# Patient Record
Sex: MALE | Race: WHITE | Employment: OTHER | URBAN - METROPOLITAN AREA
[De-identification: names, ages, dates, MRNs, and addresses within clinical notes are randomized per-mention and may not be internally consistent; named-entity substitution may affect disease eponyms.]

---

## 2017-01-25 ENCOUNTER — TELEPHONE (OUTPATIENT)
Dept: CARDIOLOGY CLINIC | Age: 67
End: 2017-01-25

## 2017-02-01 ENCOUNTER — TELEPHONE (OUTPATIENT)
Dept: CARDIOLOGY CLINIC | Age: 67
End: 2017-02-01

## 2017-02-01 ENCOUNTER — HOSPITAL ENCOUNTER (OUTPATIENT)
Dept: SURGERY | Age: 67
Discharge: HOME | End: 2017-02-01
Attending: OPHTHALMOLOGY | Admitting: OPHTHALMOLOGY

## 2017-02-01 VITALS
HEART RATE: 64 BPM | HEIGHT: 71 IN | BODY MASS INDEX: 28 KG/M2 | WEIGHT: 200 LBS | RESPIRATION RATE: 16 BRPM | OXYGEN SATURATION: 92 % | TEMPERATURE: 97.2 F | SYSTOLIC BLOOD PRESSURE: 102 MMHG | DIASTOLIC BLOOD PRESSURE: 64 MMHG

## 2017-02-01 RX ORDER — SODIUM CHLORIDE, SODIUM LACTATE, POTASSIUM CHLORIDE, CALCIUM CHLORIDE 600; 310; 30; 20 MG/100ML; MG/100ML; MG/100ML; MG/100ML
INJECTION, SOLUTION INTRAVENOUS CONTINUOUS
Status: DISCONTINUED | OUTPATIENT
Start: 2017-02-01 | End: 2017-02-02 | Stop reason: HOSPADM

## 2017-02-01 RX ORDER — CLOPIDOGREL BISULFATE 75 MG/1
75 TABLET ORAL DAILY
Qty: 90 TABLET | Refills: 0 | Status: SHIPPED | OUTPATIENT
Start: 2017-02-01 | End: 2017-04-06 | Stop reason: SDUPTHER

## 2017-02-01 RX ORDER — LISINOPRIL 10 MG/1
10 TABLET ORAL DAILY
Qty: 90 TABLET | Refills: 0 | Status: SHIPPED | OUTPATIENT
Start: 2017-02-01 | End: 2017-04-06 | Stop reason: SDUPTHER

## 2017-02-01 RX ADMIN — SODIUM CHLORIDE, SODIUM LACTATE, POTASSIUM CHLORIDE, CALCIUM CHLORIDE: 600; 310; 30; 20 INJECTION, SOLUTION INTRAVENOUS at 08:38

## 2017-02-01 ASSESSMENT — PAIN SCALES - GENERAL: PAINLEVEL_OUTOF10: 0

## 2017-02-01 ASSESSMENT — PAIN - FUNCTIONAL ASSESSMENT: PAIN_FUNCTIONAL_ASSESSMENT: 0-10

## 2017-02-10 RX ORDER — FUROSEMIDE 20 MG/1
20 TABLET ORAL DAILY
Qty: 90 TABLET | Refills: 0 | Status: SHIPPED | OUTPATIENT
Start: 2017-02-10 | End: 2017-05-16 | Stop reason: SDUPTHER

## 2017-02-22 ENCOUNTER — HOSPITAL ENCOUNTER (OUTPATIENT)
Dept: SURGERY | Age: 67
Discharge: HOME | End: 2017-02-22
Attending: OPHTHALMOLOGY | Admitting: OPHTHALMOLOGY

## 2017-02-22 RX ORDER — LABETALOL HYDROCHLORIDE 5 MG/ML
5 INJECTION, SOLUTION INTRAVENOUS EVERY 10 MIN PRN
Status: DISCONTINUED | OUTPATIENT
Start: 2017-02-22 | End: 2017-02-23 | Stop reason: HOSPADM

## 2017-02-22 RX ORDER — SODIUM CHLORIDE, SODIUM LACTATE, POTASSIUM CHLORIDE, CALCIUM CHLORIDE 600; 310; 30; 20 MG/100ML; MG/100ML; MG/100ML; MG/100ML
INJECTION, SOLUTION INTRAVENOUS CONTINUOUS
Status: DISCONTINUED | OUTPATIENT
Start: 2017-02-22 | End: 2017-02-23 | Stop reason: HOSPADM

## 2017-02-22 RX ORDER — MORPHINE SULFATE 10 MG/ML
2 INJECTION, SOLUTION INTRAMUSCULAR; INTRAVENOUS EVERY 5 MIN PRN
Status: DISCONTINUED | OUTPATIENT
Start: 2017-02-22 | End: 2017-02-23 | Stop reason: HOSPADM

## 2017-02-22 RX ORDER — SODIUM CHLORIDE 0.9 % (FLUSH) 0.9 %
10 SYRINGE (ML) INJECTION EVERY 12 HOURS SCHEDULED
Status: DISCONTINUED | OUTPATIENT
Start: 2017-02-22 | End: 2017-02-23 | Stop reason: HOSPADM

## 2017-02-22 RX ORDER — OXYCODONE HYDROCHLORIDE AND ACETAMINOPHEN 5; 325 MG/1; MG/1
2 TABLET ORAL PRN
Status: ACTIVE | OUTPATIENT
Start: 2017-02-22 | End: 2017-02-22

## 2017-02-22 RX ORDER — MEPERIDINE HYDROCHLORIDE 50 MG/ML
12.5 INJECTION INTRAMUSCULAR; INTRAVENOUS; SUBCUTANEOUS EVERY 5 MIN PRN
Status: DISCONTINUED | OUTPATIENT
Start: 2017-02-22 | End: 2017-02-23 | Stop reason: HOSPADM

## 2017-02-22 RX ORDER — LIDOCAINE HYDROCHLORIDE 10 MG/ML
1 INJECTION, SOLUTION EPIDURAL; INFILTRATION; INTRACAUDAL; PERINEURAL
Status: ACTIVE | OUTPATIENT
Start: 2017-02-22 | End: 2017-02-22

## 2017-02-22 RX ORDER — HYDRALAZINE HYDROCHLORIDE 20 MG/ML
5 INJECTION INTRAMUSCULAR; INTRAVENOUS EVERY 10 MIN PRN
Status: DISCONTINUED | OUTPATIENT
Start: 2017-02-22 | End: 2017-02-23 | Stop reason: HOSPADM

## 2017-02-22 RX ORDER — SODIUM CHLORIDE 0.9 % (FLUSH) 0.9 %
10 SYRINGE (ML) INJECTION PRN
Status: DISCONTINUED | OUTPATIENT
Start: 2017-02-22 | End: 2017-02-23 | Stop reason: HOSPADM

## 2017-02-22 RX ORDER — PROMETHAZINE HYDROCHLORIDE 25 MG/ML
6.25 INJECTION, SOLUTION INTRAMUSCULAR; INTRAVENOUS
Status: ACTIVE | OUTPATIENT
Start: 2017-02-22 | End: 2017-02-22

## 2017-02-22 RX ORDER — OXYCODONE HYDROCHLORIDE AND ACETAMINOPHEN 5; 325 MG/1; MG/1
1 TABLET ORAL PRN
Status: ACTIVE | OUTPATIENT
Start: 2017-02-22 | End: 2017-02-22

## 2017-02-22 RX ORDER — MORPHINE SULFATE 2 MG/ML
1 INJECTION, SOLUTION INTRAMUSCULAR; INTRAVENOUS EVERY 5 MIN PRN
Status: DISCONTINUED | OUTPATIENT
Start: 2017-02-22 | End: 2017-02-23 | Stop reason: HOSPADM

## 2017-02-22 RX ORDER — ONDANSETRON 2 MG/ML
4 INJECTION INTRAMUSCULAR; INTRAVENOUS
Status: ACTIVE | OUTPATIENT
Start: 2017-02-22 | End: 2017-02-22

## 2017-02-22 RX ORDER — DIPHENHYDRAMINE HYDROCHLORIDE 50 MG/ML
12.5 INJECTION INTRAMUSCULAR; INTRAVENOUS
Status: ACTIVE | OUTPATIENT
Start: 2017-02-22 | End: 2017-02-22

## 2017-03-01 ENCOUNTER — HOSPITAL ENCOUNTER (OUTPATIENT)
Dept: SURGERY | Age: 67
Discharge: HOME | End: 2017-03-01
Attending: OPHTHALMOLOGY | Admitting: OPHTHALMOLOGY

## 2017-03-01 VITALS
HEIGHT: 71 IN | RESPIRATION RATE: 16 BRPM | DIASTOLIC BLOOD PRESSURE: 63 MMHG | WEIGHT: 200 LBS | SYSTOLIC BLOOD PRESSURE: 97 MMHG | BODY MASS INDEX: 28 KG/M2 | TEMPERATURE: 97.2 F | OXYGEN SATURATION: 96 % | HEART RATE: 64 BPM

## 2017-03-01 RX ORDER — SODIUM CHLORIDE, SODIUM LACTATE, POTASSIUM CHLORIDE, CALCIUM CHLORIDE 600; 310; 30; 20 MG/100ML; MG/100ML; MG/100ML; MG/100ML
INJECTION, SOLUTION INTRAVENOUS CONTINUOUS
Status: DISCONTINUED | OUTPATIENT
Start: 2017-03-01 | End: 2017-03-02 | Stop reason: HOSPADM

## 2017-03-01 RX ORDER — LIDOCAINE HYDROCHLORIDE 10 MG/ML
1 INJECTION, SOLUTION EPIDURAL; INFILTRATION; INTRACAUDAL; PERINEURAL
Status: ACTIVE | OUTPATIENT
Start: 2017-03-01 | End: 2017-03-01

## 2017-03-01 RX ORDER — SODIUM CHLORIDE 0.9 % (FLUSH) 0.9 %
10 SYRINGE (ML) INJECTION PRN
Status: DISCONTINUED | OUTPATIENT
Start: 2017-03-01 | End: 2017-03-02 | Stop reason: HOSPADM

## 2017-03-01 RX ORDER — SODIUM CHLORIDE 0.9 % (FLUSH) 0.9 %
10 SYRINGE (ML) INJECTION EVERY 12 HOURS SCHEDULED
Status: DISCONTINUED | OUTPATIENT
Start: 2017-03-01 | End: 2017-03-02 | Stop reason: HOSPADM

## 2017-03-01 RX ADMIN — SODIUM CHLORIDE, SODIUM LACTATE, POTASSIUM CHLORIDE, CALCIUM CHLORIDE: 600; 310; 30; 20 INJECTION, SOLUTION INTRAVENOUS at 06:18

## 2017-03-01 ASSESSMENT — PAIN SCALES - GENERAL: PAINLEVEL_OUTOF10: 0

## 2017-03-01 ASSESSMENT — PAIN - FUNCTIONAL ASSESSMENT: PAIN_FUNCTIONAL_ASSESSMENT: 0-10

## 2017-03-09 ENCOUNTER — OFFICE VISIT (OUTPATIENT)
Dept: CARDIOLOGY CLINIC | Age: 67
End: 2017-03-09

## 2017-03-09 VITALS
HEART RATE: 88 BPM | HEIGHT: 70 IN | WEIGHT: 194 LBS | SYSTOLIC BLOOD PRESSURE: 96 MMHG | DIASTOLIC BLOOD PRESSURE: 58 MMHG | BODY MASS INDEX: 27.77 KG/M2 | OXYGEN SATURATION: 95 %

## 2017-03-09 DIAGNOSIS — Z95.810 AICD (AUTOMATIC CARDIOVERTER/DEFIBRILLATOR) PRESENT: ICD-10-CM

## 2017-03-09 DIAGNOSIS — I25.10 CORONARY ARTERY DISEASE INVOLVING NATIVE CORONARY ARTERY OF NATIVE HEART WITHOUT ANGINA PECTORIS: ICD-10-CM

## 2017-03-09 DIAGNOSIS — R07.2 PRECORDIAL PAIN: Primary | ICD-10-CM

## 2017-03-09 PROCEDURE — 99214 OFFICE O/P EST MOD 30 MIN: CPT | Performed by: INTERNAL MEDICINE

## 2017-03-09 RX ORDER — OMEPRAZOLE 20 MG/1
20 TABLET, DELAYED RELEASE ORAL DAILY
Status: ON HOLD | COMMUNITY
End: 2018-12-27

## 2017-03-14 ENCOUNTER — HOSPITAL ENCOUNTER (OUTPATIENT)
Dept: ENDOSCOPY | Age: 67
Discharge: HOME | End: 2017-03-14
Attending: INTERNAL MEDICINE | Admitting: INTERNAL MEDICINE

## 2017-03-14 VITALS
SYSTOLIC BLOOD PRESSURE: 112 MMHG | HEART RATE: 77 BPM | WEIGHT: 194 LBS | OXYGEN SATURATION: 96 % | TEMPERATURE: 97.9 F | DIASTOLIC BLOOD PRESSURE: 75 MMHG | HEIGHT: 71 IN | BODY MASS INDEX: 27.16 KG/M2 | RESPIRATION RATE: 20 BRPM

## 2017-04-07 RX ORDER — LISINOPRIL 10 MG/1
10 TABLET ORAL DAILY
Qty: 90 TABLET | Refills: 3 | Status: SHIPPED | OUTPATIENT
Start: 2017-04-07 | End: 2018-05-01 | Stop reason: SDUPTHER

## 2017-04-07 RX ORDER — CLOPIDOGREL BISULFATE 75 MG/1
75 TABLET ORAL DAILY
Qty: 90 TABLET | Refills: 3 | Status: SHIPPED | OUTPATIENT
Start: 2017-04-07 | End: 2018-05-01 | Stop reason: SDUPTHER

## 2017-05-10 RX ORDER — CARVEDILOL 3.12 MG/1
3.12 TABLET ORAL 2 TIMES DAILY WITH MEALS
Qty: 180 TABLET | Refills: 3 | Status: SHIPPED | OUTPATIENT
Start: 2017-05-10 | End: 2018-06-05 | Stop reason: SDUPTHER

## 2017-05-16 RX ORDER — FUROSEMIDE 20 MG/1
20 TABLET ORAL DAILY
Qty: 90 TABLET | Refills: 2 | Status: SHIPPED | OUTPATIENT
Start: 2017-05-16 | End: 2017-10-27 | Stop reason: SDUPTHER

## 2017-06-06 ENCOUNTER — TELEPHONE (OUTPATIENT)
Dept: CARDIOLOGY CLINIC | Age: 67
End: 2017-06-06

## 2017-06-08 ENCOUNTER — OFFICE VISIT (OUTPATIENT)
Dept: ORTHOPEDIC SURGERY | Age: 67
End: 2017-06-08

## 2017-06-08 VITALS
HEIGHT: 70 IN | BODY MASS INDEX: 27.77 KG/M2 | WEIGHT: 194 LBS | SYSTOLIC BLOOD PRESSURE: 107 MMHG | DIASTOLIC BLOOD PRESSURE: 78 MMHG | HEART RATE: 67 BPM

## 2017-06-08 DIAGNOSIS — M17.10 ARTHRITIS OF KNEE: Primary | ICD-10-CM

## 2017-06-08 PROCEDURE — 99213 OFFICE O/P EST LOW 20 MIN: CPT | Performed by: ORTHOPAEDIC SURGERY

## 2017-06-08 PROCEDURE — 73562 X-RAY EXAM OF KNEE 3: CPT | Performed by: ORTHOPAEDIC SURGERY

## 2017-06-08 RX ORDER — MELOXICAM 15 MG/1
15 TABLET ORAL DAILY
Qty: 30 TABLET | Refills: 0 | Status: SHIPPED | OUTPATIENT
Start: 2017-06-08 | End: 2017-09-21 | Stop reason: ALTCHOICE

## 2017-08-01 ENCOUNTER — NURSE ONLY (OUTPATIENT)
Dept: CARDIOLOGY CLINIC | Age: 67
End: 2017-08-01

## 2017-08-01 DIAGNOSIS — Z95.810 AICD (AUTOMATIC CARDIOVERTER/DEFIBRILLATOR) PRESENT: Primary | ICD-10-CM

## 2017-08-01 DIAGNOSIS — I42.9 CARDIOMYOPATHY, UNSPECIFIED TYPE (HCC): ICD-10-CM

## 2017-08-01 PROCEDURE — 93295 DEV INTERROG REMOTE 1/2/MLT: CPT | Performed by: INTERNAL MEDICINE

## 2017-08-01 PROCEDURE — 93296 REM INTERROG EVL PM/IDS: CPT | Performed by: INTERNAL MEDICINE

## 2017-08-25 ENCOUNTER — HOSPITAL ENCOUNTER (OUTPATIENT)
Dept: OTHER | Age: 67
Discharge: HOME | End: 2017-08-25
Attending: INTERNAL MEDICINE | Admitting: INTERNAL MEDICINE

## 2017-08-25 DIAGNOSIS — E78.5 HYPERLIPIDEMIA, UNSPECIFIED HYPERLIPIDEMIA TYPE: Primary | ICD-10-CM

## 2017-08-25 LAB
CHOLESTEROL, FASTING: 171 MG/DL (ref 0–199)
HDLC SERPL-MCNC: 41 MG/DL (ref 40–60)
LDL CHOLESTEROL CALCULATED: 106 MG/DL
TRIGLYCERIDE, FASTING: 121 MG/DL (ref 0–150)
VLDLC SERPL CALC-MCNC: 24 MG/DL

## 2017-08-27 LAB
ALBUMIN SERPL-MCNC: 5 G/DL (ref 3.4–5)
ALP BLD-CCNC: 93 U/L (ref 40–129)
ALT SERPL-CCNC: 11 U/L (ref 10–40)
AST SERPL-CCNC: 16 U/L (ref 15–37)
BILIRUB SERPL-MCNC: 0.3 MG/DL (ref 0–1)
BILIRUBIN DIRECT: <0.2 MG/DL (ref 0–0.3)
BILIRUBIN, INDIRECT: NORMAL MG/DL (ref 0–1)
TOTAL PROTEIN: 7.7 G/DL (ref 6.4–8.2)

## 2017-08-28 ENCOUNTER — TELEPHONE (OUTPATIENT)
Dept: CARDIOLOGY CLINIC | Age: 67
End: 2017-08-28

## 2017-09-21 ENCOUNTER — OFFICE VISIT (OUTPATIENT)
Dept: CARDIOLOGY CLINIC | Age: 67
End: 2017-09-21

## 2017-09-21 VITALS
BODY MASS INDEX: 27.77 KG/M2 | WEIGHT: 194 LBS | DIASTOLIC BLOOD PRESSURE: 60 MMHG | HEART RATE: 80 BPM | SYSTOLIC BLOOD PRESSURE: 114 MMHG | HEIGHT: 70 IN | OXYGEN SATURATION: 96 %

## 2017-09-21 DIAGNOSIS — Z95.810 AICD (AUTOMATIC CARDIOVERTER/DEFIBRILLATOR) PRESENT: ICD-10-CM

## 2017-09-21 DIAGNOSIS — E78.2 MIXED HYPERLIPIDEMIA: ICD-10-CM

## 2017-09-21 DIAGNOSIS — I50.22 CHRONIC SYSTOLIC CHF (CONGESTIVE HEART FAILURE), NYHA CLASS 3 (HCC): ICD-10-CM

## 2017-09-21 DIAGNOSIS — I25.10 CORONARY ARTERY DISEASE INVOLVING NATIVE CORONARY ARTERY OF NATIVE HEART WITHOUT ANGINA PECTORIS: Primary | ICD-10-CM

## 2017-09-21 PROCEDURE — 99214 OFFICE O/P EST MOD 30 MIN: CPT | Performed by: INTERNAL MEDICINE

## 2017-09-21 RX ORDER — ATORVASTATIN CALCIUM 80 MG/1
80 TABLET, FILM COATED ORAL DAILY
Qty: 90 TABLET | Refills: 3 | Status: SHIPPED | OUTPATIENT
Start: 2017-09-21 | End: 2018-07-23 | Stop reason: ALTCHOICE

## 2017-10-25 NOTE — TELEPHONE ENCOUNTER
Pt states he needs a refill on his gemfibrozil (LOPID) 600 MG tablet sent to [facility] mail order pharmacy. He also states that he only has 1 more refill on his Furosemide 20 mg and would like a new Rx sent for that as well if possible.  Last ov 9/21/17

## 2017-10-26 RX ORDER — GEMFIBROZIL 600 MG/1
600 TABLET, FILM COATED ORAL
Qty: 180 TABLET | Refills: 3 | Status: SHIPPED | OUTPATIENT
Start: 2017-10-26 | End: 2017-10-27 | Stop reason: SDUPTHER

## 2017-10-27 RX ORDER — GEMFIBROZIL 600 MG/1
600 TABLET, FILM COATED ORAL
Qty: 180 TABLET | Refills: 3 | Status: SHIPPED | OUTPATIENT
Start: 2017-10-27 | End: 2018-11-26 | Stop reason: SDUPTHER

## 2017-10-27 RX ORDER — FUROSEMIDE 20 MG/1
20 TABLET ORAL DAILY
Qty: 90 TABLET | Refills: 2 | Status: SHIPPED | OUTPATIENT
Start: 2017-10-27 | End: 2018-05-01 | Stop reason: SDUPTHER

## 2017-10-31 ENCOUNTER — NURSE ONLY (OUTPATIENT)
Dept: CARDIOLOGY CLINIC | Age: 67
End: 2017-10-31

## 2017-10-31 DIAGNOSIS — Z95.810 AICD (AUTOMATIC CARDIOVERTER/DEFIBRILLATOR) PRESENT: Primary | ICD-10-CM

## 2017-10-31 DIAGNOSIS — I42.9 CARDIOMYOPATHY, UNSPECIFIED TYPE (HCC): ICD-10-CM

## 2017-10-31 PROCEDURE — 93295 DEV INTERROG REMOTE 1/2/MLT: CPT | Performed by: INTERNAL MEDICINE

## 2017-10-31 PROCEDURE — 93296 REM INTERROG EVL PM/IDS: CPT | Performed by: INTERNAL MEDICINE

## 2017-10-31 NOTE — LETTER
[facility] [phone]  [address] [place] [address]  Martine Cooks [address] [facility] [phone]    Pacemaker/Defibrillator Clinic          11/07/17        Matteo Yap  [address]  [place] [address]        Dear Matteo Yap    This letter is to inform you that we received the transmission from your monitor at home that checks your pacemaker and/or defibrillator, or implanted heart monitor. Everything is within normal limits. The next date your monitor will automatically transmit will be 1/30/18. Please do not send additional routine transmissions unless specifically requested. Your device and monitor are wireless and most transmit cellularly, but please periodically check your monitor is still plugged in to the electrical outlet. If you still use the telephone land line to send please ensure the connection to the phone antwan is secure. This will help to ensure successful automatic transmissions in the future. Also, the monitor needs to be close to you while sleeping at night. Please be aware that the remote device transmission sites are periodically monitored only during regular business hours during which simultaneous in-office device clinics are being run. If your transmission requires attention, we will contact you as soon as possible. Thank you.             Larissa [address]

## 2018-01-30 ENCOUNTER — NURSE ONLY (OUTPATIENT)
Dept: CARDIOLOGY CLINIC | Age: 68
End: 2018-01-30

## 2018-01-30 DIAGNOSIS — Z95.810 AICD (AUTOMATIC CARDIOVERTER/DEFIBRILLATOR) PRESENT: Primary | ICD-10-CM

## 2018-01-30 DIAGNOSIS — I42.9 CARDIOMYOPATHY, UNSPECIFIED TYPE (HCC): ICD-10-CM

## 2018-01-30 PROCEDURE — 93296 REM INTERROG EVL PM/IDS: CPT | Performed by: INTERNAL MEDICINE

## 2018-01-30 PROCEDURE — 93295 DEV INTERROG REMOTE 1/2/MLT: CPT | Performed by: INTERNAL MEDICINE

## 2018-05-01 ENCOUNTER — NURSE ONLY (OUTPATIENT)
Dept: CARDIOLOGY CLINIC | Age: 68
End: 2018-05-01

## 2018-05-01 DIAGNOSIS — Z95.810 AICD (AUTOMATIC CARDIOVERTER/DEFIBRILLATOR) PRESENT: Primary | ICD-10-CM

## 2018-05-01 DIAGNOSIS — I42.9 CARDIOMYOPATHY, UNSPECIFIED TYPE (HCC): ICD-10-CM

## 2018-05-01 PROCEDURE — 93295 DEV INTERROG REMOTE 1/2/MLT: CPT | Performed by: INTERNAL MEDICINE

## 2018-05-01 PROCEDURE — 93296 REM INTERROG EVL PM/IDS: CPT | Performed by: INTERNAL MEDICINE

## 2018-05-01 RX ORDER — CLOPIDOGREL BISULFATE 75 MG/1
75 TABLET ORAL DAILY
Qty: 90 TABLET | Refills: 5 | Status: SHIPPED | OUTPATIENT
Start: 2018-05-01 | End: 2019-03-15 | Stop reason: SDUPTHER

## 2018-05-01 RX ORDER — FUROSEMIDE 20 MG/1
20 TABLET ORAL DAILY
Qty: 90 TABLET | Refills: 5 | Status: SHIPPED | OUTPATIENT
Start: 2018-05-01 | End: 2019-03-15 | Stop reason: SDUPTHER

## 2018-05-01 RX ORDER — LISINOPRIL 10 MG/1
10 TABLET ORAL DAILY
Qty: 90 TABLET | Refills: 5 | Status: ON HOLD | OUTPATIENT
Start: 2018-05-01 | End: 2018-12-31

## 2018-05-29 ENCOUNTER — HOSPITAL ENCOUNTER (OUTPATIENT)
Dept: OTHER | Age: 68
Discharge: HOME | End: 2018-05-29
Attending: INTERNAL MEDICINE | Admitting: INTERNAL MEDICINE

## 2018-05-29 LAB
CHOLESTEROL, FASTING: 180 MG/DL (ref 0–199)
HDLC SERPL-MCNC: 47 MG/DL (ref 40–60)
LDL CHOLESTEROL CALCULATED: 106 MG/DL
TRIGLYCERIDE, FASTING: 133 MG/DL (ref 0–150)
VLDLC SERPL CALC-MCNC: 27 MG/DL

## 2018-05-30 DIAGNOSIS — Z79.899 MEDICATION MANAGEMENT: Primary | ICD-10-CM

## 2018-05-30 RX ORDER — ROSUVASTATIN CALCIUM 40 MG/1
40 TABLET, COATED ORAL EVERY EVENING
Qty: 30 TABLET | Refills: 5 | Status: SHIPPED | OUTPATIENT
Start: 2018-05-30 | End: 2018-07-23 | Stop reason: SDUPTHER

## 2018-06-05 RX ORDER — CARVEDILOL 3.12 MG/1
3.12 TABLET ORAL 2 TIMES DAILY WITH MEALS
Qty: 180 TABLET | Refills: 3 | Status: SHIPPED | OUTPATIENT
Start: 2018-06-05 | End: 2019-03-15 | Stop reason: SDUPTHER

## 2018-06-26 ENCOUNTER — PROCEDURE VISIT (OUTPATIENT)
Dept: CARDIOLOGY CLINIC | Age: 68
End: 2018-06-26

## 2018-06-26 DIAGNOSIS — Z95.810 AICD (AUTOMATIC CARDIOVERTER/DEFIBRILLATOR) PRESENT: ICD-10-CM

## 2018-07-23 ENCOUNTER — TELEPHONE (OUTPATIENT)
Dept: CARDIOLOGY CLINIC | Age: 68
End: 2018-07-23

## 2018-07-23 ENCOUNTER — OFFICE VISIT (OUTPATIENT)
Dept: CARDIOLOGY CLINIC | Age: 68
End: 2018-07-23

## 2018-07-23 VITALS
HEART RATE: 78 BPM | WEIGHT: 194 LBS | BODY MASS INDEX: 27.77 KG/M2 | OXYGEN SATURATION: 97 % | HEIGHT: 70 IN | SYSTOLIC BLOOD PRESSURE: 116 MMHG | DIASTOLIC BLOOD PRESSURE: 66 MMHG

## 2018-07-23 DIAGNOSIS — E78.2 MIXED HYPERLIPIDEMIA: ICD-10-CM

## 2018-07-23 DIAGNOSIS — Z95.810 AICD (AUTOMATIC CARDIOVERTER/DEFIBRILLATOR) PRESENT: ICD-10-CM

## 2018-07-23 DIAGNOSIS — I25.10 CORONARY ARTERY DISEASE INVOLVING NATIVE CORONARY ARTERY OF NATIVE HEART WITHOUT ANGINA PECTORIS: Primary | ICD-10-CM

## 2018-07-23 DIAGNOSIS — I50.22 CHRONIC SYSTOLIC CHF (CONGESTIVE HEART FAILURE), NYHA CLASS 3 (HCC): ICD-10-CM

## 2018-07-23 PROCEDURE — 4040F PNEUMOC VAC/ADMIN/RCVD: CPT | Performed by: INTERNAL MEDICINE

## 2018-07-23 PROCEDURE — G8427 DOCREV CUR MEDS BY ELIG CLIN: HCPCS | Performed by: INTERNAL MEDICINE

## 2018-07-23 PROCEDURE — 1101F PT FALLS ASSESS-DOCD LE1/YR: CPT | Performed by: INTERNAL MEDICINE

## 2018-07-23 PROCEDURE — 3017F COLORECTAL CA SCREEN DOC REV: CPT | Performed by: INTERNAL MEDICINE

## 2018-07-23 PROCEDURE — G8598 ASA/ANTIPLAT THER USED: HCPCS | Performed by: INTERNAL MEDICINE

## 2018-07-23 PROCEDURE — 1123F ACP DISCUSS/DSCN MKR DOCD: CPT | Performed by: INTERNAL MEDICINE

## 2018-07-23 PROCEDURE — 4004F PT TOBACCO SCREEN RCVD TLK: CPT | Performed by: INTERNAL MEDICINE

## 2018-07-23 PROCEDURE — 99214 OFFICE O/P EST MOD 30 MIN: CPT | Performed by: INTERNAL MEDICINE

## 2018-07-23 PROCEDURE — G8419 CALC BMI OUT NRM PARAM NOF/U: HCPCS | Performed by: INTERNAL MEDICINE

## 2018-07-23 RX ORDER — ROSUVASTATIN CALCIUM 40 MG/1
40 TABLET, COATED ORAL EVERY EVENING
Qty: 90 TABLET | Refills: 3 | Status: SHIPPED | OUTPATIENT
Start: 2018-07-23 | End: 2019-03-15 | Stop reason: SDUPTHER

## 2018-07-23 NOTE — PROGRESS NOTES
Place 1 tablet under the tongue every 5 minutes as needed for Chest pain 8/29/16  Yes Mehrdad Arshad MD   aspirin 81 MG tablet Take 81 mg by mouth daily. Yes Historical Provider, MD        Allergies:  Patient has no known allergies. Review of Systems:   · Constitutional: there has been no unanticipated weight loss. There's been no change in energy level, sleep pattern, or activity level. · Eyes: No visual changes or diplopia. No scleral icterus. · ENT: No Headaches, hearing loss or vertigo. No mouth sores or sore throat. · Cardiovascular: Reviewed in HPI  · Respiratory: No cough or wheezing, no sputum production. No hematemesis. · Gastrointestinal: No abdominal pain, appetite loss, blood in stools. No change in bowel or bladder habits. · Genitourinary: No dysuria, trouble voiding, or hematuria. · Musculoskeletal:  No gait disturbance, weakness or joint complaints. · Integumentary: No rash or pruritis. · Neurological: No headache, diplopia, change in muscle strength, numbness or tingling. No change in gait, balance, coordination, mood, affect, memory, mentation, behavior. · Psychiatric: No anxiety, no depression. · Endocrine: No malaise, fatigue or temperature intolerance. No excessive thirst, fluid intake, or urination. No tremor. · Hematologic/Lymphatic: No abnormal bruising or bleeding, blood clots or swollen lymph nodes. · Allergic/Immunologic: No nasal congestion or hives.     Physical Examination:    Vitals:    07/23/18 1451   BP: 116/66   Pulse: 78   SpO2: 97%        Constitutional and General Appearance: NAD   Respiratory:  · Normal excursion and expansion without use of accessory muscles  · Resp Auscultation: soft clear bs  Cardiovascular:  · The apical impulses not displaced  · Heart tones are crisp and normal  · Cervical veins are not engorged  · The carotid upstroke is normal in amplitude and contour without delay or bruit  · Distant soft S1S2, No S3, No Murmur  · Peripheral anterior and apical  akinesis. (no change from stress test from 11/13 and 2/15 studies). I have educated Mr. Blair Swenson that he may occasionally get SOB and/or CP related to his ischemic CM but to call if these increase in severity or frequency. Device check 9/21/17 showed 2 short brief episodes of VT. Most recent device check 5/1/18 Latitude remote transmission shows normal function. 1 NEW NSVT EVENT AND 1 NEW PAT EPISODE RECORDED IN THE LAST 3 MONTHS, both < 7 seconds      2. CAD:  Cardiac cath 6/2014 --> Stable known ischemic heart disease. He also has hx MI. There are no concerning symptoms for angina currently. Atypical chest pain may be due to severe cardiomyopathy. No need for cardiac testing at this time. 3. Hyperlipidemia:  Most recent lipids 5/29/18 see results I personally reviewed (see above). I switched him from high dose lipitor to crestor in late May due to YCC=497. Recheck labs now cmp, lipids      Plan:  1. He is doing well overall from a cardiac standpoint. No need for cardiac testing at this time. 2.  Meds reviewed and refilled as warranted  3. Follow up with me in 6 months   4. Smoking cessation is likely not going to happen. He is not very interested. Cost of prescription medications and patient compliance have been reviewed with patient. All questions answered. Thank you for allowing me to participate in the care of this individual.    Anthony Mcadams.  Aria Howard M.D., [facility]

## 2018-07-23 NOTE — COMMUNICATION BODY
day.  His wife is present at exam today. Past Medical History:   has a past medical history of AICD (automatic cardioverter/defibrillator) present; CAD (coronary artery disease); CHF (congestive heart failure) ([facility] [address] Chronic systolic CHF (congestive heart failure), NYHA class 3 ([facility] [address] Hyperlipidemia; and MI (myocardial infarction). Surgical History:   has a past surgical history that includes Coronary angioplasty with stent; Cardiac defibrillator placement; knee surgery; Cardiac defibrillator placement; Colonoscopy; Cataract removal with implant (Right, 02/01/2017); Cataract removal with implant (Left, 03/01/2017); and eye surgery. Social History:   reports that he has been smoking Cigarettes. He has a 54.00 pack-year smoking history. He has never used smokeless tobacco. He reports that he does not drink alcohol or use drugs. Family History:  family history includes Cancer in his father; Diabetes in his mother. Home Medications:  Prior to Admission medications    Medication Sig Start Date End Date Taking?  Authorizing Provider   rosuvastatin (CRESTOR) 40 MG tablet Take 1 tablet by mouth every evening 7/23/18  Yes Nohemi Salter MD   carvedilol (COREG) 3.125 MG tablet Take 1 tablet by mouth 2 times daily (with meals) 6/5/18  Yes Nohemi Salter MD   clopidogrel (PLAVIX) 75 MG tablet Take 1 tablet by mouth daily 5/1/18  Yes Nohemi Salter MD   furosemide (LASIX) 20 MG tablet Take 1 tablet by mouth daily 5/1/18  Yes Nohemi Salter MD   lisinopril (PRINIVIL;ZESTRIL) 10 MG tablet Take 1 tablet by mouth daily 5/1/18  Yes Nohemi Salter MD   gemfibrozil (LOPID) 600 MG tablet Take 1 tablet by mouth 2 times daily (before meals) 10/27/17  Yes Nohemi Salter MD   omeprazole (PRILOSEC OTC) 20 MG tablet Take 20 mg by mouth daily   Yes Historical Provider, MD   tamsulosin (FLOMAX) 0.4 MG capsule Take 0.4 mg by mouth daily   Yes Historical Provider, MD   nitroGLYCERIN (NITROSTAT) 0.4 MG SL tablet Place 1 tablet under the tongue every 5 minutes as needed for Chest pain 8/29/16  Yes Iliana Hernandez MD   aspirin 81 MG tablet Take 81 mg by mouth daily. Yes Historical Provider, MD        Allergies:  Patient has no known allergies. Review of Systems:   · Constitutional: there has been no unanticipated weight loss. There's been no change in energy level, sleep pattern, or activity level. · Eyes: No visual changes or diplopia. No scleral icterus. · ENT: No Headaches, hearing loss or vertigo. No mouth sores or sore throat. · Cardiovascular: Reviewed in HPI  · Respiratory: No cough or wheezing, no sputum production. No hematemesis. · Gastrointestinal: No abdominal pain, appetite loss, blood in stools. No change in bowel or bladder habits. · Genitourinary: No dysuria, trouble voiding, or hematuria. · Musculoskeletal:  No gait disturbance, weakness or joint complaints. · Integumentary: No rash or pruritis. · Neurological: No headache, diplopia, change in muscle strength, numbness or tingling. No change in gait, balance, coordination, mood, affect, memory, mentation, behavior. · Psychiatric: No anxiety, no depression. · Endocrine: No malaise, fatigue or temperature intolerance. No excessive thirst, fluid intake, or urination. No tremor. · Hematologic/Lymphatic: No abnormal bruising or bleeding, blood clots or swollen lymph nodes. · Allergic/Immunologic: No nasal congestion or hives.     Physical Examination:    Vitals:    07/23/18 1451   BP: 116/66   Pulse: 78   SpO2: 97%        Constitutional and General Appearance: NAD   Respiratory:  · Normal excursion and expansion without use of accessory muscles  · Resp Auscultation: soft clear bs  Cardiovascular:  · The apical impulses not displaced  · Heart tones are crisp and normal  · Cervical veins are not engorged  · The carotid upstroke is normal in amplitude and contour without delay or bruit  · Distant soft S1S2, No S3, No Murmur  · Peripheral

## 2018-07-23 NOTE — TELEPHONE ENCOUNTER
Pt had ov with SMM today and was told to call Claude Ochoa to discuss getting a machine to do at home transmissions. Emeli Oliveros unavailable at time of call. Please call pt to discuss.

## 2018-07-23 NOTE — TELEPHONE ENCOUNTER
So I talked to the patient. He states that he no longer has a land line. He states they went with direct TV and [employer] for cell phones. What does he need to do to restart the transmissions?

## 2018-07-23 NOTE — TELEPHONE ENCOUNTER
I will order him a cellular transmitter. It will connect to the home monitor, then keep the monitor plugged into the electrical outlet and it will work wirelessly. He will not need to use any landline.

## 2018-07-23 NOTE — TELEPHONE ENCOUNTER
He has a machine at home, I ordered it for him 6/6/2017. He received the latitude remote transmitter and set it up and I told him it was working 1 year ago. It has been sending wireless transmissions since 8/2017. See notes from me and cardiology/paceart reports. His last transmission was 6/28/18. As of 7/17/18 it appears that the monitor came unplugged? ?? bc there has been no communication since then. He was sent a letter today stating to check his monitor bc he is due for remote transmission 7-31-18. If he has questions about the monitor please have him call [employer] at  [phone]. Please inform the patient.  Thank you

## 2018-07-24 NOTE — PROGRESS NOTES
See PACEART report under Cardiology tab. Latitude remote transmission shows normal function. 4 brief NSVT events recorded in the last 3 months-he is on Coreg 3.125 BID. new 6280 latitude monitor and cell adaptor ordered.     No charge

## 2018-08-03 ENCOUNTER — TELEPHONE (OUTPATIENT)
Dept: CARDIOLOGY CLINIC | Age: 68
End: 2018-08-03

## 2018-08-03 NOTE — TELEPHONE ENCOUNTER
Called pt. Gave him the number for [employer] to provide assistance for him with regard to his equipment and answer all his questions.

## 2018-09-25 ENCOUNTER — NURSE ONLY (OUTPATIENT)
Dept: CARDIOLOGY CLINIC | Age: 68
End: 2018-09-25
Payer: MEDICARE

## 2018-09-25 DIAGNOSIS — Z95.810 AICD (AUTOMATIC CARDIOVERTER/DEFIBRILLATOR) PRESENT: ICD-10-CM

## 2018-09-25 PROCEDURE — 93296 REM INTERROG EVL PM/IDS: CPT | Performed by: INTERNAL MEDICINE

## 2018-09-25 PROCEDURE — 93295 DEV INTERROG REMOTE 1/2/MLT: CPT | Performed by: INTERNAL MEDICINE

## 2018-09-25 NOTE — LETTER
[facility] [phone]  [facility] [address] [phone]    Pacemaker/Defibrillator Clinic          10/01/18        Nasrin Ambriz  [address]  [place] [address]        Dear Nasrin Ambriz    This letter is to inform you that we received the transmission from your monitor at home that checks your pacemaker and/or defibrillator, or implanted heart monitor. Your device shows normal function. The next date your monitor will automatically transmit will be 1-8-19. Please do not send additional routine transmissions unless specifically requested. Your device and monitor are wireless and most transmit cellularly, but please periodically check your monitor is still plugged in to the electrical outlet. If you still use the telephone land line to send please ensure the connection to the phone antwan is secure. This will help to ensure successful automatic transmissions in the future. Also, the monitor needs to be close to you while sleeping at night. Please be aware that the remote device transmission sites are periodically monitored only during regular business hours during which simultaneous in-office device clinics are being run. If your transmission requires attention, we will contact you as soon as possible. Thank you.             Larissa 81

## 2018-11-26 RX ORDER — GEMFIBROZIL 600 MG/1
600 TABLET, FILM COATED ORAL
Qty: 180 TABLET | Refills: 5 | Status: SHIPPED | OUTPATIENT
Start: 2018-11-26 | End: 2019-03-15 | Stop reason: SDUPTHER

## 2018-12-04 ENCOUNTER — HOSPITAL ENCOUNTER (OUTPATIENT)
Age: 68
Discharge: HOME | End: 2018-12-04
Payer: MEDICARE

## 2018-12-04 PROCEDURE — 84100 ASSAY OF PHOSPHORUS: CPT

## 2018-12-04 PROCEDURE — 80076 HEPATIC FUNCTION PANEL: CPT

## 2018-12-04 PROCEDURE — 80048 BASIC METABOLIC PNL TOTAL CA: CPT

## 2018-12-04 PROCEDURE — 85025 COMPLETE CBC W/AUTO DIFF WBC: CPT

## 2018-12-04 PROCEDURE — 36415 COLL VENOUS BLD VENIPUNCTURE: CPT

## 2018-12-05 ENCOUNTER — OFFICE VISIT (OUTPATIENT)
Dept: ORTHOPEDIC SURGERY | Age: 68
End: 2018-12-05
Payer: MEDICARE

## 2018-12-05 VITALS
SYSTOLIC BLOOD PRESSURE: 103 MMHG | WEIGHT: 194 LBS | DIASTOLIC BLOOD PRESSURE: 67 MMHG | BODY MASS INDEX: 27.77 KG/M2 | HEIGHT: 70 IN | HEART RATE: 92 BPM

## 2018-12-05 DIAGNOSIS — M25.561 RIGHT KNEE PAIN, UNSPECIFIED CHRONICITY: Primary | ICD-10-CM

## 2018-12-05 LAB
ALBUMIN SERPL-MCNC: 4.9 G/DL (ref 3.4–5)
ALP BLD-CCNC: 77 U/L (ref 40–129)
ALT SERPL-CCNC: 18 U/L (ref 10–40)
ANION GAP SERPL CALCULATED.3IONS-SCNC: 16 MMOL/L (ref 3–16)
AST SERPL-CCNC: 20 U/L (ref 15–37)
BASOPHILS ABSOLUTE: 0.1 K/UL (ref 0–0.2)
BASOPHILS RELATIVE PERCENT: 1 %
BILIRUB SERPL-MCNC: 0.4 MG/DL (ref 0–1)
BILIRUBIN DIRECT: <0.2 MG/DL (ref 0–0.3)
BILIRUBIN, INDIRECT: ABNORMAL MG/DL (ref 0–1)
BUN BLDV-MCNC: 24 MG/DL (ref 7–20)
CALCIUM SERPL-MCNC: 9.8 MG/DL (ref 8.3–10.6)
CHLORIDE BLD-SCNC: 101 MMOL/L (ref 99–110)
CO2: 23 MMOL/L (ref 21–32)
CREAT SERPL-MCNC: 1.3 MG/DL (ref 0.8–1.3)
EOSINOPHILS ABSOLUTE: 0.4 K/UL (ref 0–0.6)
EOSINOPHILS RELATIVE PERCENT: 4 %
GFR AFRICAN AMERICAN: >60
GFR NON-AFRICAN AMERICAN: 55
GLUCOSE BLD-MCNC: 103 MG/DL (ref 70–99)
HCT VFR BLD CALC: 37.4 % (ref 40.5–52.5)
HEMOGLOBIN: 12.5 G/DL (ref 13.5–17.5)
LYMPHOCYTES ABSOLUTE: 2.5 K/UL (ref 1–5.1)
LYMPHOCYTES RELATIVE PERCENT: 28 %
MCH RBC QN AUTO: 31.8 PG (ref 26–34)
MCHC RBC AUTO-ENTMCNC: 33.4 G/DL (ref 31–36)
MCV RBC AUTO: 95.4 FL (ref 80–100)
MONOCYTES ABSOLUTE: 1 K/UL (ref 0–1.3)
MONOCYTES RELATIVE PERCENT: 11 %
NEUTROPHILS ABSOLUTE: 5 K/UL (ref 1.7–7.7)
NEUTROPHILS RELATIVE PERCENT: 56 %
NUCLEATED RED BLOOD CELLS: 1 /100 WBC
PDW BLD-RTO: 13.9 % (ref 12.4–15.4)
PHOSPHORUS: 2.6 MG/DL (ref 2.5–4.9)
PLATELET # BLD: 234 K/UL (ref 135–450)
PLATELET SLIDE REVIEW: ADEQUATE
PMV BLD AUTO: 8.9 FL (ref 5–10.5)
POTASSIUM SERPL-SCNC: 4.6 MMOL/L (ref 3.5–5.1)
RBC # BLD: 3.93 M/UL (ref 4.2–5.9)
RBC # BLD: NORMAL 10*6/UL
SLIDE REVIEW: ABNORMAL
SODIUM BLD-SCNC: 140 MMOL/L (ref 136–145)
TOTAL PROTEIN: 6.3 G/DL (ref 6.4–8.2)
WBC # BLD: 9 K/UL (ref 4–11)

## 2018-12-05 PROCEDURE — 99213 OFFICE O/P EST LOW 20 MIN: CPT | Performed by: ORTHOPAEDIC SURGERY

## 2018-12-05 PROCEDURE — 1123F ACP DISCUSS/DSCN MKR DOCD: CPT | Performed by: ORTHOPAEDIC SURGERY

## 2018-12-05 PROCEDURE — 3017F COLORECTAL CA SCREEN DOC REV: CPT | Performed by: ORTHOPAEDIC SURGERY

## 2018-12-05 PROCEDURE — 20610 DRAIN/INJ JOINT/BURSA W/O US: CPT | Performed by: ORTHOPAEDIC SURGERY

## 2018-12-05 PROCEDURE — 1101F PT FALLS ASSESS-DOCD LE1/YR: CPT | Performed by: ORTHOPAEDIC SURGERY

## 2018-12-05 PROCEDURE — G8419 CALC BMI OUT NRM PARAM NOF/U: HCPCS | Performed by: ORTHOPAEDIC SURGERY

## 2018-12-05 PROCEDURE — 4004F PT TOBACCO SCREEN RCVD TLK: CPT | Performed by: ORTHOPAEDIC SURGERY

## 2018-12-05 PROCEDURE — 4040F PNEUMOC VAC/ADMIN/RCVD: CPT | Performed by: ORTHOPAEDIC SURGERY

## 2018-12-05 PROCEDURE — G8484 FLU IMMUNIZE NO ADMIN: HCPCS | Performed by: ORTHOPAEDIC SURGERY

## 2018-12-05 PROCEDURE — G8427 DOCREV CUR MEDS BY ELIG CLIN: HCPCS | Performed by: ORTHOPAEDIC SURGERY

## 2018-12-05 PROCEDURE — G8598 ASA/ANTIPLAT THER USED: HCPCS | Performed by: ORTHOPAEDIC SURGERY

## 2018-12-05 NOTE — PROGRESS NOTES
Subjective: Patient states that he is here for follow-up of his right knee osteoarthritis. He had previous meniscectomy years ago and then I did a arthroscopy meniscectomy chondroplasty 2005/2006. He states it helped him for years. States he's had recent increased pain on the medial aspect of his right knee. [employer] on it makes it worse getting off of it makes it better. I last saw him in June 2017. Evident of 10. Walking makes it worse heat makes it better  Objective: Physical exam shows he is alert and oriented ×3. He has a mild effusion in the right knee. Tenderness along the medial joint line of the right knee. Strength is 4+ over 5 the knee flexion extension anterior drawer and posterior drawer and Lachman's are all negative. Questionably positive bounce home and Sandra's. Full range of motion of the left hip knee and ankle with only mild tenderness over the left medial joint line. Crepitus with range of motion bilaterally. Ambulates with an antalgic gait  Imaging: 3 views of the right knee show medial compartment narrowing as well as spur formation of the lateral aspect of the patellofemoral joint  Assessment and plan: Patient has posttraumatic arthritis of the right knee. I discussed with him operative and nonoperative treatments. He does not want to consider a knee replacement right now. He's had some recent skin issues in the right leg so I don't think he would do well with a knee arthroscopy just yet. I injected his right knee with Marcaine and lidocaine and Kenalog 4/4/2 mL under sterile technique in the anterolateral portal for osteoarthritis. He tolerated this well. I'll see him back in 6 weeks. If it's still problematic than I would scope him.   Review of systems was performed based on the history intake sheet dated 12/5/18 and copy to the medial section of his chart

## 2018-12-18 ENCOUNTER — HOSPITAL ENCOUNTER (OUTPATIENT)
Age: 68
Discharge: HOME | End: 2018-12-18
Payer: MEDICARE

## 2018-12-18 LAB
HAV IGM SER IA-ACNC: NORMAL
HEPATITIS B CORE IGM ANTIBODY: NORMAL
HEPATITIS B SURFACE ANTIGEN INTERPRETATION: NORMAL
HEPATITIS C ANTIBODY INTERPRETATION: NORMAL

## 2018-12-18 PROCEDURE — 36415 COLL VENOUS BLD VENIPUNCTURE: CPT

## 2018-12-18 PROCEDURE — 80074 ACUTE HEPATITIS PANEL: CPT

## 2018-12-27 ENCOUNTER — APPOINTMENT (OUTPATIENT)
Dept: GENERAL RADIOLOGY | Age: 68
DRG: 315 | End: 2018-12-27
Payer: MEDICARE

## 2018-12-27 ENCOUNTER — HOSPITAL ENCOUNTER (INPATIENT)
Age: 68
LOS: 4 days | Discharge: HOME | DRG: 315 | End: 2018-12-31
Attending: EMERGENCY MEDICINE | Admitting: INTERNAL MEDICINE
Payer: MEDICARE

## 2018-12-27 DIAGNOSIS — I95.9 HYPOTENSION, UNSPECIFIED HYPOTENSION TYPE: ICD-10-CM

## 2018-12-27 DIAGNOSIS — R07.9 CHEST PAIN, UNSPECIFIED TYPE: Primary | ICD-10-CM

## 2018-12-27 LAB
A/G RATIO: 1.6 (ref 1.1–2.2)
ALBUMIN SERPL-MCNC: 4.7 G/DL (ref 3.4–5)
ALP BLD-CCNC: 82 U/L (ref 40–129)
ALT SERPL-CCNC: 13 U/L (ref 10–40)
ANION GAP SERPL CALCULATED.3IONS-SCNC: 12 MMOL/L (ref 3–16)
AST SERPL-CCNC: 16 U/L (ref 15–37)
BASOPHILS ABSOLUTE: 0.1 K/UL (ref 0–0.2)
BASOPHILS RELATIVE PERCENT: 0.9 %
BILIRUB SERPL-MCNC: <0.2 MG/DL (ref 0–1)
BUN BLDV-MCNC: 40 MG/DL (ref 7–20)
CALCIUM SERPL-MCNC: 10.1 MG/DL (ref 8.3–10.6)
CHLORIDE BLD-SCNC: 104 MMOL/L (ref 99–110)
CO2: 22 MMOL/L (ref 21–32)
CREAT SERPL-MCNC: 1.6 MG/DL (ref 0.8–1.3)
D DIMER: <200 NG/ML DDU (ref 0–229)
EKG ATRIAL RATE: 85 BPM
EKG DIAGNOSIS: NORMAL
EKG P AXIS: 74 DEGREES
EKG P-R INTERVAL: 182 MS
EKG Q-T INTERVAL: 332 MS
EKG QRS DURATION: 110 MS
EKG QTC CALCULATION (BAZETT): 395 MS
EKG R AXIS: 89 DEGREES
EKG T AXIS: 85 DEGREES
EKG VENTRICULAR RATE: 85 BPM
EOSINOPHILS ABSOLUTE: 0.2 K/UL (ref 0–0.6)
EOSINOPHILS RELATIVE PERCENT: 2.6 %
GFR AFRICAN AMERICAN: 52
GFR NON-AFRICAN AMERICAN: 43
GLOBULIN: 2.9 G/DL
GLUCOSE BLD-MCNC: 108 MG/DL (ref 70–99)
HCT VFR BLD CALC: 37.4 % (ref 40.5–52.5)
HEMOGLOBIN: 12.5 G/DL (ref 13.5–17.5)
LYMPHOCYTES ABSOLUTE: 1.2 K/UL (ref 1–5.1)
LYMPHOCYTES RELATIVE PERCENT: 16.2 %
MCH RBC QN AUTO: 32.2 PG (ref 26–34)
MCHC RBC AUTO-ENTMCNC: 33.3 G/DL (ref 31–36)
MCV RBC AUTO: 96.6 FL (ref 80–100)
MONOCYTES ABSOLUTE: 0.4 K/UL (ref 0–1.3)
MONOCYTES RELATIVE PERCENT: 5.4 %
NEUTROPHILS ABSOLUTE: 5.5 K/UL (ref 1.7–7.7)
NEUTROPHILS RELATIVE PERCENT: 74.9 %
PDW BLD-RTO: 13.4 % (ref 12.4–15.4)
PLATELET # BLD: 207 K/UL (ref 135–450)
PMV BLD AUTO: 7.8 FL (ref 5–10.5)
POTASSIUM SERPL-SCNC: 4.4 MMOL/L (ref 3.5–5.1)
RBC # BLD: 3.87 M/UL (ref 4.2–5.9)
SODIUM BLD-SCNC: 138 MMOL/L (ref 136–145)
TOTAL PROTEIN: 7.6 G/DL (ref 6.4–8.2)
TROPONIN: <0.01 NG/ML
WBC # BLD: 7.3 K/UL (ref 4–11)

## 2018-12-27 PROCEDURE — G0378 HOSPITAL OBSERVATION PER HR: HCPCS

## 2018-12-27 PROCEDURE — 99285 EMERGENCY DEPT VISIT HI MDM: CPT

## 2018-12-27 PROCEDURE — 2060000000 HC ICU INTERMEDIATE R&B

## 2018-12-27 PROCEDURE — 2580000003 HC RX 258: Performed by: INTERNAL MEDICINE

## 2018-12-27 PROCEDURE — 80053 COMPREHEN METABOLIC PANEL: CPT

## 2018-12-27 PROCEDURE — 85379 FIBRIN DEGRADATION QUANT: CPT

## 2018-12-27 PROCEDURE — 93010 ELECTROCARDIOGRAM REPORT: CPT | Performed by: INTERNAL MEDICINE

## 2018-12-27 PROCEDURE — 6370000000 HC RX 637 (ALT 250 FOR IP): Performed by: INTERNAL MEDICINE

## 2018-12-27 PROCEDURE — 96361 HYDRATE IV INFUSION ADD-ON: CPT

## 2018-12-27 PROCEDURE — 84484 ASSAY OF TROPONIN QUANT: CPT

## 2018-12-27 PROCEDURE — 2580000003 HC RX 258: Performed by: PHYSICIAN ASSISTANT

## 2018-12-27 PROCEDURE — 96360 HYDRATION IV INFUSION INIT: CPT

## 2018-12-27 PROCEDURE — 85025 COMPLETE CBC W/AUTO DIFF WBC: CPT

## 2018-12-27 PROCEDURE — 93005 ELECTROCARDIOGRAM TRACING: CPT | Performed by: EMERGENCY MEDICINE

## 2018-12-27 PROCEDURE — 36415 COLL VENOUS BLD VENIPUNCTURE: CPT

## 2018-12-27 PROCEDURE — 6360000002 HC RX W HCPCS: Performed by: INTERNAL MEDICINE

## 2018-12-27 PROCEDURE — 71046 X-RAY EXAM CHEST 2 VIEWS: CPT

## 2018-12-27 PROCEDURE — 2580000003 HC RX 258: Performed by: EMERGENCY MEDICINE

## 2018-12-27 PROCEDURE — 6370000000 HC RX 637 (ALT 250 FOR IP): Performed by: PHYSICIAN ASSISTANT

## 2018-12-27 RX ORDER — SODIUM CHLORIDE 9 MG/ML
INJECTION, SOLUTION INTRAVENOUS CONTINUOUS
Status: DISCONTINUED | OUTPATIENT
Start: 2018-12-27 | End: 2018-12-28

## 2018-12-27 RX ORDER — SODIUM CHLORIDE 0.9 % (FLUSH) 0.9 %
10 SYRINGE (ML) INJECTION PRN
Status: DISCONTINUED | OUTPATIENT
Start: 2018-12-27 | End: 2018-12-31 | Stop reason: HOSPADM

## 2018-12-27 RX ORDER — 0.9 % SODIUM CHLORIDE 0.9 %
1000 INTRAVENOUS SOLUTION INTRAVENOUS ONCE
Status: COMPLETED | OUTPATIENT
Start: 2018-12-27 | End: 2018-12-27

## 2018-12-27 RX ORDER — ASPIRIN 81 MG/1
81 TABLET, CHEWABLE ORAL DAILY
Status: DISCONTINUED | OUTPATIENT
Start: 2018-12-28 | End: 2018-12-31 | Stop reason: HOSPADM

## 2018-12-27 RX ORDER — NITROGLYCERIN 0.4 MG/1
0.4 TABLET SUBLINGUAL EVERY 5 MIN PRN
Status: DISCONTINUED | OUTPATIENT
Start: 2018-12-27 | End: 2018-12-31 | Stop reason: HOSPADM

## 2018-12-27 RX ORDER — CLOPIDOGREL BISULFATE 75 MG/1
75 TABLET ORAL DAILY
Status: DISCONTINUED | OUTPATIENT
Start: 2018-12-28 | End: 2018-12-31 | Stop reason: HOSPADM

## 2018-12-27 RX ORDER — TAMSULOSIN HYDROCHLORIDE 0.4 MG/1
0.4 CAPSULE ORAL DAILY
Status: DISCONTINUED | OUTPATIENT
Start: 2018-12-28 | End: 2018-12-31 | Stop reason: HOSPADM

## 2018-12-27 RX ORDER — ASPIRIN 81 MG/1
243 TABLET, CHEWABLE ORAL ONCE
Status: COMPLETED | OUTPATIENT
Start: 2018-12-27 | End: 2018-12-27

## 2018-12-27 RX ORDER — ROSUVASTATIN CALCIUM 10 MG/1
40 TABLET, COATED ORAL EVERY EVENING
Status: DISCONTINUED | OUTPATIENT
Start: 2018-12-27 | End: 2018-12-31 | Stop reason: HOSPADM

## 2018-12-27 RX ORDER — GEMFIBROZIL 600 MG/1
600 TABLET, FILM COATED ORAL
Status: DISCONTINUED | OUTPATIENT
Start: 2018-12-28 | End: 2018-12-31 | Stop reason: HOSPADM

## 2018-12-27 RX ORDER — PANTOPRAZOLE SODIUM 40 MG/1
40 TABLET, DELAYED RELEASE ORAL
Status: DISCONTINUED | OUTPATIENT
Start: 2018-12-28 | End: 2018-12-31 | Stop reason: HOSPADM

## 2018-12-27 RX ORDER — SODIUM CHLORIDE 0.9 % (FLUSH) 0.9 %
10 SYRINGE (ML) INJECTION EVERY 12 HOURS SCHEDULED
Status: DISCONTINUED | OUTPATIENT
Start: 2018-12-27 | End: 2018-12-31 | Stop reason: HOSPADM

## 2018-12-27 RX ORDER — NITROGLYCERIN 0.4 MG/1
0.4 TABLET SUBLINGUAL EVERY 5 MIN PRN
Status: DISCONTINUED | OUTPATIENT
Start: 2018-12-27 | End: 2018-12-27 | Stop reason: SDUPTHER

## 2018-12-27 RX ORDER — PANTOPRAZOLE SODIUM 20 MG/1
20 TABLET, DELAYED RELEASE ORAL DAILY
COMMUNITY
End: 2019-03-02

## 2018-12-27 RX ORDER — FOLIC ACID 1 MG/1
1 TABLET ORAL DAILY
Status: DISCONTINUED | OUTPATIENT
Start: 2018-12-28 | End: 2018-12-31 | Stop reason: HOSPADM

## 2018-12-27 RX ORDER — 0.9 % SODIUM CHLORIDE 0.9 %
500 INTRAVENOUS SOLUTION INTRAVENOUS ONCE
Status: COMPLETED | OUTPATIENT
Start: 2018-12-27 | End: 2018-12-27

## 2018-12-27 RX ORDER — ONDANSETRON 2 MG/ML
4 INJECTION INTRAMUSCULAR; INTRAVENOUS EVERY 6 HOURS PRN
Status: DISCONTINUED | OUTPATIENT
Start: 2018-12-27 | End: 2018-12-31 | Stop reason: HOSPADM

## 2018-12-27 RX ORDER — CARVEDILOL 3.12 MG/1
3.12 TABLET ORAL 2 TIMES DAILY WITH MEALS
Status: DISCONTINUED | OUTPATIENT
Start: 2018-12-28 | End: 2018-12-28

## 2018-12-27 RX ORDER — FOLIC ACID 1 MG/1
1 TABLET ORAL DAILY
COMMUNITY

## 2018-12-27 RX ADMIN — ENOXAPARIN SODIUM 40 MG: 100 INJECTION SUBCUTANEOUS at 21:21

## 2018-12-27 RX ADMIN — SODIUM CHLORIDE 1000 ML: 9 INJECTION, SOLUTION INTRAVENOUS at 18:26

## 2018-12-27 RX ADMIN — SODIUM CHLORIDE: 9 INJECTION, SOLUTION INTRAVENOUS at 21:22

## 2018-12-27 RX ADMIN — ASPIRIN 81 MG 243 MG: 81 TABLET ORAL at 15:44

## 2018-12-27 RX ADMIN — SODIUM CHLORIDE 1000 ML: 9 INJECTION, SOLUTION INTRAVENOUS at 16:40

## 2018-12-27 RX ADMIN — ROSUVASTATIN CALCIUM 40 MG: 10 TABLET, FILM COATED ORAL at 21:21

## 2018-12-27 RX ADMIN — SODIUM CHLORIDE 500 ML: 9 INJECTION, SOLUTION INTRAVENOUS at 16:00

## 2018-12-27 RX ADMIN — Medication 10 ML: at 21:22

## 2018-12-27 ASSESSMENT — PAIN DESCRIPTION - DESCRIPTORS: DESCRIPTORS: PRESSURE

## 2018-12-27 ASSESSMENT — PAIN DESCRIPTION - LOCATION: LOCATION: CHEST

## 2018-12-27 ASSESSMENT — PAIN SCALES - GENERAL: PAINLEVEL_OUTOF10: 7

## 2018-12-27 ASSESSMENT — HEART SCORE: ECG: 0

## 2018-12-27 ASSESSMENT — PAIN DESCRIPTION - PAIN TYPE: TYPE: ACUTE PAIN

## 2018-12-28 ENCOUNTER — APPOINTMENT (OUTPATIENT)
Dept: NUCLEAR MEDICINE | Age: 68
DRG: 315 | End: 2018-12-28
Payer: MEDICARE

## 2018-12-28 LAB
CHOLESTEROL, TOTAL: 126 MG/DL (ref 0–199)
HCT VFR BLD CALC: 30 % (ref 40.5–52.5)
HDLC SERPL-MCNC: 38 MG/DL (ref 40–60)
HEMOGLOBIN: 10.2 G/DL (ref 13.5–17.5)
LDL CHOLESTEROL CALCULATED: 54 MG/DL
LV EF: 33 %
LVEF MODALITY: NORMAL
MCH RBC QN AUTO: 32.9 PG (ref 26–34)
MCHC RBC AUTO-ENTMCNC: 34.2 G/DL (ref 31–36)
MCV RBC AUTO: 96.2 FL (ref 80–100)
PDW BLD-RTO: 13.6 % (ref 12.4–15.4)
PLATELET # BLD: 166 K/UL (ref 135–450)
PMV BLD AUTO: 8.1 FL (ref 5–10.5)
RBC # BLD: 3.11 M/UL (ref 4.2–5.9)
TRIGL SERPL-MCNC: 170 MG/DL (ref 0–150)
VLDLC SERPL CALC-MCNC: 34 MG/DL
WBC # BLD: 5.5 K/UL (ref 4–11)

## 2018-12-28 PROCEDURE — A9502 TC99M TETROFOSMIN: HCPCS | Performed by: INTERNAL MEDICINE

## 2018-12-28 PROCEDURE — 99218 PR INITIAL OBSERVATION CARE/DAY 30 MINUTES: CPT | Performed by: INTERNAL MEDICINE

## 2018-12-28 PROCEDURE — 6370000000 HC RX 637 (ALT 250 FOR IP): Performed by: INTERNAL MEDICINE

## 2018-12-28 PROCEDURE — 2580000003 HC RX 258: Performed by: INTERNAL MEDICINE

## 2018-12-28 PROCEDURE — G0378 HOSPITAL OBSERVATION PER HR: HCPCS

## 2018-12-28 PROCEDURE — 3430000000 HC RX DIAGNOSTIC RADIOPHARMACEUTICAL: Performed by: INTERNAL MEDICINE

## 2018-12-28 PROCEDURE — 85027 COMPLETE CBC AUTOMATED: CPT

## 2018-12-28 PROCEDURE — 36415 COLL VENOUS BLD VENIPUNCTURE: CPT

## 2018-12-28 PROCEDURE — 93308 TTE F-UP OR LMTD: CPT

## 2018-12-28 PROCEDURE — 6360000002 HC RX W HCPCS: Performed by: INTERNAL MEDICINE

## 2018-12-28 PROCEDURE — 99223 1ST HOSP IP/OBS HIGH 75: CPT | Performed by: INTERNAL MEDICINE

## 2018-12-28 PROCEDURE — 99220 PR INITIAL OBSERVATION CARE/DAY 70 MINUTES: CPT | Performed by: INTERNAL MEDICINE

## 2018-12-28 PROCEDURE — 2060000000 HC ICU INTERMEDIATE R&B

## 2018-12-28 PROCEDURE — 93017 CV STRESS TEST TRACING ONLY: CPT

## 2018-12-28 PROCEDURE — 78452 HT MUSCLE IMAGE SPECT MULT: CPT

## 2018-12-28 PROCEDURE — 80061 LIPID PANEL: CPT

## 2018-12-28 RX ADMIN — ASPIRIN 81 MG 81 MG: 81 TABLET ORAL at 08:34

## 2018-12-28 RX ADMIN — Medication 10 ML: at 20:41

## 2018-12-28 RX ADMIN — CLOPIDOGREL BISULFATE 75 MG: 75 TABLET ORAL at 08:34

## 2018-12-28 RX ADMIN — Medication 2.5 MG: at 14:18

## 2018-12-28 RX ADMIN — TETROFOSMIN 10.5 MILLICURIE: 0.23 INJECTION, POWDER, LYOPHILIZED, FOR SOLUTION INTRAVENOUS at 10:50

## 2018-12-28 RX ADMIN — REGADENOSON 0.4 MG: 0.08 INJECTION, SOLUTION INTRAVENOUS at 11:48

## 2018-12-28 RX ADMIN — TAMSULOSIN HYDROCHLORIDE 0.4 MG: 0.4 CAPSULE ORAL at 08:34

## 2018-12-28 RX ADMIN — TETROFOSMIN 31.1 MILLICURIE: 0.23 INJECTION, POWDER, LYOPHILIZED, FOR SOLUTION INTRAVENOUS at 11:48

## 2018-12-28 RX ADMIN — GEMFIBROZIL 600 MG: 600 TABLET ORAL at 16:40

## 2018-12-28 RX ADMIN — ROSUVASTATIN CALCIUM 40 MG: 10 TABLET, FILM COATED ORAL at 20:40

## 2018-12-28 RX ADMIN — SODIUM CHLORIDE: 9 INJECTION, SOLUTION INTRAVENOUS at 07:43

## 2018-12-28 RX ADMIN — FOLIC ACID 1 MG: 1 TABLET ORAL at 08:34

## 2018-12-28 ASSESSMENT — PAIN SCALES - GENERAL: PAINLEVEL_OUTOF10: 0

## 2018-12-29 PROCEDURE — 6370000000 HC RX 637 (ALT 250 FOR IP): Performed by: INTERNAL MEDICINE

## 2018-12-29 PROCEDURE — G0008 ADMIN INFLUENZA VIRUS VAC: HCPCS | Performed by: INTERNAL MEDICINE

## 2018-12-29 PROCEDURE — 2580000003 HC RX 258: Performed by: INTERNAL MEDICINE

## 2018-12-29 PROCEDURE — 90686 IIV4 VACC NO PRSV 0.5 ML IM: CPT | Performed by: INTERNAL MEDICINE

## 2018-12-29 PROCEDURE — 2060000000 HC ICU INTERMEDIATE R&B

## 2018-12-29 PROCEDURE — 99233 SBSQ HOSP IP/OBS HIGH 50: CPT | Performed by: INTERNAL MEDICINE

## 2018-12-29 PROCEDURE — 6360000002 HC RX W HCPCS: Performed by: INTERNAL MEDICINE

## 2018-12-29 PROCEDURE — G0378 HOSPITAL OBSERVATION PER HR: HCPCS

## 2018-12-29 PROCEDURE — 99224 PR SBSQ OBSERVATION CARE/DAY 15 MINUTES: CPT | Performed by: INTERNAL MEDICINE

## 2018-12-29 RX ORDER — SODIUM CHLORIDE 9 MG/ML
INJECTION, SOLUTION INTRAVENOUS CONTINUOUS
Status: ACTIVE | OUTPATIENT
Start: 2018-12-29 | End: 2018-12-29

## 2018-12-29 RX ORDER — ACETAMINOPHEN 325 MG/1
650 TABLET ORAL EVERY 4 HOURS PRN
Status: DISCONTINUED | OUTPATIENT
Start: 2018-12-29 | End: 2018-12-31 | Stop reason: HOSPADM

## 2018-12-29 RX ADMIN — ASPIRIN 81 MG 81 MG: 81 TABLET ORAL at 09:19

## 2018-12-29 RX ADMIN — GEMFIBROZIL 600 MG: 600 TABLET ORAL at 09:19

## 2018-12-29 RX ADMIN — SODIUM CHLORIDE: 9 INJECTION, SOLUTION INTRAVENOUS at 09:29

## 2018-12-29 RX ADMIN — Medication 10 ML: at 09:19

## 2018-12-29 RX ADMIN — FOLIC ACID 1 MG: 1 TABLET ORAL at 09:19

## 2018-12-29 RX ADMIN — INFLUENZA A VIRUS A/MICHIGAN/45/2015 X-275 (H1N1) ANTIGEN (FORMALDEHYDE INACTIVATED), INFLUENZA A VIRUS A/SINGAPORE/INFIMH-16-0019/2016 IVR-186 (H3N2) ANTIGEN (FORMALDEHYDE INACTIVATED), INFLUENZA B VIRUS B/PHUKET/3073/2013 ANTIGEN (FORMALDEHYDE INACTIVATED), AND INFLUENZA B VIRUS B/MARYLAND/15/2016 BX-69A ANTIGEN (FORMALDEHYDE INACTIVATED) 0.5 ML: 15; 15; 15; 15 INJECTION, SUSPENSION INTRAMUSCULAR at 09:19

## 2018-12-29 RX ADMIN — ENOXAPARIN SODIUM 40 MG: 100 INJECTION SUBCUTANEOUS at 09:19

## 2018-12-29 RX ADMIN — PANTOPRAZOLE SODIUM 40 MG: 40 TABLET, DELAYED RELEASE ORAL at 05:33

## 2018-12-29 RX ADMIN — GEMFIBROZIL 600 MG: 600 TABLET ORAL at 17:23

## 2018-12-29 RX ADMIN — ROSUVASTATIN CALCIUM 40 MG: 10 TABLET, FILM COATED ORAL at 20:38

## 2018-12-29 RX ADMIN — ACETAMINOPHEN 650 MG: 325 TABLET ORAL at 14:21

## 2018-12-29 RX ADMIN — TAMSULOSIN HYDROCHLORIDE 0.4 MG: 0.4 CAPSULE ORAL at 09:19

## 2018-12-29 RX ADMIN — CLOPIDOGREL BISULFATE 75 MG: 75 TABLET ORAL at 09:19

## 2018-12-29 RX ADMIN — Medication 10 ML: at 20:38

## 2018-12-29 ASSESSMENT — PAIN DESCRIPTION - LOCATION: LOCATION: HEAD

## 2018-12-29 ASSESSMENT — PAIN DESCRIPTION - PAIN TYPE: TYPE: ACUTE PAIN

## 2018-12-29 ASSESSMENT — PAIN - FUNCTIONAL ASSESSMENT: PAIN_FUNCTIONAL_ASSESSMENT: 0-10

## 2018-12-29 ASSESSMENT — PAIN SCALES - GENERAL
PAINLEVEL_OUTOF10: 5
PAINLEVEL_OUTOF10: 0

## 2018-12-29 ASSESSMENT — PAIN DESCRIPTION - DESCRIPTORS: DESCRIPTORS: HEADACHE

## 2018-12-30 LAB
ANION GAP SERPL CALCULATED.3IONS-SCNC: 12 MMOL/L (ref 3–16)
BUN BLDV-MCNC: 21 MG/DL (ref 7–20)
CALCIUM SERPL-MCNC: 9.8 MG/DL (ref 8.3–10.6)
CHLORIDE BLD-SCNC: 102 MMOL/L (ref 99–110)
CO2: 20 MMOL/L (ref 21–32)
CREAT SERPL-MCNC: 1.1 MG/DL (ref 0.8–1.3)
GFR AFRICAN AMERICAN: >60
GFR NON-AFRICAN AMERICAN: >60
GLUCOSE BLD-MCNC: 104 MG/DL (ref 70–99)
POTASSIUM SERPL-SCNC: 4.3 MMOL/L (ref 3.5–5.1)
SODIUM BLD-SCNC: 134 MMOL/L (ref 136–145)

## 2018-12-30 PROCEDURE — 1200000000 HC SEMI PRIVATE

## 2018-12-30 PROCEDURE — 80048 BASIC METABOLIC PNL TOTAL CA: CPT

## 2018-12-30 PROCEDURE — 6370000000 HC RX 637 (ALT 250 FOR IP): Performed by: INTERNAL MEDICINE

## 2018-12-30 PROCEDURE — 6360000002 HC RX W HCPCS: Performed by: INTERNAL MEDICINE

## 2018-12-30 PROCEDURE — 36415 COLL VENOUS BLD VENIPUNCTURE: CPT

## 2018-12-30 PROCEDURE — 2580000003 HC RX 258: Performed by: INTERNAL MEDICINE

## 2018-12-30 PROCEDURE — 99233 SBSQ HOSP IP/OBS HIGH 50: CPT | Performed by: INTERNAL MEDICINE

## 2018-12-30 RX ORDER — CARVEDILOL 3.12 MG/1
3.12 TABLET ORAL 2 TIMES DAILY WITH MEALS
Status: DISCONTINUED | OUTPATIENT
Start: 2018-12-30 | End: 2018-12-30

## 2018-12-30 RX ORDER — CARVEDILOL 3.12 MG/1
3.12 TABLET ORAL 2 TIMES DAILY
Status: DISCONTINUED | OUTPATIENT
Start: 2018-12-30 | End: 2018-12-31 | Stop reason: HOSPADM

## 2018-12-30 RX ADMIN — Medication 10 ML: at 21:59

## 2018-12-30 RX ADMIN — GEMFIBROZIL 600 MG: 600 TABLET ORAL at 05:19

## 2018-12-30 RX ADMIN — GEMFIBROZIL 600 MG: 600 TABLET ORAL at 15:04

## 2018-12-30 RX ADMIN — ASPIRIN 81 MG 81 MG: 81 TABLET ORAL at 08:56

## 2018-12-30 RX ADMIN — ACETAMINOPHEN 650 MG: 325 TABLET ORAL at 23:13

## 2018-12-30 RX ADMIN — CLOPIDOGREL BISULFATE 75 MG: 75 TABLET ORAL at 08:56

## 2018-12-30 RX ADMIN — CARVEDILOL 3.12 MG: 3.12 TABLET, FILM COATED ORAL at 21:59

## 2018-12-30 RX ADMIN — FOLIC ACID 1 MG: 1 TABLET ORAL at 08:56

## 2018-12-30 RX ADMIN — TAMSULOSIN HYDROCHLORIDE 0.4 MG: 0.4 CAPSULE ORAL at 08:56

## 2018-12-30 RX ADMIN — ENOXAPARIN SODIUM 40 MG: 100 INJECTION SUBCUTANEOUS at 08:56

## 2018-12-30 RX ADMIN — CARVEDILOL 3.12 MG: 3.12 TABLET, FILM COATED ORAL at 13:42

## 2018-12-30 RX ADMIN — PANTOPRAZOLE SODIUM 40 MG: 40 TABLET, DELAYED RELEASE ORAL at 05:19

## 2018-12-30 RX ADMIN — Medication 10 ML: at 08:56

## 2018-12-30 RX ADMIN — ROSUVASTATIN CALCIUM 40 MG: 10 TABLET, FILM COATED ORAL at 21:59

## 2018-12-30 ASSESSMENT — PAIN DESCRIPTION - LOCATION: LOCATION: HEAD

## 2018-12-30 ASSESSMENT — PAIN DESCRIPTION - PAIN TYPE: TYPE: ACUTE PAIN

## 2018-12-30 ASSESSMENT — PAIN DESCRIPTION - ONSET: ONSET: GRADUAL

## 2018-12-30 ASSESSMENT — PAIN DESCRIPTION - FREQUENCY: FREQUENCY: INTERMITTENT

## 2018-12-30 ASSESSMENT — PAIN DESCRIPTION - DESCRIPTORS: DESCRIPTORS: HEADACHE

## 2018-12-30 ASSESSMENT — PAIN SCALES - GENERAL
PAINLEVEL_OUTOF10: 2
PAINLEVEL_OUTOF10: 3

## 2018-12-30 ASSESSMENT — PAIN DESCRIPTION - PROGRESSION: CLINICAL_PROGRESSION: GRADUALLY WORSENING

## 2018-12-31 VITALS
RESPIRATION RATE: 18 BRPM | HEART RATE: 72 BPM | BODY MASS INDEX: 26.94 KG/M2 | OXYGEN SATURATION: 96 % | HEIGHT: 71 IN | TEMPERATURE: 98.1 F | WEIGHT: 192.4 LBS | SYSTOLIC BLOOD PRESSURE: 116 MMHG | DIASTOLIC BLOOD PRESSURE: 82 MMHG

## 2018-12-31 PROCEDURE — 99233 SBSQ HOSP IP/OBS HIGH 50: CPT | Performed by: INTERNAL MEDICINE

## 2018-12-31 PROCEDURE — 6360000002 HC RX W HCPCS: Performed by: INTERNAL MEDICINE

## 2018-12-31 PROCEDURE — 2580000003 HC RX 258: Performed by: INTERNAL MEDICINE

## 2018-12-31 PROCEDURE — 6370000000 HC RX 637 (ALT 250 FOR IP): Performed by: INTERNAL MEDICINE

## 2018-12-31 RX ORDER — LISINOPRIL 10 MG/1
5 TABLET ORAL DAILY
Qty: 90 TABLET | Refills: 5
Start: 2018-12-31 | End: 2019-01-02 | Stop reason: DRUGHIGH

## 2018-12-31 RX ORDER — LISINOPRIL 5 MG/1
5 TABLET ORAL DAILY
Status: DISCONTINUED | OUTPATIENT
Start: 2018-12-31 | End: 2018-12-31 | Stop reason: HOSPADM

## 2018-12-31 RX ADMIN — Medication 10 ML: at 08:46

## 2018-12-31 RX ADMIN — PANTOPRAZOLE SODIUM 40 MG: 40 TABLET, DELAYED RELEASE ORAL at 07:10

## 2018-12-31 RX ADMIN — LISINOPRIL 5 MG: 5 TABLET ORAL at 12:03

## 2018-12-31 RX ADMIN — ENOXAPARIN SODIUM 40 MG: 100 INJECTION SUBCUTANEOUS at 08:46

## 2018-12-31 RX ADMIN — ASPIRIN 81 MG 81 MG: 81 TABLET ORAL at 08:46

## 2018-12-31 RX ADMIN — TAMSULOSIN HYDROCHLORIDE 0.4 MG: 0.4 CAPSULE ORAL at 08:46

## 2018-12-31 RX ADMIN — CARVEDILOL 3.12 MG: 3.12 TABLET, FILM COATED ORAL at 08:46

## 2018-12-31 RX ADMIN — FOLIC ACID 1 MG: 1 TABLET ORAL at 08:46

## 2018-12-31 RX ADMIN — GEMFIBROZIL 600 MG: 600 TABLET ORAL at 07:10

## 2018-12-31 RX ADMIN — CLOPIDOGREL BISULFATE 75 MG: 75 TABLET ORAL at 08:46

## 2019-01-02 RX ORDER — LISINOPRIL 5 MG/1
5 TABLET ORAL DAILY
COMMUNITY
End: 2019-01-02 | Stop reason: SDUPTHER

## 2019-01-02 RX ORDER — LISINOPRIL 5 MG/1
5 TABLET ORAL DAILY
Qty: 90 TABLET | Refills: 5 | Status: SHIPPED | OUTPATIENT
Start: 2019-01-02 | End: 2020-02-07 | Stop reason: SINTOL

## 2019-01-03 ENCOUNTER — HOSPITAL ENCOUNTER (OUTPATIENT)
Age: 69
Discharge: HOME | End: 2019-01-03
Payer: MEDICARE

## 2019-01-03 LAB
ALBUMIN SERPL-MCNC: 4.5 G/DL (ref 3.4–5)
ALP BLD-CCNC: 83 U/L (ref 40–129)
ALT SERPL-CCNC: 21 U/L (ref 10–40)
AST SERPL-CCNC: 22 U/L (ref 15–37)
BILIRUB SERPL-MCNC: 0.3 MG/DL (ref 0–1)
BILIRUBIN DIRECT: <0.2 MG/DL (ref 0–0.3)
BILIRUBIN, INDIRECT: NORMAL MG/DL (ref 0–1)
HCT VFR BLD CALC: 36 % (ref 40.5–52.5)
HEMOGLOBIN: 12 G/DL (ref 13.5–17.5)
MCH RBC QN AUTO: 32.3 PG (ref 26–34)
MCHC RBC AUTO-ENTMCNC: 33.3 G/DL (ref 31–36)
MCV RBC AUTO: 97.2 FL (ref 80–100)
PDW BLD-RTO: 13.8 % (ref 12.4–15.4)
PLATELET # BLD: 210 K/UL (ref 135–450)
PMV BLD AUTO: 7.1 FL (ref 5–10.5)
RBC # BLD: 3.7 M/UL (ref 4.2–5.9)
TOTAL PROTEIN: 7.6 G/DL (ref 6.4–8.2)
WBC # BLD: 8.7 K/UL (ref 4–11)

## 2019-01-03 PROCEDURE — 85027 COMPLETE CBC AUTOMATED: CPT

## 2019-01-03 PROCEDURE — 80076 HEPATIC FUNCTION PANEL: CPT

## 2019-01-03 PROCEDURE — 36415 COLL VENOUS BLD VENIPUNCTURE: CPT

## 2019-01-08 ENCOUNTER — NURSE ONLY (OUTPATIENT)
Dept: CARDIOLOGY CLINIC | Age: 69
End: 2019-01-08
Payer: MEDICARE

## 2019-01-08 DIAGNOSIS — I25.5 ISCHEMIC CARDIOMYOPATHY: ICD-10-CM

## 2019-01-08 DIAGNOSIS — Z95.810 AICD (AUTOMATIC CARDIOVERTER/DEFIBRILLATOR) PRESENT: Primary | ICD-10-CM

## 2019-01-08 PROCEDURE — 93295 DEV INTERROG REMOTE 1/2/MLT: CPT | Performed by: INTERNAL MEDICINE

## 2019-01-08 PROCEDURE — 93296 REM INTERROG EVL PM/IDS: CPT | Performed by: INTERNAL MEDICINE

## 2019-01-09 ENCOUNTER — HOSPITAL ENCOUNTER (OUTPATIENT)
Dept: ULTRASOUND IMAGING | Age: 69
Discharge: HOME | End: 2019-01-09
Payer: MEDICARE

## 2019-01-09 DIAGNOSIS — Z13.6 SCREENING FOR AAA (ABDOMINAL AORTIC ANEURYSM): ICD-10-CM

## 2019-01-09 PROCEDURE — 76706 US ABDL AORTA SCREEN AAA: CPT

## 2019-01-17 ENCOUNTER — TELEPHONE (OUTPATIENT)
Dept: CARDIOLOGY CLINIC | Age: 69
End: 2019-01-17

## 2019-02-12 ENCOUNTER — HOSPITAL ENCOUNTER (OUTPATIENT)
Age: 69
Discharge: HOME | End: 2019-02-12
Payer: MEDICARE

## 2019-02-12 LAB
ALBUMIN SERPL-MCNC: 4.5 G/DL (ref 3.4–5)
ALP BLD-CCNC: 110 U/L (ref 40–129)
ALT SERPL-CCNC: 8 U/L (ref 10–40)
AST SERPL-CCNC: 15 U/L (ref 15–37)
BILIRUB SERPL-MCNC: 0.3 MG/DL (ref 0–1)
BILIRUBIN DIRECT: <0.2 MG/DL (ref 0–0.3)
BILIRUBIN, INDIRECT: ABNORMAL MG/DL (ref 0–1)
HCT VFR BLD CALC: 34.8 % (ref 40.5–52.5)
HEMOGLOBIN: 12 G/DL (ref 13.5–17.5)
MCH RBC QN AUTO: 33.5 PG (ref 26–34)
MCHC RBC AUTO-ENTMCNC: 34.3 G/DL (ref 31–36)
MCV RBC AUTO: 97.6 FL (ref 80–100)
PDW BLD-RTO: 14.9 % (ref 12.4–15.4)
PLATELET # BLD: 276 K/UL (ref 135–450)
PMV BLD AUTO: 8.2 FL (ref 5–10.5)
RBC # BLD: 3.57 M/UL (ref 4.2–5.9)
TOTAL PROTEIN: 7.9 G/DL (ref 6.4–8.2)
WBC # BLD: 9.7 K/UL (ref 4–11)

## 2019-02-12 PROCEDURE — 85027 COMPLETE CBC AUTOMATED: CPT

## 2019-02-12 PROCEDURE — 36415 COLL VENOUS BLD VENIPUNCTURE: CPT

## 2019-02-12 PROCEDURE — 80076 HEPATIC FUNCTION PANEL: CPT

## 2019-03-02 ENCOUNTER — APPOINTMENT (OUTPATIENT)
Dept: GENERAL RADIOLOGY | Age: 69
DRG: 189 | End: 2019-03-02
Payer: MEDICARE

## 2019-03-02 ENCOUNTER — HOSPITAL ENCOUNTER (INPATIENT)
Age: 69
LOS: 2 days | Discharge: HOME | DRG: 189 | End: 2019-03-05
Attending: EMERGENCY MEDICINE | Admitting: INTERNAL MEDICINE
Payer: MEDICARE

## 2019-03-02 DIAGNOSIS — D64.9 ANEMIA, UNSPECIFIED TYPE: ICD-10-CM

## 2019-03-02 DIAGNOSIS — N17.9 ACUTE KIDNEY INJURY (HCC): ICD-10-CM

## 2019-03-02 DIAGNOSIS — J44.9 CHRONIC OBSTRUCTIVE PULMONARY DISEASE, UNSPECIFIED COPD TYPE (HCC): ICD-10-CM

## 2019-03-02 DIAGNOSIS — R06.02 SHORTNESS OF BREATH: ICD-10-CM

## 2019-03-02 DIAGNOSIS — Z72.0 TOBACCO ABUSE: ICD-10-CM

## 2019-03-02 DIAGNOSIS — I95.9 HYPOTENSION, UNSPECIFIED HYPOTENSION TYPE: ICD-10-CM

## 2019-03-02 DIAGNOSIS — J44.1 COPD EXACERBATION (HCC): Primary | ICD-10-CM

## 2019-03-02 LAB
A/G RATIO: 1.2 (ref 1.1–2.2)
ALBUMIN SERPL-MCNC: 4.2 G/DL (ref 3.4–5)
ALP BLD-CCNC: 88 U/L (ref 40–129)
ALT SERPL-CCNC: 7 U/L (ref 10–40)
ANION GAP SERPL CALCULATED.3IONS-SCNC: 12 MMOL/L (ref 3–16)
AST SERPL-CCNC: 13 U/L (ref 15–37)
BASE EXCESS ARTERIAL: -3.6 MMOL/L (ref -3–3)
BASOPHILS ABSOLUTE: 0.1 K/UL (ref 0–0.2)
BASOPHILS RELATIVE PERCENT: 1 %
BILIRUB SERPL-MCNC: 0.5 MG/DL (ref 0–1)
BUN BLDV-MCNC: 30 MG/DL (ref 7–20)
CALCIUM SERPL-MCNC: 9.9 MG/DL (ref 8.3–10.6)
CARBOXYHEMOGLOBIN ARTERIAL: 0.8 % (ref 0–1.5)
CHLORIDE BLD-SCNC: 100 MMOL/L (ref 99–110)
CO2: 20 MMOL/L (ref 21–32)
CREAT SERPL-MCNC: 2 MG/DL (ref 0.8–1.3)
EOSINOPHILS ABSOLUTE: 0.4 K/UL (ref 0–0.6)
EOSINOPHILS RELATIVE PERCENT: 4.1 %
ETHANOL: NORMAL MG/DL (ref 0–0.08)
GFR AFRICAN AMERICAN: 40
GFR NON-AFRICAN AMERICAN: 33
GLOBULIN: 3.5 G/DL
GLUCOSE BLD-MCNC: 107 MG/DL (ref 70–99)
HCO3 ARTERIAL: 19.6 MMOL/L (ref 21–29)
HCT VFR BLD CALC: 30.3 % (ref 40.5–52.5)
HEMOGLOBIN, ART, EXTENDED: 10.1 G/DL (ref 13.5–17.5)
HEMOGLOBIN: 10.3 G/DL (ref 13.5–17.5)
LACTIC ACID: 1.7 MMOL/L (ref 0.4–2)
LYMPHOCYTES ABSOLUTE: 1.3 K/UL (ref 1–5.1)
LYMPHOCYTES RELATIVE PERCENT: 13.3 %
MCH RBC QN AUTO: 33.1 PG (ref 26–34)
MCHC RBC AUTO-ENTMCNC: 33.9 G/DL (ref 31–36)
MCV RBC AUTO: 97.7 FL (ref 80–100)
METHEMOGLOBIN ARTERIAL: 0.1 %
MONOCYTES ABSOLUTE: 1.4 K/UL (ref 0–1.3)
MONOCYTES RELATIVE PERCENT: 14.6 %
NEUTROPHILS ABSOLUTE: 6.4 K/UL (ref 1.7–7.7)
NEUTROPHILS RELATIVE PERCENT: 67 %
O2 CONTENT ARTERIAL: 13 ML/DL
O2 SAT, ARTERIAL: 93.8 %
O2 THERAPY: ABNORMAL
PCO2 ARTERIAL: 29 MMHG (ref 35–45)
PDW BLD-RTO: 15.5 % (ref 12.4–15.4)
PH ARTERIAL: 7.45 (ref 7.35–7.45)
PLATELET # BLD: 305 K/UL (ref 135–450)
PMV BLD AUTO: 7.5 FL (ref 5–10.5)
PO2 ARTERIAL: 64.6 MMHG (ref 75–108)
POTASSIUM SERPL-SCNC: 4.4 MMOL/L (ref 3.5–5.1)
PRO-BNP: 746 PG/ML (ref 0–124)
RAPID INFLUENZA  B AGN: NEGATIVE
RAPID INFLUENZA A AGN: NEGATIVE
RBC # BLD: 3.1 M/UL (ref 4.2–5.9)
SODIUM BLD-SCNC: 132 MMOL/L (ref 136–145)
TCO2 ARTERIAL: 20.5 MMOL/L
TOTAL PROTEIN: 7.7 G/DL (ref 6.4–8.2)
TROPONIN: <0.01 NG/ML
WBC # BLD: 9.6 K/UL (ref 4–11)

## 2019-03-02 PROCEDURE — 80053 COMPREHEN METABOLIC PANEL: CPT

## 2019-03-02 PROCEDURE — 96374 THER/PROPH/DIAG INJ IV PUSH: CPT

## 2019-03-02 PROCEDURE — 83605 ASSAY OF LACTIC ACID: CPT

## 2019-03-02 PROCEDURE — 6370000000 HC RX 637 (ALT 250 FOR IP): Performed by: PHYSICIAN ASSISTANT

## 2019-03-02 PROCEDURE — G0480 DRUG TEST DEF 1-7 CLASSES: HCPCS

## 2019-03-02 PROCEDURE — 2580000003 HC RX 258: Performed by: PHYSICIAN ASSISTANT

## 2019-03-02 PROCEDURE — 82803 BLOOD GASES ANY COMBINATION: CPT

## 2019-03-02 PROCEDURE — 71046 X-RAY EXAM CHEST 2 VIEWS: CPT

## 2019-03-02 PROCEDURE — 83880 ASSAY OF NATRIURETIC PEPTIDE: CPT

## 2019-03-02 PROCEDURE — 85025 COMPLETE CBC W/AUTO DIFF WBC: CPT

## 2019-03-02 PROCEDURE — 99285 EMERGENCY DEPT VISIT HI MDM: CPT

## 2019-03-02 PROCEDURE — 87040 BLOOD CULTURE FOR BACTERIA: CPT

## 2019-03-02 PROCEDURE — 93005 ELECTROCARDIOGRAM TRACING: CPT | Performed by: EMERGENCY MEDICINE

## 2019-03-02 PROCEDURE — 84484 ASSAY OF TROPONIN QUANT: CPT

## 2019-03-02 PROCEDURE — 96361 HYDRATE IV INFUSION ADD-ON: CPT

## 2019-03-02 PROCEDURE — 6360000002 HC RX W HCPCS: Performed by: PHYSICIAN ASSISTANT

## 2019-03-02 PROCEDURE — 87804 INFLUENZA ASSAY W/OPTIC: CPT

## 2019-03-02 RX ORDER — METHYLPREDNISOLONE SODIUM SUCCINATE 125 MG/2ML
125 INJECTION, POWDER, LYOPHILIZED, FOR SOLUTION INTRAMUSCULAR; INTRAVENOUS ONCE
Status: COMPLETED | OUTPATIENT
Start: 2019-03-02 | End: 2019-03-02

## 2019-03-02 RX ORDER — 0.9 % SODIUM CHLORIDE 0.9 %
2000 INTRAVENOUS SOLUTION INTRAVENOUS ONCE
Status: COMPLETED | OUTPATIENT
Start: 2019-03-02 | End: 2019-03-03

## 2019-03-02 RX ORDER — IPRATROPIUM BROMIDE AND ALBUTEROL SULFATE 2.5; .5 MG/3ML; MG/3ML
1 SOLUTION RESPIRATORY (INHALATION) ONCE
Status: COMPLETED | OUTPATIENT
Start: 2019-03-02 | End: 2019-03-02

## 2019-03-02 RX ADMIN — IPRATROPIUM BROMIDE AND ALBUTEROL SULFATE 1 AMPULE: .5; 3 SOLUTION RESPIRATORY (INHALATION) at 23:05

## 2019-03-02 RX ADMIN — METHYLPREDNISOLONE SODIUM SUCCINATE 125 MG: 125 INJECTION, POWDER, FOR SOLUTION INTRAMUSCULAR; INTRAVENOUS at 23:07

## 2019-03-02 RX ADMIN — SODIUM CHLORIDE 2000 ML: 9 INJECTION, SOLUTION INTRAVENOUS at 23:07

## 2019-03-03 PROBLEM — R06.02 SOB (SHORTNESS OF BREATH): Status: ACTIVE | Noted: 2019-03-03

## 2019-03-03 LAB
ANION GAP SERPL CALCULATED.3IONS-SCNC: 13 MMOL/L (ref 3–16)
BUN BLDV-MCNC: 30 MG/DL (ref 7–20)
CALCIUM SERPL-MCNC: 9.7 MG/DL (ref 8.3–10.6)
CHLORIDE BLD-SCNC: 102 MMOL/L (ref 99–110)
CO2: 20 MMOL/L (ref 21–32)
CREAT SERPL-MCNC: 1.6 MG/DL (ref 0.8–1.3)
EKG ATRIAL RATE: 101 BPM
EKG DIAGNOSIS: NORMAL
EKG P AXIS: 79 DEGREES
EKG P-R INTERVAL: 176 MS
EKG Q-T INTERVAL: 350 MS
EKG QRS DURATION: 108 MS
EKG QTC CALCULATION (BAZETT): 453 MS
EKG R AXIS: 97 DEGREES
EKG T AXIS: 81 DEGREES
EKG VENTRICULAR RATE: 101 BPM
GFR AFRICAN AMERICAN: 52
GFR NON-AFRICAN AMERICAN: 43
GLUCOSE BLD-MCNC: 196 MG/DL (ref 70–99)
MAGNESIUM: 2.1 MG/DL (ref 1.8–2.4)
POTASSIUM SERPL-SCNC: 4.6 MMOL/L (ref 3.5–5.1)
REASON FOR REJECTION: NORMAL
REJECTED TEST: NORMAL
S PYO AG THROAT QL: NEGATIVE
SODIUM BLD-SCNC: 135 MMOL/L (ref 136–145)

## 2019-03-03 PROCEDURE — 83735 ASSAY OF MAGNESIUM: CPT

## 2019-03-03 PROCEDURE — 87205 SMEAR GRAM STAIN: CPT

## 2019-03-03 PROCEDURE — 6370000000 HC RX 637 (ALT 250 FOR IP): Performed by: INTERNAL MEDICINE

## 2019-03-03 PROCEDURE — 94640 AIRWAY INHALATION TREATMENT: CPT

## 2019-03-03 PROCEDURE — 96372 THER/PROPH/DIAG INJ SC/IM: CPT

## 2019-03-03 PROCEDURE — 2060000000 HC ICU INTERMEDIATE R&B

## 2019-03-03 PROCEDURE — 93010 ELECTROCARDIOGRAM REPORT: CPT | Performed by: INTERNAL MEDICINE

## 2019-03-03 PROCEDURE — 36415 COLL VENOUS BLD VENIPUNCTURE: CPT

## 2019-03-03 PROCEDURE — 87880 STREP A ASSAY W/OPTIC: CPT

## 2019-03-03 PROCEDURE — 87081 CULTURE SCREEN ONLY: CPT

## 2019-03-03 PROCEDURE — 6360000002 HC RX W HCPCS: Performed by: INTERNAL MEDICINE

## 2019-03-03 PROCEDURE — 99222 1ST HOSP IP/OBS MODERATE 55: CPT | Performed by: INTERNAL MEDICINE

## 2019-03-03 PROCEDURE — 96361 HYDRATE IV INFUSION ADD-ON: CPT

## 2019-03-03 PROCEDURE — 2580000003 HC RX 258: Performed by: INTERNAL MEDICINE

## 2019-03-03 PROCEDURE — 94750 HC PULMONARY COMPLIANCE STUDY: CPT

## 2019-03-03 PROCEDURE — 80048 BASIC METABOLIC PNL TOTAL CA: CPT

## 2019-03-03 PROCEDURE — 94761 N-INVAS EAR/PLS OXIMETRY MLT: CPT

## 2019-03-03 RX ORDER — TAMSULOSIN HYDROCHLORIDE 0.4 MG/1
0.4 CAPSULE ORAL DAILY
Status: DISCONTINUED | OUTPATIENT
Start: 2019-03-03 | End: 2019-03-05 | Stop reason: HOSPADM

## 2019-03-03 RX ORDER — CLOPIDOGREL BISULFATE 75 MG/1
75 TABLET ORAL DAILY
Status: DISCONTINUED | OUTPATIENT
Start: 2019-03-03 | End: 2019-03-05 | Stop reason: HOSPADM

## 2019-03-03 RX ORDER — NITROGLYCERIN 0.4 MG/1
0.4 TABLET SUBLINGUAL EVERY 5 MIN PRN
Status: DISCONTINUED | OUTPATIENT
Start: 2019-03-03 | End: 2019-03-05 | Stop reason: HOSPADM

## 2019-03-03 RX ORDER — IPRATROPIUM BROMIDE AND ALBUTEROL SULFATE 2.5; .5 MG/3ML; MG/3ML
1 SOLUTION RESPIRATORY (INHALATION) EVERY 4 HOURS PRN
Status: DISCONTINUED | OUTPATIENT
Start: 2019-03-03 | End: 2019-03-05 | Stop reason: HOSPADM

## 2019-03-03 RX ORDER — CARVEDILOL 3.12 MG/1
3.12 TABLET ORAL 2 TIMES DAILY WITH MEALS
Status: DISCONTINUED | OUTPATIENT
Start: 2019-03-03 | End: 2019-03-05 | Stop reason: HOSPADM

## 2019-03-03 RX ORDER — SODIUM CHLORIDE 0.9 % (FLUSH) 0.9 %
10 SYRINGE (ML) INJECTION EVERY 12 HOURS SCHEDULED
Status: DISCONTINUED | OUTPATIENT
Start: 2019-03-03 | End: 2019-03-05 | Stop reason: HOSPADM

## 2019-03-03 RX ORDER — SODIUM CHLORIDE 0.9 % (FLUSH) 0.9 %
10 SYRINGE (ML) INJECTION PRN
Status: DISCONTINUED | OUTPATIENT
Start: 2019-03-03 | End: 2019-03-05 | Stop reason: HOSPADM

## 2019-03-03 RX ORDER — ONDANSETRON 2 MG/ML
4 INJECTION INTRAMUSCULAR; INTRAVENOUS EVERY 6 HOURS PRN
Status: DISCONTINUED | OUTPATIENT
Start: 2019-03-03 | End: 2019-03-05 | Stop reason: HOSPADM

## 2019-03-03 RX ORDER — ASPIRIN 81 MG/1
TABLET, CHEWABLE ORAL
Status: DISPENSED
Start: 2019-03-03 | End: 2019-03-03

## 2019-03-03 RX ORDER — AZITHROMYCIN 250 MG/1
500 TABLET, FILM COATED ORAL DAILY
Status: COMPLETED | OUTPATIENT
Start: 2019-03-03 | End: 2019-03-03

## 2019-03-03 RX ORDER — AZITHROMYCIN 250 MG/1
250 TABLET, FILM COATED ORAL DAILY
Status: DISCONTINUED | OUTPATIENT
Start: 2019-03-04 | End: 2019-03-05 | Stop reason: HOSPADM

## 2019-03-03 RX ORDER — ASPIRIN 81 MG/1
81 TABLET, CHEWABLE ORAL DAILY
Status: DISCONTINUED | OUTPATIENT
Start: 2019-03-03 | End: 2019-03-03 | Stop reason: SDUPTHER

## 2019-03-03 RX ORDER — ASPIRIN 81 MG/1
81 TABLET, CHEWABLE ORAL DAILY
Status: DISCONTINUED | OUTPATIENT
Start: 2019-03-03 | End: 2019-03-05 | Stop reason: HOSPADM

## 2019-03-03 RX ORDER — IPRATROPIUM BROMIDE AND ALBUTEROL SULFATE 2.5; .5 MG/3ML; MG/3ML
1 SOLUTION RESPIRATORY (INHALATION) 3 TIMES DAILY
Status: DISCONTINUED | OUTPATIENT
Start: 2019-03-03 | End: 2019-03-05 | Stop reason: HOSPADM

## 2019-03-03 RX ORDER — ROSUVASTATIN CALCIUM 10 MG/1
40 TABLET, COATED ORAL EVERY EVENING
Status: DISCONTINUED | OUTPATIENT
Start: 2019-03-03 | End: 2019-03-05 | Stop reason: HOSPADM

## 2019-03-03 RX ADMIN — CARVEDILOL 3.12 MG: 3.12 TABLET, FILM COATED ORAL at 17:21

## 2019-03-03 RX ADMIN — TAMSULOSIN HYDROCHLORIDE 0.4 MG: 0.4 CAPSULE ORAL at 09:44

## 2019-03-03 RX ADMIN — ROSUVASTATIN CALCIUM 40 MG: 10 TABLET, FILM COATED ORAL at 17:21

## 2019-03-03 RX ADMIN — IPRATROPIUM BROMIDE AND ALBUTEROL SULFATE 1 AMPULE: .5; 3 SOLUTION RESPIRATORY (INHALATION) at 16:15

## 2019-03-03 RX ADMIN — CLOPIDOGREL BISULFATE 75 MG: 75 TABLET ORAL at 09:44

## 2019-03-03 RX ADMIN — ENOXAPARIN SODIUM 40 MG: 40 INJECTION SUBCUTANEOUS at 09:44

## 2019-03-03 RX ADMIN — IPRATROPIUM BROMIDE AND ALBUTEROL SULFATE 1 AMPULE: .5; 3 SOLUTION RESPIRATORY (INHALATION) at 13:18

## 2019-03-03 RX ADMIN — ASPIRIN 81 MG 81 MG: 81 TABLET ORAL at 09:52

## 2019-03-03 RX ADMIN — IPRATROPIUM BROMIDE AND ALBUTEROL SULFATE 1 AMPULE: .5; 3 SOLUTION RESPIRATORY (INHALATION) at 19:58

## 2019-03-03 RX ADMIN — AZITHROMYCIN 500 MG: 250 TABLET, FILM COATED ORAL at 09:44

## 2019-03-03 RX ADMIN — CARVEDILOL 3.12 MG: 3.12 TABLET, FILM COATED ORAL at 09:44

## 2019-03-03 RX ADMIN — Medication 10 ML: at 21:12

## 2019-03-03 RX ADMIN — Medication 10 ML: at 09:43

## 2019-03-03 ASSESSMENT — PAIN SCALES - GENERAL: PAINLEVEL_OUTOF10: 0

## 2019-03-04 PROCEDURE — 6360000002 HC RX W HCPCS: Performed by: INTERNAL MEDICINE

## 2019-03-04 PROCEDURE — 6370000000 HC RX 637 (ALT 250 FOR IP): Performed by: INTERNAL MEDICINE

## 2019-03-04 PROCEDURE — 96372 THER/PROPH/DIAG INJ SC/IM: CPT

## 2019-03-04 PROCEDURE — 2060000000 HC ICU INTERMEDIATE R&B

## 2019-03-04 PROCEDURE — 99232 SBSQ HOSP IP/OBS MODERATE 35: CPT | Performed by: INTERNAL MEDICINE

## 2019-03-04 PROCEDURE — 2580000003 HC RX 258: Performed by: INTERNAL MEDICINE

## 2019-03-04 PROCEDURE — 94640 AIRWAY INHALATION TREATMENT: CPT

## 2019-03-04 PROCEDURE — 94761 N-INVAS EAR/PLS OXIMETRY MLT: CPT

## 2019-03-04 RX ORDER — PREDNISONE 20 MG/1
40 TABLET ORAL DAILY
Status: DISCONTINUED | OUTPATIENT
Start: 2019-03-04 | End: 2019-03-05 | Stop reason: HOSPADM

## 2019-03-04 RX ADMIN — IPRATROPIUM BROMIDE AND ALBUTEROL SULFATE 1 AMPULE: .5; 3 SOLUTION RESPIRATORY (INHALATION) at 19:38

## 2019-03-04 RX ADMIN — AZITHROMYCIN 250 MG: 250 TABLET, FILM COATED ORAL at 08:29

## 2019-03-04 RX ADMIN — IPRATROPIUM BROMIDE AND ALBUTEROL SULFATE 1 AMPULE: .5; 3 SOLUTION RESPIRATORY (INHALATION) at 07:44

## 2019-03-04 RX ADMIN — Medication 10 ML: at 08:30

## 2019-03-04 RX ADMIN — ENOXAPARIN SODIUM 40 MG: 40 INJECTION SUBCUTANEOUS at 08:27

## 2019-03-04 RX ADMIN — ASPIRIN 81 MG 81 MG: 81 TABLET ORAL at 08:29

## 2019-03-04 RX ADMIN — CARVEDILOL 3.12 MG: 3.12 TABLET, FILM COATED ORAL at 16:45

## 2019-03-04 RX ADMIN — ROSUVASTATIN CALCIUM 40 MG: 10 TABLET, FILM COATED ORAL at 18:56

## 2019-03-04 RX ADMIN — CLOPIDOGREL BISULFATE 75 MG: 75 TABLET ORAL at 08:29

## 2019-03-04 RX ADMIN — PREDNISONE 40 MG: 20 TABLET ORAL at 12:04

## 2019-03-04 RX ADMIN — Medication 10 ML: at 21:55

## 2019-03-04 RX ADMIN — TAMSULOSIN HYDROCHLORIDE 0.4 MG: 0.4 CAPSULE ORAL at 08:29

## 2019-03-04 RX ADMIN — CARVEDILOL 3.12 MG: 3.12 TABLET, FILM COATED ORAL at 08:28

## 2019-03-04 RX ADMIN — IPRATROPIUM BROMIDE AND ALBUTEROL SULFATE 1 AMPULE: .5; 3 SOLUTION RESPIRATORY (INHALATION) at 13:13

## 2019-03-04 ASSESSMENT — PAIN SCALES - GENERAL
PAINLEVEL_OUTOF10: 0

## 2019-03-05 VITALS
OXYGEN SATURATION: 94 % | HEIGHT: 71 IN | HEART RATE: 92 BPM | TEMPERATURE: 97.4 F | DIASTOLIC BLOOD PRESSURE: 64 MMHG | BODY MASS INDEX: 26.14 KG/M2 | WEIGHT: 186.7 LBS | SYSTOLIC BLOOD PRESSURE: 110 MMHG | RESPIRATION RATE: 16 BRPM

## 2019-03-05 LAB
ANION GAP SERPL CALCULATED.3IONS-SCNC: 12 MMOL/L (ref 3–16)
BUN BLDV-MCNC: 25 MG/DL (ref 7–20)
CALCIUM SERPL-MCNC: 9.4 MG/DL (ref 8.3–10.6)
CHLORIDE BLD-SCNC: 101 MMOL/L (ref 99–110)
CO2: 21 MMOL/L (ref 21–32)
CREAT SERPL-MCNC: 1.2 MG/DL (ref 0.8–1.3)
GFR AFRICAN AMERICAN: >60
GFR NON-AFRICAN AMERICAN: 60
GLUCOSE BLD-MCNC: 179 MG/DL (ref 70–99)
POTASSIUM SERPL-SCNC: 4.5 MMOL/L (ref 3.5–5.1)
S PYO THROAT QL CULT: NORMAL
SODIUM BLD-SCNC: 134 MMOL/L (ref 136–145)

## 2019-03-05 PROCEDURE — 6360000002 HC RX W HCPCS: Performed by: INTERNAL MEDICINE

## 2019-03-05 PROCEDURE — 94640 AIRWAY INHALATION TREATMENT: CPT

## 2019-03-05 PROCEDURE — 6370000000 HC RX 637 (ALT 250 FOR IP): Performed by: INTERNAL MEDICINE

## 2019-03-05 PROCEDURE — 96372 THER/PROPH/DIAG INJ SC/IM: CPT

## 2019-03-05 PROCEDURE — 2580000003 HC RX 258: Performed by: INTERNAL MEDICINE

## 2019-03-05 PROCEDURE — 99238 HOSP IP/OBS DSCHRG MGMT 30/<: CPT | Performed by: INTERNAL MEDICINE

## 2019-03-05 PROCEDURE — 36415 COLL VENOUS BLD VENIPUNCTURE: CPT

## 2019-03-05 PROCEDURE — 80048 BASIC METABOLIC PNL TOTAL CA: CPT

## 2019-03-05 RX ORDER — AZITHROMYCIN 250 MG/1
250 TABLET, FILM COATED ORAL DAILY
Qty: 2 TABLET | Refills: 0 | Status: SHIPPED | OUTPATIENT
Start: 2019-03-06 | End: 2019-03-08

## 2019-03-05 RX ORDER — PREDNISONE 20 MG/1
TABLET ORAL
Qty: 9 TABLET | Refills: 0 | Status: SHIPPED | OUTPATIENT
Start: 2019-03-05 | End: 2019-04-25 | Stop reason: ALTCHOICE

## 2019-03-05 RX ADMIN — TAMSULOSIN HYDROCHLORIDE 0.4 MG: 0.4 CAPSULE ORAL at 08:24

## 2019-03-05 RX ADMIN — CLOPIDOGREL BISULFATE 75 MG: 75 TABLET ORAL at 08:24

## 2019-03-05 RX ADMIN — Medication 10 ML: at 08:24

## 2019-03-05 RX ADMIN — AZITHROMYCIN 250 MG: 250 TABLET, FILM COATED ORAL at 08:23

## 2019-03-05 RX ADMIN — IPRATROPIUM BROMIDE AND ALBUTEROL SULFATE 1 AMPULE: .5; 3 SOLUTION RESPIRATORY (INHALATION) at 06:43

## 2019-03-05 RX ADMIN — CARVEDILOL 3.12 MG: 3.12 TABLET, FILM COATED ORAL at 08:23

## 2019-03-05 RX ADMIN — ASPIRIN 81 MG 81 MG: 81 TABLET ORAL at 08:23

## 2019-03-05 RX ADMIN — PREDNISONE 40 MG: 20 TABLET ORAL at 08:24

## 2019-03-05 RX ADMIN — ENOXAPARIN SODIUM 40 MG: 40 INJECTION SUBCUTANEOUS at 08:24

## 2019-03-07 LAB — BLOOD CULTURE, ROUTINE: NORMAL

## 2019-03-15 RX ORDER — ROSUVASTATIN CALCIUM 40 MG/1
40 TABLET, COATED ORAL EVERY EVENING
Qty: 90 TABLET | Refills: 3 | Status: SHIPPED | OUTPATIENT
Start: 2019-03-15 | End: 2020-05-12 | Stop reason: SDUPTHER

## 2019-03-15 RX ORDER — FUROSEMIDE 20 MG/1
20 TABLET ORAL DAILY
Qty: 90 TABLET | Refills: 3 | Status: SHIPPED | OUTPATIENT
Start: 2019-03-15 | End: 2019-04-25 | Stop reason: DRUGHIGH

## 2019-03-15 RX ORDER — CARVEDILOL 3.12 MG/1
3.12 TABLET ORAL 2 TIMES DAILY WITH MEALS
Qty: 180 TABLET | Refills: 3 | Status: SHIPPED | OUTPATIENT
Start: 2019-03-15 | End: 2020-06-22 | Stop reason: SDUPTHER

## 2019-03-15 RX ORDER — GEMFIBROZIL 600 MG/1
600 TABLET, FILM COATED ORAL
Qty: 180 TABLET | Refills: 3 | Status: SHIPPED | OUTPATIENT
Start: 2019-03-15 | End: 2020-06-22 | Stop reason: SDUPTHER

## 2019-03-15 RX ORDER — CLOPIDOGREL BISULFATE 75 MG/1
75 TABLET ORAL DAILY
Qty: 90 TABLET | Refills: 3 | Status: SHIPPED | OUTPATIENT
Start: 2019-03-15 | End: 2020-05-12 | Stop reason: SDUPTHER

## 2019-03-19 ENCOUNTER — TELEPHONE (OUTPATIENT)
Dept: FAMILY MEDICINE CLINIC | Age: 69
End: 2019-03-19

## 2019-04-23 ENCOUNTER — NURSE ONLY (OUTPATIENT)
Dept: CARDIOLOGY CLINIC | Age: 69
End: 2019-04-23

## 2019-04-23 DIAGNOSIS — Z95.810 AICD (AUTOMATIC CARDIOVERTER/DEFIBRILLATOR) PRESENT: ICD-10-CM

## 2019-04-23 NOTE — PROGRESS NOTES
[address]   Cardiac Consultation    Referring Provider:  Carter Spain     Chief Complaint   Patient presents with    Dizziness      Subjective: Mr Dairl Hashimoto is being seen today for cardiology follow up of CAD, HTN, HLD, severe ischemic cardiomyopathy; c/o dizziness upon standing at times    Past Medical History:    Austin Jacinto is a 79 yo male who has PMH of chronic severe ischemic cardiomyopathy and CAD s/p RCA stents prior.  Former patient of Dr. Irene Barcenas. He has a Guidant ICD for h/o VT. Cardiac cath 6/2014 --> stable known ischemic heart disease. He also has hx MI, hyperlipidemia, and chronic systolic CHF. Most recent ECHO July 2016 showed EF is severely decreased EF=15-20%; akinetic septum/apex; LV dilated (no change from 2014 study). I switched him from high dose lipitor to crestor in late May 2018 due to DXV=931. Due to unspecified CP he underwent most recent Lexiscan myoview 12/28/18 no evidence of stress induced ischemia. Moderate-large sized anterior, anteroseptal, and entire apex fixed defects c/w infarction in the territory of the proximal to distal LAD and/or LCx; apical akinesis;  LVEF 33% (no change from 7/16, 11/15, or 11/13 studies). Most recent ECHO 12/28/18 EF 20-25%. [place], septum, and anteroseptal segment appear akinetic. Mild to moderate MR and TR; trace anterior pericardial effusion; Pacemaker / ICD lead is visualized in the right-sided chambers. Previous ECHO done 2014 - EF 25%  Device check 4/23/19 Latitude Remote transmission of pacemaker and/or ICD shows normal cardiac device function. 4 episodes of PAT x 6-7 sec, no NSVT. Most recent EKG 3/2/19  Sinus tachycardia Rightward axis     History of Present Illness: Today he reports he has been feeling fairly good but reports feeling dizzy headed when he gets up to walk. He waits few minutes and dizziness resolves. He reports intermittent, rare brief sharp CP but he does not need to take SL nitro.  He continues to smoke Monday/Wednesday/Friday. 3/5/19  Yes Junius Holstein, MD   lisinopril (PRINIVIL;ZESTRIL) 5 MG tablet Take 1 tablet by mouth daily 1/2/19  Yes Manuela Kingsley MD   folic acid (FOLVITE) 1 MG tablet Take 1 mg by mouth daily   Yes Historical Provider, MD   tamsulosin (FLOMAX) 0.4 MG capsule Take 0.4 mg by mouth daily   Yes Historical Provider, MD   nitroGLYCERIN (NITROSTAT) 0.4 MG SL tablet Place 1 tablet under the tongue every 5 minutes as needed for Chest pain 8/29/16  Yes Manuela Kingsley MD   aspirin 81 MG tablet Take 81 mg by mouth daily. Yes Historical Provider, MD        Allergies:  Patient has no known allergies. Review of Systems:   · Constitutional: there has been no unanticipated weight loss. There's been no change in energy level, sleep pattern, or activity level. · Eyes: No visual changes or diplopia. No scleral icterus. · ENT: No Headaches, hearing loss or vertigo. No mouth sores or sore throat. · Cardiovascular: Reviewed in HPI  · Respiratory: No cough or wheezing, no sputum production. No hematemesis. · Gastrointestinal: No abdominal pain, appetite loss, blood in stools. No change in bowel or bladder habits. · Genitourinary: No dysuria, trouble voiding, or hematuria. · Musculoskeletal:  No gait disturbance, weakness or joint complaints. · Integumentary: No rash or pruritis. · Neurological: No headache, diplopia, change in muscle strength, numbness or tingling. No change in gait, balance, coordination, mood, affect, memory, mentation, behavior. · Psychiatric: No anxiety, no depression. · Endocrine: No malaise, fatigue or temperature intolerance. No excessive thirst, fluid intake, or urination. No tremor. · Hematologic/Lymphatic: No abnormal bruising or bleeding, blood clots or swollen lymph nodes. · Allergic/Immunologic: No nasal congestion or hives.     Physical Examination:    Vitals:    04/25/19 1351   BP: (!) 80/56   Pulse: 98   SpO2: 96%        Constitutional and General Appearance: NAD   Respiratory:  · Normal excursion and expansion without use of accessory muscles  · Resp Auscultation: soft BLL crackles otherwise soft clear bs  Cardiovascular:  · The apical impulses not displaced  · Heart tones are crisp and normal  · Cervical veins are not engorged  · The carotid upstroke is normal in amplitude and contour without delay or bruit  · Distant soft S1S2, No S3, No Murmur  · Peripheral pulses are symmetrical and full  · There is no clubbing, cyanosis of the extremities. · No edema  · Femoral Arteries: 2+ and equal  · Pedal Pulses: 2+ and equal   Abdomen:  · No masses or tenderness  · Liver/Spleen: No Abnormalities Noted  Neurological/Psychiatric:  · Alert and oriented in all spheres  · Moves all extremities well  · Exhibits normal gait balance and coordination  · No abnormalities of mood, affect, memory, mentation, or behavior are noted  ·   Lab Results   Component Value Date    CHOL 126 12/28/2018    CHOL 132 07/06/2016    CHOL 160 03/25/2016     Lab Results   Component Value Date    TRIG 170 (H) 12/28/2018    TRIG 196 (H) 07/06/2016    TRIG 272 (H) 03/25/2016     Lab Results   Component Value Date    HDL 38 (L) 12/28/2018    HDL 47 05/29/2018    HDL 41 08/25/2017     Lab Results   Component Value Date    LDLCALC 54 12/28/2018    LDLCALC 106 (H) 05/29/2018    LDLCALC 106 (H) 08/25/2017     Lab Results   Component Value Date    LABVLDL 34 12/28/2018    LABVLDL 27 05/29/2018    LABVLDL 24 08/25/2017       Assessment:   1. Ischemic cardiomyopathy:  Most recent ECHO 12/28/18 EF 20-25%. [place], septum, and anteroseptal segment appear akinetic. Mild to moderate MR and TR; trace anterior pericardial effusion; Pacemaker / ICD lead is visualized in the right-sided chambers. Previous ECHO done 2014 - EF 25%  Device check 4/23/19 Latitude Remote transmission of pacemaker and/or ICD shows normal cardiac device function. 4 episodes of PAT x 6-7 sec, no NSVT.  Clinically compensated NYHA Class II and will continue current CHF medical regimen. Given low BP I cannot add aldactone. 2. Hyperlipidemia:  Most recent egamzo55/28/18 see results I personally reviewed (see above). I switched him from high dose lipitor to crestor in late May 2018 and he is doing better. 3. Orthostatic hypotension: Reports dizziness with standing. Today in office orthostatic readings 4/25/19 supine 102/64 Hr 95 sitting 80/58 P 94 Standing 80/56 HR 98. Will have him use lasix PRN to see if helps. Plan:  1. He is doing well overall from a cardiac standpoint. No need for cardiac testing at this time. 2.  Meds reviewed and refilled as warranted  3. Follow up with me in 6 months   4. Stop daily use of Lasix and use only as needed for swelling or weight gain, this may improve dizziness. If you have more than 2-3 lbs of weight gain overnight or swelling in feet/legs then take   5. Advised to watch low salt diet limit fluid to 64 oz per day  6. Smoking cessation is likely not going to happen. He is not very interested. Cost of prescription medications and patient compliance have been reviewed with patient. All questions answered. Thank you for allowing me to participate in the care of this individual.    This note was scribed in the presence of Edi Meza MD by [place]onsec[place] RN    I, Dr. Axel Rodriguez, personally performed the services described in this documentation, as scribed by the above signed scribe in my presence. It is both accurate and complete to my knowledge. I agree with the details independently gathered by the clinical support staff, while the remaining scribed note accurately describes my personal service to the patient. Carol Hicks.  Radha Orellana M.D., [facility]

## 2019-04-25 ENCOUNTER — OFFICE VISIT (OUTPATIENT)
Dept: CARDIOLOGY CLINIC | Age: 69
End: 2019-04-25
Payer: MEDICARE

## 2019-04-25 VITALS
DIASTOLIC BLOOD PRESSURE: 56 MMHG | WEIGHT: 190 LBS | OXYGEN SATURATION: 96 % | HEIGHT: 70 IN | SYSTOLIC BLOOD PRESSURE: 80 MMHG | BODY MASS INDEX: 27.2 KG/M2 | HEART RATE: 98 BPM

## 2019-04-25 DIAGNOSIS — I25.10 CORONARY ARTERY DISEASE INVOLVING NATIVE CORONARY ARTERY OF NATIVE HEART WITHOUT ANGINA PECTORIS: Primary | ICD-10-CM

## 2019-04-25 DIAGNOSIS — E78.2 MIXED HYPERLIPIDEMIA: ICD-10-CM

## 2019-04-25 DIAGNOSIS — Z95.810 AICD (AUTOMATIC CARDIOVERTER/DEFIBRILLATOR) PRESENT: ICD-10-CM

## 2019-04-25 DIAGNOSIS — I25.5 ISCHEMIC CARDIOMYOPATHY: ICD-10-CM

## 2019-04-25 PROCEDURE — G8427 DOCREV CUR MEDS BY ELIG CLIN: HCPCS | Performed by: INTERNAL MEDICINE

## 2019-04-25 PROCEDURE — 99214 OFFICE O/P EST MOD 30 MIN: CPT | Performed by: INTERNAL MEDICINE

## 2019-04-25 PROCEDURE — 4004F PT TOBACCO SCREEN RCVD TLK: CPT | Performed by: INTERNAL MEDICINE

## 2019-04-25 PROCEDURE — 3017F COLORECTAL CA SCREEN DOC REV: CPT | Performed by: INTERNAL MEDICINE

## 2019-04-25 PROCEDURE — 1123F ACP DISCUSS/DSCN MKR DOCD: CPT | Performed by: INTERNAL MEDICINE

## 2019-04-25 PROCEDURE — G8598 ASA/ANTIPLAT THER USED: HCPCS | Performed by: INTERNAL MEDICINE

## 2019-04-25 PROCEDURE — G8419 CALC BMI OUT NRM PARAM NOF/U: HCPCS | Performed by: INTERNAL MEDICINE

## 2019-04-25 PROCEDURE — 4040F PNEUMOC VAC/ADMIN/RCVD: CPT | Performed by: INTERNAL MEDICINE

## 2019-04-25 RX ORDER — FINASTERIDE 5 MG/1
5 TABLET, FILM COATED ORAL DAILY
COMMUNITY

## 2019-04-25 RX ORDER — FUROSEMIDE 20 MG/1
20 TABLET ORAL DAILY PRN
Qty: 90 TABLET | Refills: 3 | Status: SHIPPED
Start: 2019-04-25 | End: 2019-10-17 | Stop reason: ALTCHOICE

## 2019-04-25 NOTE — PATIENT INSTRUCTIONS
1/2ppd but down from [address] prior. Orthostatic readings 4/25/19 supine 102/64 Hr 95 sitting 80/58 P 94 Standing 80/56 HR 98    Past Medical History:   has a past medical history of AICD (automatic cardioverter/defibrillator) present, CAD (coronary artery disease), CHF (congestive heart failure) ([facility] Chronic systolic CHF (congestive heart failure), NYHA class 3 ([facility] Hyperlipidemia, and MI (myocardial infarction) ([facility] Surgical History:   has a past surgical history that includes Coronary angioplasty with stent; Cardiac defibrillator placement; knee surgery; Cardiac defibrillator placement; Colonoscopy; Cataract removal with implant (Right, 02/01/2017); Cataract removal with implant (Left, 03/01/2017); and eye surgery. Social History:   reports that he has been smoking cigarettes. He has a 54.00 pack-year smoking history. He has never used smokeless tobacco. He reports that he does not drink alcohol or use drugs. Family History:  family history includes Cancer in his father; Diabetes in his mother. Home Medications:  Prior to Admission medications    Medication Sig Start Date End Date Taking? Authorizing Provider   finasteride (PROSCAR) 5 MG tablet Take 5 mg by mouth daily   Yes Historical Provider, MD   carvedilol (COREG) 3.125 MG tablet Take 1 tablet by mouth 2 times daily (with meals) 3/15/19  Yes Fifi Mckeon MD   clopidogrel (PLAVIX) 75 MG tablet Take 1 tablet by mouth daily 3/15/19  Yes Fifi Mckeon MD   furosemide (LASIX) 20 MG tablet Take 1 tablet by mouth daily 3/15/19  Yes Fifi Mckeon MD   gemfibrozil (LOPID) 600 MG tablet Take 1 tablet by mouth 2 times daily (before meals) 3/15/19  Yes Fifi Mckeon MD   rosuvastatin (CRESTOR) 40 MG tablet Take 1 tablet by mouth every evening 3/15/19  Yes Fifi Mckeon MD   methotrexate (RHEUMATREX) 2.5 MG chemo tablet Take 1 tablet by mouth once a week RX directions are 4 tablets weekly.   Patient takes one tablet Monday/Wednesday/Friday. 3/5/19  Yes Clay Barrios MD   lisinopril (PRINIVIL;ZESTRIL) 5 MG tablet Take 1 tablet by mouth daily 1/2/19  Yes Yamini Martinez MD   folic acid (FOLVITE) 1 MG tablet Take 1 mg by mouth daily   Yes Historical Provider, MD   tamsulosin (FLOMAX) 0.4 MG capsule Take 0.4 mg by mouth daily   Yes Historical Provider, MD   nitroGLYCERIN (NITROSTAT) 0.4 MG SL tablet Place 1 tablet under the tongue every 5 minutes as needed for Chest pain 8/29/16  Yes Yamini Martinez MD   aspirin 81 MG tablet Take 81 mg by mouth daily. Yes Historical Provider, MD        Allergies:  Patient has no known allergies. Review of Systems:   · Constitutional: there has been no unanticipated weight loss. There's been no change in energy level, sleep pattern, or activity level. · Eyes: No visual changes or diplopia. No scleral icterus. · ENT: No Headaches, hearing loss or vertigo. No mouth sores or sore throat. · Cardiovascular: Reviewed in HPI  · Respiratory: No cough or wheezing, no sputum production. No hematemesis. · Gastrointestinal: No abdominal pain, appetite loss, blood in stools. No change in bowel or bladder habits. · Genitourinary: No dysuria, trouble voiding, or hematuria. · Musculoskeletal:  No gait disturbance, weakness or joint complaints. · Integumentary: No rash or pruritis. · Neurological: No headache, diplopia, change in muscle strength, numbness or tingling. No change in gait, balance, coordination, mood, affect, memory, mentation, behavior. · Psychiatric: No anxiety, no depression. · Endocrine: No malaise, fatigue or temperature intolerance. No excessive thirst, fluid intake, or urination. No tremor. · Hematologic/Lymphatic: No abnormal bruising or bleeding, blood clots or swollen lymph nodes. · Allergic/Immunologic: No nasal congestion or hives.     Physical Examination:    Vitals:    04/25/19 1351   BP: (!) 80/56   Pulse: 98   SpO2: 96%        Constitutional and General Appearance: NAD   Respiratory:  · Normal excursion and expansion without use of accessory muscles  · Resp Auscultation: soft BLL crackles otherwise soft clear bs  Cardiovascular:  · The apical impulses not displaced  · Heart tones are crisp and normal  · Cervical veins are not engorged  · The carotid upstroke is normal in amplitude and contour without delay or bruit  · Distant soft S1S2, No S3, No Murmur  · Peripheral pulses are symmetrical and full  · There is no clubbing, cyanosis of the extremities. · No edema  · Femoral Arteries: 2+ and equal  · Pedal Pulses: 2+ and equal   Abdomen:  · No masses or tenderness  · Liver/Spleen: No Abnormalities Noted  Neurological/Psychiatric:  · Alert and oriented in all spheres  · Moves all extremities well  · Exhibits normal gait balance and coordination  · No abnormalities of mood, affect, memory, mentation, or behavior are noted  ·   Lab Results   Component Value Date    CHOL 126 12/28/2018    CHOL 132 07/06/2016    CHOL 160 03/25/2016     Lab Results   Component Value Date    TRIG 170 (H) 12/28/2018    TRIG 196 (H) 07/06/2016    TRIG 272 (H) 03/25/2016     Lab Results   Component Value Date    HDL 38 (L) 12/28/2018    HDL 47 05/29/2018    HDL 41 08/25/2017     Lab Results   Component Value Date    LDLCALC 54 12/28/2018    LDLCALC 106 (H) 05/29/2018    LDLCALC 106 (H) 08/25/2017     Lab Results   Component Value Date    LABVLDL 34 12/28/2018    LABVLDL 27 05/29/2018    LABVLDL 24 08/25/2017       Assessment:   1. Ischemic cardiomyopathy:  Most recent ECHO 12/28/18 EF 20-25%. [place], septum, and anteroseptal segment appear akinetic. Mild to moderate MR and TR; trace anterior pericardial effusion; Pacemaker / ICD lead is visualized in the right-sided chambers. Previous ECHO done 2014 - EF 25%  Device check 4/23/19 [facility] Remote transmission of pacemaker and/or ICD shows normal cardiac device function. 4 episodes of PAT x 6-7 sec, no NSVT.  Clinically compensated NYHA Class II and will continue current CHF medical regimen. Given low BP I cannot add aldactone. 2. Hyperlipidemia:  Most recent tpolrm29/28/18 see results I personally reviewed (see above). I switched him from high dose lipitor to crestor in late May 2018 and he is doing better. 3. Orthostatic hypotension: Reports dizziness with standing. Today in office orthostatic readings 4/25/19 supine 102/64 Hr 95 sitting 80/58 P 94 Standing 80/56 HR 98. Will have him use lasix PRN to see if helps. Plan:  1. He is doing well overall from a cardiac standpoint. No need for cardiac testing at this time. 2.  Meds reviewed and refilled as warranted  3. Follow up with me in 6 months   4. Stop daily use of Lasix and use only as needed for swelling or weight gain, this may improve dizziness. If you have more than 2-3 lbs of weight gain overnight or swelling in feet/legs then take   5. Advised to watch low salt diet limit fluid to 64 oz per day  6. Smoking cessation is likely not going to happen. He is not very interested. Cost of prescription medications and patient compliance have been reviewed with patient. All questions answered. Thank you for allowing me to participate in the care of this individual.    This note was scribed in the presence of Tammy Mercer MD by Una Magaña RN    I, Dr. Radha Cordero, personally performed the services described in this documentation, as scribed by the above signed scribe in my presence. It is both accurate and complete to my knowledge. I agree with the details independently gathered by the clinical support staff, while the remaining scribed note accurately describes my personal service to the patient. Bartolome Jules.  Pipo Walton M.D., [address] [facility]

## 2019-05-07 ENCOUNTER — NURSE ONLY (OUTPATIENT)
Dept: CARDIOLOGY CLINIC | Age: 69
End: 2019-05-07
Payer: MEDICARE

## 2019-05-07 DIAGNOSIS — I25.5 ISCHEMIC CARDIOMYOPATHY: ICD-10-CM

## 2019-05-07 DIAGNOSIS — Z95.810 AICD (AUTOMATIC CARDIOVERTER/DEFIBRILLATOR) PRESENT: ICD-10-CM

## 2019-05-07 PROCEDURE — 93295 DEV INTERROG REMOTE 1/2/MLT: CPT | Performed by: INTERNAL MEDICINE

## 2019-05-07 PROCEDURE — 93296 REM INTERROG EVL PM/IDS: CPT | Performed by: INTERNAL MEDICINE

## 2019-05-08 NOTE — PROGRESS NOTES
[facility] Remote transmission of pacemaker and/or ICD shows normal cardiac device function. 3 new episodes recorded as NSVT-EGM's show brief PAT. He takes ASA, Plavix and Coreg. Follow up in 3 months via [facility].

## 2019-05-10 ENCOUNTER — TELEPHONE (OUTPATIENT)
Dept: FAMILY MEDICINE CLINIC | Age: 69
End: 2019-05-10

## 2019-05-10 ENCOUNTER — OFFICE VISIT (OUTPATIENT)
Dept: FAMILY MEDICINE CLINIC | Age: 69
End: 2019-05-10
Payer: MEDICARE

## 2019-05-10 VITALS
DIASTOLIC BLOOD PRESSURE: 78 MMHG | BODY MASS INDEX: 29.1 KG/M2 | HEIGHT: 68 IN | SYSTOLIC BLOOD PRESSURE: 110 MMHG | WEIGHT: 192 LBS | HEART RATE: 63 BPM

## 2019-05-10 DIAGNOSIS — I25.10 CORONARY ARTERY DISEASE INVOLVING NATIVE CORONARY ARTERY OF NATIVE HEART WITHOUT ANGINA PECTORIS: ICD-10-CM

## 2019-05-10 DIAGNOSIS — R73.9 HYPERGLYCEMIA: ICD-10-CM

## 2019-05-10 DIAGNOSIS — I50.22 CHRONIC SYSTOLIC CONGESTIVE HEART FAILURE (HCC): Primary | ICD-10-CM

## 2019-05-10 DIAGNOSIS — Z87.891 PERSONAL HISTORY OF TOBACCO USE: ICD-10-CM

## 2019-05-10 DIAGNOSIS — J41.0 SIMPLE CHRONIC BRONCHITIS (HCC): ICD-10-CM

## 2019-05-10 DIAGNOSIS — E78.2 MIXED HYPERLIPIDEMIA: ICD-10-CM

## 2019-05-10 LAB — HBA1C MFR BLD: 6.1 %

## 2019-05-10 PROCEDURE — 83036 HEMOGLOBIN GLYCOSYLATED A1C: CPT | Performed by: INTERNAL MEDICINE

## 2019-05-10 PROCEDURE — 3023F SPIROM DOC REV: CPT | Performed by: INTERNAL MEDICINE

## 2019-05-10 PROCEDURE — 99214 OFFICE O/P EST MOD 30 MIN: CPT | Performed by: INTERNAL MEDICINE

## 2019-05-10 PROCEDURE — G8419 CALC BMI OUT NRM PARAM NOF/U: HCPCS | Performed by: INTERNAL MEDICINE

## 2019-05-10 PROCEDURE — 3017F COLORECTAL CA SCREEN DOC REV: CPT | Performed by: INTERNAL MEDICINE

## 2019-05-10 PROCEDURE — G8926 SPIRO NO PERF OR DOC: HCPCS | Performed by: INTERNAL MEDICINE

## 2019-05-10 PROCEDURE — 4040F PNEUMOC VAC/ADMIN/RCVD: CPT | Performed by: INTERNAL MEDICINE

## 2019-05-10 PROCEDURE — G8598 ASA/ANTIPLAT THER USED: HCPCS | Performed by: INTERNAL MEDICINE

## 2019-05-10 PROCEDURE — G8427 DOCREV CUR MEDS BY ELIG CLIN: HCPCS | Performed by: INTERNAL MEDICINE

## 2019-05-10 PROCEDURE — 4004F PT TOBACCO SCREEN RCVD TLK: CPT | Performed by: INTERNAL MEDICINE

## 2019-05-10 PROCEDURE — 1123F ACP DISCUSS/DSCN MKR DOCD: CPT | Performed by: INTERNAL MEDICINE

## 2019-05-10 RX ORDER — IPRATROPIUM BROMIDE AND ALBUTEROL SULFATE 2.5; .5 MG/3ML; MG/3ML
1 SOLUTION RESPIRATORY (INHALATION) EVERY 6 HOURS PRN
Qty: 120 VIAL | Refills: 5 | Status: SHIPPED | OUTPATIENT
Start: 2019-05-10

## 2019-05-10 ASSESSMENT — PATIENT HEALTH QUESTIONNAIRE - PHQ9
SUM OF ALL RESPONSES TO PHQ QUESTIONS 1-9: 0
1. LITTLE INTEREST OR PLEASURE IN DOING THINGS: 0
SUM OF ALL RESPONSES TO PHQ QUESTIONS 1-9: 0
SUM OF ALL RESPONSES TO PHQ9 QUESTIONS 1 & 2: 0
2. FEELING DOWN, DEPRESSED OR HOPELESS: 0

## 2019-05-10 NOTE — PROGRESS NOTES
5/10/2019    Zabrina Ibanez (:  1950) is a 78 y.o. male, here for evaluation of the following medical concerns:  Chief Complaint   Patient presents with   Pearline Meckel Doctor     was a former pt of yours was seeing     Nicotine Dependence     Pended CT Lung Screening     Other     per pt had a Colonoscopy done about 5 years ago. I called for report      Seeing Dr. Breonna Rudolph and Dr. eNllie Prescott. HPI  New patient visit and follow up of   Diagnosis Orders   1. Personal history of tobacco use  CT Lung Screening (Annual)   2. Hyperglycemia  POCT glycosylated hemoglobin (Hb A1C)   3. Chronic systolic congestive heart failure ([address]     4. Coronary artery disease involving native coronary artery of native heart without angina pectoris     5. Mixed hyperlipidemia     6. Simple chronic bronchitis (HCC)       Stable per patient. Discussed CT lung screen. Smoking and discussed cessation, behavioral changes, dependence, medication uses and side effects. Patient's medications, allergies, past medical, surgical, social and family histories were reviewed and updated as appropriate. Review of Systems   Constitutional: Negative for fatigue. Respiratory: Negative for cough and shortness of breath. Cardiovascular: Negative for chest pain and leg swelling. Neurological: Negative for dizziness and headaches. Prior to Visit Medications    Medication Sig Taking?  Authorizing Provider   finasteride (PROSCAR) 5 MG tablet Take 5 mg by mouth daily Indications: Rx by Dr. Giuliana Prescott  Yes Oksana Pierson MD   carvedilol (COREG) 3.125 MG tablet Take 1 tablet by mouth 2 times daily (with meals)  Patient taking differently: Take 3.125 mg by mouth 2 times daily (with meals) Indications: Prescribed by Dr. Breonna Rudolph  Yes Salvatore Cheadle, MD   clopidogrel (PLAVIX) 75 MG tablet Take 1 tablet by mouth daily  Patient taking differently: Take 75 mg by mouth daily Indications: Gets from Dr Brennan Richmond Date    CARDIAC DEFIBRILLATOR PLACEMENT      CARDIAC DEFIBRILLATOR PLACEMENT      CATARACT REMOVAL WITH IMPLANT Right 02/01/2017    CATARACT REMOVAL WITH IMPLANT Left 03/01/2017    COLONOSCOPY      CORONARY ANGIOPLASTY WITH STENT PLACEMENT      EYE SURGERY      KNEE SURGERY         Social History     Socioeconomic History    Marital status:      Spouse name: Not on file    Number of children: Not on file    Years of education: Not on file    Highest education level: Not on file   Occupational History    Not on file   Social Needs    Financial resource strain: Not on file    Food insecurity:     Worry: Not on file     Inability: Not on file    Transportation needs:     Medical: Not on file     Non-medical: Not on file   Tobacco Use    Smoking status: Current Every Day Smoker     Packs/day: 1.00     Years: 54.00     Pack years: 54.00     Types: Cigarettes    Smokeless tobacco: Never Used   Substance and Sexual Activity    Alcohol use: No    Drug use: No    Sexual activity: Yes     Partners: Female   Lifestyle    Physical activity:     Days per week: Not on file     Minutes per session: Not on file    Stress: Not on file   Relationships    Social connections:     Talks on phone: Not on file     Gets together: Not on file     Attends Jainism service: Not on file     Active member of club or organization: Not on file     Attends meetings of clubs or organizations: Not on file     Relationship status: Not on file    Intimate partner violence:     Fear of current or ex partner: Not on file     Emotionally abused: Not on file     Physically abused: Not on file     Forced sexual activity: Not on file   Other Topics Concern    Not on file   Social History Narrative    Not on file        Family History   Problem Relation Age of Onset    Diabetes Mother     Cancer Father        Vitals:    05/10/19 0958   BP: 110/78   Pulse: 63   Weight: 192 lb (87.1 kg)  Comment: without shoes   Height: 5' 7.72\" (1.72 m)  Comment: without shoes   PF: 97 L/min     Estimated body mass index is 29.44 kg/m² as calculated from the following:    Height as of this encounter: 5' 7.72\" (1.72 m). Weight as of this encounter: 192 lb (87.1 kg). Physical Exam   Constitutional: He is oriented to person, place, and time. He appears well-developed and well-nourished. HENT:   Mouth/Throat: Oropharynx is clear and moist.   Eyes: Conjunctivae are normal. No scleral icterus. Neck: No JVD present. Carotid bruit is not present. No thyromegaly present. Cardiovascular: Normal rate, regular rhythm, normal heart sounds and intact distal pulses. No murmur heard. Pulmonary/Chest: Effort normal and breath sounds normal. He has no wheezes. He has no rales. Musculoskeletal: He exhibits no edema or tenderness. Neurological: He is alert and oriented to person, place, and time. Skin: No rash noted. Vitals reviewed. ASSESSMENT/PLAN:  Dakotah Rose was seen today for established new doctor, nicotine dependence and other. Diagnoses and all orders for this visit:    Chronic systolic congestive heart failure ([address]    Personal history of tobacco use  -     CT Lung Screening (Annual); Future    Hyperglycemia  -     POCT glycosylated hemoglobin (Hb A1C)    Coronary artery disease involving native coronary artery of native heart without angina pectoris    Mixed hyperlipidemia    Simple chronic bronchitis (HCC)    Other orders  -     ipratropium-albuterol (DUONEB) 0.5-2.5 (3) MG/3ML SOLN nebulizer solution; Inhale 3 mLs into the lungs every 6 hours as needed for Shortness of Breath  -     nicotine (NICOTROL) 10 MG inhaler; Inhale 1 puff into the lungs as needed for Smoking cessation    Smoking and discussed cessation, behavioral changes, dependence, medication uses and side effects. No follow-ups on file. An  electronic signature was used to authenticate this note.     --Rizwana Beatty MD on 5/10/2019 at 10:25 AM

## 2019-05-17 ENCOUNTER — TELEPHONE (OUTPATIENT)
Dept: FAMILY MEDICINE CLINIC | Age: 69
End: 2019-05-17

## 2019-05-19 ASSESSMENT — ENCOUNTER SYMPTOMS
COUGH: 0
SHORTNESS OF BREATH: 0

## 2019-05-20 NOTE — TELEPHONE ENCOUNTER
I submitted PA for Ipratropium-Albuterol 0.5-2.5 (3)MG/3ML solution. Villalobos: [place]. Message came back that this is Available without authorization. Please advise patient. Thank you.

## 2019-06-28 ENCOUNTER — HOSPITAL ENCOUNTER (OUTPATIENT)
Age: 69
Discharge: HOME | End: 2019-06-28
Payer: MEDICARE

## 2019-06-28 LAB
ALBUMIN SERPL-MCNC: 4.6 G/DL (ref 3.4–5)
ALP BLD-CCNC: 111 U/L (ref 40–129)
ALT SERPL-CCNC: 11 U/L (ref 10–40)
AST SERPL-CCNC: 18 U/L (ref 15–37)
BILIRUB SERPL-MCNC: 0.3 MG/DL (ref 0–1)
BILIRUBIN DIRECT: <0.2 MG/DL (ref 0–0.3)
BILIRUBIN, INDIRECT: NORMAL MG/DL (ref 0–1)
HCT VFR BLD CALC: 38.3 % (ref 40.5–52.5)
HEMOGLOBIN: 12.9 G/DL (ref 13.5–17.5)
MCH RBC QN AUTO: 33.6 PG (ref 26–34)
MCHC RBC AUTO-ENTMCNC: 33.8 G/DL (ref 31–36)
MCV RBC AUTO: 99.5 FL (ref 80–100)
PDW BLD-RTO: 15.3 % (ref 12.4–15.4)
PLATELET # BLD: 241 K/UL (ref 135–450)
PMV BLD AUTO: 8.3 FL (ref 5–10.5)
RBC # BLD: 3.84 M/UL (ref 4.2–5.9)
TOTAL PROTEIN: 7.6 G/DL (ref 6.4–8.2)
WBC # BLD: 8.8 K/UL (ref 4–11)

## 2019-06-28 PROCEDURE — 85027 COMPLETE CBC AUTOMATED: CPT

## 2019-06-28 PROCEDURE — 36415 COLL VENOUS BLD VENIPUNCTURE: CPT

## 2019-06-28 PROCEDURE — 80076 HEPATIC FUNCTION PANEL: CPT

## 2019-07-17 ENCOUNTER — OFFICE VISIT (OUTPATIENT)
Dept: ORTHOPEDIC SURGERY | Age: 69
End: 2019-07-17
Payer: MEDICARE

## 2019-07-17 VITALS
WEIGHT: 192.02 LBS | DIASTOLIC BLOOD PRESSURE: 62 MMHG | SYSTOLIC BLOOD PRESSURE: 99 MMHG | HEART RATE: 71 BPM | BODY MASS INDEX: 29.1 KG/M2 | HEIGHT: 68 IN

## 2019-07-17 DIAGNOSIS — M25.561 RIGHT KNEE PAIN, UNSPECIFIED CHRONICITY: Primary | ICD-10-CM

## 2019-07-17 PROCEDURE — 4004F PT TOBACCO SCREEN RCVD TLK: CPT | Performed by: ORTHOPAEDIC SURGERY

## 2019-07-17 PROCEDURE — 1123F ACP DISCUSS/DSCN MKR DOCD: CPT | Performed by: ORTHOPAEDIC SURGERY

## 2019-07-17 PROCEDURE — 3017F COLORECTAL CA SCREEN DOC REV: CPT | Performed by: ORTHOPAEDIC SURGERY

## 2019-07-17 PROCEDURE — G8419 CALC BMI OUT NRM PARAM NOF/U: HCPCS | Performed by: ORTHOPAEDIC SURGERY

## 2019-07-17 PROCEDURE — 4040F PNEUMOC VAC/ADMIN/RCVD: CPT | Performed by: ORTHOPAEDIC SURGERY

## 2019-07-17 PROCEDURE — G8427 DOCREV CUR MEDS BY ELIG CLIN: HCPCS | Performed by: ORTHOPAEDIC SURGERY

## 2019-07-17 PROCEDURE — 20610 DRAIN/INJ JOINT/BURSA W/O US: CPT | Performed by: ORTHOPAEDIC SURGERY

## 2019-07-17 PROCEDURE — G8598 ASA/ANTIPLAT THER USED: HCPCS | Performed by: ORTHOPAEDIC SURGERY

## 2019-07-17 PROCEDURE — 99212 OFFICE O/P EST SF 10 MIN: CPT | Performed by: ORTHOPAEDIC SURGERY

## 2019-08-06 ENCOUNTER — NURSE ONLY (OUTPATIENT)
Dept: CARDIOLOGY CLINIC | Age: 69
End: 2019-08-06
Payer: MEDICARE

## 2019-08-06 DIAGNOSIS — Z95.810 AICD (AUTOMATIC CARDIOVERTER/DEFIBRILLATOR) PRESENT: ICD-10-CM

## 2019-08-06 DIAGNOSIS — I25.5 ISCHEMIC CARDIOMYOPATHY: ICD-10-CM

## 2019-08-06 PROCEDURE — 93296 REM INTERROG EVL PM/IDS: CPT | Performed by: INTERNAL MEDICINE

## 2019-08-06 PROCEDURE — 93295 DEV INTERROG REMOTE 1/2/MLT: CPT | Performed by: INTERNAL MEDICINE

## 2019-09-18 ENCOUNTER — OFFICE VISIT (OUTPATIENT)
Dept: FAMILY MEDICINE CLINIC | Age: 69
End: 2019-09-18
Payer: MEDICARE

## 2019-09-18 VITALS
DIASTOLIC BLOOD PRESSURE: 62 MMHG | WEIGHT: 194.6 LBS | HEIGHT: 68 IN | BODY MASS INDEX: 29.49 KG/M2 | SYSTOLIC BLOOD PRESSURE: 102 MMHG | RESPIRATION RATE: 18 BRPM | HEART RATE: 81 BPM | TEMPERATURE: 98.5 F | OXYGEN SATURATION: 99 %

## 2019-09-18 DIAGNOSIS — J40 BRONCHITIS: Primary | ICD-10-CM

## 2019-09-18 DIAGNOSIS — J44.9 SUSPECTED CHRONIC OBSTRUCTIVE PULMONARY DISEASE BASED ON INITIAL EVALUATION (HCC): ICD-10-CM

## 2019-09-18 PROCEDURE — 99213 OFFICE O/P EST LOW 20 MIN: CPT | Performed by: PHYSICIAN ASSISTANT

## 2019-09-18 PROCEDURE — G8419 CALC BMI OUT NRM PARAM NOF/U: HCPCS | Performed by: PHYSICIAN ASSISTANT

## 2019-09-18 PROCEDURE — 4040F PNEUMOC VAC/ADMIN/RCVD: CPT | Performed by: PHYSICIAN ASSISTANT

## 2019-09-18 PROCEDURE — 96372 THER/PROPH/DIAG INJ SC/IM: CPT | Performed by: PHYSICIAN ASSISTANT

## 2019-09-18 PROCEDURE — 3017F COLORECTAL CA SCREEN DOC REV: CPT | Performed by: PHYSICIAN ASSISTANT

## 2019-09-18 PROCEDURE — 3023F SPIROM DOC REV: CPT | Performed by: PHYSICIAN ASSISTANT

## 2019-09-18 PROCEDURE — G8427 DOCREV CUR MEDS BY ELIG CLIN: HCPCS | Performed by: PHYSICIAN ASSISTANT

## 2019-09-18 PROCEDURE — 4004F PT TOBACCO SCREEN RCVD TLK: CPT | Performed by: PHYSICIAN ASSISTANT

## 2019-09-18 PROCEDURE — G8926 SPIRO NO PERF OR DOC: HCPCS | Performed by: PHYSICIAN ASSISTANT

## 2019-09-18 PROCEDURE — G8598 ASA/ANTIPLAT THER USED: HCPCS | Performed by: PHYSICIAN ASSISTANT

## 2019-09-18 PROCEDURE — 1123F ACP DISCUSS/DSCN MKR DOCD: CPT | Performed by: PHYSICIAN ASSISTANT

## 2019-09-18 RX ORDER — AZITHROMYCIN 250 MG/1
TABLET, FILM COATED ORAL
Qty: 1 PACKET | Refills: 0 | Status: SHIPPED | OUTPATIENT
Start: 2019-09-18 | End: 2019-09-28

## 2019-09-18 RX ORDER — METHYLPREDNISOLONE ACETATE 40 MG/ML
40 INJECTION, SUSPENSION INTRA-ARTICULAR; INTRALESIONAL; INTRAMUSCULAR; SOFT TISSUE ONCE
Status: COMPLETED | OUTPATIENT
Start: 2019-09-18 | End: 2019-09-18

## 2019-09-18 RX ORDER — BENZONATATE 100 MG/1
100 CAPSULE ORAL 2 TIMES DAILY PRN
Qty: 20 CAPSULE | Refills: 0 | Status: SHIPPED | OUTPATIENT
Start: 2019-09-18 | End: 2019-09-25

## 2019-09-18 RX ADMIN — METHYLPREDNISOLONE ACETATE 40 MG: 40 INJECTION, SUSPENSION INTRA-ARTICULAR; INTRALESIONAL; INTRAMUSCULAR; SOFT TISSUE at 14:15

## 2019-09-18 NOTE — PROGRESS NOTES
Subjective:       History was provided by the patient. Main Marques is a 78 y.o. male who presents for evaluation of symptoms of a URI. Symptoms include chills, headache, nasal blockage, post nasal drip, productive cough, sinus and nasal congestion, sore throat and wheezing. Onset of symptoms was 10 days ago, gradually worsening since that time. Associated symptoms include nasal congestion and no  fever. He is not drinking much. Evaluation to date: none. Treatment to date: none  Patient's medications, allergies, past medical, surgical, social and family histories were reviewed and updated as appropriate.     Review of Systems  Eyes: positive for none  Ears, nose, mouth, throat, and face: positive for nasal congestion and sore throat  Respiratory: positive for cough and wheezing  Cardiovascular: negative  Gastrointestinal: negative  Musculoskeletal:negative      Objective:      /62   Pulse 81   Temp 98.5 °F (36.9 °C) (Oral)   Resp 18   Ht 5' 7.7\" (1.72 m)   Wt 194 lb 9.6 oz (88.3 kg)   SpO2 99%   BMI 29.85 kg/m²     General Appearance:    Alert, cooperative, no distress, appears stated age   Head:    Normocephalic, without obvious abnormality, atraumatic   Eyes:    PERRL, conjunctiva/corneas clear, EOM's intact, fundi     benign, both eyes        Ears:    Normal TM's and external ear canals, both ears   Nose:   Nares normal, septum midline, mucosa normal, no drainage    or sinus tenderness   Throat:   Lips, mucosa, and tongue normal; teeth and gums normal; erythematous posterior oropharynx, no exudate   Neck:   Supple, symmetrical, trachea midline, no adenopathy;        thyroid:  No enlargement/tenderness/nodules; no carotid    bruit or JVD   Back:     Symmetric, no curvature, ROM normal, no CVA tenderness   Lungs:    Course rales bilaterally, no crackles appreciated; respirations unlabored   Chest wall:    No tenderness or deformity   Heart:    Regular rate and rhythm, S1 and S2 normal, no murmur, rub

## 2019-09-25 ENCOUNTER — HOSPITAL ENCOUNTER (OUTPATIENT)
Dept: CT IMAGING | Age: 69
Discharge: HOME | End: 2019-09-25
Payer: MEDICARE

## 2019-09-25 DIAGNOSIS — Z87.891 PERSONAL HISTORY OF TOBACCO USE: ICD-10-CM

## 2019-09-25 PROCEDURE — G0297 LDCT FOR LUNG CA SCREEN: HCPCS

## 2019-09-27 ENCOUNTER — TELEPHONE (OUTPATIENT)
Dept: FAMILY MEDICINE CLINIC | Age: 69
End: 2019-09-27

## 2019-10-17 ENCOUNTER — OFFICE VISIT (OUTPATIENT)
Dept: CARDIOLOGY CLINIC | Age: 69
End: 2019-10-17
Payer: MEDICARE

## 2019-10-17 VITALS
HEART RATE: 88 BPM | BODY MASS INDEX: 30.03 KG/M2 | WEIGHT: 198.12 LBS | SYSTOLIC BLOOD PRESSURE: 90 MMHG | OXYGEN SATURATION: 97 % | DIASTOLIC BLOOD PRESSURE: 60 MMHG | HEIGHT: 68 IN

## 2019-10-17 DIAGNOSIS — E78.2 MIXED HYPERLIPIDEMIA: ICD-10-CM

## 2019-10-17 DIAGNOSIS — I25.5 ISCHEMIC CARDIOMYOPATHY: ICD-10-CM

## 2019-10-17 DIAGNOSIS — I25.10 CORONARY ARTERY DISEASE INVOLVING NATIVE CORONARY ARTERY OF NATIVE HEART WITHOUT ANGINA PECTORIS: Primary | ICD-10-CM

## 2019-10-17 PROCEDURE — 99214 OFFICE O/P EST MOD 30 MIN: CPT | Performed by: INTERNAL MEDICINE

## 2019-10-17 PROCEDURE — 4040F PNEUMOC VAC/ADMIN/RCVD: CPT | Performed by: INTERNAL MEDICINE

## 2019-10-17 PROCEDURE — 4004F PT TOBACCO SCREEN RCVD TLK: CPT | Performed by: INTERNAL MEDICINE

## 2019-10-17 PROCEDURE — 3017F COLORECTAL CA SCREEN DOC REV: CPT | Performed by: INTERNAL MEDICINE

## 2019-10-17 PROCEDURE — G8484 FLU IMMUNIZE NO ADMIN: HCPCS | Performed by: INTERNAL MEDICINE

## 2019-10-17 PROCEDURE — 1123F ACP DISCUSS/DSCN MKR DOCD: CPT | Performed by: INTERNAL MEDICINE

## 2019-10-17 PROCEDURE — G8427 DOCREV CUR MEDS BY ELIG CLIN: HCPCS | Performed by: INTERNAL MEDICINE

## 2019-10-17 PROCEDURE — G8417 CALC BMI ABV UP PARAM F/U: HCPCS | Performed by: INTERNAL MEDICINE

## 2019-10-17 PROCEDURE — G8598 ASA/ANTIPLAT THER USED: HCPCS | Performed by: INTERNAL MEDICINE

## 2019-10-17 RX ORDER — NITROGLYCERIN 0.4 MG/1
0.4 TABLET SUBLINGUAL EVERY 5 MIN PRN
Qty: 25 TABLET | Refills: 1 | Status: SHIPPED | OUTPATIENT
Start: 2019-10-17 | End: 2021-02-25 | Stop reason: SDUPTHER

## 2019-10-29 ENCOUNTER — OFFICE VISIT (OUTPATIENT)
Dept: FAMILY MEDICINE CLINIC | Age: 69
End: 2019-10-29
Payer: MEDICARE

## 2019-10-29 VITALS
SYSTOLIC BLOOD PRESSURE: 110 MMHG | HEART RATE: 84 BPM | WEIGHT: 196 LBS | DIASTOLIC BLOOD PRESSURE: 74 MMHG | TEMPERATURE: 97.8 F | OXYGEN SATURATION: 96 % | BODY MASS INDEX: 30.07 KG/M2

## 2019-10-29 DIAGNOSIS — I25.10 CORONARY ARTERY DISEASE INVOLVING NATIVE CORONARY ARTERY OF NATIVE HEART WITHOUT ANGINA PECTORIS: ICD-10-CM

## 2019-10-29 DIAGNOSIS — J41.0 SIMPLE CHRONIC BRONCHITIS (HCC): ICD-10-CM

## 2019-10-29 DIAGNOSIS — E78.2 MIXED HYPERLIPIDEMIA: ICD-10-CM

## 2019-10-29 DIAGNOSIS — E78.2 MIXED HYPERLIPIDEMIA: Primary | ICD-10-CM

## 2019-10-29 DIAGNOSIS — Z23 NEED FOR INFLUENZA VACCINATION: ICD-10-CM

## 2019-10-29 DIAGNOSIS — R73.01 IFG (IMPAIRED FASTING GLUCOSE): ICD-10-CM

## 2019-10-29 DIAGNOSIS — M1A.0710 CHRONIC IDIOPATHIC GOUT INVOLVING TOE OF RIGHT FOOT WITHOUT TOPHUS: ICD-10-CM

## 2019-10-29 DIAGNOSIS — I50.22 CHRONIC SYSTOLIC CONGESTIVE HEART FAILURE (HCC): ICD-10-CM

## 2019-10-29 DIAGNOSIS — I25.5 ISCHEMIC CARDIOMYOPATHY: ICD-10-CM

## 2019-10-29 LAB
A/G RATIO: 2.1 (ref 1.1–2.2)
ALBUMIN SERPL-MCNC: 4.9 G/DL (ref 3.4–5)
ALP BLD-CCNC: 106 U/L (ref 40–129)
ALT SERPL-CCNC: 12 U/L (ref 10–40)
ANION GAP SERPL CALCULATED.3IONS-SCNC: 16 MMOL/L (ref 3–16)
AST SERPL-CCNC: 18 U/L (ref 15–37)
BASOPHILS ABSOLUTE: 0.1 K/UL (ref 0–0.2)
BASOPHILS RELATIVE PERCENT: 0.7 %
BILIRUB SERPL-MCNC: 0.4 MG/DL (ref 0–1)
BUN BLDV-MCNC: 22 MG/DL (ref 7–20)
CALCIUM SERPL-MCNC: 9.9 MG/DL (ref 8.3–10.6)
CHLORIDE BLD-SCNC: 102 MMOL/L (ref 99–110)
CHOLESTEROL, TOTAL: 147 MG/DL (ref 0–199)
CO2: 21 MMOL/L (ref 21–32)
CREAT SERPL-MCNC: 1.1 MG/DL (ref 0.8–1.3)
EOSINOPHILS ABSOLUTE: 0.1 K/UL (ref 0–0.6)
EOSINOPHILS RELATIVE PERCENT: 1.8 %
GFR AFRICAN AMERICAN: >60
GFR NON-AFRICAN AMERICAN: >60
GLOBULIN: 2.3 G/DL
GLUCOSE BLD-MCNC: 76 MG/DL (ref 70–99)
HCT VFR BLD CALC: 37.5 % (ref 40.5–52.5)
HDLC SERPL-MCNC: 52 MG/DL (ref 40–60)
HEMOGLOBIN: 12.6 G/DL (ref 13.5–17.5)
LDL CHOLESTEROL CALCULATED: 72 MG/DL
LYMPHOCYTES ABSOLUTE: 1.2 K/UL (ref 1–5.1)
LYMPHOCYTES RELATIVE PERCENT: 15.2 %
MCH RBC QN AUTO: 33.3 PG (ref 26–34)
MCHC RBC AUTO-ENTMCNC: 33.6 G/DL (ref 31–36)
MCV RBC AUTO: 99.1 FL (ref 80–100)
MONOCYTES ABSOLUTE: 0.4 K/UL (ref 0–1.3)
MONOCYTES RELATIVE PERCENT: 5.3 %
NEUTROPHILS ABSOLUTE: 6.4 K/UL (ref 1.7–7.7)
NEUTROPHILS RELATIVE PERCENT: 77 %
PDW BLD-RTO: 15.3 % (ref 12.4–15.4)
PLATELET # BLD: 249 K/UL (ref 135–450)
PMV BLD AUTO: 8.3 FL (ref 5–10.5)
POTASSIUM SERPL-SCNC: 4.7 MMOL/L (ref 3.5–5.1)
RBC # BLD: 3.79 M/UL (ref 4.2–5.9)
SODIUM BLD-SCNC: 139 MMOL/L (ref 136–145)
TOTAL PROTEIN: 7.2 G/DL (ref 6.4–8.2)
TRIGL SERPL-MCNC: 117 MG/DL (ref 0–150)
URIC ACID, SERUM: 6 MG/DL (ref 3.5–7.2)
VLDLC SERPL CALC-MCNC: 23 MG/DL
WBC # BLD: 8.2 K/UL (ref 4–11)

## 2019-10-29 PROCEDURE — G8482 FLU IMMUNIZE ORDER/ADMIN: HCPCS | Performed by: INTERNAL MEDICINE

## 2019-10-29 PROCEDURE — 99214 OFFICE O/P EST MOD 30 MIN: CPT | Performed by: INTERNAL MEDICINE

## 2019-10-29 PROCEDURE — G8417 CALC BMI ABV UP PARAM F/U: HCPCS | Performed by: INTERNAL MEDICINE

## 2019-10-29 PROCEDURE — G8427 DOCREV CUR MEDS BY ELIG CLIN: HCPCS | Performed by: INTERNAL MEDICINE

## 2019-10-29 PROCEDURE — G8598 ASA/ANTIPLAT THER USED: HCPCS | Performed by: INTERNAL MEDICINE

## 2019-10-29 PROCEDURE — 90653 IIV ADJUVANT VACCINE IM: CPT | Performed by: INTERNAL MEDICINE

## 2019-10-29 PROCEDURE — G0008 ADMIN INFLUENZA VIRUS VAC: HCPCS | Performed by: INTERNAL MEDICINE

## 2019-10-29 PROCEDURE — 3023F SPIROM DOC REV: CPT | Performed by: INTERNAL MEDICINE

## 2019-10-29 PROCEDURE — 4040F PNEUMOC VAC/ADMIN/RCVD: CPT | Performed by: INTERNAL MEDICINE

## 2019-10-29 PROCEDURE — 3017F COLORECTAL CA SCREEN DOC REV: CPT | Performed by: INTERNAL MEDICINE

## 2019-10-29 PROCEDURE — G8926 SPIRO NO PERF OR DOC: HCPCS | Performed by: INTERNAL MEDICINE

## 2019-10-29 PROCEDURE — 1123F ACP DISCUSS/DSCN MKR DOCD: CPT | Performed by: INTERNAL MEDICINE

## 2019-10-29 PROCEDURE — 4004F PT TOBACCO SCREEN RCVD TLK: CPT | Performed by: INTERNAL MEDICINE

## 2019-10-29 RX ORDER — PREDNISONE 20 MG/1
20 TABLET ORAL DAILY
Qty: 5 TABLET | Refills: 0 | Status: SHIPPED | OUTPATIENT
Start: 2019-10-29 | End: 2019-11-03

## 2019-10-29 RX ORDER — OMEPRAZOLE 20 MG/1
20 CAPSULE, DELAYED RELEASE ORAL
Qty: 90 CAPSULE | Refills: 1 | Status: SHIPPED | OUTPATIENT
Start: 2019-10-29 | End: 2020-04-29 | Stop reason: SDUPTHER

## 2019-10-29 ASSESSMENT — ENCOUNTER SYMPTOMS
SHORTNESS OF BREATH: 0
COUGH: 0

## 2019-10-30 LAB
ESTIMATED AVERAGE GLUCOSE: 139.9 MG/DL
HBA1C MFR BLD: 6.5 %

## 2019-10-31 ENCOUNTER — TELEPHONE (OUTPATIENT)
Dept: ADMINISTRATIVE | Age: 69
End: 2019-10-31

## 2019-10-31 RX ORDER — INDOMETHACIN 25 MG/1
25 CAPSULE ORAL DAILY
Qty: 5 CAPSULE | Refills: 0 | Status: SHIPPED | OUTPATIENT
Start: 2019-10-31 | End: 2020-01-21 | Stop reason: ALTCHOICE

## 2019-11-04 ENCOUNTER — TELEPHONE (OUTPATIENT)
Dept: FAMILY MEDICINE CLINIC | Age: 69
End: 2019-11-04

## 2019-11-05 ENCOUNTER — NURSE ONLY (OUTPATIENT)
Dept: CARDIOLOGY CLINIC | Age: 69
End: 2019-11-05
Payer: MEDICARE

## 2019-11-05 DIAGNOSIS — Z95.810 AICD (AUTOMATIC CARDIOVERTER/DEFIBRILLATOR) PRESENT: ICD-10-CM

## 2019-11-05 DIAGNOSIS — I25.5 ISCHEMIC CARDIOMYOPATHY: ICD-10-CM

## 2019-11-05 PROCEDURE — 93296 REM INTERROG EVL PM/IDS: CPT | Performed by: INTERNAL MEDICINE

## 2019-11-05 PROCEDURE — 93295 DEV INTERROG REMOTE 1/2/MLT: CPT | Performed by: INTERNAL MEDICINE

## 2019-11-08 ENCOUNTER — OFFICE VISIT (OUTPATIENT)
Dept: FAMILY MEDICINE CLINIC | Age: 69
End: 2019-11-08
Payer: MEDICARE

## 2019-11-08 ENCOUNTER — HOSPITAL ENCOUNTER (OUTPATIENT)
Dept: GENERAL RADIOLOGY | Age: 69
Discharge: HOME | End: 2019-11-08
Payer: MEDICARE

## 2019-11-08 ENCOUNTER — HOSPITAL ENCOUNTER (OUTPATIENT)
Age: 69
Discharge: HOME | End: 2019-11-08
Payer: MEDICARE

## 2019-11-08 ENCOUNTER — TELEPHONE (OUTPATIENT)
Dept: FAMILY MEDICINE CLINIC | Age: 69
End: 2019-11-08

## 2019-11-08 VITALS
HEART RATE: 67 BPM | BODY MASS INDEX: 26.88 KG/M2 | RESPIRATION RATE: 20 BRPM | TEMPERATURE: 98 F | HEIGHT: 71 IN | SYSTOLIC BLOOD PRESSURE: 102 MMHG | WEIGHT: 192 LBS | OXYGEN SATURATION: 99 % | DIASTOLIC BLOOD PRESSURE: 70 MMHG

## 2019-11-08 DIAGNOSIS — M89.8X7 PAIN IN METATARSUS OF RIGHT FOOT: ICD-10-CM

## 2019-11-08 DIAGNOSIS — R73.03 PRE-DIABETES: Primary | ICD-10-CM

## 2019-11-08 DIAGNOSIS — M89.8X7 PAIN IN METATARSUS OF RIGHT FOOT: Primary | ICD-10-CM

## 2019-11-08 LAB
CREATININE URINE POCT: 100
MICROALBUMIN/CREAT 24H UR: 80 MG/G{CREAT}
MICROALBUMIN/CREAT UR-RTO: 30

## 2019-11-08 PROCEDURE — 1123F ACP DISCUSS/DSCN MKR DOCD: CPT | Performed by: PHYSICIAN ASSISTANT

## 2019-11-08 PROCEDURE — 4040F PNEUMOC VAC/ADMIN/RCVD: CPT | Performed by: PHYSICIAN ASSISTANT

## 2019-11-08 PROCEDURE — G8427 DOCREV CUR MEDS BY ELIG CLIN: HCPCS | Performed by: PHYSICIAN ASSISTANT

## 2019-11-08 PROCEDURE — G8598 ASA/ANTIPLAT THER USED: HCPCS | Performed by: PHYSICIAN ASSISTANT

## 2019-11-08 PROCEDURE — 3017F COLORECTAL CA SCREEN DOC REV: CPT | Performed by: PHYSICIAN ASSISTANT

## 2019-11-08 PROCEDURE — G8482 FLU IMMUNIZE ORDER/ADMIN: HCPCS | Performed by: PHYSICIAN ASSISTANT

## 2019-11-08 PROCEDURE — G8417 CALC BMI ABV UP PARAM F/U: HCPCS | Performed by: PHYSICIAN ASSISTANT

## 2019-11-08 PROCEDURE — 4004F PT TOBACCO SCREEN RCVD TLK: CPT | Performed by: PHYSICIAN ASSISTANT

## 2019-11-08 PROCEDURE — 82044 UR ALBUMIN SEMIQUANTITATIVE: CPT | Performed by: PHYSICIAN ASSISTANT

## 2019-11-08 PROCEDURE — 73630 X-RAY EXAM OF FOOT: CPT

## 2019-11-08 PROCEDURE — 99213 OFFICE O/P EST LOW 20 MIN: CPT | Performed by: PHYSICIAN ASSISTANT

## 2019-11-08 RX ORDER — TRAMADOL HYDROCHLORIDE 50 MG/1
50 TABLET ORAL EVERY 6 HOURS PRN
Qty: 12 TABLET | Refills: 0 | Status: SHIPPED | OUTPATIENT
Start: 2019-11-08 | End: 2019-11-11

## 2019-11-08 ASSESSMENT — ENCOUNTER SYMPTOMS
CONSTIPATION: 0
NAUSEA: 0
ABDOMINAL PAIN: 0
VOMITING: 0
SORE THROAT: 0
SHORTNESS OF BREATH: 0
COUGH: 0
RHINORRHEA: 0
DIARRHEA: 0

## 2019-11-13 ENCOUNTER — OFFICE VISIT (OUTPATIENT)
Dept: ORTHOPEDIC SURGERY | Age: 69
End: 2019-11-13
Payer: MEDICARE

## 2019-11-13 VITALS
HEIGHT: 71 IN | SYSTOLIC BLOOD PRESSURE: 129 MMHG | WEIGHT: 192.02 LBS | DIASTOLIC BLOOD PRESSURE: 75 MMHG | HEART RATE: 62 BPM | BODY MASS INDEX: 26.88 KG/M2

## 2019-11-13 DIAGNOSIS — M20.11 HALLUX VALGUS, RIGHT: ICD-10-CM

## 2019-11-13 DIAGNOSIS — M77.51 BURSITIS OF RIGHT FOOT: ICD-10-CM

## 2019-11-13 DIAGNOSIS — M79.671 FOOT PAIN, RIGHT: Primary | ICD-10-CM

## 2019-11-13 PROCEDURE — 99203 OFFICE O/P NEW LOW 30 MIN: CPT | Performed by: PODIATRIST

## 2019-11-13 PROCEDURE — 1123F ACP DISCUSS/DSCN MKR DOCD: CPT | Performed by: PODIATRIST

## 2019-11-13 PROCEDURE — G8598 ASA/ANTIPLAT THER USED: HCPCS | Performed by: PODIATRIST

## 2019-11-13 PROCEDURE — 4040F PNEUMOC VAC/ADMIN/RCVD: CPT | Performed by: PODIATRIST

## 2019-11-13 PROCEDURE — G8427 DOCREV CUR MEDS BY ELIG CLIN: HCPCS | Performed by: PODIATRIST

## 2019-11-13 PROCEDURE — G8482 FLU IMMUNIZE ORDER/ADMIN: HCPCS | Performed by: PODIATRIST

## 2019-11-13 PROCEDURE — G8417 CALC BMI ABV UP PARAM F/U: HCPCS | Performed by: PODIATRIST

## 2019-11-13 PROCEDURE — 20600 DRAIN/INJ JOINT/BURSA W/O US: CPT | Performed by: PODIATRIST

## 2019-11-13 PROCEDURE — 4004F PT TOBACCO SCREEN RCVD TLK: CPT | Performed by: PODIATRIST

## 2019-11-13 PROCEDURE — 3017F COLORECTAL CA SCREEN DOC REV: CPT | Performed by: PODIATRIST

## 2019-11-13 RX ORDER — BETAMETHASONE SODIUM PHOSPHATE AND BETAMETHASONE ACETATE 3; 3 MG/ML; MG/ML
0.5 INJECTION, SUSPENSION INTRA-ARTICULAR; INTRALESIONAL; INTRAMUSCULAR; SOFT TISSUE ONCE
Status: COMPLETED | OUTPATIENT
Start: 2019-11-13 | End: 2019-11-13

## 2019-11-13 RX ORDER — BUPIVACAINE HYDROCHLORIDE 5 MG/ML
1 INJECTION, SOLUTION PERINEURAL ONCE
Status: COMPLETED | OUTPATIENT
Start: 2019-11-13 | End: 2019-11-13

## 2019-11-13 RX ADMIN — BUPIVACAINE HYDROCHLORIDE 5 MG: 5 INJECTION, SOLUTION PERINEURAL at 12:25

## 2019-11-13 RX ADMIN — BETAMETHASONE SODIUM PHOSPHATE AND BETAMETHASONE ACETATE 0.48 MG: 3; 3 INJECTION, SUSPENSION INTRA-ARTICULAR; INTRALESIONAL; INTRAMUSCULAR; SOFT TISSUE at 12:24

## 2019-12-31 ENCOUNTER — OFFICE VISIT (OUTPATIENT)
Dept: FAMILY MEDICINE CLINIC | Age: 69
End: 2019-12-31
Payer: MEDICARE

## 2019-12-31 VITALS
OXYGEN SATURATION: 96 % | HEART RATE: 80 BPM | SYSTOLIC BLOOD PRESSURE: 126 MMHG | DIASTOLIC BLOOD PRESSURE: 66 MMHG | WEIGHT: 197 LBS | BODY MASS INDEX: 27.86 KG/M2 | TEMPERATURE: 98.1 F

## 2019-12-31 DIAGNOSIS — R30.0 BURNING WITH URINATION: Primary | ICD-10-CM

## 2019-12-31 DIAGNOSIS — R10.9 ABDOMINAL CRAMPING: ICD-10-CM

## 2019-12-31 DIAGNOSIS — M54.50 BILATERAL LOW BACK PAIN WITHOUT SCIATICA, UNSPECIFIED CHRONICITY: ICD-10-CM

## 2019-12-31 PROBLEM — I47.29 PAROXYSMAL VENTRICULAR TACHYCARDIA: Status: ACTIVE | Noted: 2019-12-31

## 2019-12-31 PROBLEM — I42.9 SECONDARY CARDIOMYOPATHY (HCC): Status: ACTIVE | Noted: 2019-12-31

## 2019-12-31 PROBLEM — I47.20 PAROXYSMAL VENTRICULAR TACHYCARDIA: Status: ACTIVE | Noted: 2019-12-31

## 2019-12-31 LAB
BILIRUBIN, POC: NORMAL
BLOOD URINE, POC: NORMAL
CLARITY, POC: CLEAR
COLOR, POC: YELLOW
GLUCOSE URINE, POC: NORMAL
KETONES, POC: NORMAL
LEUKOCYTE EST, POC: NORMAL
NITRITE, POC: NORMAL
PH, POC: 5.5
PROTEIN, POC: NORMAL
SPECIFIC GRAVITY, POC: 1.02
UROBILINOGEN, POC: 0.2

## 2019-12-31 PROCEDURE — G8598 ASA/ANTIPLAT THER USED: HCPCS | Performed by: INTERNAL MEDICINE

## 2019-12-31 PROCEDURE — G8427 DOCREV CUR MEDS BY ELIG CLIN: HCPCS | Performed by: INTERNAL MEDICINE

## 2019-12-31 PROCEDURE — 3017F COLORECTAL CA SCREEN DOC REV: CPT | Performed by: INTERNAL MEDICINE

## 2019-12-31 PROCEDURE — G8417 CALC BMI ABV UP PARAM F/U: HCPCS | Performed by: INTERNAL MEDICINE

## 2019-12-31 PROCEDURE — 81002 URINALYSIS NONAUTO W/O SCOPE: CPT | Performed by: INTERNAL MEDICINE

## 2019-12-31 PROCEDURE — 4040F PNEUMOC VAC/ADMIN/RCVD: CPT | Performed by: INTERNAL MEDICINE

## 2019-12-31 PROCEDURE — 4004F PT TOBACCO SCREEN RCVD TLK: CPT | Performed by: INTERNAL MEDICINE

## 2019-12-31 PROCEDURE — G8482 FLU IMMUNIZE ORDER/ADMIN: HCPCS | Performed by: INTERNAL MEDICINE

## 2019-12-31 PROCEDURE — 1123F ACP DISCUSS/DSCN MKR DOCD: CPT | Performed by: INTERNAL MEDICINE

## 2019-12-31 PROCEDURE — 99213 OFFICE O/P EST LOW 20 MIN: CPT | Performed by: INTERNAL MEDICINE

## 2019-12-31 RX ORDER — CIPROFLOXACIN 500 MG/1
500 TABLET, FILM COATED ORAL 2 TIMES DAILY
Qty: 14 TABLET | Refills: 0 | Status: SHIPPED | OUTPATIENT
Start: 2019-12-31 | End: 2020-01-07

## 2020-01-02 LAB — URINE CULTURE, ROUTINE: NORMAL

## 2020-01-13 ENCOUNTER — TELEPHONE (OUTPATIENT)
Dept: FAMILY MEDICINE CLINIC | Age: 70
End: 2020-01-13

## 2020-01-13 ENCOUNTER — OFFICE VISIT (OUTPATIENT)
Dept: FAMILY MEDICINE CLINIC | Age: 70
End: 2020-01-13
Payer: MEDICARE

## 2020-01-13 VITALS
HEART RATE: 83 BPM | BODY MASS INDEX: 27.55 KG/M2 | OXYGEN SATURATION: 96 % | DIASTOLIC BLOOD PRESSURE: 78 MMHG | HEIGHT: 71 IN | TEMPERATURE: 98.7 F | WEIGHT: 196.8 LBS | RESPIRATION RATE: 16 BRPM | SYSTOLIC BLOOD PRESSURE: 120 MMHG

## 2020-01-13 PROCEDURE — G8427 DOCREV CUR MEDS BY ELIG CLIN: HCPCS | Performed by: NURSE PRACTITIONER

## 2020-01-13 PROCEDURE — G8417 CALC BMI ABV UP PARAM F/U: HCPCS | Performed by: NURSE PRACTITIONER

## 2020-01-13 PROCEDURE — 4004F PT TOBACCO SCREEN RCVD TLK: CPT | Performed by: NURSE PRACTITIONER

## 2020-01-13 PROCEDURE — 4040F PNEUMOC VAC/ADMIN/RCVD: CPT | Performed by: NURSE PRACTITIONER

## 2020-01-13 PROCEDURE — G8482 FLU IMMUNIZE ORDER/ADMIN: HCPCS | Performed by: NURSE PRACTITIONER

## 2020-01-13 PROCEDURE — 3017F COLORECTAL CA SCREEN DOC REV: CPT | Performed by: NURSE PRACTITIONER

## 2020-01-13 PROCEDURE — 99213 OFFICE O/P EST LOW 20 MIN: CPT | Performed by: NURSE PRACTITIONER

## 2020-01-13 PROCEDURE — 1123F ACP DISCUSS/DSCN MKR DOCD: CPT | Performed by: NURSE PRACTITIONER

## 2020-01-13 RX ORDER — METHYLPREDNISOLONE 4 MG/1
TABLET ORAL
Qty: 1 KIT | Refills: 0 | Status: SHIPPED | OUTPATIENT
Start: 2020-01-13 | End: 2020-01-19

## 2020-01-13 RX ORDER — AZITHROMYCIN 250 MG/1
TABLET, FILM COATED ORAL
Qty: 6 TABLET | Refills: 0 | Status: SHIPPED | OUTPATIENT
Start: 2020-01-13 | End: 2020-01-21 | Stop reason: ALTCHOICE

## 2020-01-13 ASSESSMENT — ENCOUNTER SYMPTOMS
COUGH: 1
WHEEZING: 1
SORE THROAT: 1
DIARRHEA: 0

## 2020-01-13 NOTE — TELEPHONE ENCOUNTER
Pt. Wife calling to report that Joe Gomez is wheezing, has a cough and generally does not feel well. Is asking to be worked into your schedule today.

## 2020-01-15 ENCOUNTER — OFFICE VISIT (OUTPATIENT)
Dept: ORTHOPEDIC SURGERY | Age: 70
End: 2020-01-15
Payer: MEDICARE

## 2020-01-15 VITALS — BODY MASS INDEX: 27.56 KG/M2 | WEIGHT: 196.87 LBS | HEIGHT: 71 IN

## 2020-01-15 PROCEDURE — 1123F ACP DISCUSS/DSCN MKR DOCD: CPT | Performed by: PODIATRIST

## 2020-01-15 PROCEDURE — 4004F PT TOBACCO SCREEN RCVD TLK: CPT | Performed by: PODIATRIST

## 2020-01-15 PROCEDURE — G8417 CALC BMI ABV UP PARAM F/U: HCPCS | Performed by: PODIATRIST

## 2020-01-15 PROCEDURE — G8482 FLU IMMUNIZE ORDER/ADMIN: HCPCS | Performed by: PODIATRIST

## 2020-01-15 PROCEDURE — 4040F PNEUMOC VAC/ADMIN/RCVD: CPT | Performed by: PODIATRIST

## 2020-01-15 PROCEDURE — G8427 DOCREV CUR MEDS BY ELIG CLIN: HCPCS | Performed by: PODIATRIST

## 2020-01-15 PROCEDURE — 99213 OFFICE O/P EST LOW 20 MIN: CPT | Performed by: PODIATRIST

## 2020-01-15 PROCEDURE — 3017F COLORECTAL CA SCREEN DOC REV: CPT | Performed by: PODIATRIST

## 2020-01-15 NOTE — TELEPHONE ENCOUNTER
Pharmacy ran out of medrol dose pack a couple of days ago. Have been dispensing the 21 pills with an instruction sheet. Pharmacy said Pt said did not get instruction sheet. Has been taking 2 tabs a day. Please advise how you want him to take the remainder.

## 2020-01-18 ENCOUNTER — TELEPHONE (OUTPATIENT)
Dept: FAMILY MEDICINE CLINIC | Age: 70
End: 2020-01-18

## 2020-01-21 ENCOUNTER — HOSPITAL ENCOUNTER (OUTPATIENT)
Dept: GENERAL RADIOLOGY | Age: 70
Discharge: HOME | End: 2020-01-21
Payer: MEDICARE

## 2020-01-21 ENCOUNTER — OFFICE VISIT (OUTPATIENT)
Dept: FAMILY MEDICINE CLINIC | Age: 70
End: 2020-01-21
Payer: MEDICARE

## 2020-01-21 ENCOUNTER — HOSPITAL ENCOUNTER (OUTPATIENT)
Age: 70
Discharge: HOME | End: 2020-01-21
Payer: MEDICARE

## 2020-01-21 VITALS — HEART RATE: 81 BPM | OXYGEN SATURATION: 95 % | TEMPERATURE: 98.6 F | BODY MASS INDEX: 26.58 KG/M2 | WEIGHT: 188 LBS

## 2020-01-21 LAB
A/G RATIO: 1.5 (ref 1.1–2.2)
ALBUMIN SERPL-MCNC: 4.8 G/DL (ref 3.4–5)
ALP BLD-CCNC: 104 U/L (ref 40–129)
ALT SERPL-CCNC: 10 U/L (ref 10–40)
ANION GAP SERPL CALCULATED.3IONS-SCNC: 15 MMOL/L (ref 3–16)
AST SERPL-CCNC: 16 U/L (ref 15–37)
BASOPHILS ABSOLUTE: 0.1 K/UL (ref 0–0.2)
BASOPHILS RELATIVE PERCENT: 0.8 %
BILIRUB SERPL-MCNC: 0.4 MG/DL (ref 0–1)
BUN BLDV-MCNC: 37 MG/DL (ref 7–20)
CALCIUM SERPL-MCNC: 9.9 MG/DL (ref 8.3–10.6)
CHLORIDE BLD-SCNC: 103 MMOL/L (ref 99–110)
CO2: 20 MMOL/L (ref 21–32)
CREAT SERPL-MCNC: 2.2 MG/DL (ref 0.8–1.3)
EOSINOPHILS ABSOLUTE: 0.3 K/UL (ref 0–0.6)
EOSINOPHILS RELATIVE PERCENT: 3.3 %
GFR AFRICAN AMERICAN: 36
GFR NON-AFRICAN AMERICAN: 30
GLOBULIN: 3.1 G/DL
GLUCOSE BLD-MCNC: 112 MG/DL (ref 70–99)
HCT VFR BLD CALC: 40.3 % (ref 40.5–52.5)
HEMOGLOBIN: 13.6 G/DL (ref 13.5–17.5)
LYMPHOCYTES ABSOLUTE: 1.9 K/UL (ref 1–5.1)
LYMPHOCYTES RELATIVE PERCENT: 22.3 %
MCH RBC QN AUTO: 33.1 PG (ref 26–34)
MCHC RBC AUTO-ENTMCNC: 33.8 G/DL (ref 31–36)
MCV RBC AUTO: 98 FL (ref 80–100)
MONOCYTES ABSOLUTE: 0.7 K/UL (ref 0–1.3)
MONOCYTES RELATIVE PERCENT: 7.5 %
NEUTROPHILS ABSOLUTE: 5.7 K/UL (ref 1.7–7.7)
NEUTROPHILS RELATIVE PERCENT: 66.1 %
PDW BLD-RTO: 14 % (ref 12.4–15.4)
PLATELET # BLD: 330 K/UL (ref 135–450)
PMV BLD AUTO: 8.5 FL (ref 5–10.5)
POTASSIUM SERPL-SCNC: 4.9 MMOL/L (ref 3.5–5.1)
RBC # BLD: 4.12 M/UL (ref 4.2–5.9)
SODIUM BLD-SCNC: 138 MMOL/L (ref 136–145)
TOTAL PROTEIN: 7.9 G/DL (ref 6.4–8.2)
TSH REFLEX: 2.93 UIU/ML (ref 0.27–4.2)
WBC # BLD: 8.7 K/UL (ref 4–11)

## 2020-01-21 PROCEDURE — G8417 CALC BMI ABV UP PARAM F/U: HCPCS | Performed by: INTERNAL MEDICINE

## 2020-01-21 PROCEDURE — 3017F COLORECTAL CA SCREEN DOC REV: CPT | Performed by: INTERNAL MEDICINE

## 2020-01-21 PROCEDURE — 1123F ACP DISCUSS/DSCN MKR DOCD: CPT | Performed by: INTERNAL MEDICINE

## 2020-01-21 PROCEDURE — 99214 OFFICE O/P EST MOD 30 MIN: CPT | Performed by: INTERNAL MEDICINE

## 2020-01-21 PROCEDURE — 4040F PNEUMOC VAC/ADMIN/RCVD: CPT | Performed by: INTERNAL MEDICINE

## 2020-01-21 PROCEDURE — 4004F PT TOBACCO SCREEN RCVD TLK: CPT | Performed by: INTERNAL MEDICINE

## 2020-01-21 PROCEDURE — G8482 FLU IMMUNIZE ORDER/ADMIN: HCPCS | Performed by: INTERNAL MEDICINE

## 2020-01-21 PROCEDURE — G8427 DOCREV CUR MEDS BY ELIG CLIN: HCPCS | Performed by: INTERNAL MEDICINE

## 2020-01-21 PROCEDURE — 71046 X-RAY EXAM CHEST 2 VIEWS: CPT

## 2020-01-21 ASSESSMENT — PATIENT HEALTH QUESTIONNAIRE - PHQ9
SUM OF ALL RESPONSES TO PHQ QUESTIONS 1-9: 2
1. LITTLE INTEREST OR PLEASURE IN DOING THINGS: 1
2. FEELING DOWN, DEPRESSED OR HOPELESS: 1
SUM OF ALL RESPONSES TO PHQ9 QUESTIONS 1 & 2: 2
SUM OF ALL RESPONSES TO PHQ QUESTIONS 1-9: 2

## 2020-01-21 NOTE — PROGRESS NOTES
(with meals)  Patient taking differently: Take 3.125 mg by mouth 2 times daily (with meals) Indications: Prescribed by Dr. Mike Virk MD   clopidogrel (PLAVIX) 75 MG tablet Take 1 tablet by mouth daily  Patient taking differently: Take 75 mg by mouth daily Indications: Gets from Dr Mike Virk MD   gemfibrozil (LOPID) 600 MG tablet Take 1 tablet by mouth 2 times daily (before meals)  Patient taking differently: Take 600 mg by mouth 2 times daily (before meals) Indications: prescribed by Dr. Mike Virk MD   rosuvastatin (CRESTOR) 40 MG tablet Take 1 tablet by mouth every evening  Patient taking differently: Take 40 mg by mouth every evening Indications: Prescribed by Dr. Mike Virk MD   methotrexate (RHEUMATREX) 2.5 MG chemo tablet Take 1 tablet by mouth once a week RX directions are 4 tablets weekly. Patient takes one tablet Monday/Wednesday/Friday. Patient taking differently: Take 2.5 mg by mouth once a week Indications: prescribed  by Kimberlyn Martinez RX directions are 4 tablets weekly. Patient takes one tablet Monday/Wednesday/Friday. Larry Tobar MD   lisinopril (PRINIVIL;ZESTRIL) 5 MG tablet Take 1 tablet by mouth daily  Patient taking differently: Take 5 mg by mouth daily Indications: Prescribed by Dr. Mike Virk MD   folic acid (FOLVITE) 1 MG tablet Take 1 mg by mouth daily  Historical Provider, MD   tamsulosin (FLOMAX) 0.4 MG capsule Take 0.4 mg by mouth daily  Clint Jordan MD   aspirin 81 MG tablet Take 81 mg by mouth daily.   Historical Provider, MD        Social History     Tobacco Use    Smoking status: Current Every Day Smoker     Packs/day: 1.00     Years: 54.00     Pack years: 54.00     Types: Cigarettes     Start date: 1/1/1962    Smokeless tobacco: Never Used   Substance Use Topics    Alcohol use: No        Vitals:    01/21/20 1634   Pulse: 81   Temp: 98.6 °F (37 °C)   TempSrc: Oral   SpO2:

## 2020-01-29 ASSESSMENT — ENCOUNTER SYMPTOMS
SHORTNESS OF BREATH: 0
COUGH: 1

## 2020-01-29 NOTE — TELEPHONE ENCOUNTER
Pt spouse is calling back regarding a medication the pt is suppose to be stopping but they could not understand the message or make out the rx name please call pt spouse at phone number [phone]

## 2020-02-04 ENCOUNTER — HOSPITAL ENCOUNTER (OUTPATIENT)
Age: 70
Discharge: HOME | End: 2020-02-04
Payer: MEDICARE

## 2020-02-04 ENCOUNTER — NURSE ONLY (OUTPATIENT)
Dept: CARDIOLOGY CLINIC | Age: 70
End: 2020-02-04
Payer: MEDICARE

## 2020-02-04 ENCOUNTER — HOSPITAL ENCOUNTER (OUTPATIENT)
Dept: ULTRASOUND IMAGING | Age: 70
Discharge: HOME | End: 2020-02-04
Payer: MEDICARE

## 2020-02-04 PROCEDURE — 93295 DEV INTERROG REMOTE 1/2/MLT: CPT | Performed by: INTERNAL MEDICINE

## 2020-02-04 PROCEDURE — 93296 REM INTERROG EVL PM/IDS: CPT | Performed by: INTERNAL MEDICINE

## 2020-02-04 PROCEDURE — 76770 US EXAM ABDO BACK WALL COMP: CPT

## 2020-02-06 ENCOUNTER — HOSPITAL ENCOUNTER (OUTPATIENT)
Age: 70
Discharge: HOME | End: 2020-02-06
Payer: MEDICARE

## 2020-02-06 LAB
24HR URINE VOLUME (ML): 1400 ML
ALBUMIN SERPL-MCNC: 4.6 G/DL (ref 3.4–5)
ANION GAP SERPL CALCULATED.3IONS-SCNC: 15 MMOL/L (ref 3–16)
BACTERIA: ABNORMAL /HPF
BUN BLDV-MCNC: 17 MG/DL (ref 7–20)
CALCIUM SERPL-MCNC: 9.7 MG/DL (ref 8.3–10.6)
CHLORIDE BLD-SCNC: 104 MMOL/L (ref 99–110)
CO2: 21 MMOL/L (ref 21–32)
CREAT SERPL-MCNC: 1.3 MG/DL (ref 0.8–1.3)
CREATININE 24 HOUR URINE: 2.1 G/24HR (ref 0.6–2.5)
CREATININE URINE: 36.7 MG/DL (ref 39–259)
EPITHELIAL CELLS, UA: ABNORMAL /HPF
GFR AFRICAN AMERICAN: >60
GFR NON-AFRICAN AMERICAN: 54
GLUCOSE BLD-MCNC: 113 MG/DL (ref 70–99)
OSMOLALITY URINE: 218 MOSM/KG (ref 390–1070)
PHOSPHORUS: 2.8 MG/DL (ref 2.5–4.9)
POTASSIUM SERPL-SCNC: 4.7 MMOL/L (ref 3.5–5.1)
PROTEIN PROTEIN: 12 MG/DL
PROTEIN/CREAT RATIO: 0.3 MG/DL
RBC UA: ABNORMAL /HPF (ref 0–2)
SODIUM 24 HOUR URINE: 211 MMOL/24 HR (ref 40–220)
SODIUM BLD-SCNC: 140 MMOL/L (ref 136–145)
URINE TYPE: ABNORMAL
WBC UA: ABNORMAL /HPF (ref 0–5)

## 2020-02-06 PROCEDURE — 84156 ASSAY OF PROTEIN URINE: CPT

## 2020-02-06 PROCEDURE — 82570 ASSAY OF URINE CREATININE: CPT

## 2020-02-06 PROCEDURE — 83935 ASSAY OF URINE OSMOLALITY: CPT

## 2020-02-06 PROCEDURE — 83930 ASSAY OF BLOOD OSMOLALITY: CPT

## 2020-02-06 PROCEDURE — 80069 RENAL FUNCTION PANEL: CPT

## 2020-02-06 PROCEDURE — 81015 MICROSCOPIC EXAM OF URINE: CPT

## 2020-02-06 PROCEDURE — 36415 COLL VENOUS BLD VENIPUNCTURE: CPT

## 2020-02-06 PROCEDURE — 84300 ASSAY OF URINE SODIUM: CPT

## 2020-02-07 ENCOUNTER — OFFICE VISIT (OUTPATIENT)
Dept: FAMILY MEDICINE CLINIC | Age: 70
End: 2020-02-07
Payer: MEDICARE

## 2020-02-07 VITALS
TEMPERATURE: 98.3 F | HEIGHT: 71 IN | SYSTOLIC BLOOD PRESSURE: 118 MMHG | OXYGEN SATURATION: 98 % | BODY MASS INDEX: 27.3 KG/M2 | DIASTOLIC BLOOD PRESSURE: 72 MMHG | WEIGHT: 195 LBS | HEART RATE: 75 BPM

## 2020-02-07 LAB — OSMOLALITY: 292 MOSM/KG (ref 280–301)

## 2020-02-07 PROCEDURE — G8417 CALC BMI ABV UP PARAM F/U: HCPCS | Performed by: INTERNAL MEDICINE

## 2020-02-07 PROCEDURE — G8482 FLU IMMUNIZE ORDER/ADMIN: HCPCS | Performed by: INTERNAL MEDICINE

## 2020-02-07 PROCEDURE — 4040F PNEUMOC VAC/ADMIN/RCVD: CPT | Performed by: INTERNAL MEDICINE

## 2020-02-07 PROCEDURE — G8427 DOCREV CUR MEDS BY ELIG CLIN: HCPCS | Performed by: INTERNAL MEDICINE

## 2020-02-07 PROCEDURE — 99214 OFFICE O/P EST MOD 30 MIN: CPT | Performed by: INTERNAL MEDICINE

## 2020-02-07 PROCEDURE — 3017F COLORECTAL CA SCREEN DOC REV: CPT | Performed by: INTERNAL MEDICINE

## 2020-02-07 PROCEDURE — 4004F PT TOBACCO SCREEN RCVD TLK: CPT | Performed by: INTERNAL MEDICINE

## 2020-02-07 PROCEDURE — 1123F ACP DISCUSS/DSCN MKR DOCD: CPT | Performed by: INTERNAL MEDICINE

## 2020-02-07 RX ORDER — AMOXICILLIN 500 MG/1
500 CAPSULE ORAL 3 TIMES DAILY
Qty: 30 CAPSULE | Refills: 0 | Status: SHIPPED | OUTPATIENT
Start: 2020-02-07 | End: 2020-02-17

## 2020-02-07 ASSESSMENT — PATIENT HEALTH QUESTIONNAIRE - PHQ9
1. LITTLE INTEREST OR PLEASURE IN DOING THINGS: 0
SUM OF ALL RESPONSES TO PHQ QUESTIONS 1-9: 0
2. FEELING DOWN, DEPRESSED OR HOPELESS: 0
SUM OF ALL RESPONSES TO PHQ QUESTIONS 1-9: 0
SUM OF ALL RESPONSES TO PHQ9 QUESTIONS 1 & 2: 0

## 2020-02-07 NOTE — PROGRESS NOTES
2020     Nellie Cloud (:  1950) is a 69 y.o. male, here for evaluation of the following medical concerns:  Chief Complaint   Patient presents with    Blood Pressure Check     low blood pressure was informed to stop bp meds    Otalgia     left ear pressure alot of wax build up     Discuss Labs     need to discuss recent lab results        HPI  Follow up hypotension associated with acute illness, poor oral intake and renal failure. Now eating and drinking well, blood pressure gradually increased. Has been off ACE and methotrexate. Repeat renal function test has improved close to baseline. New left ear pressure, some tenderness, no change in hearing. Patient's medications, allergies, past medical, surgical, social and family histories were reviewed and updated as appropriate. Review of Systems   Constitutional: Negative for fatigue. Respiratory: Negative for cough and shortness of breath. Cardiovascular: Negative for chest pain and leg swelling. Neurological: Negative for dizziness and headaches. Prior to Visit Medications    Medication Sig Taking? Authorizing Provider   finasteride (PROSCAR) 5 MG tablet Take 5 mg by mouth daily Indications: Rx by Dr. Valerie Mckeon MD   folic acid (FOLVITE) 1 MG tablet Take 1 mg by mouth daily Yes Historical Provider, MD   omeprazole (PRILOSEC) 20 MG delayed release capsule Take 1 capsule by mouth every morning (before breakfast)  Ruperto Her MD   nitroGLYCERIN (NITROSTAT) 0.4 MG SL tablet Place 1 tablet under the tongue every 5 minutes as needed for Chest pain  Lv Goyal MD   nicotine (NICOTROL) 10 MG/ML SOLN nasal spray 1-2 sprays nasal every 1 hours as needed. Minimum daily dose 8 sprays per day. Maximum 48 sprays per day.   Ruperto Her MD   ipratropium-albuterol (DUONEB) 0.5-2.5 (3) MG/3ML SOLN nebulizer solution Inhale 3 mLs into the lungs every 6 hours as needed for Shortness of Breath  Ruperto Her MD nicotine (NICOTROL) 10 MG inhaler Inhale 1 puff into the lungs as needed for Smoking cessation  Jelena Virk MD   carvedilol (COREG) 3.125 MG tablet Take 1 tablet by mouth 2 times daily (with meals)  Patient not taking: Reported on 2/7/2020  Yesenia Isaac MD   clopidogrel (PLAVIX) 75 MG tablet Take 1 tablet by mouth daily  Patient not taking: Reported on 2/7/2020  Yesenia Isaac MD   gemfibrozil (LOPID) 600 MG tablet Take 1 tablet by mouth 2 times daily (before meals)  Patient taking differently: Take 600 mg by mouth 2 times daily (before meals) Indications: prescribed by Dr. David Pineda MD   rosuvastatin (CRESTOR) 40 MG tablet Take 1 tablet by mouth every evening  Patient taking differently: Take 40 mg by mouth every evening Indications: Prescribed by Dr. David Pineda MD   methotrexate (RHEUMATREX) 2.5 MG chemo tablet Take 1 tablet by mouth once a week RX directions are 4 tablets weekly. Patient takes one tablet Monday/Wednesday/Friday. Patient not taking: Reported on 2/7/2020  Raffi Hunt MD   tamsulosin [facility]) 0.4 MG capsule Take 0.4 mg by mouth daily  Mary Mitchell MD   aspirin 81 MG tablet Take 81 mg by mouth daily. Historical Provider, MD        Social History     Tobacco Use    Smoking status: Current Every Day Smoker     Packs/day: 1.00     Years: 54.00     Pack years: 54.00     Types: Cigarettes     Start date: 1/1/1962    Smokeless tobacco: Never Used   Substance Use Topics    Alcohol use: No        Vitals:    02/07/20 1319   BP: 118/72   Site: Right Upper Arm   Position: Sitting   Cuff Size: Small Adult   Pulse: 75   Temp: 98.3 °F (36.8 °C)   TempSrc: Oral   SpO2: 98%   Weight: 195 lb (88.5 kg)   Height: 5' 10.5\" (1.791 m)     Estimated body mass index is 27.58 kg/m² as calculated from the following:    Height as of this encounter: 5' 10.5\" (1.791 m). Weight as of this encounter: 195 lb (88.5 kg).     Physical Exam  Vitals signs

## 2020-02-10 ENCOUNTER — TELEPHONE (OUTPATIENT)
Dept: FAMILY MEDICINE CLINIC | Age: 70
End: 2020-02-10

## 2020-02-10 ASSESSMENT — ENCOUNTER SYMPTOMS
COUGH: 0
SHORTNESS OF BREATH: 0

## 2020-02-10 NOTE — TELEPHONE ENCOUNTER
Pt is still having problems with his ear, would like to speak to [facility] regarding wax buildup.

## 2020-02-11 ENCOUNTER — TELEPHONE (OUTPATIENT)
Dept: CARDIOLOGY CLINIC | Age: 70
End: 2020-02-11

## 2020-02-11 NOTE — TELEPHONE ENCOUNTER
Pt would like a return call from [facility]'s nurse , pt has some questions about his Lisinopril, pt had some kidney issues, and his pcp took him off his Lisinopril and pt wants to know if this is going to be ok.   Please call

## 2020-02-11 NOTE — TELEPHONE ENCOUNTER
I did not see any wax there. I recommend trial of flonase 2 sprays each nostril twice daily for one week. Schedule OV in 2 to 3 days if not feeling better.

## 2020-02-11 NOTE — TELEPHONE ENCOUNTER
I called Mr. Medina De Santiago. Stay off lisinopril and get BMP drawn next week. If renal fcn still improved and stable I will restart lisinopril at 2.5mg daily. Await results until next week.

## 2020-02-11 NOTE — TELEPHONE ENCOUNTER
Patient made aware and states it maybe another day or so before he can pick any flonase up from the store

## 2020-02-17 ENCOUNTER — HOSPITAL ENCOUNTER (OUTPATIENT)
Age: 70
Discharge: HOME | End: 2020-02-17
Payer: MEDICARE

## 2020-02-17 LAB
ANION GAP SERPL CALCULATED.3IONS-SCNC: 17 MMOL/L (ref 3–16)
BUN BLDV-MCNC: 21 MG/DL (ref 7–20)
CALCIUM SERPL-MCNC: 10 MG/DL (ref 8.3–10.6)
CHLORIDE BLD-SCNC: 100 MMOL/L (ref 99–110)
CO2: 20 MMOL/L (ref 21–32)
CREAT SERPL-MCNC: 1.3 MG/DL (ref 0.8–1.3)
GFR AFRICAN AMERICAN: >60
GFR NON-AFRICAN AMERICAN: 54
GLUCOSE BLD-MCNC: 109 MG/DL (ref 70–99)
POTASSIUM SERPL-SCNC: 4.8 MMOL/L (ref 3.5–5.1)
SODIUM BLD-SCNC: 137 MMOL/L (ref 136–145)

## 2020-02-17 PROCEDURE — 36415 COLL VENOUS BLD VENIPUNCTURE: CPT

## 2020-02-17 PROCEDURE — 80048 BASIC METABOLIC PNL TOTAL CA: CPT

## 2020-02-20 ENCOUNTER — OFFICE VISIT (OUTPATIENT)
Dept: FAMILY MEDICINE CLINIC | Age: 70
End: 2020-02-20
Payer: MEDICARE

## 2020-02-20 ENCOUNTER — OFFICE VISIT (OUTPATIENT)
Dept: ENT CLINIC | Age: 70
End: 2020-02-20
Payer: MEDICARE

## 2020-02-20 VITALS
DIASTOLIC BLOOD PRESSURE: 76 MMHG | HEART RATE: 73 BPM | BODY MASS INDEX: 27.77 KG/M2 | OXYGEN SATURATION: 99 % | HEIGHT: 71 IN | TEMPERATURE: 97.6 F | RESPIRATION RATE: 20 BRPM | WEIGHT: 198.4 LBS | SYSTOLIC BLOOD PRESSURE: 121 MMHG

## 2020-02-20 VITALS
WEIGHT: 198.41 LBS | BODY MASS INDEX: 27.78 KG/M2 | HEART RATE: 65 BPM | DIASTOLIC BLOOD PRESSURE: 67 MMHG | TEMPERATURE: 97.1 F | HEIGHT: 71 IN | SYSTOLIC BLOOD PRESSURE: 108 MMHG

## 2020-02-20 PROBLEM — M26.609 TMJ (TEMPOROMANDIBULAR JOINT SYNDROME): Status: ACTIVE | Noted: 2020-02-20

## 2020-02-20 LAB — HBA1C MFR BLD: 6.2 %

## 2020-02-20 PROCEDURE — G8427 DOCREV CUR MEDS BY ELIG CLIN: HCPCS | Performed by: OTOLARYNGOLOGY

## 2020-02-20 PROCEDURE — 4004F PT TOBACCO SCREEN RCVD TLK: CPT | Performed by: OTOLARYNGOLOGY

## 2020-02-20 PROCEDURE — G8482 FLU IMMUNIZE ORDER/ADMIN: HCPCS | Performed by: OTOLARYNGOLOGY

## 2020-02-20 PROCEDURE — G8482 FLU IMMUNIZE ORDER/ADMIN: HCPCS | Performed by: PHYSICIAN ASSISTANT

## 2020-02-20 PROCEDURE — 1123F ACP DISCUSS/DSCN MKR DOCD: CPT | Performed by: PHYSICIAN ASSISTANT

## 2020-02-20 PROCEDURE — G8427 DOCREV CUR MEDS BY ELIG CLIN: HCPCS | Performed by: PHYSICIAN ASSISTANT

## 2020-02-20 PROCEDURE — 4040F PNEUMOC VAC/ADMIN/RCVD: CPT | Performed by: OTOLARYNGOLOGY

## 2020-02-20 PROCEDURE — 3017F COLORECTAL CA SCREEN DOC REV: CPT | Performed by: OTOLARYNGOLOGY

## 2020-02-20 PROCEDURE — G8417 CALC BMI ABV UP PARAM F/U: HCPCS | Performed by: PHYSICIAN ASSISTANT

## 2020-02-20 PROCEDURE — 99203 OFFICE O/P NEW LOW 30 MIN: CPT | Performed by: OTOLARYNGOLOGY

## 2020-02-20 PROCEDURE — 4040F PNEUMOC VAC/ADMIN/RCVD: CPT | Performed by: PHYSICIAN ASSISTANT

## 2020-02-20 PROCEDURE — G8417 CALC BMI ABV UP PARAM F/U: HCPCS | Performed by: OTOLARYNGOLOGY

## 2020-02-20 PROCEDURE — 4004F PT TOBACCO SCREEN RCVD TLK: CPT | Performed by: PHYSICIAN ASSISTANT

## 2020-02-20 PROCEDURE — 83036 HEMOGLOBIN GLYCOSYLATED A1C: CPT | Performed by: PHYSICIAN ASSISTANT

## 2020-02-20 PROCEDURE — 99213 OFFICE O/P EST LOW 20 MIN: CPT | Performed by: PHYSICIAN ASSISTANT

## 2020-02-20 PROCEDURE — 1123F ACP DISCUSS/DSCN MKR DOCD: CPT | Performed by: OTOLARYNGOLOGY

## 2020-02-20 PROCEDURE — 3017F COLORECTAL CA SCREEN DOC REV: CPT | Performed by: PHYSICIAN ASSISTANT

## 2020-02-20 ASSESSMENT — ENCOUNTER SYMPTOMS
DIARRHEA: 0
CONSTIPATION: 0
SHORTNESS OF BREATH: 0
VOMITING: 0
ABDOMINAL PAIN: 0
NAUSEA: 0
SORE THROAT: 0
COUGH: 0
RHINORRHEA: 0

## 2020-02-20 NOTE — PROGRESS NOTES
Review of Systems     REVIEW OF SYSTEMS  The following systems were reviewed and revealed the following in addition to any already discussed in the HPI:    CONSTITUTIONAL: No weight loss, no fever, no night sweats, no chills  EYES: no vision changes, no blurry vision  EARS: no changes in hearing, +otalgia  NOSE: no epistaxis, no rhinorrhea  RESPIRATORY: No difficulty breathing, no shortness of breath  CV: no chest pain, no peripheral vascular disease  HEME: No coagulation disorder, no bleeding disorder  NEURO: No TIA or stroke-like symptoms  SKIN: No new rashes in the head and neck, no recent skin cancers  MOUTH: No new ulcers, no recent teeth infections  GASTROINTESTINAL: No diarrhea, stomach pain  PSYCH: No anxiety, no depression    PhysicalExam     Vitals:    02/20/20 1116   BP: 108/67   Site: Left Upper Arm   Position: Sitting   Cuff Size: Medium Adult   Pulse: 65   Temp: 97.1 °F (36.2 °C)   TempSrc: Oral   Weight: 198 lb 6.6 oz (90 kg)   Height: 5' 10.51\" (1.791 m)       PHYSICAL EXAM  /67 (Site: Left Upper Arm, Position: Sitting, Cuff Size: Medium Adult)   Pulse 65   Temp 97.1 °F (36.2 °C) (Oral)   Ht 5' 10.51\" (1.791 m)   Wt 198 lb 6.6 oz (90 kg)   BMI 28.06 kg/m²     GENERAL: No Acute Distress, Alert and Oriented, no Hoarseness  EYES: EOMI, Anti-icteric  NOSE: On anterior rhinoscopy there is no epistaxis, nasal mucosa within normal limits, no purulent drainage  EARS: Normal external appearance; on portable otomicroscopy:  -Right ear: External auditory canal without stenosis, tympanic membrane clear, no middle ear effusions or retractions  -Left ear: External auditory canal without stenosis, tympanic membrane clear, no middle ear effusions or retractions  -Marked tenderness to palpation of the left temporomandibular joint - especially intra-canal with jaw opening (normal on right side)  FACE: 1/6 House-Brackmann Scale, symmetric, sensation equal bilaterally  ORAL CAVITY: No masses or lesions review of images  Review / order clinical lab tests  Review / order radiology tests  Decision to obtain old records  Review and summation of old records as accessed through [facility] (a summary of my findings in these old records: none)     Portions of this note were dictated using Dragon.  There may be linguistic errors secondary to the use of this program.

## 2020-02-20 NOTE — PATIENT INSTRUCTIONS
Jaw joint tecommendations as follows:  -Ibuprofen 600mg q8h x 7 days (may exacerbate LPR)  -Soft non chewing diet x 7 days  -Warm compresses to the left jaw joint x 7 days  -Sleep without dentures in place

## 2020-02-20 NOTE — PROGRESS NOTES
2020  Jorge Banks (: 1950)  69 y.o. HPI    Presents left ear pain. Has been ongoing for the last 2 weeks. Patient reports completed abx and steroid nasal spray recently without improvement in symptoms. Feels ear is \"full\" and painful and soreness radiates down into the side of the face and into the mouth. Has tried claritin, flonase, and tylenol. Denies history of bruxism or clenching. Review of Systems   Constitutional: Negative for activity change, chills and fever. HENT: Positive for dental problem and ear pain. Negative for congestion, rhinorrhea and sore throat. +jaw pain   Eyes: Negative for visual disturbance. Respiratory: Negative for cough and shortness of breath. Cardiovascular: Negative for chest pain and palpitations. Gastrointestinal: Negative for abdominal pain, constipation, diarrhea, nausea and vomiting. Genitourinary: Negative for difficulty urinating and dysuria. Musculoskeletal: Negative for arthralgias and myalgias. Skin: Negative for rash. Neurological: Negative for dizziness, weakness and numbness. Psychiatric/Behavioral: Negative for sleep disturbance. Allergies, past medical history, family history, and social history reviewed and unchanged from previous encounter.      Current Outpatient Medications   Medication Sig Dispense Refill    omeprazole (PRILOSEC) 20 MG delayed release capsule Take 1 capsule by mouth every morning (before breakfast) 90 capsule 1    nitroGLYCERIN (NITROSTAT) 0.4 MG SL tablet Place 1 tablet under the tongue every 5 minutes as needed for Chest pain 25 tablet 1    ipratropium-albuterol (DUONEB) 0.5-2.5 (3) MG/3ML SOLN nebulizer solution Inhale 3 mLs into the lungs every 6 hours as needed for Shortness of Breath 120 vial 5    finasteride (PROSCAR) 5 MG tablet Take 5 mg by mouth daily Indications: Rx by Dr. Mariusz Peterson gemfibrozil (LOPID) 600 MG tablet Take 1 tablet by mouth 2 times daily (before meals)

## 2020-02-24 ENCOUNTER — TELEPHONE (OUTPATIENT)
Dept: ORTHOPEDIC SURGERY | Age: 70
End: 2020-02-24

## 2020-02-24 ENCOUNTER — TELEPHONE (OUTPATIENT)
Dept: ENT CLINIC | Age: 70
End: 2020-02-24

## 2020-02-24 RX ORDER — METAXALONE 800 MG/1
800 TABLET ORAL 3 TIMES DAILY
Qty: 30 TABLET | Refills: 0 | Status: SHIPPED | OUTPATIENT
Start: 2020-02-24 | End: 2020-03-02

## 2020-02-24 NOTE — TELEPHONE ENCOUNTER
ENT Note  Patient with left suspected TMJ arthralgia-is using ibuprofen, warm compresses, soft diet-pain without improvement, continues to have left otalgia with radiation to the jaw and trapezius. In addition to ibuprofen 600 mg every 6 hours, we will start him on metaxalone 800 mg 3 times daily for 10-day worse-he currently has been prescribed baclofen and tizanidine, however he is not using them.

## 2020-02-24 NOTE — TELEPHONE ENCOUNTER
Pt called in saying he is still in pain, the instructions he was given do not seem to be helping. Pt is wanting to know if he should be seen in our clinic again before his hearing test that is scheduled for 03/02/2020, or if there is anything else that he can try for the pain, or if there is anything else Dr. Kerrie Johnson would like him to do.

## 2020-03-02 ENCOUNTER — OFFICE VISIT (OUTPATIENT)
Dept: ENT CLINIC | Age: 70
End: 2020-03-02
Payer: MEDICARE

## 2020-03-02 ENCOUNTER — PROCEDURE VISIT (OUTPATIENT)
Dept: AUDIOLOGY | Age: 70
End: 2020-03-02
Payer: MEDICARE

## 2020-03-02 VITALS
BODY MASS INDEX: 27.97 KG/M2 | HEART RATE: 74 BPM | SYSTOLIC BLOOD PRESSURE: 99 MMHG | WEIGHT: 199.8 LBS | DIASTOLIC BLOOD PRESSURE: 60 MMHG | TEMPERATURE: 97.7 F | HEIGHT: 71 IN

## 2020-03-02 PROBLEM — H90.3 SENSORINEURAL HEARING LOSS, BILATERAL: Status: ACTIVE | Noted: 2020-03-02

## 2020-03-02 PROCEDURE — G8427 DOCREV CUR MEDS BY ELIG CLIN: HCPCS | Performed by: OTOLARYNGOLOGY

## 2020-03-02 PROCEDURE — 4040F PNEUMOC VAC/ADMIN/RCVD: CPT | Performed by: OTOLARYNGOLOGY

## 2020-03-02 PROCEDURE — 92557 COMPREHENSIVE HEARING TEST: CPT | Performed by: AUDIOLOGIST

## 2020-03-02 PROCEDURE — 1123F ACP DISCUSS/DSCN MKR DOCD: CPT | Performed by: OTOLARYNGOLOGY

## 2020-03-02 PROCEDURE — G8417 CALC BMI ABV UP PARAM F/U: HCPCS | Performed by: OTOLARYNGOLOGY

## 2020-03-02 PROCEDURE — 4004F PT TOBACCO SCREEN RCVD TLK: CPT | Performed by: OTOLARYNGOLOGY

## 2020-03-02 PROCEDURE — 92567 TYMPANOMETRY: CPT | Performed by: AUDIOLOGIST

## 2020-03-02 PROCEDURE — G8482 FLU IMMUNIZE ORDER/ADMIN: HCPCS | Performed by: OTOLARYNGOLOGY

## 2020-03-02 PROCEDURE — 3017F COLORECTAL CA SCREEN DOC REV: CPT | Performed by: OTOLARYNGOLOGY

## 2020-03-02 PROCEDURE — 99213 OFFICE O/P EST LOW 20 MIN: CPT | Performed by: OTOLARYNGOLOGY
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
## 2020-03-02 NOTE — PATIENT INSTRUCTIONS
part of your treatment and safety. Be sure to make and go to all appointments, and call your doctor if you are having problems. It's also a good idea to know your test results and keep a list of the medicines you take. How can you care for yourself at home? · Limit or cut out alcohol, caffeine, and sodium. They can make your symptoms worse. · Do not smoke or use other tobacco products. Nicotine reduces blood flow to the ear and makes tinnitus worse. If you need help quitting, talk to your doctor about stop-smoking programs and medicines. These can increase your chances of quitting for good. · Talk to your doctor about whether to stop taking aspirin and similar products such as ibuprofen or naproxen. · Get exercise often. It can help improve blood flow to the ear. Ways to manage/cope with tinnitus  Some tinnitus may last a long time. To manage your tinnitus, try to:  · Avoid noises that you think caused your tinnitus. If you can't avoid loud noises, wear earplugs or earmuffs. · Ignore the sound by paying attention to other things. Keeping your brain busy with other tasks or background noise can help your brain not focus on the tinnitus. · Try to not give the tinnitus an emotional reaction. Do your best to ignore the sound and not let it bother you. Relax using biofeedback, meditation, or yoga. Feeling stressed and being tired can make tinnitus worse. · Play music or white noise to help you sleep. Background noise may cover up the noise that you hear in your ears. You can buy a tabletop machine or a device that sits under your pillow to play soothing sounds, like ocean waves. · Smart phones have free apps, such as Whist, Relax Melodies, ReSound Relief, and White Noise Lite. These apps have different types of sounds/noise, some of which you can blend together to find sounds that are most soothing to you.   · Hearing aid technology, especially when there is some hearing loss, may help reduce tinnitus instead of talking or listening. These devices are also called TDD. When messages are typed on the keyboard, they are sent over the phone line to a receiving TTY. The message is shown on a monitor. · Use pagers, fax machines, text, and email if it is hard for you to communicate by telephone. · Try to learn a listening technique called speech-reading. It is not lip-reading. You pay attention to people's gestures, expressions, posture, and tone of voice. These clues can help you understand what a person is saying. Face the person you are talking to, and have him or her face you. Make sure the lighting is good. You need to see the other person's face clearly. · Think about counseling if you need help to adjust to your hearing loss. When should you call for help? Watch closely for changes in your health, and be sure to contact your doctor if:    · You think your hearing is getting worse. · You have new symptoms, such as dizziness or nausea.

## 2020-03-03 ENCOUNTER — TELEPHONE (OUTPATIENT)
Dept: ENT CLINIC | Age: 70
End: 2020-03-03

## 2020-03-03 LAB
ANION GAP SERPL CALCULATED.3IONS-SCNC: 14 MMOL/L (ref 3–16)
BUN BLDV-MCNC: 21 MG/DL (ref 7–20)
CALCIUM SERPL-MCNC: 9.7 MG/DL (ref 8.3–10.6)
CHLORIDE BLD-SCNC: 103 MMOL/L (ref 99–110)
CO2: 22 MMOL/L (ref 21–32)
CREAT SERPL-MCNC: 1.3 MG/DL (ref 0.8–1.3)
GFR AFRICAN AMERICAN: >60
GFR NON-AFRICAN AMERICAN: 54
GLUCOSE BLD-MCNC: 112 MG/DL (ref 70–99)
POTASSIUM SERPL-SCNC: 4.5 MMOL/L (ref 3.5–5.1)
SODIUM BLD-SCNC: 139 MMOL/L (ref 136–145)

## 2020-03-03 NOTE — TELEPHONE ENCOUNTER
Keke Arechiga from [facility] called and asked if Dr Ruben Dallas can put an order for a CT scan without contrast due to the radiologist's recommendation.

## 2020-03-04 NOTE — TELEPHONE ENCOUNTER
Spoke with Madeleine at [address] [place] [address] to let her know the order was placed for the pt. Madeleine said she would let Greer know I called and I asked her to give her the message.

## 2020-03-10 ENCOUNTER — HOSPITAL ENCOUNTER (OUTPATIENT)
Dept: CT IMAGING | Age: 70
Discharge: HOME | End: 2020-03-10
Payer: MEDICARE

## 2020-03-10 PROCEDURE — 70480 CT ORBIT/EAR/FOSSA W/O DYE: CPT

## 2020-04-29 RX ORDER — OMEPRAZOLE 20 MG/1
20 CAPSULE, DELAYED RELEASE ORAL
Qty: 90 CAPSULE | Refills: 1 | Status: SHIPPED | OUTPATIENT
Start: 2020-04-29 | End: 2020-10-22 | Stop reason: SDUPTHER

## 2020-05-07 ENCOUNTER — NURSE ONLY (OUTPATIENT)
Dept: CARDIOLOGY CLINIC | Age: 70
End: 2020-05-07
Payer: MEDICARE

## 2020-05-07 PROCEDURE — 93295 DEV INTERROG REMOTE 1/2/MLT: CPT | Performed by: INTERNAL MEDICINE

## 2020-05-07 PROCEDURE — 93296 REM INTERROG EVL PM/IDS: CPT | Performed by: INTERNAL MEDICINE

## 2020-05-07 NOTE — PROGRESS NOTES
[facility] Remote transmission of pacemaker and/or ICD shows normal cardiac device function. BILLY @ 3.5 yrs. AP/ 0%. Events recorded in nsvt/vt zone. Egms show nsvt, longest 16 sec.  (coreg). Follow up in 3 months via [facility].

## 2020-05-12 RX ORDER — CLOPIDOGREL BISULFATE 75 MG/1
75 TABLET ORAL DAILY
Qty: 90 TABLET | Refills: 0 | Status: SHIPPED | OUTPATIENT
Start: 2020-05-12 | End: 2020-06-22 | Stop reason: SDUPTHER

## 2020-05-12 RX ORDER — ROSUVASTATIN CALCIUM 40 MG/1
40 TABLET, COATED ORAL EVERY EVENING
Qty: 90 TABLET | Refills: 0 | Status: SHIPPED | OUTPATIENT
Start: 2020-05-12 | End: 2020-06-22 | Stop reason: SDUPTHER

## 2020-05-18 ENCOUNTER — TELEPHONE (OUTPATIENT)
Dept: CARDIOLOGY CLINIC | Age: 70
End: 2020-05-18

## 2020-05-18 RX ORDER — LISINOPRIL 5 MG/1
5 TABLET ORAL DAILY
Qty: 90 TABLET | Refills: 1 | Status: SHIPPED | OUTPATIENT
Start: 2020-05-18 | End: 2020-06-22 | Stop reason: SDUPTHER

## 2020-05-18 RX ORDER — LISINOPRIL 5 MG/1
5 TABLET ORAL DAILY
Qty: 90 TABLET | Status: CANCELLED | OUTPATIENT
Start: 2020-05-18

## 2020-05-18 NOTE — TELEPHONE ENCOUNTER
DR. Melissa Linder - Pt had BMP in march. He is asking if you want another BMP, or did you not know he already had one. He said having blood draws is expensive so he wanted to make sure you knew [facility] had one in March.

## 2020-05-18 NOTE — TELEPHONE ENCOUNTER
Pt calling for refill on Lisinopril 5 mg 1 qd. Per tele note on 2.11.20. Pt was told to cut back to 2.5 mg if blood work came back ok, regarding kidneys. Pt continued to take 5 mg daily (he never started taking 2.5 mg)  Per pt he is not having anymore kidney issues. Do you want him to stay on 5 mg daily? If, so medication needs needs to be sent to [facility] mail order 90 day supply.

## 2020-06-11 ENCOUNTER — TELEPHONE (OUTPATIENT)
Dept: FAMILY MEDICINE CLINIC | Age: 70
End: 2020-06-11

## 2020-06-22 RX ORDER — CLOPIDOGREL BISULFATE 75 MG/1
75 TABLET ORAL DAILY
Qty: 90 TABLET | Refills: 3 | Status: ON HOLD | OUTPATIENT
Start: 2020-06-22 | End: 2020-06-29 | Stop reason: SDUPTHER

## 2020-06-22 RX ORDER — CARVEDILOL 3.12 MG/1
3.12 TABLET ORAL 2 TIMES DAILY WITH MEALS
Qty: 180 TABLET | Refills: 3 | Status: ON HOLD | OUTPATIENT
Start: 2020-06-22 | End: 2020-06-29 | Stop reason: SDUPTHER

## 2020-06-22 RX ORDER — ROSUVASTATIN CALCIUM 40 MG/1
40 TABLET, COATED ORAL EVERY EVENING
Qty: 90 TABLET | Refills: 3 | Status: ON HOLD | OUTPATIENT
Start: 2020-06-22 | End: 2020-06-29 | Stop reason: SDUPTHER

## 2020-06-22 RX ORDER — LISINOPRIL 5 MG/1
5 TABLET ORAL DAILY
Qty: 90 TABLET | Refills: 3 | Status: ON HOLD | OUTPATIENT
Start: 2020-06-22 | End: 2020-06-29 | Stop reason: SDUPTHER

## 2020-06-22 RX ORDER — GEMFIBROZIL 600 MG/1
600 TABLET, FILM COATED ORAL
Qty: 180 TABLET | Refills: 3 | Status: ON HOLD | OUTPATIENT
Start: 2020-06-22 | End: 2020-06-29 | Stop reason: SDUPTHER

## 2020-06-22 NOTE — TELEPHONE ENCOUNTER
Pt needs new rx's for:  Clopidogrel 75mgs, Gemfibrozil 600mgs, Lisinopril 5mgs,  and Carvedilol 3.125mgs. Pt's pharmacy - [facility]. Pt would like refills please.

## 2020-06-28 ENCOUNTER — HOSPITAL ENCOUNTER (INPATIENT)
Age: 70
LOS: 1 days | Discharge: HOME | DRG: 309 | End: 2020-06-29
Attending: EMERGENCY MEDICINE | Admitting: INTERNAL MEDICINE
Payer: MEDICARE

## 2020-06-28 PROBLEM — Z45.02 DEFIBRILLATOR DISCHARGE: Status: ACTIVE | Noted: 2020-06-28

## 2020-06-28 LAB
A/G RATIO: 1.7 (ref 1.1–2.2)
ALBUMIN SERPL-MCNC: 4.7 G/DL (ref 3.4–5)
ALP BLD-CCNC: 110 U/L (ref 40–129)
ALT SERPL-CCNC: 14 U/L (ref 10–40)
ANION GAP SERPL CALCULATED.3IONS-SCNC: 11 MMOL/L (ref 3–16)
AST SERPL-CCNC: 20 U/L (ref 15–37)
BASOPHILS ABSOLUTE: 0.1 K/UL (ref 0–0.2)
BASOPHILS RELATIVE PERCENT: 0.7 %
BILIRUB SERPL-MCNC: 0.5 MG/DL (ref 0–1)
BUN BLDV-MCNC: 22 MG/DL (ref 7–20)
CALCIUM SERPL-MCNC: 9.7 MG/DL (ref 8.3–10.6)
CHLORIDE BLD-SCNC: 99 MMOL/L (ref 99–110)
CO2: 23 MMOL/L (ref 21–32)
CREAT SERPL-MCNC: 1.1 MG/DL (ref 0.8–1.3)
EKG ATRIAL RATE: 66 BPM
EKG DIAGNOSIS: NORMAL
EKG P AXIS: 14 DEGREES
EKG P-R INTERVAL: 192 MS
EKG Q-T INTERVAL: 390 MS
EKG QRS DURATION: 136 MS
EKG QTC CALCULATION (BAZETT): 408 MS
EKG R AXIS: -44 DEGREES
EKG T AXIS: 54 DEGREES
EKG VENTRICULAR RATE: 66 BPM
EOSINOPHILS ABSOLUTE: 0.2 K/UL (ref 0–0.6)
EOSINOPHILS RELATIVE PERCENT: 2.8 %
GFR AFRICAN AMERICAN: >60
GFR NON-AFRICAN AMERICAN: >60
GLOBULIN: 2.7 G/DL
GLUCOSE BLD-MCNC: 107 MG/DL (ref 70–99)
HCT VFR BLD CALC: 39.4 % (ref 40.5–52.5)
HEMOGLOBIN: 13.4 G/DL (ref 13.5–17.5)
LYMPHOCYTES ABSOLUTE: 1.3 K/UL (ref 1–5.1)
LYMPHOCYTES RELATIVE PERCENT: 15.5 %
MAGNESIUM: 2 MG/DL (ref 1.8–2.4)
MCH RBC QN AUTO: 33.8 PG (ref 26–34)
MCHC RBC AUTO-ENTMCNC: 34.1 G/DL (ref 31–36)
MCV RBC AUTO: 99.1 FL (ref 80–100)
MONOCYTES ABSOLUTE: 0.6 K/UL (ref 0–1.3)
MONOCYTES RELATIVE PERCENT: 7.7 %
NEUTROPHILS ABSOLUTE: 6.1 K/UL (ref 1.7–7.7)
NEUTROPHILS RELATIVE PERCENT: 73.3 %
PDW BLD-RTO: 14.6 % (ref 12.4–15.4)
PLATELET # BLD: 211 K/UL (ref 135–450)
PMV BLD AUTO: 7.8 FL (ref 5–10.5)
POTASSIUM SERPL-SCNC: 4.1 MMOL/L (ref 3.5–5.1)
RBC # BLD: 3.97 M/UL (ref 4.2–5.9)
SODIUM BLD-SCNC: 133 MMOL/L (ref 136–145)
TOTAL PROTEIN: 7.4 G/DL (ref 6.4–8.2)
TROPONIN: <0.01 NG/ML
WBC # BLD: 8.3 K/UL (ref 4–11)

## 2020-06-28 PROCEDURE — 2580000003 HC RX 258: Performed by: INTERNAL MEDICINE

## 2020-06-28 PROCEDURE — 85025 COMPLETE CBC W/AUTO DIFF WBC: CPT

## 2020-06-28 PROCEDURE — 93010 ELECTROCARDIOGRAM REPORT: CPT | Performed by: INTERNAL MEDICINE

## 2020-06-28 PROCEDURE — 83735 ASSAY OF MAGNESIUM: CPT

## 2020-06-28 PROCEDURE — 93005 ELECTROCARDIOGRAM TRACING: CPT | Performed by: EMERGENCY MEDICINE

## 2020-06-28 PROCEDURE — 99285 EMERGENCY DEPT VISIT HI MDM: CPT

## 2020-06-28 PROCEDURE — 84484 ASSAY OF TROPONIN QUANT: CPT

## 2020-06-28 PROCEDURE — 1200000000 HC SEMI PRIVATE

## 2020-06-28 PROCEDURE — 6370000000 HC RX 637 (ALT 250 FOR IP): Performed by: INTERNAL MEDICINE

## 2020-06-28 PROCEDURE — 80053 COMPREHEN METABOLIC PANEL: CPT

## 2020-06-28 PROCEDURE — 36415 COLL VENOUS BLD VENIPUNCTURE: CPT

## 2020-06-28 RX ORDER — ASPIRIN 81 MG/1
81 TABLET ORAL DAILY
Status: DISCONTINUED | OUTPATIENT
Start: 2020-06-28 | End: 2020-06-29 | Stop reason: HOSPADM

## 2020-06-28 RX ORDER — SODIUM CHLORIDE 0.9 % (FLUSH) 0.9 %
10 SYRINGE (ML) INJECTION EVERY 12 HOURS SCHEDULED
Status: DISCONTINUED | OUTPATIENT
Start: 2020-06-28 | End: 2020-06-29 | Stop reason: HOSPADM

## 2020-06-28 RX ORDER — PROMETHAZINE HYDROCHLORIDE 25 MG/1
12.5 TABLET ORAL EVERY 6 HOURS PRN
Status: DISCONTINUED | OUTPATIENT
Start: 2020-06-28 | End: 2020-06-29 | Stop reason: HOSPADM

## 2020-06-28 RX ORDER — CLOPIDOGREL BISULFATE 75 MG/1
75 TABLET ORAL DAILY
Status: DISCONTINUED | OUTPATIENT
Start: 2020-06-28 | End: 2020-06-29 | Stop reason: HOSPADM

## 2020-06-28 RX ORDER — NITROGLYCERIN 0.4 MG/1
0.4 TABLET SUBLINGUAL EVERY 5 MIN PRN
Status: DISCONTINUED | OUTPATIENT
Start: 2020-06-28 | End: 2020-06-29 | Stop reason: HOSPADM

## 2020-06-28 RX ORDER — SODIUM CHLORIDE 0.9 % (FLUSH) 0.9 %
10 SYRINGE (ML) INJECTION PRN
Status: DISCONTINUED | OUTPATIENT
Start: 2020-06-28 | End: 2020-06-29 | Stop reason: HOSPADM

## 2020-06-28 RX ORDER — ROSUVASTATIN CALCIUM 10 MG/1
40 TABLET, COATED ORAL EVERY EVENING
Status: DISCONTINUED | OUTPATIENT
Start: 2020-06-28 | End: 2020-06-29 | Stop reason: HOSPADM

## 2020-06-28 RX ORDER — ONDANSETRON 2 MG/ML
4 INJECTION INTRAMUSCULAR; INTRAVENOUS EVERY 6 HOURS PRN
Status: DISCONTINUED | OUTPATIENT
Start: 2020-06-28 | End: 2020-06-29 | Stop reason: HOSPADM

## 2020-06-28 RX ORDER — PANTOPRAZOLE SODIUM 40 MG/1
40 TABLET, DELAYED RELEASE ORAL
Status: DISCONTINUED | OUTPATIENT
Start: 2020-06-29 | End: 2020-06-29 | Stop reason: HOSPADM

## 2020-06-28 RX ORDER — LISINOPRIL 5 MG/1
5 TABLET ORAL DAILY
Status: DISCONTINUED | OUTPATIENT
Start: 2020-06-28 | End: 2020-06-29 | Stop reason: HOSPADM

## 2020-06-28 RX ORDER — CARVEDILOL 3.12 MG/1
3.12 TABLET ORAL 2 TIMES DAILY WITH MEALS
Status: DISCONTINUED | OUTPATIENT
Start: 2020-06-28 | End: 2020-06-29 | Stop reason: HOSPADM

## 2020-06-28 RX ORDER — ASPIRIN 81 MG/1
81 TABLET, CHEWABLE ORAL DAILY
Status: DISCONTINUED | OUTPATIENT
Start: 2020-06-29 | End: 2020-06-28 | Stop reason: SDUPTHER

## 2020-06-28 RX ORDER — TAMSULOSIN HYDROCHLORIDE 0.4 MG/1
0.4 CAPSULE ORAL DAILY
Status: DISCONTINUED | OUTPATIENT
Start: 2020-06-28 | End: 2020-06-29 | Stop reason: HOSPADM

## 2020-06-28 RX ORDER — POLYETHYLENE GLYCOL 3350 17 G/17G
17 POWDER, FOR SOLUTION ORAL DAILY PRN
Status: DISCONTINUED | OUTPATIENT
Start: 2020-06-28 | End: 2020-06-29 | Stop reason: HOSPADM

## 2020-06-28 RX ORDER — FINASTERIDE 5 MG/1
5 TABLET, FILM COATED ORAL DAILY
Status: DISCONTINUED | OUTPATIENT
Start: 2020-06-28 | End: 2020-06-29 | Stop reason: HOSPADM

## 2020-06-28 RX ORDER — ACETAMINOPHEN 650 MG/1
650 SUPPOSITORY RECTAL EVERY 6 HOURS PRN
Status: DISCONTINUED | OUTPATIENT
Start: 2020-06-28 | End: 2020-06-29 | Stop reason: HOSPADM

## 2020-06-28 RX ORDER — IPRATROPIUM BROMIDE AND ALBUTEROL SULFATE 2.5; .5 MG/3ML; MG/3ML
1 SOLUTION RESPIRATORY (INHALATION) EVERY 6 HOURS PRN
Status: DISCONTINUED | OUTPATIENT
Start: 2020-06-28 | End: 2020-06-29 | Stop reason: HOSPADM

## 2020-06-28 RX ORDER — GEMFIBROZIL 600 MG/1
600 TABLET, FILM COATED ORAL
Status: DISCONTINUED | OUTPATIENT
Start: 2020-06-28 | End: 2020-06-29 | Stop reason: HOSPADM

## 2020-06-28 RX ORDER — FOLIC ACID 1 MG/1
1 TABLET ORAL DAILY
Status: DISCONTINUED | OUTPATIENT
Start: 2020-06-28 | End: 2020-06-29 | Stop reason: HOSPADM

## 2020-06-28 RX ORDER — ACETAMINOPHEN 325 MG/1
650 TABLET ORAL EVERY 6 HOURS PRN
Status: DISCONTINUED | OUTPATIENT
Start: 2020-06-28 | End: 2020-06-29 | Stop reason: HOSPADM

## 2020-06-28 RX ADMIN — CARVEDILOL 3.12 MG: 3.12 TABLET, FILM COATED ORAL at 17:41

## 2020-06-28 RX ADMIN — GEMFIBROZIL 600 MG: 600 TABLET ORAL at 17:41

## 2020-06-28 RX ADMIN — Medication 10 ML: at 22:11

## 2020-06-28 RX ADMIN — ROSUVASTATIN CALCIUM 40 MG: 10 TABLET, FILM COATED ORAL at 17:41

## 2020-06-28 ASSESSMENT — PAIN SCALES - GENERAL: PAINLEVEL_OUTOF10: 0

## 2020-06-28 NOTE — ED NOTES
Attempted to interrogate [employer] pacemaker with ipad. Unsuccessful attempt. [address] for a rep. [employer] will reach out to local representative who will call back.      Radha Sesay  06/28/20 0448

## 2020-06-28 NOTE — ED NOTES
1525 - PS to hospitalists  Re: aicd fire for vfib  1535 - Dr Gena Oneill called back to speak with ERIC Duenas  06/28/20 153

## 2020-06-28 NOTE — H&P
Responses    Vaping Use Never User    Start Date     Does device contain nicotine? Never    Quit Date     Vaping Type Unknown            Family History:       Reviewed in detail and negative for DM, CAD, Cancer, CVA. Positive as follows:        Problem Relation Age of Onset    Diabetes Mother     Cancer Father        REVIEW OF SYSTEMS:   Pertinent positives as noted in the HPI. All other systems reviewed and negative. PHYSICAL EXAM PERFORMED:    /66   Pulse 60   Temp 98 °F (36.7 °C) (Oral)   Resp 16   Ht 5' 10\" (1.778 m)   Wt 195 lb (88.5 kg)   SpO2 99%   BMI 27.98 kg/m²     General appearance:  No apparent distress, appears stated age and cooperative. HEENT:  Normal cephalic, atraumatic without obvious deformity. Pupils equal, round, and reactive to light. Extra ocular muscles intact. Conjunctivae/corneas clear. Neck: Supple, with full range of motion. No jugular venous distention. Trachea midline. Respiratory:  Normal respiratory effort. Clear to auscultation, bilaterally without Rales/Wheezes/Rhonchi. Cardiovascular:  Regular rate and rhythm with normal S1/S2 without murmurs, rubs or gallops. Has defibrillator  Abdomen: Soft, non-tender, non-distended with normal bowel sounds. Musculoskeletal:  No clubbing, cyanosis or edema bilaterally. Full range of motion without deformity. Skin: Skin color, texture, turgor normal.  No rashes or lesions. Neurologic:  Neurovascularly intact without any focal sensory/motor deficits.  Cranial nerves: II-XII intact, grossly non-focal.  Psychiatric:  Alert and oriented, thought content appropriate, normal insight  Capillary Refill: Brisk,< 3 seconds   Peripheral Pulses: +2 palpable, equal bilaterally       Labs:     Recent Labs     06/28/20  1347   WBC 8.3   HGB 13.4*   HCT 39.4*        Recent Labs     06/28/20  1347   *   K 4.1   CL 99   CO2 23   BUN 22*   CREATININE 1.1   CALCIUM 9.7     Recent Labs     06/28/20  1347   AST 20   ALT 14 BILITOT 0.5   ALKPHOS 110     No results for input(s): INR in the last 72 hours. Recent Labs     06/28/20  1347   TROPONINI <0.01       Urinalysis:      Lab Results   Component Value Date    NITRU Negative 12/18/2016    WBCUA 0-2 02/06/2020    BACTERIA Rare 02/06/2020    RBCUA 0-2 02/06/2020    BLOODU neg 12/31/2019    BLOODU Negative 12/18/2016    SPECGRAV 1.020 12/31/2019    SPECGRAV 1.020 12/18/2016    GLUCOSEU neg 12/31/2019    GLUCOSEU Negative 12/18/2016       Radiology:     CXR: I have reviewed the CXR with the following interpretation:   EKG:  I have reviewed the EKG with the following interpretation:   Normal sinus rhythm with 1st degree A-V blockLeft axis deviationNon-specific intra-ventricular conduction blockNonspecific ST abnormalityAbnormal ECGWhen compared with ECG of 02-MAR-2019 22:45,Vent. rate has decreased BY  35 BPMQRS duration has increasedConfirmed by USMAN Long MD orders to display       ASSESSMENT:    Active Hospital Problems    Diagnosis Date Noted    Defibrillator discharge [Z45.02] 06/28/2020         PLAN:  Firing of defibrillator/shock-most probably secondary to V. Fib-admit, telemetry, troponin, echocardiogram, continue home medications, cardiology was contacted from ER. Electrolytes within acceptable range potassium is 4.1 and magnesium is 2  Near syncope-secondary to above    Hyperlipidemia-statin    CAD-continue beta-blocker statin and aspirin    Chronic systolic CHF-ejection fraction is 25% according to echocardiogram in 2018-no evidence of fluid overload continue lisinopril he is off diuretics    BPH-continue home medication        DVT Prophylaxis: Lovenox  Diet: Cardiac  Code Status: Full    PT/OT Eval Status:     Dispo -admitted to [facility] 5 telemetry       Diamond Herman MD    Thank you Anthony Estrada MD for the opportunity to be involved in this patient's care. If you have any questions or concerns please feel free to contact me at [phone].

## 2020-06-28 NOTE — ED NOTES
Bed: 03  Expected date:   Expected time:   Means of arrival:   Comments:  mckenna Cobb RN  06/28/20 7620

## 2020-06-28 NOTE — ED PROVIDER NOTES
Emergency Department Provider Note     Location: [address] [place] [address]  ED  6/28/2020    I independently performed a history and physical on [employer]. All diagnostic, treatment, and disposition decisions were made by myself in conjunction with the mid-level provider. Briefly, this is a 69 y.o. male here after his defibrillator discharged in [facility] today. Patient denies any symptoms prior to being shocked. Patient reports that he currently feels fine just a little sore. Denies any dizziness, shortness of breath, or chest pain currently. ED Triage Vitals [06/28/20 1337]   BP Temp Temp Source Pulse Resp SpO2 Height Weight   130/80 98 °F (36.7 °C) Oral 70 20 98 % 5' 10\" (1.778 m) 195 lb (88.5 kg)        Patient resting comfortably in no acute distress. Heart is regular rate and rhythm. Lungs clear to auscultation bilaterally. Abdomen is soft, nondistended, and nontender. No peripheral edema noted. Patient seen and examined. Vital signs stable and within normal limits. Lab work-up largely unremarkable notable for negative troponin and electrolytes within normal limits. Device interrogated and shows that patient had an episode of ventricular fibrillation today that he was shocked for. Patient has had multiple episodes of nonsustained V. tach. Cardiology recommended admission. Patient is amenable with this plan of care. EKG  The Ekg interpreted by me in the absence of a cardiologist shows. Normal sinus rhythm, rate 66, normal intervals, no STEMI, previous EKG from March 2, 2019 showed sinus tachycardia. No results found.       Results for orders placed or performed during the hospital encounter of 06/28/20   CBC Auto Differential   Result Value Ref Range    WBC 8.3 4.0 - 11.0 K/uL    RBC 3.97 (L) 4.20 - 5.90 M/uL    Hemoglobin 13.4 (L) 13.5 - 17.5 g/dL    Hematocrit 39.4 (L) 40.5 - 52.5 %    MCV 99.1 80.0 - 100.0 fL    MCH 33.8 26.0 - 34.0 pg    MCHC 34.1 31.0 - 36.0 g/dL    RDW 14.6 12.4 - 15.4 %    Platelets 988 849 - 721 K/uL    MPV 7.8 5.0 - 10.5 fL    Neutrophils % 73.3 %    Lymphocytes % 15.5 %    Monocytes % 7.7 %    Eosinophils % 2.8 %    Basophils % 0.7 %    Neutrophils Absolute 6.1 1.7 - 7.7 K/uL    Lymphocytes Absolute 1.3 1.0 - 5.1 K/uL    Monocytes Absolute 0.6 0.0 - 1.3 K/uL    Eosinophils Absolute 0.2 0.0 - 0.6 K/uL    Basophils Absolute 0.1 0.0 - 0.2 K/uL   Comprehensive Metabolic Panel   Result Value Ref Range    Sodium 133 (L) 136 - 145 mmol/L    Potassium 4.1 3.5 - 5.1 mmol/L    Chloride 99 99 - 110 mmol/L    CO2 23 21 - 32 mmol/L    Anion Gap 11 3 - 16    Glucose 107 (H) 70 - 99 mg/dL    BUN 22 (H) 7 - 20 mg/dL    CREATININE 1.1 0.8 - 1.3 mg/dL    GFR Non-African American >60 >60    GFR African American >60 >60    Calcium 9.7 8.3 - 10.6 mg/dL    Total Protein 7.4 6.4 - 8.2 g/dL    Alb 4.7 3.4 - 5.0 g/dL    Albumin/Globulin Ratio 1.7 1.1 - 2.2    Total Bilirubin 0.5 0.0 - 1.0 mg/dL    Alkaline Phosphatase 110 40 - 129 U/L    ALT 14 10 - 40 U/L    AST 20 15 - 37 U/L    Globulin 2.7 g/dL   Troponin   Result Value Ref Range    Troponin <0.01 <0.01 ng/mL   Magnesium   Result Value Ref Range    Magnesium 2.00 1.80 - 2.40 mg/dL   EKG 12 Lead   Result Value Ref Range    Ventricular Rate 66 BPM    Atrial Rate 66 BPM    P-R Interval 192 ms    QRS Duration 136 ms    Q-T Interval 390 ms    QTc Calculation (Bazett) 408 ms    P Axis 14 degrees    R Axis -44 degrees    T Axis 54 degrees    Diagnosis       Normal sinus rhythm with 1st degree A-V blockLeft axis deviationNon-specific intra-ventricular conduction blockNonspecific ST abnormalityAbnormal ECGWhen compared with ECG of 02-MAR-2019 22:45,Vent. rate has decreased BY  35 BPMQRS duration has increasedConfirmed by Debra Haley MD, [address] [place] [address] ([id number]) on 6/28/2020 2:13:36 PM       For further details of [facility] emergency department encounter, please see Hang Seay's documentation.           This chart was generated in part by using Dragon Dictation system and may contain errors related to that system including errors in grammar, punctuation, and spelling, as well as words and phrases that may be inappropriate. If there are any questions or concerns please feel free to contact the dictating provider for clarification.            Joe Baptiste MD  06/28/20 1600

## 2020-06-28 NOTE — ED NOTES
Pt provided urinal per request. Pt able to stand without difficulty. This RN stayed in room, as stand by assist, should pt have gotten dizzy. Pt back to stretcher and now in position of comfort. Pt and spouse updated on plan of care. 3:53 PM   Dr Pelon Jasmine at bedside.       Curlene Claude, DORENE  06/28/20 2460

## 2020-06-28 NOTE — ED NOTES
Per , unable to interrogate pacemaker/defibrillator with ipad. After calling [employer], the rep states pt has \"some parts\" through [employer], but actual device is through [employer].       Curlene Claude, RN  06/28/20 9765

## 2020-06-28 NOTE — ED NOTES
Tech in room to interrogate pacemaker/defibrillator - [address] , [facility]  06/28/20 5015

## 2020-06-28 NOTE — ED NOTES
Pablo Huerta from [facility] called back and stated that the pacemaker is from [address] [place] [address].      Vijay Arguello  06/28/20 1264

## 2020-06-28 NOTE — PROGRESS NOTES
4 Eyes Skin Assessment     The patient is being assess for  Admission    I agree that 2 RN's have performed a thorough Head to Toe Skin Assessment on the patient. ALL assessment sites listed below have been assessed. Areas assessed by both nurses: Cata  and April  [x]   Head, Face, and Ears   [x]   Shoulders, Back, and Chest  [x]   Arms, Elbows, and Hands   [x]   Coccyx, Sacrum, and Ischum  [x]   Legs, Feet, and Heels        Does the Patient have Skin Breakdown?   No         Tommy Prevention initiated:  NA   Wound Care Orders initiated:  NA      [facility] nurse consulted for Pressure Injury (Stage 3,4, Unstageable, DTI, NWPT, and Complex wounds):  NA      Nurse 1 eSignature: Electronically signed by Jazmine Tucker RN on 6/28/20 at 6:28 PM EDT    **SHARE this note so that the co-signing nurse is able to place an eSignature**    Nurse 2 eSignature: Electronically signed by Tammy Li RN on 6/28/20 at 6:50 PM EDT

## 2020-06-28 NOTE — PROGRESS NOTES
Lat[facility] consult system check @ [facility] ER 6/28/20 for AICD discharge. Pt currently admitted. Hx nsvt (coreg). Last interrogation 5/7/20. arrhythmia report shows NSVT, longest 8 sec. Jun 28, 2020 11:29 VF ATPx1, 41J 00:00:48    See PACEART report under Cardiology tab.

## 2020-06-28 NOTE — PROGRESS NOTES
RESPIRATORY THERAPY ASSESSMENT FOR PRN PATIENTS    Name:  [address] [place] [address] Record Number:  [id number]  Age: 69 y.o. Gender: male  : 1950  Today's Date:  2020  Room:  [facility]0217-01  Patient Admission Diagnosis      Allergies  No Known Allergies    Vital Signs   /80   Pulse 69   Temp 98.3 °F (36.8 °C) (Oral)   Resp 20   Ht 5' 10\" (1.778 m)   Wt 196 lb 6.4 oz (89.1 kg)   SpO2 95%   BMI 28.18 kg/m²     Plan       Goals: Patient does not have any Respiratory Care goals at this time    Comments: Patient assessed and chart reviewed. Plan of Care: DuoNeb HHN Q6H PRN. Re-evaluate as needed.     Patient/caregiver was educated on the proper method of use of Respiratory Therapy device:  Yes      Level of patient/caregiver understanding able to:   [] Verbalize understanding   [] Demonstrate understanding       [] Teach back        [] Needs reinforcement       []  No available caregiver               []  Other:     Response to education:  Excellent     Electronically signed by Yonatan Lynn RCP, RRT, RRT-ACCS on 2020 at 4:50 PM

## 2020-06-28 NOTE — ED NOTES
Interrogated pacemaker using [employer] device. L[employer] [facility] and they stated they will fax report in 20 mins.      Michelle Lovell  06/28/20 0679
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
## 2020-06-28 NOTE — ED TRIAGE NOTES
Patient identified as a positive fall risk on the ED triage fall screening. Patient placed in fall precautions which includes:  yellow fall risk bracelet on wrist, yellow socks on feet, \"Be Safe\" sign placed on patient's door, and bed alarm placed under patient/alarm turned on. Patient instructed on importance of not getting out of bed or ambulating without assistance for safety.

## 2020-06-29 ENCOUNTER — NURSE ONLY (OUTPATIENT)
Dept: CARDIOLOGY CLINIC | Age: 70
End: 2020-06-29

## 2020-06-29 ENCOUNTER — APPOINTMENT (OUTPATIENT)
Dept: NUCLEAR MEDICINE | Age: 70
DRG: 309 | End: 2020-06-29
Payer: MEDICARE

## 2020-06-29 ENCOUNTER — TELEPHONE (OUTPATIENT)
Dept: CARDIOLOGY CLINIC | Age: 70
End: 2020-06-29

## 2020-06-29 VITALS
OXYGEN SATURATION: 99 % | HEART RATE: 66 BPM | RESPIRATION RATE: 18 BRPM | BODY MASS INDEX: 27.33 KG/M2 | DIASTOLIC BLOOD PRESSURE: 65 MMHG | TEMPERATURE: 97.5 F | HEIGHT: 70 IN | SYSTOLIC BLOOD PRESSURE: 99 MMHG | WEIGHT: 190.9 LBS

## 2020-06-29 LAB
ANION GAP SERPL CALCULATED.3IONS-SCNC: 11 MMOL/L (ref 3–16)
BUN BLDV-MCNC: 19 MG/DL (ref 7–20)
CALCIUM SERPL-MCNC: 9.5 MG/DL (ref 8.3–10.6)
CHLORIDE BLD-SCNC: 104 MMOL/L (ref 99–110)
CHOLESTEROL, TOTAL: 136 MG/DL (ref 0–199)
CO2: 21 MMOL/L (ref 21–32)
CREAT SERPL-MCNC: 1 MG/DL (ref 0.8–1.3)
GFR AFRICAN AMERICAN: >60
GFR NON-AFRICAN AMERICAN: >60
GLUCOSE BLD-MCNC: 155 MG/DL (ref 70–99)
HCT VFR BLD CALC: 38.6 % (ref 40.5–52.5)
HDLC SERPL-MCNC: 35 MG/DL (ref 40–60)
HEMOGLOBIN: 13.1 G/DL (ref 13.5–17.5)
LDL CHOLESTEROL CALCULATED: 71 MG/DL
LV EF: 18 %
LV EF: 24 %
LVEF MODALITY: NORMAL
LVEF MODALITY: NORMAL
MAGNESIUM: 2 MG/DL (ref 1.8–2.4)
MCH RBC QN AUTO: 33.2 PG (ref 26–34)
MCHC RBC AUTO-ENTMCNC: 34 G/DL (ref 31–36)
MCV RBC AUTO: 97.7 FL (ref 80–100)
PDW BLD-RTO: 14.4 % (ref 12.4–15.4)
PLATELET # BLD: 200 K/UL (ref 135–450)
PMV BLD AUTO: 8.4 FL (ref 5–10.5)
POTASSIUM REFLEX MAGNESIUM: 4.3 MMOL/L (ref 3.5–5.1)
RBC # BLD: 3.95 M/UL (ref 4.2–5.9)
SODIUM BLD-SCNC: 136 MMOL/L (ref 136–145)
TRIGL SERPL-MCNC: 151 MG/DL (ref 0–150)
VLDLC SERPL CALC-MCNC: 30 MG/DL
WBC # BLD: 6.5 K/UL (ref 4–11)

## 2020-06-29 PROCEDURE — 83735 ASSAY OF MAGNESIUM: CPT

## 2020-06-29 PROCEDURE — 99223 1ST HOSP IP/OBS HIGH 75: CPT | Performed by: INTERNAL MEDICINE

## 2020-06-29 PROCEDURE — 78452 HT MUSCLE IMAGE SPECT MULT: CPT

## 2020-06-29 PROCEDURE — 80048 BASIC METABOLIC PNL TOTAL CA: CPT

## 2020-06-29 PROCEDURE — 6360000002 HC RX W HCPCS: Performed by: INTERNAL MEDICINE

## 2020-06-29 PROCEDURE — 3430000000 HC RX DIAGNOSTIC RADIOPHARMACEUTICAL: Performed by: INTERNAL MEDICINE

## 2020-06-29 PROCEDURE — A9502 TC99M TETROFOSMIN: HCPCS | Performed by: INTERNAL MEDICINE

## 2020-06-29 PROCEDURE — 93017 CV STRESS TEST TRACING ONLY: CPT

## 2020-06-29 PROCEDURE — 6360000004 HC RX CONTRAST MEDICATION: Performed by: INTERNAL MEDICINE

## 2020-06-29 PROCEDURE — C8929 TTE W OR WO FOL WCON,DOPPLER: HCPCS

## 2020-06-29 PROCEDURE — 80061 LIPID PANEL: CPT

## 2020-06-29 PROCEDURE — 2580000003 HC RX 258: Performed by: INTERNAL MEDICINE

## 2020-06-29 PROCEDURE — 36415 COLL VENOUS BLD VENIPUNCTURE: CPT

## 2020-06-29 PROCEDURE — 85027 COMPLETE CBC AUTOMATED: CPT

## 2020-06-29 PROCEDURE — 6370000000 HC RX 637 (ALT 250 FOR IP): Performed by: INTERNAL MEDICINE

## 2020-06-29 PROCEDURE — 99221 1ST HOSP IP/OBS SF/LOW 40: CPT | Performed by: INTERNAL MEDICINE

## 2020-06-29 RX ORDER — LISINOPRIL 5 MG/1
5 TABLET ORAL DAILY
Qty: 90 TABLET | Refills: 3 | Status: SHIPPED | OUTPATIENT
Start: 2020-06-29 | End: 2021-05-20

## 2020-06-29 RX ORDER — GEMFIBROZIL 600 MG/1
600 TABLET, FILM COATED ORAL
Qty: 180 TABLET | Refills: 3 | Status: SHIPPED | OUTPATIENT
Start: 2020-06-29 | End: 2021-09-21 | Stop reason: SDUPTHER

## 2020-06-29 RX ORDER — AMINOPHYLLINE DIHYDRATE 25 MG/ML
75 INJECTION, SOLUTION INTRAVENOUS ONCE
Status: COMPLETED | OUTPATIENT
Start: 2020-06-29 | End: 2020-06-29

## 2020-06-29 RX ORDER — CARVEDILOL 3.12 MG/1
3.12 TABLET ORAL 2 TIMES DAILY WITH MEALS
Qty: 180 TABLET | Refills: 3 | Status: SHIPPED | OUTPATIENT
Start: 2020-06-29 | End: 2021-05-07

## 2020-06-29 RX ORDER — CLOPIDOGREL BISULFATE 75 MG/1
75 TABLET ORAL DAILY
Qty: 90 TABLET | Refills: 3 | Status: SHIPPED | OUTPATIENT
Start: 2020-06-29 | End: 2021-05-20

## 2020-06-29 RX ORDER — ROSUVASTATIN CALCIUM 40 MG/1
40 TABLET, COATED ORAL EVERY EVENING
Qty: 90 TABLET | Refills: 3 | Status: SHIPPED | OUTPATIENT
Start: 2020-06-29 | End: 2021-05-20

## 2020-06-29 RX ADMIN — CARVEDILOL 3.12 MG: 3.12 TABLET, FILM COATED ORAL at 08:34

## 2020-06-29 RX ADMIN — Medication 10 ML: at 08:36

## 2020-06-29 RX ADMIN — PERFLUTREN 1.3 ML: 6.52 INJECTION, SUSPENSION INTRAVENOUS at 13:17

## 2020-06-29 RX ADMIN — PANTOPRAZOLE SODIUM 40 MG: 40 TABLET, DELAYED RELEASE ORAL at 08:34

## 2020-06-29 RX ADMIN — AMINOPHYLLINE DIHYDRATE 75 MG: 25 INJECTION, SOLUTION INTRAVENOUS at 14:13

## 2020-06-29 RX ADMIN — ASPIRIN 81 MG: 81 TABLET ORAL at 08:34

## 2020-06-29 RX ADMIN — ENOXAPARIN SODIUM 40 MG: 40 INJECTION SUBCUTANEOUS at 08:35

## 2020-06-29 RX ADMIN — REGADENOSON 0.4 MG: 0.08 INJECTION, SOLUTION INTRAVENOUS at 14:21

## 2020-06-29 RX ADMIN — CARVEDILOL 3.12 MG: 3.12 TABLET, FILM COATED ORAL at 16:51

## 2020-06-29 RX ADMIN — ROSUVASTATIN CALCIUM 40 MG: 10 TABLET, FILM COATED ORAL at 16:51

## 2020-06-29 RX ADMIN — GEMFIBROZIL 600 MG: 600 TABLET ORAL at 08:36

## 2020-06-29 RX ADMIN — TAMSULOSIN HYDROCHLORIDE 0.4 MG: 0.4 CAPSULE ORAL at 08:34

## 2020-06-29 RX ADMIN — LISINOPRIL 5 MG: 5 TABLET ORAL at 08:34

## 2020-06-29 RX ADMIN — GEMFIBROZIL 600 MG: 600 TABLET ORAL at 16:52

## 2020-06-29 RX ADMIN — FINASTERIDE 5 MG: 5 TABLET, FILM COATED ORAL at 08:34

## 2020-06-29 RX ADMIN — CLOPIDOGREL BISULFATE 75 MG: 75 TABLET ORAL at 08:34

## 2020-06-29 RX ADMIN — TETROFOSMIN 11 MILLICURIE: 1.38 INJECTION, POWDER, LYOPHILIZED, FOR SOLUTION INTRAVENOUS at 11:50

## 2020-06-29 RX ADMIN — TETROFOSMIN 33 MILLICURIE: 1.38 INJECTION, POWDER, LYOPHILIZED, FOR SOLUTION INTRAVENOUS at 14:21

## 2020-06-29 RX ADMIN — FOLIC ACID 1 MG: 1 TABLET ORAL at 08:34

## 2020-06-29 ASSESSMENT — PAIN SCALES - GENERAL
PAINLEVEL_OUTOF10: 0
PAINLEVEL_OUTOF10: 0

## 2020-06-29 NOTE — CONSULTS
[address] [address]                                  CONSULTATION    PATIENT NAME: Natalia Tavarez                      :        1950  MED REC NO:   [id number]                          ROOM:       0217  ACCOUNT NO:   [de-identified]                           ADMIT DATE: 2020  PROVIDER:     Rhonda Méndez MD    CONSULT DATE:  2020    CARDIAC ELECTROPHYSIOLOGY CONSULTATION    REASON FOR CONSULTATION:  Ventricular fibrillation. HISTORY OF PRESENT ILLNESS:  The patient is a 76-year-old man with  history of chronic ischemic heart disease and orthostatic hypotension  who is admitted following ICD shock. He had undergone implantation of  dual-chamber ICD in  for ischemic cardiomyopathy. He had not  received high-energy therapy at any time throughout the duration of the  device. On 2020 at 11:29 a.m., he was seated in [facility] at rest  and received ICD shock without any premonitory symptoms. He reported  some confusion for the next several minutes prior to realizing what it  happened. Interrogation of the device demonstrates rapid ventricular  tachycardia which deteriorated into ventricular fibrillation. The  device appropriately recognized and treated the VF with a single-high  energy shock. This restored sinus rhythm. Review of this device  demonstrates a few episodes of nonsustained ventricular tachycardia and  brief PAT over the past several months. His device function has been  normal.  His device has 3 years of longevity remaining on this battery. The patient does not describe palpitations. He has not had much  difference in his level of exertional dyspnea. Interestingly, a 12-lead  ECG from 2020 demonstrates sinus rhythm with left bundle-branch  block. His most recent previous ECG from 2019 demonstrates a  normal QRS duration.   There were no acid-base electrolyte abnormalities  at the time of the ICD shock. PAST MEDICAL HISTORY:  1. Ischemic cardiomyopathy. 2.  Orthostatic hypotension. 3.  Compensated congestive heart failure. 4.  Status post myocardial infarction. 5.  Status post ICD implantation. MEDICATIONS:  At the time of admission include Coreg 3.125 mg p.o.  b.i.d., Plavix 75 mg p.o. daily, aspirin 81 mg p.o. daily, Proscar 5 mg  p.o. daily, folic acid 1 mg p.o. daily, gemfibrozil 600 mg p.o. b.i.d.,  lisinopril 5 mg p.o. daily, methotrexate 2.5 mg three times a week,  Prilosec 20 mg p.o. daily, Crestor 40 mg p.o. daily, Flomax 0.4 mg p.o.  daily. ALLERGIES:  None. SOCIAL HISTORY:  The patient is a current everyday cigarette smoker. He  does not consume alcohol at this time. FAMILY HISTORY:  Remarkable for diabetes in the mother. There is  history of cancer in the father. There is no known family history of  cardiac rhythm disturbance, syncope, or sudden cardiac death. REVIEW OF SYSTEMS:  Demonstrates no recent change in appetite or change  in weight. The patient has not had recent fever or chills. He does not  describe chest pain. There is no change in his baseline dyspnea on  exertion. He has not had near syncope or syncope. His functional  status is well-preserved. All other components of the 14-system review  are negative. PHYSICAL EXAMINATION:  VITAL SIGNS:  Blood pressure is 101/60, heart rate is 74 beats per  minute and regular. GENERAL APPEARANCE:  The patient is awake, alert, oriented, and in no  discomfort. HEENT:  Exam demonstrates normocephalic, atraumatic head. There is no  scleral icterus. Pupils are round and reactive. NECK:  Supple without thyromegaly. LUNGS:  Demonstrates a few basilar crackles. There is no consolidation. CARDIOVASCULAR:  Exam reveals a regular rhythm. The apical impulse is  difficult to appreciate. S1 and S2 are distant. There is no audible  murmur or gallop.   The jugular venous pressure

## 2020-06-29 NOTE — PROGRESS NOTES
Hospitalist Progress Note      PCP: Timi Baptiste MD    Date of Admission: 6/28/2020    Chief Complaint: Defibrillator Firing. Subjective: No new c/o. Medications:  Reviewed    Infusion Medications   Scheduled Medications    aspirin  81 mg Oral Daily    carvedilol  3.125 mg Oral BID WC    clopidogrel  75 mg Oral Daily    finasteride  5 mg Oral Daily    folic acid  1 mg Oral Daily    gemfibrozil  600 mg Oral BID AC    lisinopril  5 mg Oral Daily    pantoprazole  40 mg Oral QAM AC    rosuvastatin  40 mg Oral QPM    tamsulosin  0.4 mg Oral Daily    sodium chloride flush  10 mL Intravenous 2 times per day    enoxaparin  40 mg Subcutaneous Daily     PRN Meds: regadenoson, ipratropium-albuterol, sodium chloride flush, acetaminophen **OR** acetaminophen, polyethylene glycol, promethazine **OR** ondansetron, nitroGLYCERIN, perflutren lipid microspheres      Intake/Output Summary (Last 24 hours) at 6/29/2020 0911  Last data filed at 6/29/2020 0841  Gross per 24 hour   Intake 720 ml   Output 1200 ml   Net -480 ml       Physical Exam Performed:    /60   Pulse 74   Temp 97.9 °F (36.6 °C) (Oral)   Resp 16   Ht 5' 10\" (1.778 m)   Wt 190 lb 14.4 oz (86.6 kg)   SpO2 95%   BMI 27.39 kg/m²     General appearance: No apparent distress, appears stated age and cooperative. HEENT: Pupils equal, round, and reactive to light. Conjunctivae/corneas clear. Neck: Supple, with full range of motion. No jugular venous distention. Trachea midline. Respiratory:  Normal respiratory effort. Clear to auscultation, bilaterally without Rales/Wheezes/Rhonchi. Cardiovascular: Regular rate and rhythm with normal S1/S2 without murmurs, rubs or gallops. Abdomen: Soft, non-tender, non-distended with normal bowel sounds. Musculoskeletal: No clubbing, cyanosis or edema bilaterally. Full range of motion without deformity. Skin: Skin color, texture, turgor normal.  No rashes or lesions.   Neurologic: Neurovascularly intact without any focal sensory/motor deficits. Cranial nerves: II-XII intact, grossly non-focal.  Psychiatric: Alert and oriented, thought content appropriate, normal insight  Capillary Refill: Brisk,< 3 seconds   Peripheral Pulses: +2 palpable, equal bilaterally       Labs:   Recent Labs     06/28/20  1347 06/29/20  0816   WBC 8.3 6.5   HGB 13.4* 13.1*   HCT 39.4* 38.6*    200     Recent Labs     06/28/20  1347 06/29/20  0816   * 136   K 4.1 4.3   CL 99 104   CO2 23 21   BUN 22* 19   CREATININE 1.1 1.0   CALCIUM 9.7 9.5     Recent Labs     06/28/20  1347   AST 20   ALT 14   BILITOT 0.5   ALKPHOS 110     No results for input(s): INR in the last 72 hours. Recent Labs     06/28/20  1347 06/28/20  1703 06/28/20  1917   TROPONINI <0.01 <0.01 <0.01       Urinalysis:      Lab Results   Component Value Date    NITRU Negative 12/18/2016    WBCUA 0-2 02/06/2020    BACTERIA Rare 02/06/2020    RBCUA 0-2 02/06/2020    BLOODU neg 12/31/2019    BLOODU Negative 12/18/2016    SPECGRAV 1.020 12/31/2019    SPECGRAV 1.020 12/18/2016    GLUCOSEU neg 12/31/2019    GLUCOSEU Negative 12/18/2016       Consults:    IP CONSULT TO CARDIOLOGY  IP CONSULT TO HOSPITALIST  IP CONSULT TO CARDIOLOGY      Assessment/Plan:    Active Hospital Problems    Diagnosis    CAD (coronary artery disease) [I25.10]     Priority: High    Hyperlipidemia [E78.5]     Priority: Low    Defibrillator discharge [Z45.02]       Firing of defibrillator/shock-most probably secondary to V. Fib-admit, telemetry, troponin, echocardiogram, continue home medications, cardiology was contacted from ER.   Electrolytes within acceptable range potassium is 4.1 and magnesium is 2  Near syncope-secondary to above     Hyperlipidemia-statin     CAD-continue beta-blocker statin and aspirin     Chronic systolic CHF-ejection fraction is 25% according to echocardiogram in 2018-no evidence of fluid overload continue lisinopril he is off diuretics     BPH-continue home medication        DVT Prophylaxis: LMWH  Diet: DIET CARDIAC; No Caffeine  Diet NPO, After Midnight  Code Status: Full Code      PT/OT Eval Status: not yet ordered. Dispo - OK to discharge Monday 29 June per Cardiology recs.      Cortez Chong MD

## 2020-06-29 NOTE — PLAN OF CARE
Problem: Falls - Risk of:  Goal: Will remain free from falls  Description: Will remain free from falls  Outcome: Ongoing     Problem: Tissue Perfusion - Cardiopulmonary, Altered:  Goal: Absence of angina  Description: Absence of angina  Outcome: Ongoing
Problem: OXYGENATION/RESPIRATORY FUNCTION  Goal: Patient will maintain patent airway  Outcome: Ongoing  Note:   Patient's EF (Ejection Fraction) is less than 40%    Heart Failure Medications:  Diuretics[de-identified] None    (One of the following REQUIRED for EF <40%/SYSTOLIC FAILURE but MAY be used in EF% >40%/DIASTOLIC FAILURE)        ACE[de-identified] None        ARB[de-identified] None         ARNI[de-identified] None    (Beta Blockers)  NON- Evidenced Based Beta Blocker (for EF% >40%/DIASTOLIC FAILURE): None    Evidenced Based Beta Blocker::(REQUIRED for EF% <40%/SYSTOLIC FAILURE) Carvedilol- Coreg  . .................................................................................................................................................. Patient's weights and intake/output reviewed: Yes    Patient's Last Weight: 190 lbs obtained by standing scale. Difference of 6 lbs less than last documented weight. Intake/Output Summary (Last 24 hours) at 6/29/2020 1639  Last data filed at 6/29/2020 1605  Gross per 24 hour   Intake 960 ml   Output 1550 ml   Net -590 ml       Comorbidities Reviewed Yes    Patient has a past medical history of AICD (automatic cardioverter/defibrillator) present, Arthritis, CAD (coronary artery disease), CHF (congestive heart failure) ([address] Chronic systolic CHF (congestive heart failure), NYHA class 3 ([address] GERD (gastroesophageal reflux disease), Hearing loss, Hyperlipidemia, MI (myocardial infarction) ([address] and Rash. >>For CHF and Comorbidity documentation on Education Time and Topics, please see Education Tab    Progressive Mobility Assessment:  What is this patient's Current Level of Mobility?: Ambulatory-Up Ad [place]  How was this patient Mobilized today?: Up in Room                 With Whom? Self                 Level of Difficulty/Assistance: Independent     Pt sitting in bed at this time on room air. Pt denies shortness of breath. Pt without lower extremity edema.      Patient and/or Family's stated Goal of Care this
room air. Pt denies shortness of breath. Pt without lower extremity edema.      Patient and/or Family's stated Goal of Care this Admission: better understand heart failure and disease management and be more comfortable prior to discharge        :

## 2020-06-29 NOTE — CONSULTS
Travis Courtney MD   carvedilol (COREG) 3.125 MG tablet Take 1 tablet by mouth 2 times daily (with meals) 6/22/20  Yes Osei Reynaga MD   gemfibrozil (LOPID) 600 MG tablet Take 1 tablet by mouth 2 times daily (before meals) 6/22/20  Yes Osei Reynaga MD   lisinopril (PRINIVIL;ZESTRIL) 5 MG tablet Take 1 tablet by mouth daily 6/22/20  Yes Osei Reynaga MD   rosuvastatin (CRESTOR) 40 MG tablet Take 1 tablet by mouth every evening 6/22/20  Yes Osei Reynaga MD   omeprazole (PRILOSEC) 20 MG delayed release capsule Take 1 capsule by mouth every morning (before breakfast) 4/29/20  Yes WILBERTO Castillo   nitroGLYCERIN (NITROSTAT) 0.4 MG SL tablet Place 1 tablet under the tongue every 5 minutes as needed for Chest pain 10/17/19  Yes Osei Reynaga MD   ipratropium-albuterol (DUONEB) 0.5-2.5 (3) MG/3ML SOLN nebulizer solution Inhale 3 mLs into the lungs every 6 hours as needed for Shortness of Breath 5/10/19  Yes Christy Padron MD   finasteride (PROSCAR) 5 MG tablet Take 5 mg by mouth daily Indications: Rx by Dr. Jordan Arguello MD   methotrexate (RHEUMATREX) 2.5 MG chemo tablet Take 1 tablet by mouth once a week RX directions are 4 tablets weekly. Patient takes one tablet Monday/Wednesday/Friday. 3/5/19  Yes Krystyna Stiles MD   folic acid (FOLVITE) 1 MG tablet Take 1 mg by mouth daily   Yes Historical Provider, MD   tamsulosin (FLOMAX) 0.4 MG capsule Take 0.4 mg by mouth daily   Yes Jyotsna Hauser MD   aspirin 81 MG tablet Take 81 mg by mouth daily.    Yes Historical Provider, MD       In-patient schedule medications:   aspirin  81 mg Oral Daily    carvedilol  3.125 mg Oral BID WC    clopidogrel  75 mg Oral Daily    finasteride  5 mg Oral Daily    folic acid  1 mg Oral Daily    gemfibrozil  600 mg Oral BID AC    lisinopril  5 mg Oral Daily    pantoprazole  40 mg Oral QAM AC    rosuvastatin  40 mg Oral QPM    tamsulosin  0.4 mg Oral Daily    sodium chloride flush 10 mL Intravenous 2 times per day    enoxaparin  40 mg Subcutaneous Daily         Infusion Medications: Allergies:  Patient has no known allergies. Review of Systems:   All 14 point review of symptoms completed. Pertinent positives identified in the HPI, all other review of symptoms findings as below.      Review of Systems - History obtained from the patient  General ROS: negative for - chills, fever or night sweats  Psychological ROS: negative for - disorientation or hallucinations  Ophthalmic ROS: negative for - dry eyes, eye pain or loss of vision  ENT ROS: negative for - nasal discharge or sore throat  Allergy and Immunology ROS: negative for - hives or itchy/watery eyes  Hematological and Lymphatic ROS: negative for - jaundice or night sweats  Endocrine ROS: negative for - mood swings or temperature intolerance  Breast ROS: deferred  Respiratory ROS: negative for - hemoptysis or stridor  Gastrointestinal ROS: no abdominal pain, change in bowel habits, or black or bloody stools  Genito-Urinary ROS: no dysuria, trouble voiding, or hematuria  Musculoskeletal ROS: negative for - gait disturbance, joint pain or joint stiffness  Neurological ROS: negative for - seizures or speech problems  Dermatological ROS: negative for - rash or skin lesion changes      Physical Examination:    [unfilled]  BP 95/61   Pulse 71   Temp 97.7 °F (36.5 °C) (Oral)   Resp 16   Ht 5' 10\" (1.778 m)   Wt 190 lb 14.4 oz (86.6 kg)   SpO2 96%   BMI 27.39 kg/m²    Weight: 190 lb 14.4 oz (86.6 kg)     Wt Readings from Last 3 Encounters:   06/29/20 190 lb 14.4 oz (86.6 kg)   03/02/20 199 lb 12.8 oz (90.6 kg)   02/20/20 198 lb 6.6 oz (90 kg)       Intake/Output Summary (Last 24 hours) at 6/29/2020 0800  Last data filed at 6/29/2020 7365  Gross per 24 hour   Intake 480 ml   Output 850 ml   Net -370 ml       General Appearance:  Alert, cooperative, no distress, appears stated age   Head:  Normocephalic, without obvious abnormality, atraumatic   Eyes:  PERRL, conjunctiva/corneas clear       Nose: Nares normal, no drainage or sinus tenderness   Throat: Lips, mucosa, and tongue normal   Neck: Supple, symmetrical, trachea midline, no adenopathy, thyroid: not enlarged, symmetric, no tenderness/mass/nodules, no carotid bruit or JVD       Lungs:   Clear to auscultation bilaterally, respirations unlabored   Chest Wall:  No tenderness or deformity   Heart:  Regular rhythm and normal rate; S1, S2 are normal; no murmur noted; no rub or gallop   Abdomen:   Soft, non-tender, bowel sounds active all four quadrants,  no masses, no organomegaly           Extremities: Extremities normal, atraumatic, no cyanosis or edema   Pulses: 2+ and symmetric   Skin: Skin color, texture, turgor normal, no rashes or lesions   Pysch: Normal mood and affect   Neurologic: Normal gross motor and sensory exam.         Labs  Recent Labs     06/28/20  1347   WBC 8.3   HGB 13.4*   HCT 39.4*   MCV 99.1        Recent Labs     06/28/20  1347   CREATININE 1.1   BUN 22*   *   K 4.1   CL 99   CO2 23     No results for input(s): INR, PROTIME in the last 72 hours. Recent Labs     06/28/20  1347 06/28/20  1703 06/28/20  1917   TROPONINI <0.01 <0.01 <0.01     Invalid input(s): PRO-BNP  No results for input(s): CHOL, HDL in the last 72 hours. Invalid input(s): LDL, TG      Imaging:  I have reviewed the below testing personally and my interpretation is below. EKG:  CXR:      Assessment:  69 y.o. patient with:  Problem List Items Addressed This Visit     None      Visit Diagnoses     AICD discharge    -  Primary      Principal Problem:    Defibrillator discharge  Active Problems:    CAD (coronary artery disease)    Hyperlipidemia  Resolved Problems:    * No resolved hospital problems. *      Plan:  1. With the patient's underlying history of ischemic cardiomyopathy and reduced left ventricular function, he is at high risk for malignant arrhythmias.   2.  We will ask for our electrophysiology team to see the patient for further recommendations regarding his device discharge and recommendations. 3.  Maximize anti-ischemic and heart failure regimen while in-house. All questions and concerns were addressed to the patient/family. Alternatives to my treatment were discussed. The note was completed using EMR. Every effort was made to ensure accuracy; however, inadvertent computerized transcription errors may be present.     Shraddha Chavez MD, [facility], [address] [facility] office  [phone] [facility]  6/29/2020  8:00 AM

## 2020-06-29 NOTE — PROGRESS NOTES
Pt d/c'd home with wife in personal car. Removed perihperal IV and stopped bleeding. Catheter intact. Pt tolerated well. No redness noted at site. Notified [facility] and removed tele box. Reviewed d/c instructions, home meds, and  f/u information utilizing teach-back method. Meds at [address] [place] to patient to  and [address]. Patient verbalized understanding.

## 2020-06-30 ENCOUNTER — CARE COORDINATION (OUTPATIENT)
Dept: CASE MANAGEMENT | Age: 70
End: 2020-06-30

## 2020-06-30 PROCEDURE — 1111F DSCHRG MED/CURRENT MED MERGE: CPT | Performed by: INTERNAL MEDICINE

## 2020-06-30 NOTE — DISCHARGE SUMMARY
Hospital Medicine Discharge Summary    Patient ID: Love Panda      Patient's PCP: Ruddy Marsh MD    Admit Date: 6/28/2020     Discharge Date: 6/29/2020      Admitting Physician: Елена Billings MD     Discharge Physician: Rich Briones MD     Discharge Diagnoses: Active Hospital Problems    Diagnosis    CAD (coronary artery disease) [I25.10]     Priority: High    Hyperlipidemia [E78.5]     Priority: Low    Defibrillator discharge [Z45.02]       The patient was seen and examined on day of discharge and this discharge summary is in conjunction with any daily progress note from day of discharge. Hospital Course:        Firing of defibrillator/shock-most probably secondary to V. Fib-admit, telemetry, troponin, echocardiogram, continue home medications, cardiology was contacted from ER.  Electrolytes within acceptable range potassium is 4.1 and magnesium is 2    Near syncope - secondary to above     HTN/CAD - w/ known CAD but no evidence of active signs/sxs of ischemia/failure. Currently controlled on home meds w/ vitals reviewed and documented as above. HyperLipidemia - controlled on home Statin. Continue, w/ f/u and med adjustment w/ PCP     Chronic systolic CHF-ejection fraction is 25% according to echocardiogram in 2018-no evidence of fluid overload continue lisinopril he is off diuretics     BPH - w/out acute obstructive Uropathy, controlled on home medications as ordered - continued. Labs:  For convenience and continuity at follow-up the following most recent labs are provided:      CBC:    Lab Results   Component Value Date    WBC 6.5 06/29/2020    HGB 13.1 06/29/2020    HCT 38.6 06/29/2020     06/29/2020       Renal:    Lab Results   Component Value Date     06/29/2020    K 4.3 06/29/2020     06/29/2020    CO2 21 06/29/2020    BUN 19 06/29/2020    CREATININE 1.0 06/29/2020    CALCIUM 9.5 06/29/2020    PHOS 3.4 06/11/2020         Significant Diagnostic Studies    Radiology:   NM Cardiac Stress Test Nuclear Imaging   Final Result             Consults:     IP CONSULT TO CARDIOLOGY  IP CONSULT TO HOSPITALIST  IP CONSULT TO CARDIOLOGY    Disposition: Home    Condition at Discharge: Stable    Discharge Instructions/Follow-up:  w/ PCP 1-2 weeks and subspecialists as arranged. Code Status:  Full Code    Activity: activity as tolerated    Diet: regular diet      Discharge Medications:     Discharge Medication List as of 6/29/2020  5:08 PM           Details   carvedilol (COREG) 3.125 MG tablet Take 1 tablet by mouth 2 times daily (with meals), Disp-180 tablet, R-3Normal      clopidogrel (PLAVIX) 75 MG tablet Take 1 tablet by mouth daily, Disp-90 tablet, R-3Normal      gemfibrozil (LOPID) 600 MG tablet Take 1 tablet by mouth 2 times daily (before meals), Disp-180 tablet, R-3Normal      lisinopril (PRINIVIL;ZESTRIL) 5 MG tablet Take 1 tablet by mouth daily, Disp-90 tablet, R-3Normal      rosuvastatin (CRESTOR) 40 MG tablet Take 1 tablet by mouth every evening, Disp-90 tablet, R-3Normal              Details   omeprazole (PRILOSEC) 20 MG delayed release capsule Take 1 capsule by mouth every morning (before breakfast), Disp-90 capsule, R-1Normal      nitroGLYCERIN (NITROSTAT) 0.4 MG SL tablet Place 1 tablet under the tongue every 5 minutes as needed for Chest pain, Disp-25 tablet, R-1Normal      ipratropium-albuterol (DUONEB) 0.5-2.5 (3) MG/3ML SOLN nebulizer solution Inhale 3 mLs into the lungs every 6 hours as needed for Shortness of Breath, Disp-120 vial, R-5Normal      finasteride (PROSCAR) 5 MG tablet Take 5 mg by mouth daily Indications: Rx by Dr. Chelsea Leggett      methotrexate (RHEUMATREX) 2.5 MG chemo tablet Take 1 tablet by mouth once a week RX directions are 4 tablets weekly.   Patient takes one tablet Monday/Wednesday/Friday., Disp-1 tablet, U-3WM PRINT      folic acid (FOLVITE) 1 MG tablet Take 1 mg by mouth dailyHistorical Med      tamsulosin (FLOMAX) 0.4 MG capsule Take 0.4 mg by mouth dailyHistorical Med      aspirin 81 MG tablet Take 81 mg by mouth daily. Time Spent on discharge is more than 30 minutes in the examination, evaluation, counseling and review of medications and discharge plan. Signed:    Laverne Martínez MD   6/30/2020      Thank you Anthony Estrada MD for the opportunity to be involved in this patient's care. If you have any questions or concerns please feel free to contact me at [phone].

## 2020-07-08 ENCOUNTER — CARE COORDINATION (OUTPATIENT)
Dept: CASE MANAGEMENT | Age: 70
End: 2020-07-08

## 2020-07-08 ENCOUNTER — TELEPHONE (OUTPATIENT)
Dept: CARDIOLOGY CLINIC | Age: 70
End: 2020-07-08

## 2020-07-08 NOTE — CARE COORDINATION
[facility] 45 Transitions Follow Up Call    2020    Patient: Indio Sousa  Patient : 1950   MRN: [id number]  Reason for Admission: AICD discharge  Discharge Date: 20 RARS: Readmission Risk Score: 13         Spoke with: Indio Sousa (patient)    Has valid questions about his AICD, battery life and what if it happens again. Usually sees Dr Erica Georges at [place] but the below August appt at [facility] was soonest. Not sure if he is on wait list for cancellation or if it's an option. CTN will follow up with Erica Georges office to see. He prefers [place] office. Denies cp, palpitations, additional AICD discharge. Staying out of heat. Attending follow up appointments. Sees PCP in 2 days. Plans fasting lipid panel prior. Denies needs today. Outreach to Dr David Becker office to request sooner appointment. Spoke with Wilder Aguilar RN who reviewed cardiology notes from admission with CTN. Recommends sooner appt with Jagdish or LEONARD and she will send Epic message for someone to call Mr Jefry Bellamy to answer questions or move appointment up. Called Mr Jefry Bellamy back. Updated him to plan. Encouraged him to write down questions/concerns for provider when they call. He was thankful for this and said he felt better. Care Transitions Subsequent and Final Call    Schedule Follow Up Appointment with PCP:  Completed  Subsequent and Final Calls  Do you have any ongoing symptoms?:  No  Have your medications changed?:  No  Do you have any questions related to your medications?:  No  Do you currently have any active services?:  No  Do you have any needs or concerns that I can assist you with?:  No  Identified Barriers:  None  Care Transitions Interventions  No Identified Needs  Other Interventions:             Follow Up  Future Appointments   Date Time Provider Danni Dennis   7/10/2020 11:45 AM MD JAMSHID Chan   2020  7:45 AM CORNELL, Joshua Hassan REMOTE TRANSMISSION Perry Rubio [facility] 8/20/2020  1:45 PM Kriss Cody MD Izola Fuel [facility]   10/7/2020  3:00 PM SCHEDULE, [facility]   10/7/2020  3:00 PM Demi Zavala MD Izola Fuel [facility]       Luisa Bright, DORENE

## 2020-07-10 ENCOUNTER — OFFICE VISIT (OUTPATIENT)
Dept: FAMILY MEDICINE CLINIC | Age: 70
End: 2020-07-10
Payer: MEDICARE

## 2020-07-10 VITALS
TEMPERATURE: 97.7 F | OXYGEN SATURATION: 98 % | BODY MASS INDEX: 27.77 KG/M2 | HEIGHT: 70 IN | WEIGHT: 194 LBS | SYSTOLIC BLOOD PRESSURE: 106 MMHG | DIASTOLIC BLOOD PRESSURE: 68 MMHG | HEART RATE: 73 BPM

## 2020-07-10 LAB
CHOLESTEROL, TOTAL: 146 MG/DL (ref 0–199)
HDLC SERPL-MCNC: 43 MG/DL (ref 40–60)
LDL CHOLESTEROL CALCULATED: 84 MG/DL
TRIGL SERPL-MCNC: 96 MG/DL (ref 0–150)
VLDLC SERPL CALC-MCNC: 19 MG/DL

## 2020-07-10 PROCEDURE — 1111F DSCHRG MED/CURRENT MED MERGE: CPT | Performed by: INTERNAL MEDICINE

## 2020-07-10 PROCEDURE — 99495 TRANSJ CARE MGMT MOD F2F 14D: CPT | Performed by: INTERNAL MEDICINE

## 2020-07-14 ENCOUNTER — CARE COORDINATION (OUTPATIENT)
Dept: CASE MANAGEMENT | Age: 70
End: 2020-07-14

## 2020-07-14 NOTE — CARE COORDINATION
[facility]    2020    Patient: Amy Cordova  Patient : 1950   MRN: [id number]  Reason for Admission: AICD discharge  Discharge Date: 20 RARS: Readmission Risk Score: 15       Unable to reach patient by phone. Message left stating purpose of call with contact information requesting return call.       Follow Up  Future Appointments   Date Time Provider Danni Dennis   2020 11:15 AM SCHEDULE, [facility]   2020 11:15 AM MD Ingrid Goode [facility]   2020  7:45 AM SCHEDULE, Joana JERRY [facility]   2020  1:45 PM MD Ingrid Geiger [facility]   10/7/2020  3:00 PM SCHEDULE, [facility]   10/7/2020  3:00 PM MD Ingrid Goode [facility]       Krupa Munoz RN

## 2020-07-14 NOTE — PROGRESS NOTES
[facility]   Cardiac Follow Up      Primary Care:  Carlo Chong MD  Chief complaint: VF Follow up; no complaints      HPI:  Leeanne He is a 69 y.o. male who presents for evaluation of ventricular fibrillation. He was admitted 6/28/2020 after receiving ICD shock for VFib. This restored sinus rhythm. He had his dual-chamber pacemaker implanted in 4/2011 for ischemic cardiomyopathy by Dr. Sunshine Vergara. Interrogation of the device demonstrated a few episodes of NSVT and brief PAT over the past several months. Echo on 6/28/2020 showed an EF of 15-20% with severe global dysfunction with more prominent anterior hypokinesis. Stress test on 6/28/2020 showed an EF of 24%, anteroseptal/apical akinesis, inferior wall hypokinesis, fixed defect in anteroseptal.apical, inferior walls consistent with large scar, no ischemia. Today he states that he feels good. He states that he has been feeling fine since he has been out of the hospital.  He remains very active. He states that a couple of flights of stairs makes him short of breath. He can do one flight well. He tolerates doing daily activities. He is not limited. Patient denies chest pain, palpitations, dizziness or syncope. Device check today shows normal function.  <1%. Battery Life 3 years. 1 VF episode 6/28/2020 which was appropriately recognized by the device and terminated with high energy therapy. Past Medical History:   has a past medical history of AICD (automatic cardioverter/defibrillator) present, Arthritis, CAD (coronary artery disease), CHF (congestive heart failure) ([address] Chronic systolic CHF (congestive heart failure), NYHA class 3 ([address] GERD (gastroesophageal reflux disease), Hearing loss, Hyperlipidemia, MI (myocardial infarction) ([address] and Rash.     Surgical History:   has a past surgical history that includes Coronary angioplasty with stent; Cardiac defibrillator placement; knee surgery; Cardiac defibrillator placement; Colonoscopy; Cataract removal with implant (Right, 02/01/2017); Cataract removal with implant (Left, 03/01/2017); and eye surgery. Social History:   reports that he has been smoking cigarettes. He started smoking about 58 years ago. He has a 27.00 pack-year smoking history. He has never used smokeless tobacco. He reports that he does not drink alcohol or use drugs. Family History:  family history includes Cancer in his father; Diabetes in his mother. Home Medications:  Outpatient Encounter Medications as of 7/17/2020   Medication Sig Dispense Refill    carvedilol (COREG) 3.125 MG tablet Take 1 tablet by mouth 2 times daily (with meals) 180 tablet 3    clopidogrel (PLAVIX) 75 MG tablet Take 1 tablet by mouth daily 90 tablet 3    gemfibrozil (LOPID) 600 MG tablet Take 1 tablet by mouth 2 times daily (before meals) 180 tablet 3    lisinopril (PRINIVIL;ZESTRIL) 5 MG tablet Take 1 tablet by mouth daily 90 tablet 3    rosuvastatin (CRESTOR) 40 MG tablet Take 1 tablet by mouth every evening 90 tablet 3    omeprazole (PRILOSEC) 20 MG delayed release capsule Take 1 capsule by mouth every morning (before breakfast) 90 capsule 1    nitroGLYCERIN (NITROSTAT) 0.4 MG SL tablet Place 1 tablet under the tongue every 5 minutes as needed for Chest pain 25 tablet 1    ipratropium-albuterol (DUONEB) 0.5-2.5 (3) MG/3ML SOLN nebulizer solution Inhale 3 mLs into the lungs every 6 hours as needed for Shortness of Breath 120 vial 5    finasteride (PROSCAR) 5 MG tablet Take 5 mg by mouth daily Indications: Rx by Dr. Lorri Lopes methotrexate (RHEUMATREX) 2.5 MG chemo tablet Take 1 tablet by mouth once a week RX directions are 4 tablets weekly. Patient takes one tablet Monday/Wednesday/Friday. 1 tablet 0    folic acid (FOLVITE) 1 MG tablet Take 1 mg by mouth daily      tamsulosin (FLOMAX) 0.4 MG capsule Take 0.4 mg by mouth daily      aspirin 81 MG tablet Take 81 mg by mouth daily.        No

## 2020-07-17 ENCOUNTER — NURSE ONLY (OUTPATIENT)
Dept: CARDIOLOGY CLINIC | Age: 70
End: 2020-07-17
Payer: MEDICARE

## 2020-07-17 ENCOUNTER — OFFICE VISIT (OUTPATIENT)
Dept: CARDIOLOGY CLINIC | Age: 70
End: 2020-07-17
Payer: MEDICARE

## 2020-07-17 VITALS
HEART RATE: 73 BPM | HEIGHT: 70 IN | DIASTOLIC BLOOD PRESSURE: 60 MMHG | BODY MASS INDEX: 27.2 KG/M2 | OXYGEN SATURATION: 98 % | SYSTOLIC BLOOD PRESSURE: 90 MMHG | WEIGHT: 190 LBS

## 2020-07-17 PROCEDURE — 93283 PRGRMG EVAL IMPLANTABLE DFB: CPT | Performed by: INTERNAL MEDICINE

## 2020-07-17 PROCEDURE — G8427 DOCREV CUR MEDS BY ELIG CLIN: HCPCS | Performed by: INTERNAL MEDICINE

## 2020-07-17 PROCEDURE — 99214 OFFICE O/P EST MOD 30 MIN: CPT | Performed by: INTERNAL MEDICINE

## 2020-07-17 PROCEDURE — 93000 ELECTROCARDIOGRAM COMPLETE: CPT | Performed by: INTERNAL MEDICINE

## 2020-07-17 PROCEDURE — 4004F PT TOBACCO SCREEN RCVD TLK: CPT | Performed by: INTERNAL MEDICINE

## 2020-07-17 PROCEDURE — 1111F DSCHRG MED/CURRENT MED MERGE: CPT | Performed by: INTERNAL MEDICINE

## 2020-07-17 PROCEDURE — G8417 CALC BMI ABV UP PARAM F/U: HCPCS | Performed by: INTERNAL MEDICINE

## 2020-07-17 PROCEDURE — 4040F PNEUMOC VAC/ADMIN/RCVD: CPT | Performed by: INTERNAL MEDICINE

## 2020-07-17 PROCEDURE — 1123F ACP DISCUSS/DSCN MKR DOCD: CPT | Performed by: INTERNAL MEDICINE

## 2020-07-17 PROCEDURE — 3017F COLORECTAL CA SCREEN DOC REV: CPT | Performed by: INTERNAL MEDICINE

## 2020-07-20 NOTE — PROGRESS NOTES
Interrogation was preformed by company representative. See interrogation for further details. See P[employer]art report under the Cardiology tab and/or the Media tab for scanned in report.

## 2020-07-21 ENCOUNTER — CARE COORDINATION (OUTPATIENT)
Dept: CASE MANAGEMENT | Age: 70
End: 2020-07-21

## 2020-07-21 NOTE — CARE COORDINATION
[facility] 45 Transitions Follow Up Call    2020    Patient: Julianna Schrader  Patient : 1950   MRN: <[id number]>  Reason for Admission:   Discharge Date: 20 RARS: Readmission Risk Score: 13         Spoke with: [facility] Transitions Subsequent and Final Call    Subsequent and Final Calls  Do you have any ongoing symptoms?:  No  Have your medications changed?:  No  Do you have any questions related to your medications?:  No  Do you currently have any active services?:  No  Do you have any needs or concerns that I can assist you with?:  No  Care Transitions Interventions  Other Interventions:          States he's doing well. Denies any discharges from defibrillator, CP, palpitations, SOB, fever, cough or other symptoms. States he saw Dr Lito Bills yesterday and everything checked out. Reports taking medications as prescribed and states he stays on top of it. Denies questions or concerns at this time.    Follow Up  Future Appointments   Date Time Provider Danni Dennis   2020  1:45 PM Osei Reynaga MD Hermila Blood [facility]   10/21/2020  7:20 AM SCHEDULE, Karla Tomlinson REMOTE TRANSMISSION Hermila Blood [facility]       Wolfgang Her

## 2020-07-27 ENCOUNTER — TELEPHONE (OUTPATIENT)
Dept: CARDIOLOGY CLINIC | Age: 70
End: 2020-07-27

## 2020-07-27 ASSESSMENT — ENCOUNTER SYMPTOMS
COUGH: 0
SHORTNESS OF BREATH: 0

## 2020-07-27 NOTE — PROGRESS NOTES
Post-Discharge Transitional Care Management Services or Hospital Follow Up      Myron Older   YOB: 1950    Date of Office Visit:  7/10/2020  Date of Hospital Admission: 6/28/20  Date of Hospital Discharge: 6/29/20  Readmission Risk Score(high >=14%.  Medium >=10%):Readmission Risk Score: 15      Care management risk score Rising risk (score 2-5) and Complex Care (Scores >=6): 4     Non face to face  following discharge, date last encounter closed (first attempt may have been earlier):  6/30    Call initiated 2 business days of discharge: yes     Patient Active Problem List   Diagnosis    Hyperlipidemia    CAD (coronary artery disease)    Chronic systolic congestive heart failure ([address]    AICD (automatic cardioverter/defibrillator) present    Other forms of angina pectoris ([address]    Automatic implantable cardioverter-defibrillator in situ    Arthritis of knee    Hypotension    Ischemic cardiomyopathy    Pulmonary nodule    Shortness of breath    Anemia    Tracheobronchitis    Simple chronic bronchitis ([address]    Acute MI anterior wall subsequent episode care ([address]    Chronic low back pain    Chronic renal failure syndrome, stage 3 (moderate) ([address]    Mitral valve disorder    Disorder of bursae and tendons in shoulder region    Other pulmonary embolism and infarction    Pain in joint, multiple sites    Paroxysmal ventricular tachycardia (HCC)    Polymyalgia rheumatica ([address]    Primary pulmonary hypertension ([address]    Secondary cardiomyopathy ([address]    Tobacco use disorder    Asymmetrical hearing loss of left ear    TMJ (temporomandibular joint syndrome)    Sensorineural hearing loss, bilateral    Defibrillator discharge       No Known Allergies    Medications listed as ordered at the time of discharge from [address] Medication Instructions [place]UC[place]:    Printed on:07/27/20 7961   Medication Information                      aspirin 81 MG tablet  Take 81 mg by mouth daily. carvedilol (COREG) 3.125 MG tablet  Take 1 tablet by mouth 2 times daily (with meals)             clopidogrel (PLAVIX) 75 MG tablet  Take 1 tablet by mouth daily             finasteride (PROSCAR) 5 MG tablet  Take 5 mg by mouth daily Indications: Rx by Dr. Padmini Millan              folic acid (FOLVITE) 1 MG tablet  Take 1 mg by mouth daily             gemfibrozil (LOPID) 600 MG tablet  Take 1 tablet by mouth 2 times daily (before meals)             ipratropium-albuterol (DUONEB) 0.5-2.5 (3) MG/3ML SOLN nebulizer solution  Inhale 3 mLs into the lungs every 6 hours as needed for Shortness of Breath             lisinopril (PRINIVIL;ZESTRIL) 5 MG tablet  Take 1 tablet by mouth daily             methotrexate (RHEUMATREX) 2.5 MG chemo tablet  Take 1 tablet by mouth once a week RX directions are 4 tablets weekly. Patient takes one tablet Monday/Wednesday/Friday. nitroGLYCERIN (NITROSTAT) 0.4 MG SL tablet  Place 1 tablet under the tongue every 5 minutes as needed for Chest pain             omeprazole (PRILOSEC) 20 MG delayed release capsule  Take 1 capsule by mouth every morning (before breakfast)             rosuvastatin (CRESTOR) 40 MG tablet  Take 1 tablet by mouth every evening             tamsulosin (FLOMAX) 0.4 MG capsule  Take 0.4 mg by mouth daily                   Medications marked \"taking\" at this time  No outpatient medications have been marked as taking for the 7/10/20 encounter (Office Visit) with Mariia Goyal MD.        Medications patient taking as of now reconciled against medications ordered at time of hospital discharge: Yes    Chief Complaint   Patient presents with   [facility] ED Follow-up     [facility] 6/28/2020 was admitted        HPI    Inpatient course: Discharge summary reviewed- see chart. Interval history/Current status: no chest pain, shocks or dyspnea. Some fatigue and BP is running a bit low.   Patient's medications, allergies, past medical, surgical, social and family histories were reviewed and updated as appropriate. Review of Systems   Constitutional: Positive for fatigue. Respiratory: Negative for cough and shortness of breath. Cardiovascular: Negative for chest pain and leg swelling. Neurological: Negative for dizziness and headaches. Vitals:    07/10/20 1152   BP: 106/68   Site: Left Upper Arm   Position: Sitting   Cuff Size: Small Adult   Pulse: 73   Temp: 97.7 °F (36.5 °C)   TempSrc: Temporal   SpO2: 98%   Weight: 194 lb (88 kg)   Height: 5' 10\" (1.778 m)     Body mass index is 27.84 kg/m². Wt Readings from Last 3 Encounters:   07/17/20 190 lb (86.2 kg)   07/10/20 194 lb (88 kg)   06/29/20 190 lb 14.4 oz (86.6 kg)     BP Readings from Last 3 Encounters:   07/17/20 90/60   07/10/20 106/68   06/29/20 99/65       Physical Exam  Vitals signs reviewed. Constitutional:       Appearance: He is well-developed. Eyes:      General: No scleral icterus. Conjunctiva/sclera: Conjunctivae normal.   Neck:      Thyroid: No thyromegaly. Vascular: No carotid bruit or JVD. Cardiovascular:      Rate and Rhythm: Normal rate and regular rhythm. Heart sounds: Normal heart sounds. No murmur. Pulmonary:      Effort: Pulmonary effort is normal.      Breath sounds: Normal breath sounds. No wheezing or rales. Musculoskeletal:         General: No tenderness. Skin:     Findings: No rash. Neurological:      Mental Status: He is alert and oriented to person, place, and time. Assessment/Plan:  Meryl Perez was seen today for ed follow-up.     Diagnoses and all orders for this visit:    Paroxysmal ventricular tachycardia ([address]  -     WA DISCHARGE MEDS RECONCILED W/ CURRENT OUTPATIENT MED LIST    Mixed hyperlipidemia  -     Lipid Panel    Polymyalgia rheumatica (HCC)    Simple chronic bronchitis (HCC)    Chronic systolic congestive heart failure ([address]    Other forms of angina pectoris (HCC)        Stable, continue current, home weight and BP monitoring.  Follow up with cardiology    Medical Decision Making: moderate complexity

## 2020-07-29 ENCOUNTER — CARE COORDINATION (OUTPATIENT)
Dept: CASE MANAGEMENT | Age: 70
End: 2020-07-29

## 2020-07-29 NOTE — CARE COORDINATION
[facility] 45 Transitions Follow Up Call    2020    Patient: Leeanne He  Patient : 1950   MRN: [id number]  Reason for Admission: AICD discharge  Discharge Date: 20 RARS: Readmission Risk Score: 15         Spoke with: Leeanne He (patient)    Feels well. No further issues/concerns. CTN contact provided and encouraged to call prn. Reviewed upcoming appt with Renate Diop in August as below. He repeated info. He completed follow up visits as instructed and knows he can reach his physicians for any concerns as well. Denies needs today. Care transition outreach complete. Episode resolved at this time. Care Transitions Subsequent and Final Call    Schedule Follow Up Appointment with PCP:  Completed  Subsequent and Final Calls  Do you have any ongoing symptoms?:  No  Have your medications changed?:  No  Do you have any questions related to your medications?:  No  Do you currently have any active services?:  No  Do you have any needs or concerns that I can assist you with?:  No  Identified Barriers:  None  Care Transitions Interventions  No Identified Needs  Other Interventions:             Follow Up  Future Appointments   Date Time Provider Danni Dennis   2020  1:45 PM MD Jim Erazo   10/21/2020  7:20 AM SCHEDULE, Allyn Sanchez REMOTE TRANSMISSION Jim Blanca RN

## 2020-08-14 ENCOUNTER — TELEPHONE (OUTPATIENT)
Dept: FAMILY MEDICINE CLINIC | Age: 70
End: 2020-08-14

## 2020-08-14 ENCOUNTER — VIRTUAL VISIT (OUTPATIENT)
Dept: FAMILY MEDICINE CLINIC | Age: 70
End: 2020-08-14
Payer: MEDICARE

## 2020-08-14 PROCEDURE — 99212 OFFICE O/P EST SF 10 MIN: CPT | Performed by: NURSE PRACTITIONER

## 2020-08-14 RX ORDER — PREDNISONE 10 MG/1
TABLET ORAL
Qty: 32 TABLET | Refills: 0 | Status: SHIPPED | OUTPATIENT
Start: 2020-08-14 | End: 2020-08-28

## 2020-08-14 NOTE — PROGRESS NOTES
David Krishnan is a 69 y.o. male evaluated via telephone on 8/14/2020. Chief Complaint:Mau Marquez is a 69 y.o. male who is here for   Chief Complaint   Patient presents with    Foot Pain    Foot Swelling       HPI:   Patient presents via virtual visit for left foot pain. Patient was unable to access link for video so visit was completed via telephone. Describes left foot as being swollen on top of foot with no streaking. Denies injury to foot. Radiates to left ankle. Cousin has gout and told pt that is what his foot looks like. Foot Pain    The pain is present in the left foot. This is a new problem. Episode onset: 3 days ago. There has been no history of extremity trauma. The problem occurs constantly. The problem has been unchanged. The quality of the pain is described as burning and aching. The pain is at a severity of 7/10. The pain is moderate. Associated symptoms include joint swelling. Pertinent negatives include no fever, inability to bear weight (difficulty bearing weight), itching, joint locking, limited range of motion, numbness, stiffness or tingling. The symptoms are aggravated by activity, standing and contact. He has tried oral narcotics for the symptoms. The treatment provided no relief. Family history includes gout. Family history does not include rheumatoid arthritis. There is no history of diabetes, gout, osteoarthritis or rheumatoid arthritis. ROS:  Review of Systems   Constitutional: Negative for fever. Musculoskeletal: Negative for gout and stiffness. Skin: Negative for itching. Neurological: Negative for tingling and numbness.        Past medical history:  Past Medical History:   Diagnosis Date    AICD (automatic cardioverter/defibrillator) present 02/10/2015    pacemaker/defibrillator    Arthritis     CAD (coronary artery disease)     CH[facility]) [facility]     Chronic systolic CHF (congestive heart failure), NYHA class 3 ([facility])     GERD (gastroesophageal reflux disease)     Hearing loss     left    Hyperlipidemia     MI (myocardial infarction) ([address]     Rash        Surgical history:  Past Surgical History:   Procedure Laterality Date    CARDIAC DEFIBRILLATOR PLACEMENT      CARDIAC DEFIBRILLATOR PLACEMENT      CATARACT REMOVAL WITH IMPLANT Right 02/01/2017    CATARACT REMOVAL WITH IMPLANT Left 03/01/2017    COLONOSCOPY      CORONARY ANGIOPLASTY WITH STENT PLACEMENT      EYE SURGERY      KNEE SURGERY         Social history:  Social History     Socioeconomic History    Marital status:      Spouse name: None    Number of children: None    Years of education: None    Highest education level: None   Occupational History    None   Social Needs    Financial resource strain: None    Food insecurity     Worry: None     Inability: None    Transportation needs     Medical: None     Non-medical: None   Tobacco Use    Smoking status: Current Every Day Smoker     Packs/day: 0.50     Years: 54.00     Pack years: 27.00     Types: Cigarettes     Start date: 1/1/1962    Smokeless tobacco: Never Used   Substance and Sexual Activity    Alcohol use: No    Drug use: No    Sexual activity: Yes     Partners: Female   Lifestyle    Physical activity     Days per week: None     Minutes per session: None    Stress: None   Relationships    Social connections     Talks on phone: None     Gets together: None     Attends Evangelical service: None     Active member of club or organization: None     Attends meetings of clubs or organizations: None     Relationship status: None    Intimate partner violence     Fear of current or ex partner: None     Emotionally abused: None     Physically abused: None     Forced sexual activity: None   Other Topics Concern    None   Social History Narrative    None       Tobacco use:  Social History     Tobacco Use   Smoking Status Current Every Day Smoker    Packs/day: 0.50    Years: 54.00    Pack years: 27.00  Types: Cigarettes    Start date: 1/1/1962   Smokeless Tobacco Never Used       Medical, surgical, and social history reviewed. and allergies reviewed. No Known Allergies  Current Outpatient Medications   Medication Sig Dispense Refill    predniSONE (DELTASONE) 10 MG tablet Take 3 tablets by mouth daily for 7 days, THEN 2 tablets daily for 4 days, THEN 1 tablet daily for 3 days. 32 tablet 0    carvedilol (COREG) 3.125 MG tablet Take 1 tablet by mouth 2 times daily (with meals) 180 tablet 3    clopidogrel (PLAVIX) 75 MG tablet Take 1 tablet by mouth daily 90 tablet 3    gemfibrozil (LOPID) 600 MG tablet Take 1 tablet by mouth 2 times daily (before meals) 180 tablet 3    lisinopril (PRINIVIL;ZESTRIL) 5 MG tablet Take 1 tablet by mouth daily 90 tablet 3    rosuvastatin (CRESTOR) 40 MG tablet Take 1 tablet by mouth every evening 90 tablet 3    omeprazole (PRILOSEC) 20 MG delayed release capsule Take 1 capsule by mouth every morning (before breakfast) 90 capsule 1    nitroGLYCERIN (NITROSTAT) 0.4 MG SL tablet Place 1 tablet under the tongue every 5 minutes as needed for Chest pain 25 tablet 1    ipratropium-albuterol (DUONEB) 0.5-2.5 (3) MG/3ML SOLN nebulizer solution Inhale 3 mLs into the lungs every 6 hours as needed for Shortness of Breath 120 vial 5    finasteride (PROSCAR) 5 MG tablet Take 5 mg by mouth daily Indications: Rx by Dr. Jamie Johnson methotrexate (RHEUMATREX) 2.5 MG chemo tablet Take 1 tablet by mouth once a week RX directions are 4 tablets weekly. Patient takes one tablet Monday/Wednesday/Friday. 1 tablet 0    folic acid (FOLVITE) 1 MG tablet Take 1 mg by mouth daily      tamsulosin (FLOMAX) 0.4 MG capsule Take 0.4 mg by mouth daily      aspirin 81 MG tablet Take 81 mg by mouth daily. No current facility-administered medications for this visit. There were no vitals filed for this visit.    Wt Readings from Last 3 Encounters:   07/17/20 190 lb (86.2 kg)   07/10/20 194 lb (88 kg)   06/29/20 190 lb 14.4 oz (86.6 kg)     BP Readings from Last 3 Encounters:   07/17/20 90/60   07/10/20 106/68   06/29/20 99/65       Patient Active Problem List   Diagnosis    Hyperlipidemia    CAD (coronary artery disease)    Chronic systolic congestive heart failure (HCC)    AICD (automatic cardioverter/defibrillator) present    Other forms of angina pectoris (HCC)    Automatic implantable cardioverter-defibrillator in situ    Arthritis of knee    Hypotension    Ischemic cardiomyopathy    Pulmonary nodule    Shortness of breath    Anemia    Tracheobronchitis    Simple chronic bronchitis (HCC)    Acute MI anterior wall subsequent episode care ([address]    Chronic low back pain    [facility] failure syndrome, stage 3 (moderate) [facility]    Mitral valve disorder    Disorder of bursae and tendons in shoulder region    Other pulmonary embolism and infarction    Pain in joint, multiple sites    Paroxysmal ventricular tachycardia ([address]    Polymyalgia rheumatica ([address]    Primary pulmonary hypertension ([address]    Secondary cardiomyopathy ([address]    Tobacco use disorder    Asymmetrical hearing loss of left ear    TMJ (temporomandibular joint syndrome)    Sensorineural hearing loss, bilateral    Defibrillator discharge         Immunization History   Administered Date(s) Administered    Influenza Vaccine, unspecified formulation 11/22/2013, 02/12/2015, 10/05/2015, 12/29/2018    Influenza Virus Vaccine 11/22/2013, 02/12/2015, 10/05/2015    Influenza, High Dose (Fluzone 65 yrs and older) 12/06/2018    Influenza, Celestia [place]on, 6 mo and older, IM, PF (Flulaval, Fluarix) 12/29/2018    Influenza, Triv, inactivated, subunit, adjuvanted, IM (Fluad 65 yrs and older) 10/29/2019    Meningococcal B, OMV (Bexsero) 12/14/2010    Pneumococcal Conjugate 13-valent ([id number]) 06/21/2016, 12/06/2018    Pneumococcal Polysaccharide ([id number]) 11/22/2009, 11/22/2013, 06/02/2014, 12/19/2019    Tdap (Boostrix,

## 2020-08-14 NOTE — TELEPHONE ENCOUNTER
----- Message from Manuel Fine sent at 8/14/2020 11:23 AM EDT -----  Subject: Message to Provider    QUESTIONS  Information for Provider? pt requesting a call back from dr Deangelo Christianson nurse   regarding gout in his foot.   ---------------------------------------------------------------------------  --------------  CALL BACK INFO  What is the best way for the office to contact you? OK to leave message on   voicemail  Preferred Call Back Phone Number? [phone]  ---------------------------------------------------------------------------  --------------  SCRIPT ANSWERS  Relationship to Patient?  Self

## 2020-08-14 NOTE — TELEPHONE ENCOUNTER
I Scheduled pt with Benita at [facility] today so she can could order pt's labs and follow up with him tomorrow on the results.

## 2020-08-19 NOTE — PROGRESS NOTES
inferior walls; severe LV dysfunction, LVEF 24%, no ischemia. Most recent EKG 7/20 shows NSR; 1st degree AV block; LBBB. Device check today demonstrates normal function with 2 new NSVT episodes 7-8 secs. He saw EP Dr. Minnie Ferguson in June who recommended upgrade to BiV-ICD due to new LBBB but he wanted to think about it. Past Medical History:   has a past medical history of AICD (automatic cardioverter/defibrillator) present, Arthritis, CAD (coronary artery disease), CHF (congestive heart failure) ([address] Chronic systolic CHF (congestive heart failure), NYHA class 3 ([address] GERD (gastroesophageal reflux disease), Hearing loss, Hyperlipidemia, MI (myocardial infarction) ([address] and Rash. Surgical History:   has a past surgical history that includes Coronary angioplasty with stent; Cardiac defibrillator placement; knee surgery; Cardiac defibrillator placement; Colonoscopy; Cataract removal with implant (Right, 02/01/2017); Cataract removal with implant (Left, 03/01/2017); and eye surgery. Social History:   reports that he has been smoking cigarettes. He started smoking about 58 years ago. He has a 27.00 pack-year smoking history. He has never used smokeless tobacco. He reports that he does not drink alcohol or use drugs. Family History:  family history includes Cancer in his father; Diabetes in his mother. Home Medications:  Prior to Admission medications    Medication Sig Start Date End Date Taking? Authorizing Provider   predniSONE (DELTASONE) 10 MG tablet Take 3 tablets by mouth daily for 7 days, THEN 2 tablets daily for 4 days, THEN 1 tablet daily for 3 days.  8/14/20 8/28/20  SUSHMA Silverio   carvedilol (COREG) 3.125 MG tablet Take 1 tablet by mouth 2 times daily (with meals) 6/29/20   Shorty Aponte MD   clopidogrel (PLAVIX) 75 MG tablet Take 1 tablet by mouth daily 6/29/20   Shorty Aponte MD   gemfibrozil (LOPID) 600 MG tablet Take 1 tablet by mouth 2 times daily (before meals) 6/29/20 Jhon Diaz MD   lisinopril (PRINIVIL;ZESTRIL) 5 MG tablet Take 1 tablet by mouth daily 6/29/20   Jhon Diaz MD   rosuvastatin (CRESTOR) 40 MG tablet Take 1 tablet by mouth every evening 6/29/20   Jhon Diaz MD   omeprazole (PRILOSEC) 20 MG delayed release capsule Take 1 capsule by mouth every morning (before breakfast) 4/29/20   WILBERTO Granados   nitroGLYCERIN (NITROSTAT) 0.4 MG SL tablet Place 1 tablet under the tongue every 5 minutes as needed for Chest pain 10/17/19   Jhon Diaz MD   ipratropium-albuterol (DUONEB) 0.5-2.5 (3) MG/3ML SOLN nebulizer solution Inhale 3 mLs into the lungs every 6 hours as needed for Shortness of Breath 5/10/19   Alysia Bills MD   finasteride (PROSCAR) 5 MG tablet Take 5 mg by mouth daily Indications: Rx by Dr. Irving Avalos MD   methotrexate (RHEUMATREX) 2.5 MG chemo tablet Take 1 tablet by mouth once a week RX directions are 4 tablets weekly. Patient takes one tablet Monday/Wednesday/Friday. 3/5/19   Pam Funes MD   folic acid (FOLVITE) 1 MG tablet Take 1 mg by mouth daily    Historical Provider, MD   tamsulosin (FLOMAX) 0.4 MG capsule Take 0.4 mg by mouth daily    Kev Guerrero MD   aspirin 81 MG tablet Take 81 mg by mouth daily. Historical Provider, MD        Allergies:  Patient has no known allergies. Review of Systems:   · Constitutional: there has been no unanticipated weight loss. There's been no change in energy level, sleep pattern, or activity level. · Eyes: No visual changes or diplopia. No scleral icterus. · ENT: No Headaches, hearing loss or vertigo. No mouth sores or sore throat. · Cardiovascular: Reviewed in HPI  · Respiratory: No cough or wheezing, no sputum production. No hematemesis. · Gastrointestinal: No abdominal pain, appetite loss, blood in stools. No change in bowel or bladder habits. · Genitourinary: No dysuria, trouble voiding, or hematuria.   · Musculoskeletal:  No gait disturbance, weakness or joint complaints. · Integumentary: No rash or pruritis. · Neurological: No headache, diplopia, change in muscle strength, numbness or tingling. No change in gait, balance, coordination, mood, affect, memory, mentation, behavior. · Psychiatric: No anxiety, no depression. · Endocrine: No malaise, fatigue or temperature intolerance. No excessive thirst, fluid intake, or urination. No tremor. · Hematologic/Lymphatic: No abnormal bruising or bleeding, blood clots or swollen lymph nodes. · Allergic/Immunologic: No nasal congestion or hives. Physical Examination:    Vitals:    08/20/20 1420   BP: (!) 90/48   Pulse: 72   SpO2: 97%        Constitutional and General Appearance: NAD   Respiratory:  · Normal excursion and expansion without use of accessory muscles  · Resp Auscultation: soft crackles at bases otherwise clear   Cardiovascular:  · The apical impulses not displaced  · Heart tones are crisp and normal  · Cervical veins are not engorged  · The carotid upstroke is normal in amplitude and contour without delay or bruit  · Distant soft S1S2, No S3, Soft ELVIA  · Peripheral pulses are symmetrical and full  · There is no clubbing, cyanosis of the extremities.   · No edema  · Femoral Arteries: 2+ and equal  · Pedal Pulses: 2+ and equal   Abdomen:  · No masses or tenderness  · Liver/Spleen: No Abnormalities Noted  Neurological/Psychiatric:  · Alert and oriented in all spheres  · Moves all extremities well  · Exhibits normal gait balance and coordination  · No abnormalities of mood, affect, memory, mentation, or behavior are noted  ·   Lab Results   Component Value Date    CHOL 146 07/10/2020    CHOL 136 06/29/2020    CHOL 147 10/29/2019     Lab Results   Component Value Date    TRIG 96 07/10/2020    TRIG 151 (H) 06/29/2020    TRIG 117 10/29/2019     Lab Results   Component Value Date    HDL 43 07/10/2020    HDL 35 (L) 06/29/2020    HDL 52 10/29/2019     Lab Results   Component Value Date LDLCALC 84 07/10/2020    LDLCALC 71 06/29/2020    LDLCALC 72 10/29/2019     Lab Results   Component Value Date    LABVLDL 19 07/10/2020    LABVLDL 30 06/29/2020    LABVLDL 23 10/29/2019     LABS: 6/29/20 - Magnesium 2.00, , K 4.3, BUN/creat 19/1.0, H/H 13.1/3/.6  LABS: 1/21/20 TSH 2.93    Assessment:   1. Ischemic cardiomyopathy:  Note ECHO 6/29/20 showed EF=15-20%; moderate LV dilation; grade II DD with elevated filling pressure; mod MR/mild TR (previous ECHO done 2014 - EF 25% and 12/18-EF 20-25%). Most recent EKG 7/20 shows NSR; 1st degree AV block; LBBB. Device check today demonstrates normal function with 2 new NSVT episodes 7-8 secs. He saw EP Dr. Christi Bruner in June who recommended upgrade to BiV-ICD due to new LBBB but he wanted to think about it. He is pretty sure he wants device but is still thinking now. Will start low dose aldactone to see if he can tolerate. 2. Hyperlipidemia:  Most recent [id number]/28/18 see results I personally reviewed (see above). Well controlled and will continue current medical regimen. 3. CAD:   s/p RCA stents prior. Cardiac cath 6/2014 --> stable known ischemic heart disease. Most recent lexiscan stress test 6/28/20 demonstrated large scar anteroseptal/apical and inferior walls; severe LV dysfunction, LVEF 24%, no ischemia. There are no concerning symptoms for angina currently. 4. Orthostatic hypotension: Resolved    Plan:  1. Recommend starting Spironolactone (aldactone) 12.5 mg daily   2. Continue all other medications   3. Labs - BMP in 1 week after starting the spironolactone   4. Contact our office when you decide to proceed with the device upgrade with Dr. Christi Bruner  5. No cardiac testing warranted at this time  6. Follow up at [place] with Dr. Erica Georges in December 2020    Cost of prescription medications and patient compliance have been reviewed with patient. All questions answered.      Thank you for allowing me to participate in the care of this individual.    This note was scribed in the presence of Dr.Meyers JENNINGS by Ilana Limon, DORENE.     I, Dr. Jojo Rosales, personally performed the services described in this documentation, as scribed by the above signed scribe in my presence. It is both accurate and complete to my knowledge. I agree with the details independently gathered by the clinical support staff, while the remaining scribed note accurately describes my personal service to the patient. Pilo Arana.  Millie Jefferson M.D., [facility] [place]

## 2020-08-20 ENCOUNTER — NURSE ONLY (OUTPATIENT)
Dept: CARDIOLOGY CLINIC | Age: 70
End: 2020-08-20
Payer: MEDICARE

## 2020-08-20 ENCOUNTER — OFFICE VISIT (OUTPATIENT)
Dept: CARDIOLOGY CLINIC | Age: 70
End: 2020-08-20
Payer: MEDICARE

## 2020-08-20 VITALS
WEIGHT: 197 LBS | HEIGHT: 70 IN | DIASTOLIC BLOOD PRESSURE: 48 MMHG | SYSTOLIC BLOOD PRESSURE: 90 MMHG | OXYGEN SATURATION: 97 % | BODY MASS INDEX: 28.2 KG/M2 | HEART RATE: 72 BPM

## 2020-08-20 PROBLEM — R53.83 OTHER FATIGUE: Status: ACTIVE | Noted: 2020-08-20

## 2020-08-20 PROCEDURE — 1123F ACP DISCUSS/DSCN MKR DOCD: CPT | Performed by: INTERNAL MEDICINE

## 2020-08-20 PROCEDURE — 3017F COLORECTAL CA SCREEN DOC REV: CPT | Performed by: INTERNAL MEDICINE

## 2020-08-20 PROCEDURE — G8427 DOCREV CUR MEDS BY ELIG CLIN: HCPCS | Performed by: INTERNAL MEDICINE

## 2020-08-20 PROCEDURE — 4040F PNEUMOC VAC/ADMIN/RCVD: CPT | Performed by: INTERNAL MEDICINE

## 2020-08-20 PROCEDURE — G8417 CALC BMI ABV UP PARAM F/U: HCPCS | Performed by: INTERNAL MEDICINE

## 2020-08-20 PROCEDURE — 4004F PT TOBACCO SCREEN RCVD TLK: CPT | Performed by: INTERNAL MEDICINE

## 2020-08-20 PROCEDURE — 99214 OFFICE O/P EST MOD 30 MIN: CPT | Performed by: INTERNAL MEDICINE

## 2020-08-20 PROCEDURE — 93283 PRGRMG EVAL IMPLANTABLE DFB: CPT | Performed by: INTERNAL MEDICINE

## 2020-08-20 RX ORDER — SPIRONOLACTONE 25 MG/1
12.5 TABLET ORAL DAILY
Qty: 45 TABLET | Refills: 1 | Status: SHIPPED | OUTPATIENT
Start: 2020-08-20 | End: 2021-01-07

## 2020-08-20 NOTE — PROGRESS NOTES
Patient presents to the device clinic today for a programming evaluation for his defibrillator. Patient has a history of ICM and VT. Takes Coreg and Plavix. Last device interrogation was on 7/17. Since then, 2 NSVT events recorded. Event on 7/26 x 7 seconds @ 160 bpm. Event on 8/10 x 8 seconds @ 166 bpm.   AP 0%  <1%  All sensing and pacing parameters are within normal range. No changes need to be made at this time. Patient education was provided about device functionality, in home monitoring, and any other patient questions and/or concerns were addressed. Patient voices understanding. Please see interrogation for more detail. Patient will see Dr. Perla Johnson today in office. Patient will follow up in 3 months in office or remotely. See [facility] report under the Cardiology tab.

## 2020-08-20 NOTE — LETTER
[address]   Cardiac Consultation    Referring Provider:  Cynthia Shaver MD     Chief Complaint   Patient presents with    Follow-up    Coronary Artery Disease      Subjective: Mr Abeba Reyes is being seen today for cardiology follow up of CAD, HTN, HLD, severe ischemic CM; no complaints today    Past Medical History:    Heidi Jacinto is a 76yo male who has PMH of chronic severe ischemic CM, hx VT, s/p ICD and shock, CAD s/p RCA stents prior.  Former patient of Dr. Odette Dominguez. He has a Guidant ICD for h/o VT. Cardiac cath 6/2014 --> stable known ischemic heart disease. He also has hx MI, hyperlipidemia, and chronic systolic CHF. Most recent ECHO July 2016 showed EF is severely decreased EF=15-20%; akinetic septum/apex; LV dilated (no change from 2014 study). I switched him from high dose lipitor to crestor in late May 2018 due to NFH=938. Underwent Lexiscan myoview 12/28/18 no evidence of stress induced ischemia; moderate-large sized anterior, anteroseptal, and entire apex fixed defects c/w infarction; apical akinesis;  LVEF 33% (no change from 7/16, 11/15, or 11/13 studies). Note ECHO 6/29/20 showed EF=15-20%; moderate LV dilation; grade II DD with elevated filling pressure; mod MR/mild TR (previous ECHO done 2014 - EF 25% and 12/18-EF 20-25%). Today he reports feeling ok. He states he does feel weak and fatigued at times. He states he does get SOB with exertion. He denies chest pain. On 6/28/20 he presented to the ED with complaints his ICD shocked him. Upon interrogation  of the device demonstrates rapid VT which deteriorated into Vfib. The device appropriately recognized and treated the VF with a single-high energy shock. This restored sinus rhythm. Review of the device at that time demonstrated a few episodes of NSVT and brief PAT had happened over the past several months. Battery life 3 years.   Most recent lexiscan stress test 6/28/20 demonstrated large scar anteroseptal/apical and inferior walls; severe LV dysfunction, LVEF 24%, no ischemia. Most recent EKG 7/20 shows NSR; 1st degree AV block; LBBB. Device check today demonstrates normal function with 2 new NSVT episodes 7-8 secs. He saw EP Dr. Monae Foster in June who recommended upgrade to BiV-ICD due to new LBBB but he wanted to think about it. Past Medical History:   has a past medical history of AICD (automatic cardioverter/defibrillator) present, Arthritis, CAD (coronary artery disease), CHF (congestive heart failure) ([address] Chronic systolic CHF (congestive heart failure), NYHA class 3 ([address] GERD (gastroesophageal reflux disease), Hearing loss, Hyperlipidemia, MI (myocardial infarction) ([address] and Rash. Surgical History:   has a past surgical history that includes Coronary angioplasty with stent; Cardiac defibrillator placement; knee surgery; Cardiac defibrillator placement; Colonoscopy; Cataract removal with implant (Right, 02/01/2017); Cataract removal with implant (Left, 03/01/2017); and eye surgery. Social History:   reports that he has been smoking cigarettes. He started smoking about 58 years ago. He has a 27.00 pack-year smoking history. He has never used smokeless tobacco. He reports that he does not drink alcohol or use drugs. Family History:  family history includes Cancer in his father; Diabetes in his mother. Home Medications:  Prior to Admission medications    Medication Sig Start Date End Date Taking? Authorizing Provider   predniSONE (DELTASONE) 10 MG tablet Take 3 tablets by mouth daily for 7 days, THEN 2 tablets daily for 4 days, THEN 1 tablet daily for 3 days.  8/14/20 8/28/20  SUSHMA Harmon   carvedilol (COREG) 3.125 MG tablet Take 1 tablet by mouth 2 times daily (with meals) 6/29/20   Alfred Briceno MD   clopidogrel (PLAVIX) 75 MG tablet Take 1 tablet by mouth daily 6/29/20   Alfred Briceno MD   gemfibrozil (LOPID) 600 MG tablet Take 1 tablet by mouth 2 times daily (before meals) 6/29/20   Kelly Soto MD   lisinopril (PRINIVIL;ZESTRIL) 5 MG tablet Take 1 tablet by mouth daily 6/29/20   Kelly Soto MD   rosuvastatin (CRESTOR) 40 MG tablet Take 1 tablet by mouth every evening 6/29/20   Kelly Soto MD   omeprazole (PRILOSEC) 20 MG delayed release capsule Take 1 capsule by mouth every morning (before breakfast) 4/29/20   WILBERTO Weaver   nitroGLYCERIN (NITROSTAT) 0.4 MG SL tablet Place 1 tablet under the tongue every 5 minutes as needed for Chest pain 10/17/19   Kelly Soto MD   ipratropium-albuterol (DUONEB) 0.5-2.5 (3) MG/3ML SOLN nebulizer solution Inhale 3 mLs into the lungs every 6 hours as needed for Shortness of Breath 5/10/19   Chrissy Cooley MD   finasteride (PROSCAR) 5 MG tablet Take 5 mg by mouth daily Indications: Rx by Dr. Merrick Sharma MD   methotrexate (RHEUMATREX) 2.5 MG chemo tablet Take 1 tablet by mouth once a week RX directions are 4 tablets weekly. Patient takes one tablet Monday/Wednesday/Friday. 3/5/19   Philomena Sahu MD   folic acid (FOLVITE) 1 MG tablet Take 1 mg by mouth daily    Historical Provider, MD   tamsulosin (FLOMAX) 0.4 MG capsule Take 0.4 mg by mouth daily    Haylee Rodrigues MD   aspirin 81 MG tablet Take 81 mg by mouth daily. Historical Provider, MD        Allergies:  Patient has no known allergies. Review of Systems:   · Constitutional: there has been no unanticipated weight loss. There's been no change in energy level, sleep pattern, or activity level. · Eyes: No visual changes or diplopia. No scleral icterus. · ENT: No Headaches, hearing loss or vertigo. No mouth sores or sore throat. · Cardiovascular: Reviewed in HPI  · Respiratory: No cough or wheezing, no sputum production. No hematemesis. · Gastrointestinal: No abdominal pain, appetite loss, blood in stools. No change in bowel or bladder habits. · Genitourinary: No dysuria, trouble voiding, or hematuria. · Musculoskeletal:  No gait disturbance, weakness or joint complaints. · Integumentary: No rash or pruritis. · Neurological: No headache, diplopia, change in muscle strength, numbness or tingling. No change in gait, balance, coordination, mood, affect, memory, mentation, behavior. · Psychiatric: No anxiety, no depression. · Endocrine: No malaise, fatigue or temperature intolerance. No excessive thirst, fluid intake, or urination. No tremor. · Hematologic/Lymphatic: No abnormal bruising or bleeding, blood clots or swollen lymph nodes. · Allergic/Immunologic: No nasal congestion or hives. Physical Examination:    Vitals:    08/20/20 1420   BP: (!) 90/48   Pulse: 72   SpO2: 97%        Constitutional and General Appearance: NAD   Respiratory:  · Normal excursion and expansion without use of accessory muscles  · Resp Auscultation: soft crackles at bases otherwise clear   Cardiovascular:  · The apical impulses not displaced  · Heart tones are crisp and normal  · Cervical veins are not engorged  · The carotid upstroke is normal in amplitude and contour without delay or bruit  · Distant soft S1S2, No S3, Soft ELVIA  · Peripheral pulses are symmetrical and full  · There is no clubbing, cyanosis of the extremities.   · No edema  · Femoral Arteries: 2+ and equal  · Pedal Pulses: 2+ and equal   Abdomen:  · No masses or tenderness  · Liver/Spleen: No Abnormalities Noted  Neurological/Psychiatric:  · Alert and oriented in all spheres  · Moves all extremities well  · Exhibits normal gait balance and coordination  · No abnormalities of mood, affect, memory, mentation, or behavior are noted  ·   Lab Results   Component Value Date    CHOL 146 07/10/2020    CHOL 136 06/29/2020    CHOL 147 10/29/2019     Lab Results   Component Value Date    TRIG 96 07/10/2020    TRIG 151 (H) 06/29/2020    TRIG 117 10/29/2019     Lab Results   Component Value Date    HDL 43 07/10/2020    HDL 35 (L) 06/29/2020    HDL 52 10/29/2019 Lab Results   Component Value Date    LDLCALC 84 07/10/2020    LDLCALC 71 06/29/2020    LDLCALC 72 10/29/2019     Lab Results   Component Value Date    LABVLDL 19 07/10/2020    LABVLDL 30 06/29/2020    LABVLDL 23 10/29/2019     LABS: 6/29/20 - Magnesium 2.00, , K 4.3, BUN/creat 19/1.0, H/H 13.1/3/.6  LABS: 1/21/20 TSH 2.93    Assessment:   1. Ischemic cardiomyopathy:  Note ECHO 6/29/20 showed EF=15-20%; moderate LV dilation; grade II DD with elevated filling pressure; mod MR/mild TR (previous ECHO done 2014 - EF 25% and 12/18-EF 20-25%). Most recent EKG 7/20 shows NSR; 1st degree AV block; LBBB. Device check today demonstrates normal function with 2 new NSVT episodes 7-8 secs. He saw EP Dr. Rowe Patient in June who recommended upgrade to BiV-ICD due to new LBBB but he wanted to think about it. He is pretty sure he wants device but is still thinking now. Will start low dose aldactone to see if he can tolerate. 2. Hyperlipidemia:  Most recent [id number]/18 see results I personally reviewed (see above). Well controlled and will continue current medical regimen. 3. CAD:   s/p RCA stents prior. Cardiac cath 6/2014 --> stable known ischemic heart disease. Most recent lexiscan stress test 6/28/20 demonstrated large scar anteroseptal/apical and inferior walls; severe LV dysfunction, LVEF 24%, no ischemia. There are no concerning symptoms for angina currently. 4. Orthostatic hypotension: Resolved    Plan:  1. Recommend starting Spironolactone (aldactone) 12.5 mg daily   2. Continue all other medications   3. Labs - BMP in 1 week after starting the spironolactone   4. Contact our office when you decide to proceed with the device upgrade with Dr. Rowe Patient  5. No cardiac testing warranted at this time  6. Follow up at [facility] with Dr. Natasha Salamanca in December 2020    Cost of prescription medications and patient compliance have been reviewed with patient. All questions answered. Thank you for allowing me to participate in the care of this individual.    This note was scribed in the presence of Dr.Meyers JENNINGS by Christiane Davila RN.     I, Dr. Placido Burgess, personally performed the services described in this documentation, as scribed by the above signed scribe in my presence. It is both accurate and complete to my knowledge. I agree with the details independently gathered by the clinical support staff, while the remaining scribed note accurately describes my personal service to the patient. Dawn Kelly.  Denisse Frey M.D., [facility]

## 2020-09-16 ENCOUNTER — HOSPITAL ENCOUNTER (OUTPATIENT)
Age: 70
Discharge: HOME | End: 2020-09-16
Payer: MEDICARE

## 2020-09-16 LAB
ANION GAP SERPL CALCULATED.3IONS-SCNC: 13 MMOL/L (ref 3–16)
BUN BLDV-MCNC: 21 MG/DL (ref 7–20)
CALCIUM SERPL-MCNC: 9.8 MG/DL (ref 8.3–10.6)
CHLORIDE BLD-SCNC: 102 MMOL/L (ref 99–110)
CO2: 18 MMOL/L (ref 21–32)
CREAT SERPL-MCNC: 1.2 MG/DL (ref 0.8–1.3)
GFR AFRICAN AMERICAN: >60
GFR NON-AFRICAN AMERICAN: 60
GLUCOSE BLD-MCNC: 99 MG/DL (ref 70–99)
POTASSIUM SERPL-SCNC: 4.3 MMOL/L (ref 3.5–5.1)
SODIUM BLD-SCNC: 133 MMOL/L (ref 136–145)

## 2020-09-16 PROCEDURE — 80048 BASIC METABOLIC PNL TOTAL CA: CPT

## 2020-09-16 PROCEDURE — 36415 COLL VENOUS BLD VENIPUNCTURE: CPT

## 2020-10-02 ENCOUNTER — TELEPHONE (OUTPATIENT)
Dept: CARDIOLOGY CLINIC | Age: 70
End: 2020-10-02

## 2020-10-02 NOTE — TELEPHONE ENCOUNTER
Let her know that if ICD fires once and feeling OK afterwards then CPM and no need to be concerned. The device did its job. If ICD fires again or having CP/SOB symptoms concerning then come to ER to get checked out.

## 2020-10-02 NOTE — TELEPHONE ENCOUNTER
Pt's wife called and sts that the pt's ICD went off this AM. Wife sts that the pt feels fine and was walking around and even made coffee. I asked wife if pt could send in a manual transmission but pt wasn't sure how to do that. Pt wife is requesting a call back from [facility], Pt had no other symptoms. Please advise, thank you.  Call back # [phone]

## 2020-10-22 RX ORDER — OMEPRAZOLE 20 MG/1
20 CAPSULE, DELAYED RELEASE ORAL
Qty: 90 CAPSULE | Refills: 1 | Status: SHIPPED | OUTPATIENT
Start: 2020-10-22 | End: 2021-04-14 | Stop reason: SDUPTHER

## 2020-10-22 NOTE — TELEPHONE ENCOUNTER
Pt called to have his Omeprazole refilled to the LTAC, located within [facility] in [facility].

## 2020-10-26 ENCOUNTER — TELEPHONE (OUTPATIENT)
Dept: CARDIOLOGY CLINIC | Age: 70
End: 2020-10-26

## 2020-10-26 NOTE — TELEPHONE ENCOUNTER
Pt called, is ready to schedule a device upgrade with NICOLE. Told him we would send a message and someone will get back to him in about 1 week.

## 2020-10-28 NOTE — TELEPHONE ENCOUNTER
Last OV with NICOLE on 7/17/20  Device upgrade discussed at that time. Per SMM patient would like to move forward with device upgrade at this time. Does not have d/u OV scheduled with NICOLE at this time. NICOLE, how would you like to proceed?

## 2020-10-28 NOTE — TELEPHONE ENCOUNTER
If he wants to proceed with upgrade to CRT-D, he will need CHF class II-III symptoms and a left upper extremity venogram prior to scheduling.

## 2020-11-03 NOTE — TELEPHONE ENCOUNTER
FABIÁN, can you please diagnose patient with appropriate CHF class per [facility] request. Order for venogram has been placed per request of NICOLE.

## 2020-11-06 NOTE — TELEPHONE ENCOUNTER
Spoke with patient. He does not feel the need to have re-peat OV prior to scheduling procedure. He is ready to proceed. Please call patient to schedule upgrade to BiV ICD/ CRT-D with NICOLE with [employer]. Patient does not need COVID testing prior to procedure. Thank you.

## 2020-11-09 NOTE — TELEPHONE ENCOUNTER
Spoke with patient. He would like an afternoon procedure and after Thanksgiving. I told him I would call after I schedule a few others for the AM.    Patient would like info mailed to him once scheduled.

## 2020-11-19 ENCOUNTER — NURSE ONLY (OUTPATIENT)
Dept: CARDIOLOGY CLINIC | Age: 70
End: 2020-11-19
Payer: MEDICARE

## 2020-11-19 ENCOUNTER — TELEPHONE (OUTPATIENT)
Dept: CARDIOLOGY CLINIC | Age: 70
End: 2020-11-19

## 2020-11-19 PROCEDURE — 93295 DEV INTERROG REMOTE 1/2/MLT: CPT | Performed by: INTERNAL MEDICINE

## 2020-11-19 PROCEDURE — 93296 REM INTERROG EVL PM/IDS: CPT | Performed by: INTERNAL MEDICINE

## 2020-11-19 NOTE — LETTER
[address] [facility] [phone]  [address] [facility]  [address], [place] [phone]    Pacemaker/Defibrillator Clinic          11/19/20        Rem[place]  [address]  [place] [address]        Dear Remoov    This letter is to inform you that we received the transmission from your monitor at home that checks your implanted heart device. The next date your monitor will automatically transmit will be 2/22/2021. If your report needs attention we will notify you. Your device and monitor are wireless and most transmit cellularly, but please periodically check your monitor is still plugged in to the electrical outlet. If you still use the telephone land line to send please ensure the connection to the phone antwan is secure. This will help to ensure successful automatic transmissions in the future. Also, the monitor needs to be close to you while sleeping at night. Please be aware that the remote device transmission sites are periodically monitored only during regular business hours during which simultaneous in-office device clinics are being run. If your transmission requires attention, we will contact you as soon as possible. Thank you.             [address]

## 2020-11-19 NOTE — TELEPHONE ENCOUNTER
[facility] PLEASE REVIEW. PT IS HAVING INCREASED EPISODES OF NSVT, HIGH RATE VT IN THE VF ZONE AND received ATP/Shock for VT/VF events on 10/2. Since 8/20-Total Events: 2 VF, 47 NonSustV, 9 Other Untreated. LATITUDE Remote transmission of pacemaker and/or ICD shows normal cardiac device function. Last interrogation 8/20/20. Patient has a history of ICM and VT, he takes coreg. Per notes/jmb pt to have venogram and upgrade crt-d. Since last interrogation he has had 21 new [id number]-exyxcmcuoom and lasting up to 16 sec. *Oct 02, 2020 08:38, VF, A Rate: 74 bpm, V Rate: 209 bpm. Attempt 1, 41 J V Shock . ATP delivered prior to shock    *Oct 02, 2020 05:44, VF, A Rate: 88 bpm, V Rate: 188 bpm. VF ATP delivered, no shock attempted due to fail to reconfirm. Converted on own. Oct 02, 2020 05:41, VF, A Rate: 70 bpm, V Rate: 263 bpm. No shock attempted due to fail to reconfirm. Converted on own. Average V rates  w/ mean 79 bpm..

## 2020-11-19 NOTE — TELEPHONE ENCOUNTER
Patient needs stress test for ischemic disease. Unclear at this point why Dr. Jonathon Siemens is holding off on amiodarone. If he has another shock we will start him on amiodarone. Lets also ask about HF symptoms and chest pain. We will keep Dr. Jonathon Siemens in loop. Thanks.

## 2020-11-19 NOTE — PROGRESS NOTES
[facility] PLEASE REVIEW. PT IS HAVING INCREASED EPISODES OF NSVT, HIGH RATE VT IN THE VF ZONE AND received ATP/Shock for VT/VF events on 10/2. Since 8/20-Total Events: 2 VF, 47 NonSustV, 9 Other Untreated. LATITUDE Remote transmission of pacemaker and/or ICD shows normal cardiac device function. Last interrogation 8/20/20. Patient has a history of ICM and VT, he takes coreg. Per notes/jmb pt to have venogram and upgrade crt-d. Since last interrogation he has had 21 new txeunt-jiip-lerdreinthk and lasting up to 16 sec. *Oct 02, 2020 08:38, VF, A Rate: 74 bpm, V Rate: 209 bpm. Attempt 1, 41 J V Shock . ATP delivered prior to shock    *Oct 02, 2020 05:44, VF, A Rate: 88 bpm, V Rate: 188 bpm. VF ATP delivered, no shock attempted due to fail to reconfirm. Converted on own. Oct 02, 2020 05:41, VF, A Rate: 70 bpm, V Rate: 263 bpm. No shock attempted due to fail to reconfirm. Converted on own. Average V rates  w/ mean 79 bpm..    See PACEART report under Cardiology tab.

## 2020-11-20 NOTE — TELEPHONE ENCOUNTER
Patient states he was sleeping during these episodes. He reports he does recall waking up d/t SOB and then received a shock. Patient had stress test in June of 6/28/2020 that did not show any evidence of ischemia. He is to have a CRT-D upgrade this December 2020 (not scheduled yet, will send separate message to Komal). Patient has has known ongoing VT/VF episodes on recent monitor checks. Will forward to [facility] as FYI but I believe he is aware. Thank you!

## 2020-11-20 NOTE — TELEPHONE ENCOUNTER
NICOLE, please see Komal's message below regarding venogram.     I spoke with the patient today and informed him we are waiting to find out this piece of information prior to scheduling.

## 2020-11-30 ENCOUNTER — TELEPHONE (OUTPATIENT)
Dept: CASE MANAGEMENT | Age: 70
End: 2020-11-30

## 2020-11-30 NOTE — TELEPHONE ENCOUNTER
Patient due for annual CT Lung Screening. If you would like patient to have screening, please place order for CT Lung Screening ([facility] [address]). I will contact the patient to help schedule after order entered.     Thank you,  Pamela Fry RN  [facility] Lung Navigator  [phone]

## 2020-12-14 ENCOUNTER — HOSPITAL ENCOUNTER (OUTPATIENT)
Dept: CARDIAC CATH/INVASIVE PROCEDURES | Age: 70
Discharge: HOME | End: 2020-12-14
Attending: INTERNAL MEDICINE | Admitting: INTERNAL MEDICINE
Payer: MEDICARE

## 2020-12-14 ENCOUNTER — OFFICE VISIT (OUTPATIENT)
Dept: PRIMARY CARE CLINIC | Age: 70
End: 2020-12-14
Payer: MEDICARE

## 2020-12-14 VITALS — HEIGHT: 70 IN | BODY MASS INDEX: 27.92 KG/M2 | WEIGHT: 195 LBS

## 2020-12-14 LAB
ANION GAP SERPL CALCULATED.3IONS-SCNC: 9 MMOL/L (ref 3–16)
BUN BLDV-MCNC: 21 MG/DL (ref 7–20)
CALCIUM SERPL-MCNC: 9.5 MG/DL (ref 8.3–10.6)
CHLORIDE BLD-SCNC: 104 MMOL/L (ref 99–110)
CO2: 24 MMOL/L (ref 21–32)
CREAT SERPL-MCNC: 1 MG/DL (ref 0.8–1.3)
EKG ATRIAL RATE: 63 BPM
EKG DIAGNOSIS: NORMAL
EKG P AXIS: 72 DEGREES
EKG P-R INTERVAL: 202 MS
EKG Q-T INTERVAL: 398 MS
EKG QRS DURATION: 114 MS
EKG QTC CALCULATION (BAZETT): 407 MS
EKG R AXIS: 80 DEGREES
EKG T AXIS: 72 DEGREES
EKG VENTRICULAR RATE: 63 BPM
GFR AFRICAN AMERICAN: >60
GFR NON-AFRICAN AMERICAN: >60
GLUCOSE BLD-MCNC: 124 MG/DL (ref 70–99)
HCT VFR BLD CALC: 38 % (ref 40.5–52.5)
HEMOGLOBIN: 12.8 G/DL (ref 13.5–17.5)
INR BLD: 0.96 (ref 0.86–1.14)
MCH RBC QN AUTO: 33.3 PG (ref 26–34)
MCHC RBC AUTO-ENTMCNC: 33.7 G/DL (ref 31–36)
MCV RBC AUTO: 98.8 FL (ref 80–100)
PDW BLD-RTO: 14.3 % (ref 12.4–15.4)
PLATELET # BLD: 227 K/UL (ref 135–450)
PMV BLD AUTO: 8.2 FL (ref 5–10.5)
POTASSIUM REFLEX MAGNESIUM: 4 MMOL/L (ref 3.5–5.1)
PROTHROMBIN TIME: 11.1 SEC (ref 10–13.2)
RBC # BLD: 3.84 M/UL (ref 4.2–5.9)
SODIUM BLD-SCNC: 137 MMOL/L (ref 136–145)
WBC # BLD: 7.1 K/UL (ref 4–11)

## 2020-12-14 PROCEDURE — G8417 CALC BMI ABV UP PARAM F/U: HCPCS | Performed by: NURSE PRACTITIONER

## 2020-12-14 PROCEDURE — 85610 PROTHROMBIN TIME: CPT

## 2020-12-14 PROCEDURE — 80048 BASIC METABOLIC PNL TOTAL CA: CPT

## 2020-12-14 PROCEDURE — 85027 COMPLETE CBC AUTOMATED: CPT

## 2020-12-14 PROCEDURE — 36005 INJECTION EXT VENOGRAPHY: CPT

## 2020-12-14 PROCEDURE — G8428 CUR MEDS NOT DOCUMENT: HCPCS | Performed by: NURSE PRACTITIONER

## 2020-12-14 PROCEDURE — 93010 ELECTROCARDIOGRAM REPORT: CPT | Performed by: INTERNAL MEDICINE

## 2020-12-14 PROCEDURE — 75820 VEIN X-RAY ARM/LEG: CPT | Performed by: INTERNAL MEDICINE

## 2020-12-14 PROCEDURE — 93005 ELECTROCARDIOGRAM TRACING: CPT | Performed by: INTERNAL MEDICINE

## 2020-12-14 PROCEDURE — 99211 OFF/OP EST MAY X REQ PHY/QHP: CPT | Performed by: NURSE PRACTITIONER

## 2020-12-14 PROCEDURE — 75820 VEIN X-RAY ARM/LEG: CPT

## 2020-12-14 NOTE — PROCEDURES
After informed consent was obtained, the patient was brought to the cardiac electrophysiology laboratory in a fasting state on 12/14/2020. A 20-gauge IV was established in the left antecubital fossa. 15 cc of nonionic contrast was infused and cine angiography was performed. The left subclavian vein and left innominate vein are patent with no collaterals. There is slight stenosis in the mid left subclavian vein.   The patient tolerated the procedure well there are no apparent complications estimated blood loss less than 5 cc

## 2020-12-14 NOTE — PATIENT INSTRUCTIONS

## 2020-12-14 NOTE — H&P
Primary Care:  Murphy Serrano MD  Chief complaint: VF Follow up; no complaints        HPI:  Abraham Jeter is a 69 y.o. male who presents for evaluation of ventricular fibrillation. He was admitted 6/28/2020 after receiving ICD shock for VFib. This restored sinus rhythm. He had his dual-chamber pacemaker implanted in 4/2011 for ischemic cardiomyopathy by Dr. Mack Harada. Interrogation of the device demonstrated a few episodes of NSVT and brief PAT over the past several months. Echo on 6/28/2020 showed an EF of 15-20% with severe global dysfunction with more prominent anterior hypokinesis. Stress test on 6/28/2020 showed an EF of 24%, anteroseptal/apical akinesis, inferior wall hypokinesis, fixed defect in anteroseptal.apical, inferior walls consistent with large scar, no ischemia. Today he states that he feels good. He states that he has been feeling fine since he has been out of the hospital.  He remains very active. He states that a couple of flights of stairs makes him short of breath. He can do one flight well. He tolerates doing daily activities. He is not limited. Patient denies chest pain, palpitations, dizziness or syncope. Device check today shows normal function.  <1%. Battery Life 3 years. 1 VF episode 6/28/2020 which was appropriately recognized by the device and terminated with high energy therapy.     Past Medical History:   has a past medical history of AICD (automatic cardioverter/defibrillator) present, Arthritis, CAD (coronary artery disease), CHF (congestive heart failure) ([address] Chronic systolic CHF (congestive heart failure), NYHA class 3 ([address] GERD (gastroesophageal reflux disease), Hearing loss, Hyperlipidemia, MI (myocardial infarction) ([address] and Rash.     Surgical History:   has a past surgical history that includes Coronary angioplasty with stent; Cardiac defibrillator placement; knee surgery; Cardiac defibrillator placement; Colonoscopy;  Cataract removal with implant (Right, 02/01/2017); Cataract removal with implant (Left, 03/01/2017); and eye surgery.      Social History:   reports that he has been smoking cigarettes. He started smoking about 58 years ago. He has a 27.00 pack-year smoking history. He has never used smokeless tobacco. He reports that he does not drink alcohol or use drugs.      Family History:  family history includes Cancer in his father; Diabetes in his mother.      Home Medications:  Encounter Medications          Outpatient Encounter Medications as of 7/17/2020   Medication Sig Dispense Refill    carvedilol (COREG) 3.125 MG tablet Take 1 tablet by mouth 2 times daily (with meals) 180 tablet 3    clopidogrel (PLAVIX) 75 MG tablet Take 1 tablet by mouth daily 90 tablet 3    gemfibrozil (LOPID) 600 MG tablet Take 1 tablet by mouth 2 times daily (before meals) 180 tablet 3    lisinopril (PRINIVIL;ZESTRIL) 5 MG tablet Take 1 tablet by mouth daily 90 tablet 3    rosuvastatin (CRESTOR) 40 MG tablet Take 1 tablet by mouth every evening 90 tablet 3    omeprazole (PRILOSEC) 20 MG delayed release capsule Take 1 capsule by mouth every morning (before breakfast) 90 capsule 1    nitroGLYCERIN (NITROSTAT) 0.4 MG SL tablet Place 1 tablet under the tongue every 5 minutes as needed for Chest pain 25 tablet 1    ipratropium-albuterol (DUONEB) 0.5-2.5 (3) MG/3ML SOLN nebulizer solution Inhale 3 mLs into the lungs every 6 hours as needed for Shortness of Breath 120 vial 5    finasteride (PROSCAR) 5 MG tablet Take 5 mg by mouth daily Indications: Rx by Dr. Neva Jorgensen         methotrexate (RHEUMATREX) 2.5 MG chemo tablet Take 1 tablet by mouth once a week RX directions are 4 tablets weekly. Patient takes one tablet Monday/Wednesday/Friday.  1 tablet 0    folic acid (FOLVITE) 1 MG tablet Take 1 mg by mouth daily        tamsulosin (FLOMAX) 0.4 MG capsule Take 0.4 mg by mouth daily        aspirin 81 MG tablet Take 81 mg by mouth daily.          No facility-administered encounter medications on file as of 7/17/2020.             Allergies:  Patient has no known allergies.      Review of Systems   Constitutional: Negative. HENT: Negative. Eyes: Negative. Respiratory: Negative. Cardiovascular: Negative. Gastrointestinal: Negative. Genitourinary: Negative. Musculoskeletal: Negative. Skin: Negative. Neurological: Negative. Hematological: Negative. Psychiatric/Behavioral: Negative.     BP 90/60   Pulse 73   Ht 5' 10\" (1.778 m)   Wt 190 lb (86.2 kg)   SpO2 98%   BMI 27.26 kg/m²         Objective:  Physical Exam   Constitutional: He is oriented to person, place, and time. He appears well-developed and well-nourished. HENT:   Head: Normocephalic and atraumatic. Eyes: Pupils are equal, round, and reactive to light. Neck: Normal range of motion. Cardiovascular: Normal rate, regular rhythm and normal heart sounds. Pulmonary/Chest: Effort normal and breath sounds normal.   Abdominal: Soft. No tenderness. Musculoskeletal: Normal range of motion. He exhibits no edema. Neurological: He is alert and oriented to person, place, and time. Skin: Skin is warm and dry. Psychiatric: He has a normal mood and affect.      Assessment:  1. Ischemic CM  2. S/P VF episode  6/20/20  3. LBBB- CRT was discussed as QRS duration is > 130 msec      Plan:  1. Remote device check every 3months  2. Follow up in 6 months  3.  LUE venogram to evaluate left subclavian vein           Katherine Hwang M.D.

## 2020-12-14 NOTE — PROGRESS NOTES
[facility] received a viral test for COVID-19. They were educated on isolation and quarantine as appropriate. For any symptoms, they were directed to seek care from their PCP, given contact information to establish with a doctor, directed to an urgent care or the emergency room.

## 2020-12-15 LAB — SARS-COV-2, NAA: NOT DETECTED

## 2020-12-17 ENCOUNTER — APPOINTMENT (OUTPATIENT)
Dept: GENERAL RADIOLOGY | Age: 70
End: 2020-12-17
Attending: INTERNAL MEDICINE
Payer: MEDICARE

## 2020-12-17 ENCOUNTER — HOSPITAL ENCOUNTER (OUTPATIENT)
Dept: CARDIAC CATH/INVASIVE PROCEDURES | Age: 70
Discharge: HOME | End: 2020-12-17
Attending: INTERNAL MEDICINE | Admitting: INTERNAL MEDICINE
Payer: MEDICARE

## 2020-12-17 VITALS — BODY MASS INDEX: 27.63 KG/M2 | WEIGHT: 193 LBS | HEIGHT: 70 IN

## 2020-12-17 LAB
ANION GAP SERPL CALCULATED.3IONS-SCNC: 10 MMOL/L (ref 3–16)
BASOPHILS ABSOLUTE: 0.1 K/UL (ref 0–0.2)
BASOPHILS RELATIVE PERCENT: 1.1 %
BUN BLDV-MCNC: 23 MG/DL (ref 7–20)
CALCIUM SERPL-MCNC: 9.6 MG/DL (ref 8.3–10.6)
CHLORIDE BLD-SCNC: 105 MMOL/L (ref 99–110)
CHOLESTEROL, TOTAL: 142 MG/DL (ref 0–199)
CO2: 22 MMOL/L (ref 21–32)
CREAT SERPL-MCNC: 1.1 MG/DL (ref 0.8–1.3)
EKG ATRIAL RATE: 60 BPM
EKG ATRIAL RATE: 66 BPM
EKG DIAGNOSIS: NORMAL
EKG DIAGNOSIS: NORMAL
EKG P AXIS: 77 DEGREES
EKG P-R INTERVAL: 206 MS
EKG Q-T INTERVAL: 378 MS
EKG Q-T INTERVAL: 430 MS
EKG QRS DURATION: 114 MS
EKG QRS DURATION: 136 MS
EKG QTC CALCULATION (BAZETT): 396 MS
EKG QTC CALCULATION (BAZETT): 440 MS
EKG R AXIS: -71 DEGREES
EKG R AXIS: 62 DEGREES
EKG T AXIS: 63 DEGREES
EKG T AXIS: 83 DEGREES
EKG VENTRICULAR RATE: 63 BPM
EKG VENTRICULAR RATE: 66 BPM
EOSINOPHILS ABSOLUTE: 0.3 K/UL (ref 0–0.6)
EOSINOPHILS RELATIVE PERCENT: 3.8 %
GFR AFRICAN AMERICAN: >60
GFR NON-AFRICAN AMERICAN: >60
GLUCOSE BLD-MCNC: 128 MG/DL (ref 70–99)
HCT VFR BLD CALC: 37.5 % (ref 40.5–52.5)
HDLC SERPL-MCNC: 45 MG/DL (ref 40–60)
HEMOGLOBIN: 12.4 G/DL (ref 13.5–17.5)
INR BLD: 1 (ref 0.86–1.14)
LDL CHOLESTEROL CALCULATED: 71 MG/DL
LYMPHOCYTES ABSOLUTE: 1.2 K/UL (ref 1–5.1)
LYMPHOCYTES RELATIVE PERCENT: 15.5 %
MCH RBC QN AUTO: 32.8 PG (ref 26–34)
MCHC RBC AUTO-ENTMCNC: 33.2 G/DL (ref 31–36)
MCV RBC AUTO: 98.9 FL (ref 80–100)
MONOCYTES ABSOLUTE: 0.7 K/UL (ref 0–1.3)
MONOCYTES RELATIVE PERCENT: 9 %
NEUTROPHILS ABSOLUTE: 5.5 K/UL (ref 1.7–7.7)
NEUTROPHILS RELATIVE PERCENT: 70.6 %
PDW BLD-RTO: 14.5 % (ref 12.4–15.4)
PLATELET # BLD: 219 K/UL (ref 135–450)
PMV BLD AUTO: 8.3 FL (ref 5–10.5)
POTASSIUM SERPL-SCNC: 4.3 MMOL/L (ref 3.5–5.1)
PROTHROMBIN TIME: 11.6 SEC (ref 10–13.2)
RBC # BLD: 3.79 M/UL (ref 4.2–5.9)
SODIUM BLD-SCNC: 137 MMOL/L (ref 136–145)
TRIGL SERPL-MCNC: 132 MG/DL (ref 0–150)
VLDLC SERPL CALC-MCNC: 26 MG/DL
WBC # BLD: 7.8 K/UL (ref 4–11)

## 2020-12-17 PROCEDURE — 93010 ELECTROCARDIOGRAM REPORT: CPT | Performed by: INTERNAL MEDICINE

## 2020-12-17 PROCEDURE — 2709999900 HC NON-CHARGEABLE SUPPLY

## 2020-12-17 PROCEDURE — C1887 CATHETER, GUIDING: HCPCS

## 2020-12-17 PROCEDURE — C1730 CATH, EP, 19 OR FEW ELECT: HCPCS

## 2020-12-17 PROCEDURE — 33225 L VENTRIC PACING LEAD ADD-ON: CPT

## 2020-12-17 PROCEDURE — 2580000003 HC RX 258

## 2020-12-17 PROCEDURE — C1769 GUIDE WIRE: HCPCS

## 2020-12-17 PROCEDURE — 85610 PROTHROMBIN TIME: CPT

## 2020-12-17 PROCEDURE — 80048 BASIC METABOLIC PNL TOTAL CA: CPT

## 2020-12-17 PROCEDURE — C1882 AICD, OTHER THAN SING/DUAL: HCPCS

## 2020-12-17 PROCEDURE — 80061 LIPID PANEL: CPT

## 2020-12-17 PROCEDURE — 71045 X-RAY EXAM CHEST 1 VIEW: CPT

## 2020-12-17 PROCEDURE — 6360000002 HC RX W HCPCS

## 2020-12-17 PROCEDURE — 99152 MOD SED SAME PHYS/QHP 5/>YRS: CPT

## 2020-12-17 PROCEDURE — 2500000003 HC RX 250 WO HCPCS

## 2020-12-17 PROCEDURE — 33264 RMVL & RPLCMT DFB GEN MLT LD: CPT

## 2020-12-17 PROCEDURE — C1900 LEAD, CORONARY VENOUS: HCPCS

## 2020-12-17 PROCEDURE — 85025 COMPLETE CBC W/AUTO DIFF WBC: CPT

## 2020-12-17 PROCEDURE — 33264 RMVL & RPLCMT DFB GEN MLT LD: CPT | Performed by: INTERNAL MEDICINE

## 2020-12-17 PROCEDURE — 93005 ELECTROCARDIOGRAM TRACING: CPT | Performed by: INTERNAL MEDICINE

## 2020-12-17 PROCEDURE — 33225 L VENTRIC PACING LEAD ADD-ON: CPT | Performed by: INTERNAL MEDICINE

## 2020-12-17 PROCEDURE — 99153 MOD SED SAME PHYS/QHP EA: CPT

## 2020-12-17 PROCEDURE — 6360000004 HC RX CONTRAST MEDICATION

## 2020-12-17 RX ORDER — FENTANYL CITRATE 50 UG/ML
INJECTION, SOLUTION INTRAMUSCULAR; INTRAVENOUS
Status: COMPLETED | OUTPATIENT
Start: 2020-12-17 | End: 2020-12-17

## 2020-12-17 RX ORDER — 0.9 % SODIUM CHLORIDE 0.9 %
INTRAVENOUS SOLUTION INTRAVENOUS CONTINUOUS PRN
Status: COMPLETED | OUTPATIENT
Start: 2020-12-17 | End: 2020-12-17

## 2020-12-17 RX ORDER — MIDAZOLAM HYDROCHLORIDE 5 MG/ML
INJECTION INTRAMUSCULAR; INTRAVENOUS
Status: COMPLETED | OUTPATIENT
Start: 2020-12-17 | End: 2020-12-17

## 2020-12-17 RX ORDER — SODIUM CHLORIDE 9 MG/ML
1000 INJECTION, SOLUTION INTRAVENOUS CONTINUOUS
Status: DISCONTINUED | OUTPATIENT
Start: 2020-12-17 | End: 2020-12-17 | Stop reason: HOSPADM

## 2020-12-17 RX ADMIN — FENTANYL CITRATE 50 MCG: 50 INJECTION, SOLUTION INTRAMUSCULAR; INTRAVENOUS at 10:08

## 2020-12-17 RX ADMIN — MIDAZOLAM HYDROCHLORIDE 2 MG: 5 INJECTION INTRAMUSCULAR; INTRAVENOUS at 11:50

## 2020-12-17 RX ADMIN — FENTANYL CITRATE 50 MCG: 50 INJECTION, SOLUTION INTRAMUSCULAR; INTRAVENOUS at 11:01

## 2020-12-17 RX ADMIN — FENTANYL CITRATE 50 MCG: 50 INJECTION, SOLUTION INTRAMUSCULAR; INTRAVENOUS at 10:37

## 2020-12-17 RX ADMIN — MIDAZOLAM HYDROCHLORIDE 1 MG: 5 INJECTION INTRAMUSCULAR; INTRAVENOUS at 11:21

## 2020-12-17 RX ADMIN — FENTANYL CITRATE 50 MCG: 50 INJECTION, SOLUTION INTRAMUSCULAR; INTRAVENOUS at 11:21

## 2020-12-17 RX ADMIN — MIDAZOLAM HYDROCHLORIDE 2 MG: 5 INJECTION INTRAMUSCULAR; INTRAVENOUS at 11:41

## 2020-12-17 RX ADMIN — MIDAZOLAM HYDROCHLORIDE 2 MG: 5 INJECTION INTRAMUSCULAR; INTRAVENOUS at 12:11

## 2020-12-17 RX ADMIN — FENTANYL CITRATE 50 MCG: 50 INJECTION, SOLUTION INTRAMUSCULAR; INTRAVENOUS at 10:22

## 2020-12-17 RX ADMIN — MIDAZOLAM HYDROCHLORIDE 2 MG: 5 INJECTION INTRAMUSCULAR; INTRAVENOUS at 10:37

## 2020-12-17 RX ADMIN — MIDAZOLAM HYDROCHLORIDE 2 MG: 5 INJECTION INTRAMUSCULAR; INTRAVENOUS at 10:22

## 2020-12-17 RX ADMIN — Medication 500 ML: at 11:36

## 2020-12-17 RX ADMIN — MIDAZOLAM HYDROCHLORIDE 2 MG: 5 INJECTION INTRAMUSCULAR; INTRAVENOUS at 11:01

## 2020-12-17 RX ADMIN — FENTANYL CITRATE 25 MCG: 50 INJECTION, SOLUTION INTRAMUSCULAR; INTRAVENOUS at 11:41

## 2020-12-17 RX ADMIN — FENTANYL CITRATE 50 MCG: 50 INJECTION, SOLUTION INTRAMUSCULAR; INTRAVENOUS at 12:10

## 2020-12-17 RX ADMIN — MIDAZOLAM HYDROCHLORIDE 2 MG: 5 INJECTION INTRAMUSCULAR; INTRAVENOUS at 10:08

## 2020-12-17 NOTE — PROCEDURES
After informed consent was obtained the patient was brought to the cardiac electrophysiology laboratory in the fasting state on 12/17/2020. He was prepared for the procedure application of ECG electrodes and an automated blood pressure cuff. Pacing and defibrillation patches were applied to the chest and the anterior posterior orientation. The left upper chest was shaved and prepared with antibacterial soap and the patient was draped in sterile fashion. After adequate sedation was achieved, local anesthetic was infiltrated along the previous incision line. The previous incision was opened and sharp dissection. This incision was carried down to the level of the ICD capsule. The ICD capsule was incised and the pre-existing ICD and leads were dissected from surrounding connective tissue. Under ultrasound guidance, the left axillary vein was visualized and cannulated with a thin-walled 18-gauge needle through which a J-tip guidewire is passed the guidewire was observed to advance to the inferior vena cava. A 9 Albanian hemostatic introducer was advanced over the guidewire the guidewire and dilator were removed. The CS sheath was introduced over a steerable quadripolar electrode catheter. This assembly was advanced to the right atrium under fluoroscopic guidance. Temps were made to cannulate the coronary sinus with the electric catheter. The os could be engaged, but the catheter would not pass into the coronary sinus. The electric catheter was introduced into the middle cardiac vein the sheath was advanced over the electrode catheter the electrode catheter was removed and a blunt tipped angiographic catheter was introduced. Contrast injections confirmed the presence in the middle cardiac vein. The sheath and intragraft catheter were withdrawn slightly. Injection then demonstrated the middle cardiac vein as well as a left posterior lateral vein. The origin of the true coronary sinus was stenotic.   Sheath was then advanced over the angiographic catheter into the left posterior lateral branch. The angiographic catheter was then removed. The left ventricular lead was introduced over an angioplasty guidewire into the left posterior lateral branch. The antiplastic guidewire was advanced into the distal position in this branch the lead was advanced over the guidewire to the mid position in this branch. Sensing and pacing characteristics at that site were evaluated and found to be adequate. The coronary sinus guiding sheath was then withdrawn and peeled away. The lead was fixed to the underlying prepectoralis fascia using 2-0 silk suture. The pre-existing right atrial and right ventricular leads were analyzed and found to be functioning normally. The pocket was irrigated copiously with a solution of antibiotic. The leads were then connected to the new ICD. An ICD was placed and an antimicrobial patch returned to the subcutaneous pocket. The subcutaneous tissues were closed in interrupted fashion using 3-0 Vicryl suture. The skin was closed in subcuticular fashion using 4-0 Vicryl sutures. The patient tolerated procedure well there are no apparent complications. Estimated blood loss less than 30 cc.     Implanted hardware    Pre-existing right atrial lead [employer] model PY44 RS, serial number [id number]    Pre-existing right ventricular lead [employer] model 1633 serial number [id number]    New RV lead [address] 3084 serial #[id number]    Biventricular ICD [employer] model G138 serial #[id number]      Explanted device   [employer] model [id number] serial #[id number]

## 2020-12-17 NOTE — H&P
Signed             Primary Violeta Hackett MD  Chief complaint: VF Follow up; no complaints        HPI:  Mau Jacinto is a 69 y. o. male who presents for evaluation of ventricular fibrillation.  He was admitted 6/28/2020 after receiving ICD shock for VFib.  This restored sinus rhythm.  He had his dual-chamber pacemaker implanted in 4/2011 for ischemic cardiomyopathy by Dr. Jesi Pickard.  Interrogation of the device demonstrated a few episodes of NSVT and brief PAT over the past several months. Catalino Loosen on 6/28/2020 showed an EF of 15-20% with severe global dysfunction with more prominent anterior hypokinesis.  Stress test on 6/28/2020 showed an EF of 24%, anteroseptal/apical akinesis, inferior wall hypokinesis, fixed defect in anteroseptal.apical, inferior walls consistent with large scar, no ischemia.                 Today he states that he feels good. Jeremiah Kingsley states that he has been feeling fine since he has been out of the hospital. Jeremiah Kingsley remains very active. Jeremiah Kingsley states that a couple of flights of stairs makes him short of breath.  He can do one flight well. Jeremiah Kingsley tolerates doing daily activities. Jeremiah Kingsley is not limited. Lalita Layne denies chest pain, palpitations, dizziness or syncope. Device check today shows normal function.   <1%.  Battery Life 3 years.  1 VF episode 6/28/2020 which was appropriately recognized by the device and terminated with high energy therapy.     Past Medical History:   has a past medical history of AICD (automatic cardioverter/defibrillator) present, Arthritis, CAD (coronary artery disease), CHF (congestive heart failure) ([address] Chronic systolic CHF (congestive heart failure), NYHA class 3 ([address] GERD (gastroesophageal reflux disease), Hearing loss, Hyperlipidemia, MI (myocardial infarction) ([address] and Rash.     Surgical History:   has a past surgical history that includes Coronary angioplasty with stent; Cardiac defibrillator placement; knee surgery; Cardiac defibrillator placement; Colonoscopy;    No facility-administered encounter medications on file as of 7/17/2020.             Allergies:  Patient has no known allergies.      Review of Systems   Constitutional: Negative.    HENT: Negative.    Eyes: Negative.    Respiratory: Negative.    Cardiovascular: Negative. Gastrointestinal: Negative.    Genitourinary: Negative.    Musculoskeletal: Negative.    Skin: Negative.    Neurological: Negative.    Hematological: Negative.    Psychiatric/Behavioral: Negative.     BP 90/60   Pulse 73   Ht 5' 10\" (1.778 m)   Wt 190 lb (86.2 kg)   SpO2 98%   BMI 27.26 kg/m²         Objective:  Physical Exam   Constitutional: He is oriented to person, place, and time. He appears well-developed and well-nourished. HENT:   Head: Normocephalic and atraumatic. Eyes: Pupils are equal, round, and reactive to light. Neck: Normal range of motion. Cardiovascular: Normal rate, regular rhythm and normal heart sounds.    Pulmonary/Chest: Effort normal and breath sounds normal.   Abdominal: Soft. No tenderness. Musculoskeletal: Normal range of motion. He exhibits no edema. Neurological: He is alert and oriented to person, place, and time. Skin: Skin is warm and dry. Psychiatric: He has a normal mood and affect.      Assessment:  1.  Ischemic CM  2.  S/P VF episode  6/20/20  3.  LBBB- CRT was discussed as QRS duration is > 130 msec      Plan:  1.  Remote device check every 3months  2.  Follow up in 6 months  3. LUE venogram demonstrates patency of the left subclavian vein  4.  Plan upgrade to CRT-D           Jamie Campbell M.D.

## 2020-12-18 ENCOUNTER — NURSE ONLY (OUTPATIENT)
Dept: CARDIOLOGY CLINIC | Age: 70
End: 2020-12-18

## 2020-12-18 NOTE — LETTER
[address] [facility] [phone]  [facility]  [place] [facility] [facility] [phone]    Pacemaker/Defibrillator Clinic          12/18/20        Marciano Quinones  [address] [facility] [address]  [place] [address]        Dear Marciano Quinones    This letter is to inform you that we received the transmission from your monitor at home that checks your implanted heart device. The next date your monitor will automatically transmit will be 6/28/2021. If your report needs attention we will notify you. Your device and monitor are wireless and most transmit cellularly, but please periodically check your monitor is still plugged in to the electrical outlet. If you still use the telephone land line to send please ensure the connection to the phone antwan is secure. This will help to ensure successful automatic transmissions in the future. Also, the monitor needs to be close to you while sleeping at night. Please be aware that the remote device transmission sites are periodically monitored only during regular business hours during which simultaneous in-office device clinics are being run. If your transmission requires attention, we will contact you as soon as possible. Thank you.             [facility]

## 2020-12-18 NOTE — PROGRESS NOTES
Initial set up of latitude completed and transmission received. Follow up as scheduled.     Percent Paced  Atrial2 %  Right [id number] %  Left Ventricular (LVa)99 %  Left Ventricular (LVb)0 %

## 2020-12-23 ENCOUNTER — NURSE ONLY (OUTPATIENT)
Dept: CARDIOLOGY CLINIC | Age: 70
End: 2020-12-23
Payer: MEDICARE

## 2020-12-23 PROCEDURE — 93284 PRGRMG EVAL IMPLANTABLE DFB: CPT | Performed by: INTERNAL MEDICINE

## 2020-12-23 NOTE — PROGRESS NOTES
Patient presents to the device clinic today for a programming evaluation for his defibrillator. Patient has a history of ICM and VT. Takes Coreg and Plavix. Patient's device was upgraded from dual chamber to CRT on 12/17 by Dr. Sarah Beth Antonio. Since then, no arrhythmias recorded. AP <1%  5%  LV 98%  All sensing and pacing parameters are within normal range. LV pace vector was changed to [address], pulse width increased to 0.6ms. Sensed AV delay changed to 100 ms, paced AV delay was changed to 160 ms. Incision is clean and dry with all dressings removed and site left open to the air. Patient education was provided about site care, device functionality, in home monitoring, and any other patient questions and/or concerns were addressed. Aftercare and remote monitoring literature was provided. Patient voices understanding. Please see interrogation for more detail. Patient will follow up in 3 months in office or remotely. See Paceart report under the Cardiology tab.

## 2021-01-04 ENCOUNTER — TELEPHONE (OUTPATIENT)
Dept: FAMILY MEDICINE CLINIC | Age: 71
End: 2021-01-04

## 2021-01-04 DIAGNOSIS — Z72.0 TOBACCO ABUSE: Primary | ICD-10-CM

## 2021-01-04 NOTE — TELEPHONE ENCOUNTER
Pt. Has been contacted by [facility] that he is due for his annual CT Screening. Please let patient know when ordered has been placed. I cannot review the pharmacy that is pended.

## 2021-01-06 ENCOUNTER — TELEPHONE (OUTPATIENT)
Dept: CARDIOLOGY CLINIC | Age: 71
End: 2021-01-06

## 2021-01-06 ENCOUNTER — OFFICE VISIT (OUTPATIENT)
Dept: FAMILY MEDICINE CLINIC | Age: 71
End: 2021-01-06
Payer: MEDICARE

## 2021-01-06 VITALS
DIASTOLIC BLOOD PRESSURE: 64 MMHG | BODY MASS INDEX: 28.58 KG/M2 | WEIGHT: 193 LBS | SYSTOLIC BLOOD PRESSURE: 118 MMHG | HEIGHT: 69 IN | TEMPERATURE: 96.5 F | HEART RATE: 58 BPM

## 2021-01-06 DIAGNOSIS — N18.31 STAGE 3A CHRONIC KIDNEY DISEASE (HCC): ICD-10-CM

## 2021-01-06 DIAGNOSIS — I50.22 CHRONIC SYSTOLIC CONGESTIVE HEART FAILURE (HCC): ICD-10-CM

## 2021-01-06 DIAGNOSIS — I47.29 PAROXYSMAL VENTRICULAR TACHYCARDIA: ICD-10-CM

## 2021-01-06 DIAGNOSIS — Z87.891 PERSONAL HISTORY OF TOBACCO USE: Primary | ICD-10-CM

## 2021-01-06 DIAGNOSIS — Z12.11 COLON CANCER SCREENING: ICD-10-CM

## 2021-01-06 DIAGNOSIS — I20.8 OTHER FORMS OF ANGINA PECTORIS (HCC): ICD-10-CM

## 2021-01-06 DIAGNOSIS — M35.3 POLYMYALGIA RHEUMATICA (HCC): ICD-10-CM

## 2021-01-06 DIAGNOSIS — R73.01 IFG (IMPAIRED FASTING GLUCOSE): ICD-10-CM

## 2021-01-06 DIAGNOSIS — J41.0 SIMPLE CHRONIC BRONCHITIS (HCC): ICD-10-CM

## 2021-01-06 LAB — HBA1C MFR BLD: 6.1 %

## 2021-01-06 PROCEDURE — 83036 HEMOGLOBIN GLYCOSYLATED A1C: CPT | Performed by: PHYSICIAN ASSISTANT

## 2021-01-06 PROCEDURE — 4040F PNEUMOC VAC/ADMIN/RCVD: CPT | Performed by: PHYSICIAN ASSISTANT

## 2021-01-06 PROCEDURE — G8484 FLU IMMUNIZE NO ADMIN: HCPCS | Performed by: PHYSICIAN ASSISTANT

## 2021-01-06 PROCEDURE — G8427 DOCREV CUR MEDS BY ELIG CLIN: HCPCS | Performed by: PHYSICIAN ASSISTANT

## 2021-01-06 PROCEDURE — 4004F PT TOBACCO SCREEN RCVD TLK: CPT | Performed by: PHYSICIAN ASSISTANT

## 2021-01-06 PROCEDURE — 1123F ACP DISCUSS/DSCN MKR DOCD: CPT | Performed by: PHYSICIAN ASSISTANT

## 2021-01-06 PROCEDURE — G8417 CALC BMI ABV UP PARAM F/U: HCPCS | Performed by: PHYSICIAN ASSISTANT

## 2021-01-06 PROCEDURE — 99214 OFFICE O/P EST MOD 30 MIN: CPT | Performed by: PHYSICIAN ASSISTANT

## 2021-01-06 PROCEDURE — 3023F SPIROM DOC REV: CPT | Performed by: PHYSICIAN ASSISTANT

## 2021-01-06 PROCEDURE — 3017F COLORECTAL CA SCREEN DOC REV: CPT | Performed by: PHYSICIAN ASSISTANT

## 2021-01-06 PROCEDURE — 93000 ELECTROCARDIOGRAM COMPLETE: CPT | Performed by: PHYSICIAN ASSISTANT

## 2021-01-06 PROCEDURE — G8926 SPIRO NO PERF OR DOC: HCPCS | Performed by: PHYSICIAN ASSISTANT

## 2021-01-06 PROCEDURE — G0296 VISIT TO DETERM LDCT ELIG: HCPCS | Performed by: PHYSICIAN ASSISTANT

## 2021-01-06 ASSESSMENT — PATIENT HEALTH QUESTIONNAIRE - PHQ9
SUM OF ALL RESPONSES TO PHQ9 QUESTIONS 1 & 2: 0
1. LITTLE INTEREST OR PLEASURE IN DOING THINGS: 0
2. FEELING DOWN, DEPRESSED OR HOPELESS: 0

## 2021-01-06 NOTE — PROGRESS NOTES
2021  Hima James (: 1950)  66 y.o. HPI    Routine follow up chronic conditions     H/o vt/vf/PAT/LBBB/ischemic cardiomyopathy/cad/chronic combined chf: follows with cardiology. Last hospitalization 2020 after icd shock for vt which degenerated into CF. Stress test then showed fixed defects. Echo 2020 EF ~15. Current medication regimen includes carvedilol, plavix, lisinopril, spironolactone & statin. Endorses palpitations. Denies CP, soa, le swelling. Breathing at baseline  Sleep is ok. No current diet or exercise regimen   Due for labs. Review of Systems   Constitutional: Negative for activity change, chills and fever. HENT: Negative for congestion, ear pain, rhinorrhea and sore throat. Eyes: Negative for visual disturbance. Respiratory: Negative for cough and shortness of breath. Cardiovascular: Positive for palpitations. Negative for chest pain. Gastrointestinal: Negative for abdominal pain, constipation, diarrhea, nausea and vomiting. Genitourinary: Negative for difficulty urinating and dysuria. Musculoskeletal: Negative for arthralgias and myalgias. Skin: Negative for rash. Neurological: Negative for dizziness, weakness and numbness. Psychiatric/Behavioral: Negative for sleep disturbance. Allergies, past medical history, family history, and social history reviewed and unchanged from previous encounter.      Current Outpatient Medications   Medication Sig Dispense Refill    omeprazole (PRILOSEC) 20 MG delayed release capsule Take 1 capsule by mouth every morning (before breakfast) 90 capsule 1    carvedilol (COREG) 3.125 MG tablet Take 1 tablet by mouth 2 times daily (with meals) 180 tablet 3    clopidogrel (PLAVIX) 75 MG tablet Take 1 tablet by mouth daily 90 tablet 3    gemfibrozil (LOPID) 600 MG tablet Take 1 tablet by mouth 2 times daily (before meals) 180 tablet 3  lisinopril (PRINIVIL;ZESTRIL) 5 MG tablet Take 1 tablet by mouth daily 90 tablet 3    rosuvastatin (CRESTOR) 40 MG tablet Take 1 tablet by mouth every evening 90 tablet 3    nitroGLYCERIN (NITROSTAT) 0.4 MG SL tablet Place 1 tablet under the tongue every 5 minutes as needed for Chest pain 25 tablet 1    ipratropium-albuterol (DUONEB) 0.5-2.5 (3) MG/3ML SOLN nebulizer solution Inhale 3 mLs into the lungs every 6 hours as needed for Shortness of Breath (Patient not taking: Reported on 1/21/2021) 120 vial 5    finasteride (PROSCAR) 5 MG tablet Take 5 mg by mouth daily Indications: Rx by Dr. Sigifredo Elam methotrexate (RHEUMATREX) 2.5 MG chemo tablet Take 1 tablet by mouth once a week RX directions are 4 tablets weekly. Patient takes one tablet Monday/Wednesday/Friday. 1 tablet 0    folic acid (FOLVITE) 1 MG tablet Take 1 mg by mouth daily      tamsulosin (FLOMAX) 0.4 MG capsule Take 0.4 mg by mouth daily      aspirin 81 MG tablet Take 81 mg by mouth daily.  metoprolol succinate (TOPROL XL) 50 MG extended release tablet Take 1 tablet by mouth daily 90 tablet 3    Handicap Placard MISC by Does not apply route Exp: 1/1/2024 2 each 0    spironolactone (ALDACTONE) 25 MG tablet TAKE 1/2 TABLET EVERY DAY 45 tablet 0     No current facility-administered medications for this visit. Vitals:    01/06/21 1016   BP: 118/64   Site: Right Upper Arm   Position: Sitting   Cuff Size: Small Adult   Pulse: 58   Temp: 96.5 °F (35.8 °C)   TempSrc: Temporal   SpO2: Comment: Could not obtain, fingers too cold   Weight: 193 lb (87.5 kg)   Height: 5' 9\" (1.753 m)     Estimated body mass index is 28.5 kg/m² as calculated from the following:    Height as of this encounter: 5' 9\" (1.753 m). Weight as of this encounter: 193 lb (87.5 kg). Physical Exam  Constitutional:       General: He is not in acute distress. Appearance: He is well-developed. HENT:      Head: Normocephalic and atraumatic.    Eyes:

## 2021-01-06 NOTE — TELEPHONE ENCOUNTER
Pt calling. Pt saw PCP today and said that the doctor said maybe he should call our office and let JMB  Know he is having some chest pains that come and go. Pt states that his chest is sore. Pt states that sometimes he gets a little pain in his left elbow. Pt PCP said that when they took his pulse, it stops and starts. Please call pt to advise what needs to be done.

## 2021-01-07 ENCOUNTER — TELEPHONE (OUTPATIENT)
Dept: FAMILY MEDICINE CLINIC | Age: 71
End: 2021-01-07

## 2021-01-07 ENCOUNTER — TELEPHONE (OUTPATIENT)
Dept: CARDIOLOGY CLINIC | Age: 71
End: 2021-01-07

## 2021-01-07 DIAGNOSIS — Z95.810 ICD (IMPLANTABLE CARDIOVERTER-DEFIBRILLATOR) IN PLACE: Primary | ICD-10-CM

## 2021-01-07 DIAGNOSIS — I42.9 SECONDARY CARDIOMYOPATHY (HCC): ICD-10-CM

## 2021-01-07 DIAGNOSIS — M17.10 ARTHRITIS OF KNEE: ICD-10-CM

## 2021-01-07 DIAGNOSIS — I50.22 CHRONIC SYSTOLIC CONGESTIVE HEART FAILURE (HCC): ICD-10-CM

## 2021-01-07 DIAGNOSIS — R06.02 SOB (SHORTNESS OF BREATH): ICD-10-CM

## 2021-01-07 DIAGNOSIS — I50.22 CHRONIC SYSTOLIC CONGESTIVE HEART FAILURE (HCC): Primary | ICD-10-CM

## 2021-01-07 DIAGNOSIS — I25.5 ISCHEMIC CARDIOMYOPATHY: ICD-10-CM

## 2021-01-07 RX ORDER — SPIRONOLACTONE 25 MG/1
TABLET ORAL
Qty: 45 TABLET | Refills: 0 | Status: SHIPPED | OUTPATIENT
Start: 2021-01-07 | End: 2021-05-20 | Stop reason: SDUPTHER

## 2021-01-07 NOTE — TELEPHONE ENCOUNTER
Last ov 8.20.20  Assessment:   1. Ischemic cardiomyopathy:  Note ECHO 6/29/20 showed EF=15-20%; moderate LV dilation; grade II DD with elevated filling pressure; mod MR/mild TR (previous ECHO done 2014 - EF 25% and 12/18-EF 20-25%). Most recent EKG 7/20 shows NSR; 1st degree AV block; LBBB. Device check today demonstrates normal function with 2 new NSVT episodes 7-8 secs. He saw EP Dr. Akosua London in June who recommended upgrade to BiV-ICD due to new LBBB but he wanted to think about it. He is pretty sure he wants device but is still thinking now. Will start low dose aldactone to see if he can tolerate.        2. Hyperlipidemia:  Most recent ykopha29/28/18 see results I personally reviewed (see above). Well controlled and will continue current medical regimen.        3. CAD:   s/p RCA stents prior. Cardiac cath 6/2014 --> stable known ischemic heart disease. Most recent lexiscan stress test 6/28/20 demonstrated large scar anteroseptal/apical and inferior walls; severe LV dysfunction, LVEF 24%, no ischemia. There are no concerning symptoms for angina currently. 4. Orthostatic hypotension: Resolved     Plan:  1. Recommend starting Spironolactone (aldactone) 12.5 mg daily   2. Continue all other medications   3. Labs - BMP in 1 week after starting the spironolactone   4. Contact our office when you decide to proceed with the device upgrade with Dr. Akosua London  5. No cardiac testing warranted at this time  6.  Follow up at [facility] with Dr. Susana Seay in December 2020

## 2021-01-07 NOTE — TELEPHONE ENCOUNTER
Spoke with pt. Pain sounds possibly incisional and part of healing. Pt states that pain is better than it was. Advised pt to continue to monitor symptoms and either call or proceed to the ER if it worsens, doesn't go away, or he develops any other symptoms. Spoke with Dr. Bernie Aldana and Danni Crockett. Both agreed.

## 2021-01-07 NOTE — TELEPHONE ENCOUNTER
Pt is asking for a prescription to get a Handicap Placard. Do you want to order or have PCP give? Last seen [facility] on 8.20.20     Device was upgraded on 12.17.20 by NICOLE    Assessment:   1. Ischemic cardiomyopathy:  Note ECHO 6/29/20 showed EF=15-20%; moderate LV dilation; grade II DD with elevated filling pressure; mod MR/mild TR (previous ECHO done 2014 - EF 25% and 12/18-EF 20-25%). Most recent EKG 7/20 shows NSR; 1st degree AV block; LBBB. Device check today demonstrates normal function with 2 new NSVT episodes 7-8 secs. He saw EP Dr. Shukri Ochoa in June who recommended upgrade to BiV-ICD due to new LBBB but he wanted to think about it. He is pretty sure he wants device but is still thinking now. Will start low dose aldactone to see if he can tolerate.        2. Hyperlipidemia:  Most recent mcippy23/28/18 see results I personally reviewed (see above). Well controlled and will continue current medical regimen.        3. CAD:   s/p RCA stents prior. Cardiac cath 6/2014 --> stable known ischemic heart disease. Most recent lexiscan stress test 6/28/20 demonstrated large scar anteroseptal/apical and inferior walls; severe LV dysfunction, LVEF 24%, no ischemia. There are no concerning symptoms for angina currently. 4. Orthostatic hypotension: Resolved     Plan:  1. Recommend starting Spironolactone (aldactone) 12.5 mg daily   2. Continue all other medications   3. Labs - BMP in 1 week after starting the spironolactone   4. Contact our office when you decide to proceed with the device upgrade with Dr. Shukri Ochoa  5. No cardiac testing warranted at this time  6.  Follow up at [facility] with Dr. Conrado Hampton in December 2020

## 2021-01-07 NOTE — TELEPHONE ENCOUNTER
Spoke with pt and he would prefer it to be mailed to home address, verified with one on file. Mailed.

## 2021-01-07 NOTE — TELEPHONE ENCOUNTER
I am OK ordering handicap placard. Please arrange and I will sign or do whatever I have to do. Thanks.

## 2021-01-12 ENCOUNTER — HOSPITAL ENCOUNTER (OUTPATIENT)
Dept: CT IMAGING | Age: 71
Discharge: HOME | End: 2021-01-12
Payer: MEDICARE

## 2021-01-12 DIAGNOSIS — Z87.891 PERSONAL HISTORY OF TOBACCO USE: ICD-10-CM

## 2021-01-12 PROCEDURE — 71271 CT THORAX LUNG CANCER SCR C-: CPT

## 2021-01-15 ENCOUNTER — TELEPHONE (OUTPATIENT)
Dept: CARDIOLOGY CLINIC | Age: 71
End: 2021-01-15

## 2021-01-15 NOTE — TELEPHONE ENCOUNTER
Pt called wondering if he could stop in and  the letter to have his handicap stickers renewed.  Please and Thank you

## 2021-01-15 NOTE — TELEPHONE ENCOUNTER
Patient calling in stating that he was woken up by a shock from his ICD about midnight last night. He reports he felt fine. He got up and walked around for a few minutes and then went back to bed. He feels fine this morning. Instructed patient on how to send transmission. Latitude box is flashing yellow, indicating that there is a software update downloading \"briefly\". Stayed on phone with patient for 10 minutes, still flashing yellow. Gave patient number to call  [phone]) for latitude to make sure things are set up on their end correctly. V/u. Patient will call latitude once he hangs up.

## 2021-01-15 NOTE — TELEPHONE ENCOUNTER
Latitude transmission received and pt did not receive any shocks. There were no arrhythmias recorded and device is functioning as programmed. See PACEART report under Cardiology tab.

## 2021-01-20 PROBLEM — I44.7 LBBB (LEFT BUNDLE BRANCH BLOCK): Status: ACTIVE | Noted: 2021-01-20

## 2021-01-20 NOTE — PROGRESS NOTES
[facility]   Electrophysiology Outpatient Note              Date:  January 21, 2021  Patient name: Felton Campbell  YOB: 1950    Primary Care physician: Char Odell MD    HISTORY OF PRESENT ILLNESS: The patient is a 66 y.o.  male with a history of VT/VF, PAT, LBBB, orthostatic hypotension, ischemic cardiomyopathy, CAD, chronic combined CHF, mitral regurgitation, HLD, CKD, PE, chronic anemia, and hearing loss. Notes indicate he had a dual chamber ICD implanted in 2011. Most recent [facility] in 2014 showed patent stents. He was admitted in 6/2020 with ICD shock for VT then degenerated in VF. Stress test at that time showed fixed defects. Echo 6/2020 showed an EF of 15-20%. In 12/2020 he had an upgrade to a BiV ICD. Today he is being seen for VT. EKG shows SR with trigeminal PVCs with a HR of 80. Patient had intermittent chest pain that 'felt like indigestion' post procedure. No chest pain in the last week. Palpates an irregular pulse but does not have palpitations. Feels he was shocked (felt it on 1/15/2021 but device check was normal). He denies shortness of breath and dizziness. Device check today shows:   Brand: [email or web address]  Mode: DDD  Normal function   Less than 1% AP  2% RVP 99% effective LVP   Arrhythmias: frequent PVCs  Battery life 11 years  RA impedance 366 ohms   RV impedance 381 ohms   LV impedance 1284 ohms  RA threshold 0.7 V @ 0.4 ms  RV threshold 0.8 V @ 0.4 ms  LV threshold 1.4  V @ 0.6 ms  RA sensitivity 0.25 mV  RV sensitivity 0.6 mV    Past Medical History:   has a past medical history of AICD (automatic cardioverter/defibrillator) present, Arthritis, CAD (coronary artery disease), CHF (congestive heart failure) ([address] Chronic systolic CHF (congestive heart failure), NYHA class 3 ([address] GERD (gastroesophageal reflux disease), Hearing loss, Hyperlipidemia, MI (myocardial infarction) ([address] and Rash.     Past Surgical History:   has a past surgical history that includes Coronary angioplasty with stent; Cardiac defibrillator placement (Left, 12/17/2020); knee surgery; Colonoscopy; Cataract removal with implant (Right, 02/01/2017); Cataract removal with implant (Left, 03/01/2017); and eye surgery. Home Medications:    Prior to Admission medications    Medication Sig Start Date End Date Taking? Authorizing Provider   spironolactone (ALDACTONE) 25 MG tablet TAKE 1/2 TABLET EVERY DAY 1/7/21  Yes Christy Fountain MD   omeprazole (PRILOSEC) 20 MG delayed release capsule Take 1 capsule by mouth every morning (before breakfast) 10/22/20  Yes Jones Ramos MD   carvedilol (COREG) 3.125 MG tablet Take 1 tablet by mouth 2 times daily (with meals) 6/29/20  Yes Christy Fountain MD   clopidogrel (PLAVIX) 75 MG tablet Take 1 tablet by mouth daily 6/29/20  Yes Christy Fountain MD   gemfibrozil (LOPID) 600 MG tablet Take 1 tablet by mouth 2 times daily (before meals) 6/29/20  Yes Christy Fountain MD   lisinopril (PRINIVIL;ZESTRIL) 5 MG tablet Take 1 tablet by mouth daily 6/29/20  Yes Christy Fountain MD   rosuvastatin (CRESTOR) 40 MG tablet Take 1 tablet by mouth every evening 6/29/20  Yes Christy Fountain MD   nitroGLYCERIN (NITROSTAT) 0.4 MG SL tablet Place 1 tablet under the tongue every 5 minutes as needed for Chest pain 10/17/19  Yes Christy Fountain MD   finasteride (PROSCAR) 5 MG tablet Take 5 mg by mouth daily Indications: Rx by Dr. Cate Walls MD   methotrexate (RHEUMATREX) 2.5 MG chemo tablet Take 1 tablet by mouth once a week RX directions are 4 tablets weekly. Patient takes one tablet Monday/Wednesday/Friday. 3/5/19  Yes Carrie Álvarez MD   folic acid (FOLVITE) 1 MG tablet Take 1 mg by mouth daily   Yes Historical Provider, MD   tamsulosin (FLOMAX) 0.4 MG capsule Take 0.4 mg by mouth daily   Yes Joelle Crowe MD   aspirin 81 MG tablet Take 81 mg by mouth daily.    Yes Historical Provider, MD   Handicap Placard MISC by Does not apply route Exp: 1/1/2024 1/15/21   WILBERTO Terry   ipratropium-albuterol (DUONEB) 0.5-2.5 (3) MG/3ML SOLN nebulizer solution Inhale 3 mLs into the lungs every 6 hours as needed for Shortness of Breath  Patient not taking: Reported on 1/21/2021 5/10/19   Olga Velarde MD       Allergies:  Patient has no known allergies. Social History:   reports that he has been smoking cigarettes. He started smoking about 59 years ago. He has a 40.50 pack-year smoking history. He has never used smokeless tobacco. He reports that he does not drink alcohol or use drugs. Family History: family history includes Cancer in his father; Diabetes in his mother. Review of Systems   All 14-point review of systems are completed and pertinent positives are mentioned in the history of present illness. Other systems are reviewed and are negative. PHYSICAL EXAM:    Vital signs:    /64   Pulse 81   Ht 5' 9\" (1.753 m)   Wt 198 lb 8 oz (90 kg)   SpO2 98%   BMI 29.31 kg/m²      Constitutional and general appearance: alert, cooperative, distracted, no distress and appears stated age  HEENT: PERRL, no cervical lymphadenopathy. No masses palpable.  Normal oral mucosa  Respiratory:  · Normal excursion and expansion without use of accessory muscles  · Resp auscultation: Normal breath sounds without wheezing, rhonchi, and rales  Cardiovascular:  · The apical impulse is not displaced  · Heart tones are crisp and normal. regular S1 and S2.  · Jugular venous pulsation Normal  · The carotid upstroke is normal in amplitude and contour without delay or bruit  · Peripheral pulses are symmetrical and full   Abdomen:  · No masses or tenderness  · Bowel sounds present  Extremities:  ·  No cyanosis or clubbing  ·  No lower extremity edema  ·  Skin: warm and dry; right upper chest incision is closed and healed   Neurological:  · Alert and oriented  · Moves all extremities well  · + distracted, difficult to hours.  FASTING LIPID PANEL:  Lab Results   Component Value Date    HDL 45 12/17/2020    LDLDIRECT 86 10/07/2015    LDLCALC 71 12/17/2020    TRIG 132 12/17/2020     LIVER PROFILE:No results for input(s): AST, ALT, ALB in the last 72 hours. Assessment:   Ventricular tachycardia and NSVT: stable    -received ICD shock 6/2020 (VT degenerated into VF)  PVC: new problem, noted on EKG today   Ischemic cardiomyopathy: stable    -s/p dual chamber ICD implant 2011   -s/p upgrade to BiV ICD 12/2020   -device check today shows normal function as noted on HPI   LBBB: chronic, stable   PAT: stable   -noted on device checks  Orthostatic hypotension: resolved   CAD: stable     -s/p multiple PCI    -most recent [facility] 2014 showed patent stents and stress test 6/2020 showed fixed defects   -followed by Dr. Jose Cormier   Chronic combined CHF: compensated   Mitral regurgitation: moderate on echo 6/2020  CKD  Chronic anemia  History of bilateral PE: noted 2009  Polymyalgia rheumatica   Former tobacco abuse  Hearing loss    Plan:   1. Continue ASA, Plavix, lisinopril, and spironolactone   2. Stop Coreg   3. Start Toprol 50mg po QD for PVCs  4. Monitor BP at home and call if consistently out of goal ranges   5. Follow up in 2 months with Dr. Heather Cary to evaluate PVC burden. He may be a candidate for a PVC ablation if not improved. Patient is adamant that he received ICD shock. No shocks confirmed on device check 1/15/2021 and 1/21/2021. PVCs were noted on EKG today. Not noted on EKG 1/6/2021. BiV pacing is not currently affected. Changed Coreg to Toprol. Will reevaluate efficacy at next visit. Patient was seen outside of global device window for PVCs. I have spent 35 minutes of face to face time with the patient with more than 50% spent counseling and coordinating care for PVCs. MDM: SUSHMA Arboleda-MOHAN. [facility]  [phone]

## 2021-01-21 ENCOUNTER — NURSE ONLY (OUTPATIENT)
Dept: CARDIOLOGY CLINIC | Age: 71
End: 2021-01-21
Payer: MEDICARE

## 2021-01-21 ENCOUNTER — OFFICE VISIT (OUTPATIENT)
Dept: CARDIOLOGY CLINIC | Age: 71
End: 2021-01-21
Payer: MEDICARE

## 2021-01-21 VITALS
SYSTOLIC BLOOD PRESSURE: 118 MMHG | BODY MASS INDEX: 29.4 KG/M2 | OXYGEN SATURATION: 98 % | DIASTOLIC BLOOD PRESSURE: 64 MMHG | HEART RATE: 81 BPM | WEIGHT: 198.5 LBS | HEIGHT: 69 IN

## 2021-01-21 DIAGNOSIS — I47.29 PAROXYSMAL VENTRICULAR TACHYCARDIA: ICD-10-CM

## 2021-01-21 DIAGNOSIS — I44.7 LBBB (LEFT BUNDLE BRANCH BLOCK): ICD-10-CM

## 2021-01-21 DIAGNOSIS — Z95.810 PRESENCE OF CARDIAC RESYNCHRONIZATION THERAPY DEFIBRILLATOR (CRT-D): ICD-10-CM

## 2021-01-21 DIAGNOSIS — I25.5 ISCHEMIC CARDIOMYOPATHY: ICD-10-CM

## 2021-01-21 PROCEDURE — 4040F PNEUMOC VAC/ADMIN/RCVD: CPT | Performed by: NURSE PRACTITIONER

## 2021-01-21 PROCEDURE — G8484 FLU IMMUNIZE NO ADMIN: HCPCS | Performed by: NURSE PRACTITIONER

## 2021-01-21 PROCEDURE — 1123F ACP DISCUSS/DSCN MKR DOCD: CPT | Performed by: NURSE PRACTITIONER

## 2021-01-21 PROCEDURE — 3017F COLORECTAL CA SCREEN DOC REV: CPT | Performed by: NURSE PRACTITIONER

## 2021-01-21 PROCEDURE — 93000 ELECTROCARDIOGRAM COMPLETE: CPT | Performed by: NURSE PRACTITIONER

## 2021-01-21 PROCEDURE — G8417 CALC BMI ABV UP PARAM F/U: HCPCS | Performed by: NURSE PRACTITIONER

## 2021-01-21 PROCEDURE — 4004F PT TOBACCO SCREEN RCVD TLK: CPT | Performed by: NURSE PRACTITIONER

## 2021-01-21 PROCEDURE — 99215 OFFICE O/P EST HI 40 MIN: CPT | Performed by: NURSE PRACTITIONER

## 2021-01-21 PROCEDURE — G8427 DOCREV CUR MEDS BY ELIG CLIN: HCPCS | Performed by: NURSE PRACTITIONER

## 2021-01-21 PROCEDURE — 93284 PRGRMG EVAL IMPLANTABLE DFB: CPT | Performed by: INTERNAL MEDICINE

## 2021-01-21 RX ORDER — METOPROLOL SUCCINATE 50 MG/1
50 TABLET, EXTENDED RELEASE ORAL DAILY
Qty: 90 TABLET | Refills: 3 | Status: SHIPPED | OUTPATIENT
Start: 2021-01-21 | End: 2021-06-23

## 2021-01-21 NOTE — PATIENT INSTRUCTIONS
Stop Coreg after receiving Toprol in mail   Start Toprol 50mg daily   Monitor BP at home and call if consistently out of goal ranges   Follow up in March

## 2021-01-21 NOTE — LETTER
[address]   Electrophysiology Outpatient Note              Date:  January 21, 2021  Patient name: Domonique Ocampo  YOB: 1950    Primary Care physician: Crow Bell MD    HISTORY OF PRESENT ILLNESS: The patient is a 66 y.o.  male with a history of VT/VF, PAT, LBBB, orthostatic hypotension, ischemic cardiomyopathy, CAD, chronic combined CHF, mitral regurgitation, HLD, CKD, PE, chronic anemia, and hearing loss. Notes indicate he had a dual chamber ICD implanted in 2011. Most recent [facility] in 2014 showed patent stents. He was admitted in 6/2020 with ICD shock for VT then degenerated in VF. Stress test at that time showed fixed defects. Echo 6/2020 showed an EF of 15-20%. In 12/2020 he had an upgrade to a BiV ICD. Today he is being seen for VT. EKG shows SR with trigeminal PVCs with a HR of 80. Patient had intermittent chest pain that 'felt like indigestion' post procedure. No chest pain in the last week. Palpates an irregular pulse but does not have palpitations. Feels he was shocked (felt it on 1/15/2021 but device check was normal). He denies shortness of breath and dizziness. Device check today shows:   Brand: [employer]  Mode: DDD  Normal function   Less than 1% AP  2% RVP 99% effective LVP   Arrhythmias: frequent PVCs  Battery life 11 years  RA impedance 366 ohms   RV impedance 381 ohms   LV impedance 1284 ohms  RA threshold 0.7 V @ 0.4 ms  RV threshold 0.8 V @ 0.4 ms  LV threshold 1.4  V @ 0.6 ms  RA sensitivity 0.25 mV  RV sensitivity 0.6 mV    Past Medical History:   has a past medical history of AICD (automatic cardioverter/defibrillator) present, Arthritis, CAD (coronary artery disease), CHF (congestive heart failure) ([address] Chronic systolic CHF (congestive heart failure), NYHA class 3 ([address] GERD (gastroesophageal reflux disease), Hearing loss, Hyperlipidemia, MI (myocardial infarction) ([address] and Rash. Past Surgical History:   has a past surgical history that includes Coronary angioplasty with stent; Cardiac defibrillator placement (Left, 12/17/2020); knee surgery; Colonoscopy; Cataract removal with implant (Right, 02/01/2017); Cataract removal with implant (Left, 03/01/2017); and eye surgery. Home Medications:    Prior to Admission medications    Medication Sig Start Date End Date Taking? Authorizing Provider   spironolactone (ALDACTONE) 25 MG tablet TAKE 1/2 TABLET EVERY DAY 1/7/21  Yes Gerald Phillips MD   omeprazole (PRILOSEC) 20 MG delayed release capsule Take 1 capsule by mouth every morning (before breakfast) 10/22/20  Yes Ru Oakley MD   carvedilol (COREG) 3.125 MG tablet Take 1 tablet by mouth 2 times daily (with meals) 6/29/20  Yes Gerald Phillips MD   clopidogrel (PLAVIX) 75 MG tablet Take 1 tablet by mouth daily 6/29/20  Yes Gerald Phillips MD   gemfibrozil (LOPID) 600 MG tablet Take 1 tablet by mouth 2 times daily (before meals) 6/29/20  Yes Gerald Phillips MD   lisinopril (PRINIVIL;ZESTRIL) 5 MG tablet Take 1 tablet by mouth daily 6/29/20  Yes Gerald Phillips MD   rosuvastatin (CRESTOR) 40 MG tablet Take 1 tablet by mouth every evening 6/29/20  Yes Gerald Phillips MD   nitroGLYCERIN (NITROSTAT) 0.4 MG SL tablet Place 1 tablet under the tongue every 5 minutes as needed for Chest pain 10/17/19  Yes Gerald Phillips MD   finasteride (PROSCAR) 5 MG tablet Take 5 mg by mouth daily Indications: Rx by Dr. Em Grewal MD   methotrexate (RHEUMATREX) 2.5 MG chemo tablet Take 1 tablet by mouth once a week RX directions are 4 tablets weekly. Patient takes one tablet Monday/Wednesday/Friday.  3/5/19  Yes Shaheen Hernandez MD   folic acid (FOLVITE) 1 MG tablet Take 1 mg by mouth daily   Yes Historical Provider, MD   tamsulosin (FLOMAX) 0.4 MG capsule Take 0.4 mg by mouth daily   Yes Tahir Sanchez MD aspirin 81 MG tablet Take 81 mg by mouth daily. Yes Historical Provider, MD   Handicap Placard MISC by Does not apply route Exp: 1/1/2024 1/15/21   WILBERTO Nelsno   ipratropium-albuterol (DUONEB) 0.5-2.5 (3) MG/3ML SOLN nebulizer solution Inhale 3 mLs into the lungs every 6 hours as needed for Shortness of Breath  Patient not taking: Reported on 1/21/2021 5/10/19   Char Odell MD       Allergies:  Patient has no known allergies. Social History:   reports that he has been smoking cigarettes. He started smoking about 59 years ago. He has a 40.50 pack-year smoking history. He has never used smokeless tobacco. He reports that he does not drink alcohol or use drugs. Family History: family history includes Cancer in his father; Diabetes in his mother. Review of Systems   All 14-point review of systems are completed and pertinent positives are mentioned in the history of present illness. Other systems are reviewed and are negative. PHYSICAL EXAM:    Vital signs:    /64   Pulse 81   Ht 5' 9\" (1.753 m)   Wt 198 lb 8 oz (90 kg)   SpO2 98%   BMI 29.31 kg/m²      Constitutional and general appearance: alert, cooperative, distracted, no distress and appears stated age  HEENT: PERRL, no cervical lymphadenopathy. No masses palpable.  Normal oral mucosa  Respiratory:  · Normal excursion and expansion without use of accessory muscles  · Resp auscultation: Normal breath sounds without wheezing, rhonchi, and rales  Cardiovascular:  · The apical impulse is not displaced  · Heart tones are crisp and normal. regular S1 and S2.  · Jugular venous pulsation Normal  · The carotid upstroke is normal in amplitude and contour without delay or bruit  · Peripheral pulses are symmetrical and full   Abdomen:  · No masses or tenderness  · Bowel sounds present  Extremities:  ·  No cyanosis or clubbing  ·  No lower extremity edema  ·  Skin: warm and dry; right upper chest incision is closed and healed Neurological:  · Alert and oriented  · Moves all extremities well  · + distracted, difficult to redirect     DATA:    ECG 1/21/2021:  SR with trigeminal PVCs HR 80    Echo 6/29/2020: The left ventricular systolic function is severely reduced with an ejection fraction of 15-20 %. Severe global dysfunction with more prominent anterior hypokinesis. Left ventricular cavity size is moderately dilated. Grade II diastolic dysfunction with elevated left ventricular filling pressure. The left atrium is at the upper limits of normal in size. The right atrium is mildly dilated. Moderate mitral regurgitation. Mild tricuspid regurgitation. Systolic pulmonary artery pressure (SPAP) is normal estimated at 38 mmHg (Right atrial pressure of 3 mmHg). Stress test 6/29/2020:  Severe LV dysfunction, LVEF 24%.    Anteroseptal/apical akinesis    Inferior wall hypokinesis    Fixed defect in anteroseptal/apical, inferior walls consistent with large    scar    No ischemia     [facility] 6/2/2014 ([facility]):  DOMINANCE:  Right dominant        LEFT MAIN: Luminal irregularities                       LEFT ANTERIOR DESCENDING:  Luminal irregularities, widely patent   proximal stent site,  and widely patent mid stent site                   LEFT CIRCUMFLEX: Luminal irregularities    40% mid   lesion                 RIGHT CORONARY:  Luminal irregularities, Dominant,  and widely   patent distal stent site    50% mid instent  restenosis         LEFT VENTRICULAR FUNCTION:        LVEF: 20%        LVEDP: Normal pre-angio        LV Systolic Pressure: Normal         LV to AO Gradient: none         LV Wall Motion:  Abnormal, global severe hypokinesis    .        MITRAL VALVE: No mitral insufficiency      All labs and testing reviewed. CARDIOLOGY LABS:   CBC: No results for input(s): WBC, HGB, HCT, PLT in the last 72 hours. BMP: No results for input(s): NA, K, CO2, BUN, CREATININE, LABGLOM, GLUCOSE in the last 72 hours. PT/INR: No results for input(s): PROTIME, INR in the last 72 hours. APTT:No results for input(s): APTT in the last 72 hours. FASTING LIPID PANEL:  Lab Results   Component Value Date    HDL 45 12/17/2020    LDLDIRECT 86 10/07/2015    LDLCALC 71 12/17/2020    TRIG 132 12/17/2020     LIVER PROFILE:No results for input(s): AST, ALT, ALB in the last 72 hours. Assessment:   Ventricular tachycardia and NSVT: stable    -received ICD shock 6/2020 (VT degenerated into VF)  PVC: new problem, noted on EKG today   Ischemic cardiomyopathy: stable    -s/p dual chamber ICD implant 2011   -s/p upgrade to BiV ICD 12/2020   -device check today shows normal function as noted on HPI   LBBB: chronic, stable   PAT: stable   -noted on device checks  Orthostatic hypotension: resolved   CAD: stable     -s/p multiple PCI    -most recent [facility] 2014 showed patent stents and stress test 6/2020 showed fixed defects   -followed by Dr. Giuliana Portillo   Chronic combined CHF: compensated   Mitral regurgitation: moderate on echo 6/2020  CKD  Chronic anemia  History of bilateral PE: noted 2009  Polymyalgia rheumatica   Former tobacco abuse  Hearing loss    Plan:   1. Continue ASA, Plavix, lisinopril, and spironolactone   2. Stop Coreg   3. Start Toprol 50mg po QD for PVCs  4. Monitor BP at home and call if consistently out of goal ranges   5. Follow up in 2 months with Dr. Antwon Granger to evaluate PVC burden. He may be a candidate for a PVC ablation if not improved. Patient is adamant that he received ICD shock. No shocks confirmed on device check 1/15/2021 and 1/21/2021. PVCs were noted on EKG today. Not noted on EKG 1/6/2021. BiV pacing is not currently affected. Changed Coreg to Toprol. Will reevaluate efficacy at next visit. Patient was seen outside of global device window for PVCs. I have spent 35 minutes of face to face time with the patient with more than 50% spent counseling and coordinating care for PVCs.      MDM: high Cricket Hartley, APRN-CNP. [facility]  [phone]

## 2021-01-25 ASSESSMENT — ENCOUNTER SYMPTOMS
NAUSEA: 0
ABDOMINAL PAIN: 0
COUGH: 0
RHINORRHEA: 0
SORE THROAT: 0
CONSTIPATION: 0
DIARRHEA: 0
SHORTNESS OF BREATH: 0
VOMITING: 0

## 2021-02-19 ENCOUNTER — TELEPHONE (OUTPATIENT)
Dept: CARDIOLOGY CLINIC | Age: 71
End: 2021-02-19

## 2021-02-19 NOTE — TELEPHONE ENCOUNTER
Spoke with patient. States it has now occurred 3 times. He states the first time he was driving, second time he was sitting in his garage and the third time was while on the phone. He states the pain is sharp and hard. He states it strong. Radiates down the right arm to the elbow. States it like an \"electrical feeling\". Had him go to the bedroom and transmit. He was unsure how. Walked him through.

## 2021-02-20 NOTE — TELEPHONE ENCOUNTER
We should arrange for in office evaluation and pace at high output to induce diaphragmatic stimulation to see if this reproduces the clinical sensation.

## 2021-02-20 NOTE — TELEPHONE ENCOUNTER
Manual latitude received. Latest Device Transmission: Feb 19, 2021 16:18 EST. Last in office check 1/21/2021. Trigeminal ectopy is noted during V threshold testing. RV and LV lead alerts noted on device- likely d/t ectopy during auto testing, trends are stable. Remote transmission of pacemaker and/or ICD, or implanted heart monitor shows normal cardiac device functi  Total PVCs 1718 -2064 (increased). Pac's count decreased. No  arrhythmias recorded. AP 3%. RVP 3%. LVP 99%. See PACEART report under Cardiology tab.

## 2021-02-24 ENCOUNTER — NURSE ONLY (OUTPATIENT)
Dept: CARDIOLOGY CLINIC | Age: 71
End: 2021-02-24
Payer: MEDICARE

## 2021-02-24 ENCOUNTER — OFFICE VISIT (OUTPATIENT)
Dept: CARDIOLOGY CLINIC | Age: 71
End: 2021-02-24
Payer: MEDICARE

## 2021-02-24 VITALS — BODY MASS INDEX: 28.88 KG/M2 | HEIGHT: 69 IN | WEIGHT: 195 LBS

## 2021-02-24 DIAGNOSIS — R07.9 CHEST PAIN, UNSPECIFIED TYPE: ICD-10-CM

## 2021-02-24 DIAGNOSIS — I47.29 PAROXYSMAL VENTRICULAR TACHYCARDIA: Primary | ICD-10-CM

## 2021-02-24 DIAGNOSIS — I44.7 LBBB (LEFT BUNDLE BRANCH BLOCK): ICD-10-CM

## 2021-02-24 DIAGNOSIS — Z95.810 PRESENCE OF CARDIAC RESYNCHRONIZATION THERAPY DEFIBRILLATOR (CRT-D): ICD-10-CM

## 2021-02-24 DIAGNOSIS — I50.22 CHRONIC SYSTOLIC CONGESTIVE HEART FAILURE (HCC): ICD-10-CM

## 2021-02-24 DIAGNOSIS — I47.29 PAROXYSMAL VENTRICULAR TACHYCARDIA: ICD-10-CM

## 2021-02-24 DIAGNOSIS — I25.5 ISCHEMIC CARDIOMYOPATHY: ICD-10-CM

## 2021-02-24 DIAGNOSIS — I42.9 SECONDARY CARDIOMYOPATHY (HCC): ICD-10-CM

## 2021-02-24 PROCEDURE — 4040F PNEUMOC VAC/ADMIN/RCVD: CPT | Performed by: INTERNAL MEDICINE

## 2021-02-24 PROCEDURE — 99214 OFFICE O/P EST MOD 30 MIN: CPT | Performed by: INTERNAL MEDICINE

## 2021-02-24 PROCEDURE — G8427 DOCREV CUR MEDS BY ELIG CLIN: HCPCS | Performed by: INTERNAL MEDICINE

## 2021-02-24 PROCEDURE — 4004F PT TOBACCO SCREEN RCVD TLK: CPT | Performed by: INTERNAL MEDICINE

## 2021-02-24 PROCEDURE — 3017F COLORECTAL CA SCREEN DOC REV: CPT | Performed by: INTERNAL MEDICINE

## 2021-02-24 PROCEDURE — G8484 FLU IMMUNIZE NO ADMIN: HCPCS | Performed by: INTERNAL MEDICINE

## 2021-02-24 PROCEDURE — 1123F ACP DISCUSS/DSCN MKR DOCD: CPT | Performed by: INTERNAL MEDICINE

## 2021-02-24 PROCEDURE — G8417 CALC BMI ABV UP PARAM F/U: HCPCS | Performed by: INTERNAL MEDICINE

## 2021-02-24 PROCEDURE — 93284 PRGRMG EVAL IMPLANTABLE DFB: CPT | Performed by: INTERNAL MEDICINE

## 2021-02-24 NOTE — PROGRESS NOTES
Add on device check per NICOLE. Pt recently c/o of chest pain, sharp pain. (see telephone encounter 2/19/21)  Recent remote interrogation on 2/20/21. BS CRTD 12/17/2020. Normal device function. Testing appear WNL. 11 years to BILLY. Underlying AsVs @ 47 bpm.  TI has been increasing since December 2020. AP 33%  RV 3%  LV 99%  0%  AT/AF burden  0 episodes  PVC increased from 1.7K to 2.3K  Noise on shock lead  NICOLE performed PNS test.  Temporarily max out LV lead, no diaphragmatic response. Returned to programmed outputs. No changes. See Paceart report under the Cardiology tab. Patient will follow up in months in office.

## 2021-02-24 NOTE — PROGRESS NOTES
[facility]   Cardiac Follow Up      Date: 2/25/21  Patient Name: Corie Zuñiga  YOB: 1950    Primary Care Physician: Riley Schultz MD    CHIEF COMPLAINT:   Chief Complaint   Patient presents with    Follow-up    Coronary Artery Disease    Congestive Heart Failure    Tachycardia       HPI:  Corie Zuñiga is a 66 y.o. male who presents for evaluation of ventricular fibrillation. He was admitted 6/28/2020 after receiving ICD shock for VF. This restored sinus rhythm. He had his dual-chamber pacemaker implanted in 4/2011 for ischemic cardiomyopathy by Dr. Ny London. Interrogation of the device demonstrated a few episodes of NSVT and brief PAT over the past several months. Echo on 6/28/2020 showed an EF of 15-20% with severe global dysfunction with more prominent anterior hypokinesis. Stress test on 6/28/2020 showed an EF of 24%, anteroseptal/apical akinesis, inferior wall hypokinesis, fixed defect in anteroseptal.apical, inferior walls consistent with large scar, no ischemia. On 7/17/2020, he stated that he felt well. He stated that he had been feeling fine since he has been out of the hospital.  He remains very active. He states that a couple of flights of stairs makes him short of breath. He can do one flight well. He tolerates doing daily activities. He is not limited. In 12/2020 he had an upgrade to a CRT-D. Today, 2/25/2021, patient's device check demonstrated AP 33%, RV paced 3%, LV paced 99%, no AT/AF events, 11 years of battery life remaining. Patient reports he feels that his SOB and activity tolerance is worse since his device was upgraded to CRT-D. He reports he has been experiencing intermittent chest pain but is unable to describe the quality or quantity of chest pain. He is unable to identify contributing or alleviating factors at this time. He reports he is taking all medications as prescribed and tolerates them well.  Patient denies current edema, SOB, dizziness or syncope. Past Medical History:   has a past medical history of AICD (automatic cardioverter/defibrillator) present, Arthritis, CAD (coronary artery disease), CHF (congestive heart failure) ([facility] [address] Chronic systolic CHF (congestive heart failure), NYHA class 3 ([facility] [address] GERD (gastroesophageal reflux disease), Hearing loss, Hyperlipidemia, MI (myocardial infarction) ([facility] [address] and Rash. Surgical History:   has a past surgical history that includes Coronary angioplasty with stent; Cardiac defibrillator placement (Left, 12/17/2020); knee surgery; Colonoscopy; Cataract removal with implant (Right, 02/01/2017); Cataract removal with implant (Left, 03/01/2017); and eye surgery. Social History:   reports that he has been smoking cigarettes. He started smoking about 59 years ago. He has a 40.50 pack-year smoking history. He has never used smokeless tobacco. He reports that he does not drink alcohol or use drugs. Family History:  family history includes Cancer in his father; Diabetes in his mother.      Home Medications:  Outpatient Encounter Medications as of 2/24/2021   Medication Sig Dispense Refill    metoprolol succinate (TOPROL XL) 50 MG extended release tablet Take 1 tablet by mouth daily 90 tablet 3    Handicap Placard MISC by Does not apply route Exp: 1/1/2024 2 each 0    spironolactone (ALDACTONE) 25 MG tablet TAKE 1/2 TABLET EVERY DAY 45 tablet 0    omeprazole (PRILOSEC) 20 MG delayed release capsule Take 1 capsule by mouth every morning (before breakfast) 90 capsule 1    carvedilol (COREG) 3.125 MG tablet Take 1 tablet by mouth 2 times daily (with meals) 180 tablet 3    clopidogrel (PLAVIX) 75 MG tablet Take 1 tablet by mouth daily 90 tablet 3    gemfibrozil (LOPID) 600 MG tablet Take 1 tablet by mouth 2 times daily (before meals) 180 tablet 3    lisinopril (PRINIVIL;ZESTRIL) 5 MG tablet Take 1 tablet by mouth daily 90 tablet 3    rosuvastatin (CRESTOR) 40 MG tablet Take 1 tablet by mouth every evening 90 tablet 3    nitroGLYCERIN (NITROSTAT) 0.4 MG SL tablet Place 1 tablet under the tongue every 5 minutes as needed for Chest pain 25 tablet 1    ipratropium-albuterol (DUONEB) 0.5-2.5 (3) MG/3ML SOLN nebulizer solution Inhale 3 mLs into the lungs every 6 hours as needed for Shortness of Breath 120 vial 5    finasteride (PROSCAR) 5 MG tablet Take 5 mg by mouth daily Indications: Rx by Dr. Simba Ellis methotrexate (RHEUMATREX) 2.5 MG chemo tablet Take 1 tablet by mouth once a week RX directions are 4 tablets weekly. Patient takes one tablet Monday/Wednesday/Friday. 1 tablet 0    folic acid (FOLVITE) 1 MG tablet Take 1 mg by mouth daily      tamsulosin (FLOMAX) 0.4 MG capsule Take 0.4 mg by mouth daily      aspirin 81 MG tablet Take 81 mg by mouth daily. No facility-administered encounter medications on file as of 2/24/2021. Allergies:  Patient has no known allergies. Review of Systems   Constitutional: Negative. HENT: Negative. Eyes: Negative. Respiratory: Negative. Cardiovascular: Negative. Gastrointestinal: Negative. Genitourinary: Negative. Musculoskeletal: Negative. Skin: Negative. Neurological: Negative. Hematological: Negative. Psychiatric/Behavioral: Negative. Ht 5' 9\" (1.753 m)   Wt 195 lb (88.5 kg)   BMI 28.80 kg/m²     Data:     ECG 2/24/2021: V-paced 79 BPM    Objective:  Physical Exam   Constitutional: He is oriented to person, place, and time. He appears well-developed and well-nourished. HENT:   Head: Normocephalic and atraumatic. Eyes: Pupils are equal, round, and reactive to light. Neck: Normal range of motion. Cardiovascular: Normal rate, regular rhythm and normal heart sounds. Pulmonary/Chest: Effort normal and breath sounds normal.   Abdominal: Soft. No tenderness. Musculoskeletal: Normal range of motion. He exhibits no edema. Neurological: He is alert and oriented to person, place, and time. Skin: Skin is warm and dry. Psychiatric: He has a normal mood and affect. Assessment:  1. Ischemic CM- s/p ICD originally placed 2011  2. S/P VF episode  6/20/20  3. LBBB- CRT-D upgrade 12/2020- Max output on device interrogation revealed no diaphragm pacing. CXR ordered to assess LV lead location given CP an SOB. Plan:  1. CXR to assess LV lead placement  2. Follow up with [facility] in 3 months    This note has been scribed in the presence of Wild Knight MD by Melissa Paz RN.       I, Dr. Wild Knight, personally performed the services described in this documentation as scribed by Melissa Paz RN in my presence, and it is both accurate and complete.           Wild Knight M.D.

## 2021-02-25 ENCOUNTER — TELEPHONE (OUTPATIENT)
Dept: CARDIOLOGY CLINIC | Age: 71
End: 2021-02-25

## 2021-02-25 ENCOUNTER — HOSPITAL ENCOUNTER (OUTPATIENT)
Dept: GENERAL RADIOLOGY | Age: 71
Discharge: HOME | End: 2021-02-25
Payer: MEDICARE

## 2021-02-25 ENCOUNTER — HOSPITAL ENCOUNTER (OUTPATIENT)
Age: 71
Discharge: HOME | End: 2021-02-25
Payer: MEDICARE

## 2021-02-25 DIAGNOSIS — R07.9 CHEST PAIN, UNSPECIFIED TYPE: ICD-10-CM

## 2021-02-25 PROCEDURE — 71046 X-RAY EXAM CHEST 2 VIEWS: CPT

## 2021-02-25 RX ORDER — NITROGLYCERIN 0.4 MG/1
0.4 TABLET SUBLINGUAL EVERY 5 MIN PRN
Qty: 25 TABLET | Refills: 1 | Status: SHIPPED | OUTPATIENT
Start: 2021-02-25

## 2021-02-25 NOTE — TELEPHONE ENCOUNTER
Patient called stating when he was seen yesterday he was asked to remove money clip and nitro hopkins from his belt. Patient states he did not get them back when he left.  Asking that the room he was in be checked for his items

## 2021-02-25 NOTE — TELEPHONE ENCOUNTER
Pt called again asking to see if anything has turned up. I searched the device room, exam room, and waiting room and cannot find anything. CLIFF Lance do you know of anything? I informed pt that we will call him if anything turns up.

## 2021-03-01 ENCOUNTER — TELEPHONE (OUTPATIENT)
Dept: CARDIOLOGY CLINIC | Age: 71
End: 2021-03-01

## 2021-04-14 RX ORDER — OMEPRAZOLE 20 MG/1
20 CAPSULE, DELAYED RELEASE ORAL
Qty: 90 CAPSULE | Refills: 1 | Status: SHIPPED | OUTPATIENT
Start: 2021-04-14 | End: 2021-07-20 | Stop reason: SDUPTHER

## 2021-04-14 NOTE — TELEPHONE ENCOUNTER
.  Last office visit 1/6/2021     Last written 10- x 1 refill [facility]    Next office visit scheduled 4/20/2021    Requested Prescriptions     Pending Prescriptions Disp Refills    omeprazole (PRILOSEC) 20 MG delayed release capsule 90 capsule 1     Sig: Take 1 capsule by mouth every morning (before breakfast)

## 2021-04-20 ENCOUNTER — OFFICE VISIT (OUTPATIENT)
Dept: FAMILY MEDICINE CLINIC | Age: 71
End: 2021-04-20
Payer: MEDICARE

## 2021-04-20 VITALS
WEIGHT: 196 LBS | DIASTOLIC BLOOD PRESSURE: 64 MMHG | SYSTOLIC BLOOD PRESSURE: 116 MMHG | OXYGEN SATURATION: 97 % | HEART RATE: 56 BPM | BODY MASS INDEX: 28.94 KG/M2

## 2021-04-20 DIAGNOSIS — R10.9 ABDOMINAL PAIN, UNSPECIFIED ABDOMINAL LOCATION: Primary | ICD-10-CM

## 2021-04-20 DIAGNOSIS — R11.0 NAUSEA: ICD-10-CM

## 2021-04-20 PROCEDURE — 4040F PNEUMOC VAC/ADMIN/RCVD: CPT | Performed by: FAMILY MEDICINE

## 2021-04-20 PROCEDURE — G8417 CALC BMI ABV UP PARAM F/U: HCPCS | Performed by: FAMILY MEDICINE

## 2021-04-20 PROCEDURE — 1123F ACP DISCUSS/DSCN MKR DOCD: CPT | Performed by: FAMILY MEDICINE

## 2021-04-20 PROCEDURE — 99213 OFFICE O/P EST LOW 20 MIN: CPT | Performed by: FAMILY MEDICINE

## 2021-04-20 PROCEDURE — G8428 CUR MEDS NOT DOCUMENT: HCPCS | Performed by: FAMILY MEDICINE

## 2021-04-20 PROCEDURE — 4004F PT TOBACCO SCREEN RCVD TLK: CPT | Performed by: FAMILY MEDICINE

## 2021-04-20 PROCEDURE — 3017F COLORECTAL CA SCREEN DOC REV: CPT | Performed by: FAMILY MEDICINE

## 2021-04-20 RX ORDER — ONDANSETRON 4 MG/1
4 TABLET, ORALLY DISINTEGRATING ORAL EVERY 8 HOURS PRN
Qty: 30 TABLET | Refills: 1 | Status: SHIPPED | OUTPATIENT
Start: 2021-04-20 | End: 2021-05-14

## 2021-04-20 ASSESSMENT — ENCOUNTER SYMPTOMS
NAUSEA: 1
CONSTIPATION: 0
ABDOMINAL PAIN: 1
DIARRHEA: 0

## 2021-04-20 NOTE — PATIENT INSTRUCTIONS
I will send Zofran as needed for nausea  Consider increasing Omeprazole to 40 mg in the morning. Increase water intake.

## 2021-04-20 NOTE — PROGRESS NOTES
Rosalind Alvarez is a 66 y.o. male    Chief Complaint   Patient presents with    Abdominal Pain     general area of stomach        HPI:    Abdominal Pain  This is a chronic problem. The current episode started more than 1 month ago. The problem occurs daily. The problem has been waxing and waning. The pain is located in the RUQ. The quality of the pain is aching. The abdominal pain does not radiate. Associated symptoms include nausea. Pertinent negatives include no constipation, diarrhea, fever or hematuria. He has tried nothing for the symptoms. ROS:    Review of Systems   Constitutional: Negative for fever. Gastrointestinal: Positive for abdominal pain and nausea. Negative for constipation and diarrhea. Genitourinary: Negative for hematuria. /64   Pulse 56   Wt 196 lb (88.9 kg)   SpO2 97%   BMI 28.94 kg/m²     Physical Exam:    Physical Exam  Constitutional:       General: He is not in acute distress. Appearance: Normal appearance. He is well-developed. He is obese. He is not ill-appearing, toxic-appearing or diaphoretic. HENT:      Head: Normocephalic. Neck:      Musculoskeletal: Normal range of motion. No neck rigidity. Cardiovascular:      Rate and Rhythm: Normal rate and regular rhythm. Pulses: Normal pulses. Heart sounds: No murmur. Pulmonary:      Effort: Pulmonary effort is normal. No respiratory distress. Breath sounds: Normal breath sounds. No wheezing. Abdominal:      General: Abdomen is flat. Bowel sounds are normal. There is no distension. Palpations: Abdomen is soft. There is no mass. Tenderness: There is no abdominal tenderness. Hernia: No hernia is present. Lymphadenopathy:      Cervical: No cervical adenopathy. Neurological:      Mental Status: He is alert. Psychiatric:         Mood and Affect: Mood normal.         Behavior: Behavior normal.         Thought Content:  Thought content normal.         Current Outpatient Medications   Medication Sig Dispense Refill    ondansetron (ZOFRAN ODT) 4 MG disintegrating tablet Take 1 tablet by mouth every 8 hours as needed for Nausea or Vomiting 30 tablet 1    omeprazole (PRILOSEC) 20 MG delayed release capsule Take 1 capsule by mouth every morning (before breakfast) 90 capsule 1    nitroGLYCERIN (NITROSTAT) 0.4 MG SL tablet Place 1 tablet under the tongue every 5 minutes as needed for Chest pain 25 tablet 1    metoprolol succinate (TOPROL XL) 50 MG extended release tablet Take 1 tablet by mouth daily 90 tablet 3    Handicap Placard MISC by Does not apply route Exp: 1/1/2024 2 each 0    spironolactone (ALDACTONE) 25 MG tablet TAKE 1/2 TABLET EVERY DAY 45 tablet 0    carvedilol (COREG) 3.125 MG tablet Take 1 tablet by mouth 2 times daily (with meals) 180 tablet 3    clopidogrel (PLAVIX) 75 MG tablet Take 1 tablet by mouth daily 90 tablet 3    gemfibrozil (LOPID) 600 MG tablet Take 1 tablet by mouth 2 times daily (before meals) 180 tablet 3    lisinopril (PRINIVIL;ZESTRIL) 5 MG tablet Take 1 tablet by mouth daily 90 tablet 3    rosuvastatin (CRESTOR) 40 MG tablet Take 1 tablet by mouth every evening 90 tablet 3    ipratropium-albuterol (DUONEB) 0.5-2.5 (3) MG/3ML SOLN nebulizer solution Inhale 3 mLs into the lungs every 6 hours as needed for Shortness of Breath 120 vial 5    finasteride (PROSCAR) 5 MG tablet Take 5 mg by mouth daily Indications: Rx by Dr. Owen Atkinson methotrexate (RHEUMATREX) 2.5 MG chemo tablet Take 1 tablet by mouth once a week RX directions are 4 tablets weekly. Patient takes one tablet Monday/Wednesday/Friday. 1 tablet 0    folic acid (FOLVITE) 1 MG tablet Take 1 mg by mouth daily      tamsulosin (FLOMAX) 0.4 MG capsule Take 0.4 mg by mouth daily      aspirin 81 MG tablet Take 81 mg by mouth daily. No current facility-administered medications for this visit. Assessment:    1. Abdominal pain, unspecified abdominal location    2.  Nausea Plan:    1. Abdominal pain, unspecified abdominal location  Unclear etiology. It is right sided abdominal pain. It does not hurt over the rib. No constipation or diarrhea. No blood in the stool. Apparently Prilosec helps his symptoms. Discussed going up to 40 mg for a couple of weeks. 2. Nausea  His major complaint was nausea fortunately. We will try Zofran. Discussed Phenergan as another option.  - ondansetron (ZOFRAN ODT) 4 MG disintegrating tablet; Take 1 tablet by mouth every 8 hours as needed for Nausea or Vomiting  Dispense: 30 tablet; Refill: 1      Patient to return to clinic if symptoms worsen or fail to improve.

## 2021-05-07 ENCOUNTER — OFFICE VISIT (OUTPATIENT)
Dept: FAMILY MEDICINE CLINIC | Age: 71
End: 2021-05-07
Payer: MEDICARE

## 2021-05-07 VITALS
TEMPERATURE: 97.7 F | HEART RATE: 58 BPM | WEIGHT: 197 LBS | OXYGEN SATURATION: 98 % | BODY MASS INDEX: 29.18 KG/M2 | HEIGHT: 69 IN | SYSTOLIC BLOOD PRESSURE: 106 MMHG | DIASTOLIC BLOOD PRESSURE: 68 MMHG

## 2021-05-07 DIAGNOSIS — R00.1 BRADYCARDIA: ICD-10-CM

## 2021-05-07 DIAGNOSIS — R73.01 IFG (IMPAIRED FASTING GLUCOSE): ICD-10-CM

## 2021-05-07 DIAGNOSIS — R73.01 IFG (IMPAIRED FASTING GLUCOSE): Primary | ICD-10-CM

## 2021-05-07 DIAGNOSIS — I47.29 PAROXYSMAL VENTRICULAR TACHYCARDIA: ICD-10-CM

## 2021-05-07 PROCEDURE — 1123F ACP DISCUSS/DSCN MKR DOCD: CPT | Performed by: PHYSICIAN ASSISTANT

## 2021-05-07 PROCEDURE — 4004F PT TOBACCO SCREEN RCVD TLK: CPT | Performed by: PHYSICIAN ASSISTANT

## 2021-05-07 PROCEDURE — 3017F COLORECTAL CA SCREEN DOC REV: CPT | Performed by: PHYSICIAN ASSISTANT

## 2021-05-07 PROCEDURE — G8417 CALC BMI ABV UP PARAM F/U: HCPCS | Performed by: PHYSICIAN ASSISTANT

## 2021-05-07 PROCEDURE — 4040F PNEUMOC VAC/ADMIN/RCVD: CPT | Performed by: PHYSICIAN ASSISTANT

## 2021-05-07 PROCEDURE — G8427 DOCREV CUR MEDS BY ELIG CLIN: HCPCS | Performed by: PHYSICIAN ASSISTANT

## 2021-05-07 PROCEDURE — 99214 OFFICE O/P EST MOD 30 MIN: CPT | Performed by: PHYSICIAN ASSISTANT

## 2021-05-07 ASSESSMENT — ENCOUNTER SYMPTOMS
SORE THROAT: 0
NAUSEA: 0
CONSTIPATION: 0
DIARRHEA: 0
VOMITING: 0
RHINORRHEA: 0
ABDOMINAL PAIN: 0
COUGH: 0
SHORTNESS OF BREATH: 1

## 2021-05-07 NOTE — PROGRESS NOTES
2021  Kati Humphrey (: 1950)  66 y.o. HPI     Routine follow up chronic conditions      H/o vt/vf/PAT/LBBB/ischemic cardiomyopathy/cad/chronic combined chf: follows with cardiology. Last hospitalization 2020 after icd shock for vt which degenerated into CF. Stress test then showed fixed defects. Echo 2020 EF ~15. Current medication regimen includes metoprolol, plavix, lisinopril, spironolactone & statin. States that heartrate has been dropping, has been checking at home. Anywhere from 30-50. Denies CP, soa, le swelling.      DM: last a1c 6.1. diet controlled. Has tried to be more mindful of diet and exercise. Does not check BG. On statin and ace. Breathing at baseline  Sleep is ok. No current diet or exercise regimen   Requesting labs be drawn for derm and cards. Review of Systems   Constitutional: Negative for activity change, chills and fever. HENT: Negative for congestion, ear pain, rhinorrhea and sore throat. Eyes: Negative for visual disturbance. Respiratory: Positive for shortness of breath. Negative for cough. Cardiovascular: Positive for palpitations. Negative for chest pain. Gastrointestinal: Negative for abdominal pain, constipation, diarrhea, nausea and vomiting. Genitourinary: Negative for difficulty urinating and dysuria. Musculoskeletal: Negative for arthralgias and myalgias. Skin: Negative for rash. Neurological: Negative for dizziness, weakness and numbness. Psychiatric/Behavioral: Negative for sleep disturbance. Allergies, past medical history, family history, and social history reviewed and unchanged from previous encounter.      Current Outpatient Medications   Medication Sig Dispense Refill    ondansetron (ZOFRAN ODT) 4 MG disintegrating tablet Take 1 tablet by mouth every 8 hours as needed for Nausea or Vomiting 30 tablet 1    omeprazole (PRILOSEC) 20 MG delayed release capsule Take 1 capsule by mouth every morning (before breakfast) 90 capsule 1    nitroGLYCERIN (NITROSTAT) 0.4 MG SL tablet Place 1 tablet under the tongue every 5 minutes as needed for Chest pain 25 tablet 1    metoprolol succinate (TOPROL XL) 50 MG extended release tablet Take 1 tablet by mouth daily 90 tablet 3    Handicap Placard MISC by Does not apply route Exp: 1/1/2024 2 each 0    spironolactone (ALDACTONE) 25 MG tablet TAKE 1/2 TABLET EVERY DAY 45 tablet 0    clopidogrel (PLAVIX) 75 MG tablet Take 1 tablet by mouth daily 90 tablet 3    gemfibrozil (LOPID) 600 MG tablet Take 1 tablet by mouth 2 times daily (before meals) 180 tablet 3    lisinopril (PRINIVIL;ZESTRIL) 5 MG tablet Take 1 tablet by mouth daily 90 tablet 3    rosuvastatin (CRESTOR) 40 MG tablet Take 1 tablet by mouth every evening 90 tablet 3    ipratropium-albuterol (DUONEB) 0.5-2.5 (3) MG/3ML SOLN nebulizer solution Inhale 3 mLs into the lungs every 6 hours as needed for Shortness of Breath 120 vial 5    finasteride (PROSCAR) 5 MG tablet Take 5 mg by mouth daily Indications: Rx by Dr. Viji Laird methotrexate (RHEUMATREX) 2.5 MG chemo tablet Take 1 tablet by mouth once a week RX directions are 4 tablets weekly. Patient takes one tablet Monday/Wednesday/Friday. 1 tablet 0    folic acid (FOLVITE) 1 MG tablet Take 1 mg by mouth daily      tamsulosin (FLOMAX) 0.4 MG capsule Take 0.4 mg by mouth daily      aspirin 81 MG tablet Take 81 mg by mouth daily. No current facility-administered medications for this visit.         Vitals:    05/07/21 1300 05/07/21 1309 05/07/21 1310   BP: 96/64 100/62 106/68   Site: Right Upper Arm Right Upper Arm Right Upper Arm   Position: Supine Sitting Standing   Cuff Size: Small Adult Small Adult Small Adult   Pulse: 52 58 58   Temp: 97.7 °F (36.5 °C)     TempSrc: Oral     SpO2: 98%  Comment: RA     Weight: 197 lb (89.4 kg)     Height: 5' 9\" (1.753 m)       Estimated body mass index is 29.09 kg/m² as calculated from the following:    Height as of this encounter: 5' 9\" (1.753 m). Weight as of this encounter: 197 lb (89.4 kg). Physical Exam  Constitutional:       General: He is not in acute distress. Appearance: He is well-developed. HENT:      Head: Normocephalic and atraumatic. Eyes:      Conjunctiva/sclera: Conjunctivae normal.      Pupils: Pupils are equal, round, and reactive to light. Neck:      Musculoskeletal: Neck supple. Cardiovascular:      Rate and Rhythm: Normal rate and regular rhythm. Heart sounds: Normal heart sounds. No murmur. Pulmonary:      Effort: Pulmonary effort is normal.      Breath sounds: Normal breath sounds. No wheezing. Abdominal:      General: Bowel sounds are normal.      Palpations: Abdomen is soft. Tenderness: There is no abdominal tenderness. Lymphadenopathy:      Cervical: No cervical adenopathy. Skin:     General: Skin is warm and dry. Findings: No rash. Neurological:      Mental Status: He is alert and oriented to person, place, and time. Deep Tendon Reflexes: Reflexes are normal and symmetric. ASSESSMENT and PLAN:  Joe Barker was seen today for hyperlipidemia, hyperglycemia and shortness of breath. Diagnoses and all orders for this visit:    IFG (impaired fasting glucose)  -     Hemoglobin A1C; Future  -     Cancel: RENAL FUNCTION PANEL; Future  -     COMPREHENSIVE METABOLIC PANEL; Future    Bradycardia  Paroxysmal ventricular tachycardia (HCC)  Patient switched from carvedilol to metoprolol at last cards appt for increase palpitations. Since then has noticed increased feelings of fatigue. Has monitored his HR with pulse ox and has been as low as 38.   - decrease metoprolol to 25 mg daily, follow up EP as scheduled. Return in about 6 months (around 11/7/2021) for HTN, Impaired Fasting Glucose.

## 2021-05-07 NOTE — PATIENT INSTRUCTIONS
Please make sure that you are not taking both carvedilol and metoprolol  If you are on metoprolol 50 mg daily, please cut it in half to see if this improves your fatigue, heart rate and your dizziness.

## 2021-05-08 LAB
A/G RATIO: 2.1 (ref 1.1–2.2)
ALBUMIN SERPL-MCNC: 4.9 G/DL (ref 3.4–5)
ALP BLD-CCNC: 101 U/L (ref 40–129)
ALT SERPL-CCNC: 9 U/L (ref 10–40)
ANION GAP SERPL CALCULATED.3IONS-SCNC: 10 MMOL/L (ref 3–16)
AST SERPL-CCNC: 16 U/L (ref 15–37)
BILIRUB SERPL-MCNC: 0.4 MG/DL (ref 0–1)
BUN BLDV-MCNC: 21 MG/DL (ref 7–20)
CALCIUM SERPL-MCNC: 9.4 MG/DL (ref 8.3–10.6)
CHLORIDE BLD-SCNC: 103 MMOL/L (ref 99–110)
CO2: 22 MMOL/L (ref 21–32)
CREAT SERPL-MCNC: 1.2 MG/DL (ref 0.8–1.3)
ESTIMATED AVERAGE GLUCOSE: 142.7 MG/DL
GFR AFRICAN AMERICAN: >60
GFR NON-AFRICAN AMERICAN: 59
GLOBULIN: 2.3 G/DL
GLUCOSE BLD-MCNC: 98 MG/DL (ref 70–99)
HBA1C MFR BLD: 6.6 %
POTASSIUM SERPL-SCNC: 4.9 MMOL/L (ref 3.5–5.1)
SODIUM BLD-SCNC: 135 MMOL/L (ref 136–145)
TOTAL PROTEIN: 7.2 G/DL (ref 6.4–8.2)

## 2021-05-10 LAB — T4 TOTAL: 5.6 UG/DL (ref 4.5–10.9)

## 2021-05-12 ENCOUNTER — TELEPHONE (OUTPATIENT)
Dept: FAMILY MEDICINE CLINIC | Age: 71
End: 2021-05-12

## 2021-05-13 RX ORDER — SPIRONOLACTONE 25 MG/1
TABLET ORAL
Qty: 45 TABLET | Refills: 3 | OUTPATIENT
Start: 2021-05-13

## 2021-05-13 RX ORDER — METFORMIN HYDROCHLORIDE 500 MG/1
500 TABLET, EXTENDED RELEASE ORAL
Qty: 90 TABLET | Refills: 1 | Status: SHIPPED | OUTPATIENT
Start: 2021-05-13 | End: 2021-10-22

## 2021-05-13 NOTE — TELEPHONE ENCOUNTER
Refill  spironolactone (ALDACTONE) 25 MG tablet   Pt completely out    [address] [facility], [address] [place] [address] [phone]

## 2021-05-14 DIAGNOSIS — R11.0 NAUSEA: ICD-10-CM

## 2021-05-14 RX ORDER — ONDANSETRON 4 MG/1
TABLET, ORALLY DISINTEGRATING ORAL
Qty: 30 TABLET | Refills: 1 | Status: SHIPPED | OUTPATIENT
Start: 2021-05-14

## 2021-05-14 NOTE — TELEPHONE ENCOUNTER
.  Last office visit 5/7/2021     Last written 4-20-21 30 with 1      Next office visit scheduled 8/10/2021    Requested Prescriptions     Pending Prescriptions Disp Refills    ondansetron (ZOFRAN-ODT) 4 MG disintegrating tablet [Pharmacy Med Name: ONDANSETRON ODT 4 MG Tablet Disintegrating] 30 tablet 1     Sig: DISSOLVE 1 TABLET ON THE TONGUE EVERY 8 HOURS AS NEEDED FOR NAUSEA  OR  VOMITING

## 2021-05-20 RX ORDER — SPIRONOLACTONE 25 MG/1
TABLET ORAL
Qty: 45 TABLET | Refills: 0 | Status: SHIPPED | OUTPATIENT
Start: 2021-05-20 | End: 2021-05-21 | Stop reason: SDUPTHER

## 2021-05-20 RX ORDER — ROSUVASTATIN CALCIUM 40 MG/1
TABLET, COATED ORAL
Qty: 90 TABLET | Refills: 3 | Status: SHIPPED | OUTPATIENT
Start: 2021-05-20 | End: 2022-04-27 | Stop reason: SDUPTHER

## 2021-05-20 RX ORDER — LISINOPRIL 5 MG/1
TABLET ORAL
Qty: 90 TABLET | Refills: 3 | Status: SHIPPED | OUTPATIENT
Start: 2021-05-20 | End: 2021-12-08

## 2021-05-20 RX ORDER — CLOPIDOGREL BISULFATE 75 MG/1
TABLET ORAL
Qty: 90 TABLET | Refills: 3 | Status: SHIPPED | OUTPATIENT
Start: 2021-05-20 | End: 2022-04-27 | Stop reason: SDUPTHER

## 2021-05-21 DIAGNOSIS — R60.9 EDEMA, UNSPECIFIED TYPE: Primary | ICD-10-CM

## 2021-05-21 RX ORDER — SPIRONOLACTONE 25 MG/1
TABLET ORAL
Qty: 90 TABLET | Refills: 0 | Status: SHIPPED | OUTPATIENT
Start: 2021-05-21 | End: 2021-11-16

## 2021-05-21 NOTE — TELEPHONE ENCOUNTER
SM[facility], are you able to refill spironolactone for patient? [facility] [id number], last visit 8/2020. Was supposed to follow up w/ [facility] December 2020. Transferred patient to [facility] to schedule follow up. Will pend med w/ no refills. Patient is out of medication.

## 2021-05-21 NOTE — TELEPHONE ENCOUNTER
05/21-Called pt to [facility] appt for 05/26, pt stated he called yesterday and is out of spironolactone and needs refilled. Please contact pt once note has been reviewed.

## 2021-06-21 NOTE — PROGRESS NOTES
[address]   Cardiac Consultation    Referring Provider:  Jose Alejandro Peoples MD     Chief Complaint   Patient presents with    1 Year Follow Up    Coronary Artery Disease    Congestive Heart Failure    Other     Since medication change patient has been feeling light headed      Subjective: Mr Jeremy Zelaya is being seen today for cardiology follow up of CAD, HTN, HLD, severe ischemic CM; c/o dizziness upon standing at times today    Past Medical History:    Leonardo Jacinto is a 64yo male who has PMH of chronic severe ischemic CM, hx VT, s/p ICD and shock, CAD s/p RCA stents prior. He has a Guidant ICD for h/o VT. Cardiac cath 6/2014 --> stable known ischemic heart disease. He also has hx MI, HLD, and chronic systolic CHF. Underwent Lexiscan myoview 12/28/18 no evidence of stress induced ischemia; moderate-large sized anterior, anteroseptal, and entire apex fixed defects c/w infarction; apical akinesis;  LVEF 33% (no change from 7/16, 11/15, or 11/13 studies). Note ECHO 6/29/20 showed EF=15-20% (EF=20-25% in 12/18, 15-20% in 7/16, and 25% in 2014 ); moderate LV dilation; grade II DD with elevated filling pressure; mod MR/mild TR. On 6/28/20 he presented to ER for ICD shock. Upon interrogation of device showed rapid VT which deteriorated into Vfib. Device appropriately treated the VF with a single-high energy shock. Review of the device at that time demonstrated a few episodes of NSVT and brief PAT. Most recent lexiscan stress test 6/28/20 demonstrated large scar anteroseptal/apical and inferior walls; severe LV dysfunction, LVEF 24%, no ischemia. Most recent EKG 7/20/20 shows NSR; 1st degree AV block; LBBB. He upgraded to BiV-ICD on 12/17/2020 with Dr. Ryder Andrew. Last device check 2/24/21 demonstrated normal function: AP 33%, RV 3%, LV 99%, no AT/AF noted, PVC increased 1.7K to 2.3K. Today, he c/o occasional dizziness with position changes.   He notes that his chest pain has improved since upgrade of device. Denies shortness of breath. He occasionally gets fatigued. He remains active and cut grass recently without issues. He is taking his medications as prescribed and tolerates them. Dr. Jarad Victoria cut his metoprolol to 25 mg daily due to orthostasis symptoms. He did get Moderna vaccine. Past Medical History:   has a past medical history of AICD (automatic cardioverter/defibrillator) present, Arthritis, CAD (coronary artery disease), CHF (congestive heart failure) ([address] Chronic systolic CHF (congestive heart failure), NYHA class 3 ([address] GERD (gastroesophageal reflux disease), Hearing loss, Hyperlipidemia, MI (myocardial infarction) ([address] and Rash. Surgical History:   has a past surgical history that includes Coronary angioplasty with stent; Cardiac defibrillator placement (Left, 12/17/2020); knee surgery; Colonoscopy; Cataract removal with implant (Right, 02/01/2017); Cataract removal with implant (Left, 03/01/2017); and eye surgery. Social History:   reports that he has been smoking cigarettes. He started smoking about 59 years ago. He has a 40.50 pack-year smoking history. He has never used smokeless tobacco. He reports that he does not drink alcohol and does not use drugs. Family History:  family history includes Cancer in his father; Diabetes in his mother. Home Medications:  Prior to Admission medications    Medication Sig Start Date End Date Taking?  Authorizing Provider   spironolactone (ALDACTONE) 25 MG tablet TAKE 1/2 TABLET EVERY DAY 5/21/21  Yes Haider Tovar MD   rosuvastatin (CRESTOR) 40 MG tablet TAKE 1 TABLET EVERY EVENING 5/20/21  Yes Haider Tovar MD   clopidogrel (PLAVIX) 75 MG tablet TAKE 1 TABLET EVERY DAY 5/20/21  Yes Haider Tovar MD   lisinopril (PRINIVIL;ZESTRIL) 5 MG tablet TAKE 1 TABLET EVERY DAY 5/20/21  Yes Haider Tovar MD   metFORMIN (GLUCOPHAGE-XR) 500 MG extended release tablet Take 1 tablet by mouth Daily with supper 5/13/21  Yes Yifan Baugh, PA   omeprazole (PRILOSEC) 20 MG delayed release capsule Take 1 capsule by mouth every morning (before breakfast) 4/14/21  Yes SUSHMA Craft CNP   nitroGLYCERIN (NITROSTAT) 0.4 MG SL tablet Place 1 tablet under the tongue every 5 minutes as needed for Chest pain 2/25/21  Yes Rochelle Crockett MD   metoprolol succinate (TOPROL XL) 50 MG extended release tablet Take 1 tablet by mouth daily 1/21/21  Yes SUSHMA Tony CNP   Handicap Placard MISC by Does not apply route Exp: 1/1/2024 1/15/21  Yes WILBERTO Arellano   gemfibrozil (LOPID) 600 MG tablet Take 1 tablet by mouth 2 times daily (before meals) 6/29/20  Yes Geovany Christopher MD   ipratropium-albuterol (DUONEB) 0.5-2.5 (3) MG/3ML SOLN nebulizer solution Inhale 3 mLs into the lungs every 6 hours as needed for Shortness of Breath 5/10/19  Yes Antonieta Blevins MD   finasteride (PROSCAR) 5 MG tablet Take 5 mg by mouth daily Indications: Rx by Dr. Chadd Morton MD   methotrexate (RHEUMATREX) 2.5 MG chemo tablet Take 1 tablet by mouth once a week RX directions are 4 tablets weekly. Patient takes one tablet Monday/Wednesday/Friday. 3/5/19  Yes David Giordano MD   folic acid (FOLVITE) 1 MG tablet Take 1 mg by mouth daily   Yes Historical Provider, MD   tamsulosin (FLOMAX) 0.4 MG capsule Take 0.4 mg by mouth daily   Yes Ashlyn Reyes MD   aspirin 81 MG tablet Take 81 mg by mouth daily. Yes Historical Provider, MD   ondansetron (ZOFRAN-ODT) 4 MG disintegrating tablet DISSOLVE 1 TABLET ON THE TONGUE EVERY 8 HOURS AS NEEDED FOR NAUSEA  OR  VOMITING  Patient not taking: Reported on 6/23/2021 5/14/21   WILBERTO Arellano        Allergies:  Patient has no known allergies. Review of Systems:   · Constitutional: there has been no unanticipated weight loss. There's been no change in energy level, sleep pattern, or activity level. · Eyes: No visual changes or diplopia. No scleral icterus.   · ENT: No Headaches, hearing loss or vertigo. No mouth sores or sore throat. · Cardiovascular: Reviewed in HPI  · Respiratory: No cough or wheezing, no sputum production. No hematemesis. · Gastrointestinal: No abdominal pain, appetite loss, blood in stools. No change in bowel or bladder habits. · Genitourinary: No dysuria, trouble voiding, or hematuria. · Musculoskeletal:  No gait disturbance, weakness or joint complaints. · Integumentary: No rash or pruritis. · Neurological: No headache, diplopia, change in muscle strength, numbness or tingling. No change in gait, balance, coordination, mood, affect, memory, mentation, behavior. · Psychiatric: No anxiety, no depression. · Endocrine: No malaise, fatigue or temperature intolerance. No excessive thirst, fluid intake, or urination. No tremor. · Hematologic/Lymphatic: No abnormal bruising or bleeding, blood clots or swollen lymph nodes. · Allergic/Immunologic: No nasal congestion or hives. Physical Examination:    Vitals:    06/23/21 1452   BP: 100/60   Pulse: 66   Temp: 97.3 °F (36.3 °C)   SpO2: 97%         Constitutional and General Appearance: NAD   Respiratory:  · Normal excursion and expansion without use of accessory muscles  · Resp Auscultation: Clear, no crackles or wheezes   Cardiovascular:  · The apical impulses not displaced  · Heart tones are crisp and normal  · Cervical veins are not engorged  · The carotid upstroke is normal in amplitude and contour without delay or bruit  · Distant soft S1S2, No S3, +soft ELVIA  · Peripheral pulses are symmetrical and full  · There is no clubbing, cyanosis of the extremities.   · No edema  · Femoral Arteries: 2+ and equal  · Pedal Pulses: 2+ and equal   Abdomen:  · No masses or tenderness  · Liver/Spleen: No Abnormalities Noted  Neurological/Psychiatric:  · Alert and oriented in all spheres  · Moves all extremities well  · Exhibits normal gait balance and coordination  · No abnormalities of mood, affect, memory, mentation, or behavior are noted  ·   Lab Results   Component Value Date    CHOL 142 12/17/2020    CHOL 146 07/10/2020    CHOL 136 06/29/2020     Lab Results   Component Value Date    TRIG 132 12/17/2020    TRIG 96 07/10/2020    TRIG 151 (H) 06/29/2020     Lab Results   Component Value Date    HDL 45 12/17/2020    HDL 43 07/10/2020    HDL 35 (L) 06/29/2020     Lab Results   Component Value Date    LDLCALC 71 12/17/2020    LDLCALC 84 07/10/2020    LDLCALC 71 06/29/2020     Lab Results   Component Value Date    LABVLDL 26 12/17/2020    LABVLDL 19 07/10/2020    LABVLDL 30 06/29/2020     Lab Results   Component Value Date     05/07/2021    K 4.9 05/07/2021    K 4.0 12/14/2020     05/07/2021    CO2 22 05/07/2021    BUN 21 05/07/2021    CREATININE 1.2 05/07/2021    GLUCOSE 98 05/07/2021    CALCIUM 9.4 05/07/2021      Lab Results   Component Value Date    WBC 7.8 12/17/2020    HGB 12.4 (L) 12/17/2020    HCT 37.5 (L) 12/17/2020    MCV 98.9 12/17/2020     12/17/2020       LABS: 6/29/20 - Magnesium 2.00, , K 4.3, BUN/creat 19/1.0, H/H 13.1/3/.6  LABS: 1/21/20 TSH 2.93      Assessment:   1. Ischemic cardiomyopathy: Note ECHO 6/29/20 showed EF=15-20% (EF=20-25% in 12/18, 15-20% in 7/16, and 25% in 2014 ). Clinically compensated NYHA Class II and will continue current CHF medical regimen. 2. Hyperlipidemia:  Most recent lipids 12/17/2020 see results I personally reviewed (see above). Well controlled and will continue current medical regimen. 3. CAD:   s/p RCA stents prior. Cardiac cath 6/2014 --> stable known ischemic heart disease. Most recent lexiscan stress test 6/28/20 demonstrated large scar anteroseptal/apical and inferior walls; severe LV dysfunction, LVEF 24%, no ischemia. There are no concerning symptoms for angina currently. 4. Orthostatic hypotension: Mild symptoms reported but I do not want to take him off current CHF medical regimen and he agrees.  He can live with occasional dizziness and he will be careful getting out of bed and chair. Plan:  1. Continue current medication. No refills warranted  2. Do position changes slowly  3. Follow up in 6 months    Cost of prescription medications and patient compliance have been reviewed with patient. All questions answered. Thank you for allowing me to participate in the care of this individual.    This note was scribed in the presence of Johnathon Hodge MD by Donal Harp RN. I, Dr. Johnathon Hodge, personally performed the services described in this documentation, as scribed by the above signed scribe in my presence. It is both accurate and complete to my knowledge. I agree with the details independently gathered by the clinical support staff, while the remaining scribed note accurately describes my personal service to the patient. Dennis Gomez.  Adrián Vilchis M.D., [facility]

## 2021-06-23 ENCOUNTER — OFFICE VISIT (OUTPATIENT)
Dept: CARDIOLOGY CLINIC | Age: 71
End: 2021-06-23
Payer: MEDICARE

## 2021-06-23 VITALS
BODY MASS INDEX: 26.71 KG/M2 | HEART RATE: 66 BPM | WEIGHT: 190.8 LBS | DIASTOLIC BLOOD PRESSURE: 60 MMHG | HEIGHT: 71 IN | TEMPERATURE: 97.3 F | SYSTOLIC BLOOD PRESSURE: 100 MMHG | OXYGEN SATURATION: 97 %

## 2021-06-23 DIAGNOSIS — I21.09 ACUTE MI ANTERIOR WALL SUBSEQUENT EPISODE CARE (HCC): ICD-10-CM

## 2021-06-23 DIAGNOSIS — I25.5 ISCHEMIC CARDIOMYOPATHY: ICD-10-CM

## 2021-06-23 DIAGNOSIS — I50.22 CHRONIC SYSTOLIC CONGESTIVE HEART FAILURE (HCC): ICD-10-CM

## 2021-06-23 DIAGNOSIS — Z72.0 TOBACCO ABUSE: ICD-10-CM

## 2021-06-23 DIAGNOSIS — E78.2 MIXED HYPERLIPIDEMIA: ICD-10-CM

## 2021-06-23 DIAGNOSIS — I25.10 CORONARY ARTERY DISEASE INVOLVING NATIVE CORONARY ARTERY OF NATIVE HEART WITHOUT ANGINA PECTORIS: Primary | ICD-10-CM

## 2021-06-23 DIAGNOSIS — R60.9 EDEMA, UNSPECIFIED TYPE: ICD-10-CM

## 2021-06-23 DIAGNOSIS — I44.7 LBBB (LEFT BUNDLE BRANCH BLOCK): ICD-10-CM

## 2021-06-23 PROCEDURE — G8417 CALC BMI ABV UP PARAM F/U: HCPCS | Performed by: INTERNAL MEDICINE

## 2021-06-23 PROCEDURE — 4040F PNEUMOC VAC/ADMIN/RCVD: CPT | Performed by: INTERNAL MEDICINE

## 2021-06-23 PROCEDURE — 4004F PT TOBACCO SCREEN RCVD TLK: CPT | Performed by: INTERNAL MEDICINE

## 2021-06-23 PROCEDURE — 3017F COLORECTAL CA SCREEN DOC REV: CPT | Performed by: INTERNAL MEDICINE

## 2021-06-23 PROCEDURE — 99214 OFFICE O/P EST MOD 30 MIN: CPT | Performed by: INTERNAL MEDICINE

## 2021-06-23 PROCEDURE — G8427 DOCREV CUR MEDS BY ELIG CLIN: HCPCS | Performed by: INTERNAL MEDICINE

## 2021-06-23 PROCEDURE — 1123F ACP DISCUSS/DSCN MKR DOCD: CPT | Performed by: INTERNAL MEDICINE

## 2021-06-23 RX ORDER — METOPROLOL SUCCINATE 50 MG/1
25 TABLET, EXTENDED RELEASE ORAL DAILY
Qty: 3 TABLET | Refills: 0
Start: 2021-06-23 | End: 2021-08-19

## 2021-06-23 NOTE — PATIENT INSTRUCTIONS
1. Continue current medication. No refills warranted  2. Do position changes slowly  3.  Follow up in 6 months

## 2021-06-28 ENCOUNTER — NURSE ONLY (OUTPATIENT)
Dept: CARDIOLOGY CLINIC | Age: 71
End: 2021-06-28
Payer: MEDICARE

## 2021-06-28 DIAGNOSIS — I50.22 CHRONIC SYSTOLIC CONGESTIVE HEART FAILURE (HCC): ICD-10-CM

## 2021-06-28 DIAGNOSIS — I25.5 ISCHEMIC CARDIOMYOPATHY: ICD-10-CM

## 2021-06-28 DIAGNOSIS — Z95.810 AICD (AUTOMATIC CARDIOVERTER/DEFIBRILLATOR) PRESENT: Primary | ICD-10-CM

## 2021-06-28 PROCEDURE — 93296 REM INTERROG EVL PM/IDS: CPT | Performed by: INTERNAL MEDICINE

## 2021-06-28 PROCEDURE — 93295 DEV INTERROG REMOTE 1/2/MLT: CPT | Performed by: INTERNAL MEDICINE

## 2021-06-28 PROCEDURE — 93297 REM INTERROG DEV EVAL ICPMS: CPT | Performed by: INTERNAL MEDICINE

## 2021-06-28 NOTE — LETTER
[address] [facility] [phone]  [address] [facility]  [place]  [phone]    Pacemaker/Defibrillator Clinic    06/29/21      Rosalind Alvarez  [address]  [place] [address]      Dear Rosalind Alvarez    This letter is to inform you that we received the transmission from your monitor at home that checks your implanted heart device. The next date your monitor will automatically transmit will be 9/27. If your report needs attention we will notify you. Your device and monitor are wireless and most transmit cellularly, but please periodically check your monitor is still plugged in to the electrical outlet. If you still use the telephone land line to send please ensure the connection to the phone antwan is secure. This will help to ensure successful automatic transmissions in the future. Also, the monitor needs to be close to you while sleeping at night. Please be aware that the remote device transmission sites are periodically monitored only during regular business hours during which simultaneous in-office device clinics are being run. If your transmission requires attention, we will contact you as soon as possible. **PLEASE NOTE** that our [facility] policy and processes are changing to ensure a more seamless approach for all parties involved, allowing more time for our nurses to address patient issues and concerns. We will no longer be sending letters for NORMAL remote transmissions. You will be contacted by phone if your transmission requires attention (as previously done), and letters will only be sent regarding monitor disconnections or missed transmissions if you are unable to be reached by phone. Please do not be alarmed by this new process, as we will continue to contact you if your transmission report requires attention. This will be your final \"remote received\" letter.   From this point forward, the [facility] will be utilizing the no news is good news approach. As always, please feel free to contact your nurse with any questions or concerns. Thank you.     [facility]

## 2021-06-29 NOTE — PROGRESS NOTES
Remote transmission received for patients CRT-D. Transmission shows normal sensing and pacing function. EP physician will review. See interrogation under the cardiology tab in the [address] [place] [address] [address] field for more details. Will continue to monitor remotely. [employer] Heart Failure Index-1  RVP 2% %. Trends consistent. PVC count increased slightly. 2283 (34 days and 9776 (123 days). No sustained arrhythmias recorded.

## 2021-07-19 NOTE — TELEPHONE ENCOUNTER
Last office visit 5/7/2021     Last written 4/14/2021    Next office visit scheduled 8/10/2021    Requested Prescriptions     Pending Prescriptions Disp Refills    omeprazole (PRILOSEC) 20 MG delayed release capsule 90 capsule 1     Sig: Take 1 capsule by mouth every morning (before breakfast)

## 2021-07-20 RX ORDER — OMEPRAZOLE 20 MG/1
20 CAPSULE, DELAYED RELEASE ORAL
Qty: 90 CAPSULE | Refills: 1 | Status: SHIPPED | OUTPATIENT
Start: 2021-07-20 | End: 2021-11-29

## 2021-08-10 ENCOUNTER — OFFICE VISIT (OUTPATIENT)
Dept: FAMILY MEDICINE CLINIC | Age: 71
End: 2021-08-10
Payer: MEDICARE

## 2021-08-10 VITALS
DIASTOLIC BLOOD PRESSURE: 60 MMHG | OXYGEN SATURATION: 97 % | BODY MASS INDEX: 26.17 KG/M2 | WEIGHT: 185 LBS | HEART RATE: 85 BPM | SYSTOLIC BLOOD PRESSURE: 100 MMHG

## 2021-08-10 DIAGNOSIS — N18.30 CHRONIC RENAL FAILURE SYNDROME, STAGE 3 (MODERATE) (HCC): ICD-10-CM

## 2021-08-10 DIAGNOSIS — G44.229 CHRONIC TENSION-TYPE HEADACHE, NOT INTRACTABLE: ICD-10-CM

## 2021-08-10 DIAGNOSIS — E11.22 TYPE 2 DIABETES MELLITUS WITH STAGE 2 CHRONIC KIDNEY DISEASE, WITHOUT LONG-TERM CURRENT USE OF INSULIN (HCC): Primary | ICD-10-CM

## 2021-08-10 DIAGNOSIS — F33.42 RECURRENT MAJOR DEPRESSIVE DISORDER, IN FULL REMISSION (HCC): ICD-10-CM

## 2021-08-10 DIAGNOSIS — J41.0 SIMPLE CHRONIC BRONCHITIS (HCC): ICD-10-CM

## 2021-08-10 DIAGNOSIS — N18.2 TYPE 2 DIABETES MELLITUS WITH STAGE 2 CHRONIC KIDNEY DISEASE, WITHOUT LONG-TERM CURRENT USE OF INSULIN (HCC): Primary | ICD-10-CM

## 2021-08-10 DIAGNOSIS — I50.22 CHRONIC SYSTOLIC CONGESTIVE HEART FAILURE (HCC): ICD-10-CM

## 2021-08-10 DIAGNOSIS — I47.29 PAROXYSMAL VENTRICULAR TACHYCARDIA: ICD-10-CM

## 2021-08-10 PROBLEM — F33.0 MAJOR DEPRESSIVE DISORDER, RECURRENT, MILD (HCC): Status: ACTIVE | Noted: 2021-08-10

## 2021-08-10 PROBLEM — F33.9 MAJOR DEPRESSIVE DISORDER, RECURRENT, UNSPECIFIED (HCC): Status: ACTIVE | Noted: 2021-08-10

## 2021-08-10 PROBLEM — N18.31 STAGE 3A CHRONIC KIDNEY DISEASE (HCC): Status: RESOLVED | Noted: 2021-01-06 | Resolved: 2021-08-10

## 2021-08-10 PROBLEM — F33.1 MAJOR DEPRESSIVE DISORDER, RECURRENT, MODERATE (HCC): Status: ACTIVE | Noted: 2021-08-10

## 2021-08-10 LAB — HBA1C MFR BLD: 6.1 %

## 2021-08-10 PROCEDURE — 99214 OFFICE O/P EST MOD 30 MIN: CPT | Performed by: INTERNAL MEDICINE

## 2021-08-10 PROCEDURE — 3017F COLORECTAL CA SCREEN DOC REV: CPT | Performed by: INTERNAL MEDICINE

## 2021-08-10 PROCEDURE — 3023F SPIROM DOC REV: CPT | Performed by: INTERNAL MEDICINE

## 2021-08-10 PROCEDURE — 4004F PT TOBACCO SCREEN RCVD TLK: CPT | Performed by: INTERNAL MEDICINE

## 2021-08-10 PROCEDURE — G8926 SPIRO NO PERF OR DOC: HCPCS | Performed by: INTERNAL MEDICINE

## 2021-08-10 PROCEDURE — G8427 DOCREV CUR MEDS BY ELIG CLIN: HCPCS | Performed by: INTERNAL MEDICINE

## 2021-08-10 PROCEDURE — 3044F HG A1C LEVEL LT 7.0%: CPT | Performed by: INTERNAL MEDICINE

## 2021-08-10 PROCEDURE — 1123F ACP DISCUSS/DSCN MKR DOCD: CPT | Performed by: INTERNAL MEDICINE

## 2021-08-10 PROCEDURE — 2022F DILAT RTA XM EVC RTNOPTHY: CPT | Performed by: INTERNAL MEDICINE

## 2021-08-10 PROCEDURE — 83036 HEMOGLOBIN GLYCOSYLATED A1C: CPT | Performed by: INTERNAL MEDICINE

## 2021-08-10 PROCEDURE — G8417 CALC BMI ABV UP PARAM F/U: HCPCS | Performed by: INTERNAL MEDICINE

## 2021-08-10 PROCEDURE — 4040F PNEUMOC VAC/ADMIN/RCVD: CPT | Performed by: INTERNAL MEDICINE

## 2021-08-10 RX ORDER — IPRATROPIUM BROMIDE AND ALBUTEROL SULFATE 2.5; .5 MG/3ML; MG/3ML
1 SOLUTION RESPIRATORY (INHALATION) 4 TIMES DAILY
Qty: 120 VIAL | Refills: 3 | Status: SHIPPED | OUTPATIENT
Start: 2021-08-10 | End: 2021-12-29 | Stop reason: ALTCHOICE

## 2021-08-10 RX ORDER — AMITRIPTYLINE HYDROCHLORIDE 25 MG/1
25 TABLET, FILM COATED ORAL NIGHTLY
Qty: 90 TABLET | Refills: 1 | Status: SHIPPED | OUTPATIENT
Start: 2021-08-10 | End: 2021-12-17

## 2021-08-10 RX ORDER — NEBULIZER ACCESSORIES
1 KIT MISCELLANEOUS 3 TIMES DAILY
Qty: 1 KIT | Refills: 0 | Status: SHIPPED | OUTPATIENT
Start: 2021-08-10

## 2021-08-10 NOTE — PROGRESS NOTES
SUBJECTIVE:  CC: Diabetes    HPI:  Navjot Gottlieb presents for follow up and evaluation of diabetes. Also follow up on   Problem List Items Addressed This Visit     Chronic systolic congestive heart failure (HCC)    Simple chronic bronchitis (HCC)    Relevant Medications    ipratropium-albuterol (DUONEB) 0.5-2.5 (3) MG/3ML SOLN nebulizer solution    Respiratory Therapy Supplies (NEBULIZER/TUBING/MOUTHPIECE) KIT    Chronic renal failure syndrome, stage 3 (moderate) (HCC)    Paroxysmal ventricular tachycardia (HCC)    Type 2 diabetes mellitus with chronic kidney disease - Primary    Relevant Orders    Diabetic Foot Exam (Completed)    POCT glycosylated hemoglobin (Hb A1C) (Completed)    Lipid Panel    Major depressive disorder, recurrent, unspecified    Relevant Medications    amitriptyline (ELAVIL) 25 MG tablet      Other Visit Diagnoses     Chronic tension-type headache, not intractable        Relevant Medications    amitriptyline (ELAVIL) 25 MG tablet        Has increased phlem and wheeze. Known COPD. Nebulizer is broken and needs a new one. Home blood glucose log brought to visit: Yes. Control is Acceptable per home record. he has No symptoms of hypoglycemia. he has No symptoms of hyperglycemia. The patient denied polyphagia, polydipsia, or polyuria.     The last HBA1C and routine lab was   Lab Results   Component Value Date    LABA1C 6.6 05/07/2021     Lab Results   Component Value Date    .7 05/07/2021     Lab Results   Component Value Date    WBC 7.8 12/17/2020    HGB 12.4 (L) 12/17/2020    HCT 37.5 (L) 12/17/2020     12/17/2020    CHOL 142 12/17/2020    TRIG 132 12/17/2020    HDL 45 12/17/2020    LDLDIRECT 86 10/07/2015    ALT 9 (L) 05/07/2021    AST 16 05/07/2021     (L) 05/07/2021    K 4.9 05/07/2021     05/07/2021    CREATININE 1.2 05/07/2021    BUN 21 (H) 05/07/2021    CO2 22 05/07/2021    INR 1.00 12/17/2020    LABA1C 6.6 05/07/2021    LABMICR Not Indicated 12/18/2016 Has been taking meds as ordered Yes. he has no side effects from the current diabetes medications. Review of Systems    OBJECTIVE:    VS: /60 (Site: Left Upper Arm, Position: Sitting)   Pulse 85   Wt 185 lb (83.9 kg)   SpO2 97%   BMI 26.17 kg/m²   General appearance: Alert, Awake, Oriented times 3, no distress  Skin: Warm and dry  Lungs: Lungs clear to auscultation bilaterally. No rhonchi, crackles or wheezes  Heart: S1 S2  Regular rate and rhythm. No rub, murmur or gallop  Extremities: No edema, Peripheral pulses palpable  Feet:  No lesions, sensation intact to monofilament, Toe nails intact and without signs of fungus    ASSESSMENT/PLAN:    Clarissa Moreno was seen today for 3 month follow-up and diabetes. Diagnoses and all orders for this visit:    Type 2 diabetes mellitus with stage 2 chronic kidney disease, without long-term current use of insulin (HCC)  Controlled, continue current  Chronic renal failure syndrome, stage 3 (moderate) (HCC)  Stable, monitor  Chronic systolic congestive heart failure (HCC)  Stable, continue current  Paroxysmal ventricular tachycardia ([facility])  Seeing cardiology tomorrow  Recurrent major depressive disorder, in full remission ([facility] [address]  Controlled, continue current    COPD: dyspnea, wheeze and cough. Neb machine is broken and needs a new one. Use duoneb 3 times daily. Other orders  -     Diabetic Foot Exam  -     POCT glycosylated hemoglobin (Hb A1C)  -     ipratropium-albuterol (DUONEB) 0.5-2.5 (3) MG/3ML SOLN nebulizer solution; Inhale 3 mLs into the lungs 4 times daily  -     Respiratory Therapy Supplies (NEBULIZER/TUBING/MOUTHPIECE) KIT; 1 kit by Does not apply route 3 times daily  -     Lipid Panel; Future  -     amitriptyline (ELAVIL) 25 MG tablet; Take 1 tablet by mouth nightly        Instructions:    See patient goals. I have reviewed my findings and recommendations with Tony Vargas.

## 2021-08-11 ENCOUNTER — OFFICE VISIT (OUTPATIENT)
Dept: CARDIOLOGY CLINIC | Age: 71
End: 2021-08-11
Payer: MEDICARE

## 2021-08-11 ENCOUNTER — NURSE ONLY (OUTPATIENT)
Dept: CARDIOLOGY CLINIC | Age: 71
End: 2021-08-11
Payer: MEDICARE

## 2021-08-11 VITALS
HEART RATE: 70 BPM | DIASTOLIC BLOOD PRESSURE: 60 MMHG | OXYGEN SATURATION: 97 % | HEIGHT: 71 IN | BODY MASS INDEX: 26.39 KG/M2 | WEIGHT: 188.5 LBS | SYSTOLIC BLOOD PRESSURE: 110 MMHG

## 2021-08-11 DIAGNOSIS — Z95.810 PRESENCE OF CARDIAC RESYNCHRONIZATION THERAPY DEFIBRILLATOR (CRT-D): ICD-10-CM

## 2021-08-11 DIAGNOSIS — I47.29 PAROXYSMAL VENTRICULAR TACHYCARDIA: ICD-10-CM

## 2021-08-11 DIAGNOSIS — I25.5 ISCHEMIC CARDIOMYOPATHY: ICD-10-CM

## 2021-08-11 DIAGNOSIS — I47.29 PAROXYSMAL VENTRICULAR TACHYCARDIA: Primary | ICD-10-CM

## 2021-08-11 DIAGNOSIS — I42.9 SECONDARY CARDIOMYOPATHY (HCC): ICD-10-CM

## 2021-08-11 PROCEDURE — G8417 CALC BMI ABV UP PARAM F/U: HCPCS | Performed by: INTERNAL MEDICINE

## 2021-08-11 PROCEDURE — 4040F PNEUMOC VAC/ADMIN/RCVD: CPT | Performed by: INTERNAL MEDICINE

## 2021-08-11 PROCEDURE — 99214 OFFICE O/P EST MOD 30 MIN: CPT | Performed by: INTERNAL MEDICINE

## 2021-08-11 PROCEDURE — 4004F PT TOBACCO SCREEN RCVD TLK: CPT | Performed by: INTERNAL MEDICINE

## 2021-08-11 PROCEDURE — G8427 DOCREV CUR MEDS BY ELIG CLIN: HCPCS | Performed by: INTERNAL MEDICINE

## 2021-08-11 PROCEDURE — 3017F COLORECTAL CA SCREEN DOC REV: CPT | Performed by: INTERNAL MEDICINE

## 2021-08-11 PROCEDURE — 1123F ACP DISCUSS/DSCN MKR DOCD: CPT | Performed by: INTERNAL MEDICINE

## 2021-08-11 NOTE — PATIENT INSTRUCTIONS
Plan:  1. Continue with remote device transmissions every 3 months. 2. Continue medications as prescribed. 3. Follow up with EP NP in 6 months and 6 months with NICOLE.

## 2021-08-11 NOTE — PROGRESS NOTES
Patient presents to the device clinic today for a programming evaluation for his defibrillator. Patient has a history of ICM and paroxysmal VT. Takes Plavix and Toprol XL. Last device interrogation was on 6/29. Since then, no arrhythmias recorded. 1 PMT event recorded. AP 25% RVP 1% %    All sensing and pacing parameters are within normal range. No changes need to be made at this time. Patient education was provided about device functionality, in home monitoring, and any other patient questions and/or concerns were addressed. Patient voices understanding. Please see interrogation for more detail. Patient will see Dr. Ayden Marcelo today in office. Patient will follow up in 3 months in office or remotely. See [employer] report under the Cardiology tab.

## 2021-08-11 NOTE — PROGRESS NOTES
[facility]   Cardiac Follow Up      Date: 8/11/21  Patient Name: Junaid Ramirez  YOB: 1950    Primary Care Physician: Heather Rubin MD    CHIEF COMPLAINT: No chief complaint on file. HPI:  Junaid Ramirez is a 66 y.o. male who presents for evaluation of ventricular fibrillation. He was admitted 6/28/2020 after receiving ICD shock for VFib. This restored sinus rhythm. He had his dual-chamber pacemaker implanted in 4/2011 for ischemic cardiomyopathy by Dr. Scot Andrews. Interrogation of the device demonstrated a few episodes of NSVT and brief PAT over the past several months. Echo on 6/28/2020 showed an EF of 15-20% with severe global dysfunction with more prominent anterior hypokinesis. Stress test on 6/28/2020 showed an EF of 24%, anteroseptal/apical akinesis, inferior wall hypokinesis, fixed defect in anteroseptal.apical, inferior walls consistent with large scar, no ischemia. On 7/17/2020 he stated that he feels good. He stated that he has been feeling fine since he has been out of the hospital.  He remains very active. He stated that a couple of flights of stairs makes him short of breath. He can do one flight well. He tolerated doing daily activities. He is not limited. On 1/21/2021, his ECG demonstrated PVC's which were new. 8/11/2021 device interrogation demonstrated AP 25%,  99%, events -I PMT event, BILLY 11 years. Today, 8/11/2021, he reports that he is doing well. He does report that his activity is not limited, but he does report some fatigue. He states that sometimes when he has been sitting for awhile, and then changes to standing, he reports dizziness. He states that the dizziness self resolves when he stabilizes himself. He reports that he is taking his medications as prescribed and tolerates them well. Patient denies current edema, chest pain, sob, palpitations, dizziness or syncope. His QRS duration by twelve-lead ECG is excellent.   Chest x-ray demonstrates a lateral LV lead location. Past Medical History:   has a past medical history of AICD (automatic cardioverter/defibrillator) present, Arthritis, CAD (coronary artery disease), CHF (congestive heart failure) ([address] [address] Chronic systolic CHF (congestive heart failure), NYHA class 3 ([address] [address] GERD (gastroesophageal reflux disease), Hearing loss, Hyperlipidemia, MI (myocardial infarction) ([address] Rash, and Type 2 diabetes mellitus with chronic kidney disease. Surgical History:   has a past surgical history that includes Coronary angioplasty with stent; Cardiac defibrillator placement (Left, 12/17/2020); knee surgery; Colonoscopy; Cataract removal with implant (Right, 02/01/2017); Cataract removal with implant (Left, 03/01/2017); and eye surgery. Social History:   reports that he has been smoking cigarettes. He started smoking about 59 years ago. He has a 40.50 pack-year smoking history. He has never used smokeless tobacco. He reports that he does not drink alcohol and does not use drugs. Family History:  family history includes Cancer in his father; Diabetes in his mother.      Home Medications:  Outpatient Encounter Medications as of 8/11/2021   Medication Sig Dispense Refill    ipratropium-albuterol (DUONEB) 0.5-2.5 (3) MG/3ML SOLN nebulizer solution Inhale 3 mLs into the lungs 4 times daily 120 vial 3    Respiratory Therapy Supplies (NEBULIZER/TUBING/MOUTHPIECE) KIT 1 kit by Does not apply route 3 times daily 1 kit 0    amitriptyline (ELAVIL) 25 MG tablet Take 1 tablet by mouth nightly 90 tablet 1    omeprazole (PRILOSEC) 20 MG delayed release capsule Take 1 capsule by mouth every morning (before breakfast) 90 capsule 1    metoprolol succinate (TOPROL XL) 50 MG extended release tablet Take 0.5 tablets by mouth daily 3 tablet 0    spironolactone (ALDACTONE) 25 MG tablet TAKE 1/2 TABLET EVERY DAY 90 tablet 0    rosuvastatin (CRESTOR) 40 MG tablet TAKE 1 TABLET EVERY EVENING 90 tablet 3    clopidogrel (PLAVIX) 75 MG tablet TAKE 1 TABLET EVERY DAY 90 tablet 3    lisinopril (PRINIVIL;ZESTRIL) 5 MG tablet TAKE 1 TABLET EVERY DAY 90 tablet 3    ondansetron (ZOFRAN-ODT) 4 MG disintegrating tablet DISSOLVE 1 TABLET ON THE TONGUE EVERY 8 HOURS AS NEEDED FOR NAUSEA  OR  VOMITING 30 tablet 1    metFORMIN (GLUCOPHAGE-XR) 500 MG extended release tablet Take 1 tablet by mouth Daily with supper 90 tablet 1    nitroGLYCERIN (NITROSTAT) 0.4 MG SL tablet Place 1 tablet under the tongue every 5 minutes as needed for Chest pain 25 tablet 1    Handicap Placard MISC by Does not apply route Exp: 1/1/2024 2 each 0    gemfibrozil (LOPID) 600 MG tablet Take 1 tablet by mouth 2 times daily (before meals) 180 tablet 3    ipratropium-albuterol (DUONEB) 0.5-2.5 (3) MG/3ML SOLN nebulizer solution Inhale 3 mLs into the lungs every 6 hours as needed for Shortness of Breath 120 vial 5    finasteride (PROSCAR) 5 MG tablet Take 5 mg by mouth daily Indications: Rx by Dr. Dana Lucero methotrexate (RHEUMATREX) 2.5 MG chemo tablet Take 1 tablet by mouth once a week RX directions are 4 tablets weekly. Patient takes one tablet Monday/Wednesday/Friday. 1 tablet 0    folic acid (FOLVITE) 1 MG tablet Take 1 mg by mouth daily      tamsulosin (FLOMAX) 0.4 MG capsule Take 0.4 mg by mouth daily      aspirin 81 MG tablet Take 81 mg by mouth daily. No facility-administered encounter medications on file as of 8/11/2021. DATA:  ECG 8/11/21: Personally reviewed. All current cardiac medications reviewed and adjusted accordingly  8/11/21    Device interrogation reviewed and adjusted accordingly 8/11/21    Stress test 6/28/2020  Summary Severe LV dysfunction, LVEF 24%.   Anteroseptal/apical akinesis  Inferior wall hypokinesis  Fixed defect in anteroseptal/apical, inferior walls consistent with large  scar  No ischemia     Echo 6/28/2020   Summary   The left ventricular systolic function is severely reduced with an ejection   fraction of 15-20 %. Severe global dysfunction with more prominent anterior hypokinesis. Left ventricular cavity size is moderately dilated. Grade II diastolic dysfunction with elevated left ventricular filling   pressure. The left atrium is at the upper limits of normal in size. The right atrium is mildly dilated. Moderate mitral regurgitation. Mild tricuspid regurgitation. Systolic pulmonary artery pressure (SPAP) is normal estimated at 38 mmHg   (Right atrial pressure of 3 mmHg). Allergies:  Patient has no known allergies. Review of Systems   Constitutional: Negative. HENT: Negative. Eyes: Negative. Respiratory: Negative. Cardiovascular: Negative. Gastrointestinal: Negative. Genitourinary: Negative. Musculoskeletal: Negative. Skin: Negative. Neurological: Negative. Hematological: Negative. Psychiatric/Behavioral: Negative. There were no vitals taken for this visit. Objective:  Physical Exam   Constitutional: He is oriented to person, place, and time. He appears well-developed and well-nourished. HENT:   Head: Normocephalic and atraumatic. Eyes: Pupils are equal, round, and reactive to light. Neck: Normal range of motion. Cardiovascular: Normal rate, regular rhythm and normal heart sounds. Pulmonary/Chest: Effort normal and breath sounds normal.   Abdominal: Soft. No tenderness. Musculoskeletal: Normal range of motion. He exhibits no edema. Neurological: He is alert and oriented to person, place, and time. Skin: Skin is warm and dry. Psychiatric: He has a normal mood and affect. Assessment:  1. Ischemic CM  2. S/P VF episode  6/20/20  3. LBBB- CRT-D with excellent ECG response  4. CHF- on aldactone    Plan:  1. Continue with remote device transmissions every 3 months. 2. Continue medications as prescribed. 3. Follow up with EP NP in 6 months and 6 months with NICOLE.         Susan Meeks, RN, am scribing for and in the presence of Dr. Vida Homans. 08/11/21 3:42 PM   Naomi Bourgeois RN    I, Dr. Vida Homans, personally performed the services described in this documentation as scribed by Yesenia Eddy RN in my presence, and it is both accurate and complete.         Vida Homans, M.D.

## 2021-08-11 NOTE — LETTER
[address]   Cardiac Follow Up      Date: 8/11/21  Patient Name: Hair Hemphill  YOB: 1950    Primary Care Physician: Zander Akbar MD    CHIEF COMPLAINT: No chief complaint on file. HPI:  Hair Hemphill is a 66 y.o. male who presents for evaluation of ventricular fibrillation. He was admitted 6/28/2020 after receiving ICD shock for VFib. This restored sinus rhythm. He had his dual-chamber pacemaker implanted in 4/2011 for ischemic cardiomyopathy by Dr. Brit Diaz. Interrogation of the device demonstrated a few episodes of NSVT and brief PAT over the past several months. Echo on 6/28/2020 showed an EF of 15-20% with severe global dysfunction with more prominent anterior hypokinesis. Stress test on 6/28/2020 showed an EF of 24%, anteroseptal/apical akinesis, inferior wall hypokinesis, fixed defect in anteroseptal.apical, inferior walls consistent with large scar, no ischemia. On 7/17/2020 he stated that he feels good. He stated that he has been feeling fine since he has been out of the hospital.  He remains very active. He stated that a couple of flights of stairs makes him short of breath. He can do one flight well. He tolerated doing daily activities. He is not limited. On 1/21/2021, his ECG demonstrated PVC's which were new. 8/11/2021 device interrogation demonstrated AP 25%,  99%, events -I PMT event, BILLY 11 years. Today, 8/11/2021, he reports that he is doing well. He does report that his activity is not limited, but he does report some fatigue. He states that sometimes when he has been sitting for awhile, and then changes to standing, he reports dizziness. He states that the dizziness self resolves when he stabilizes himself. He reports that he is taking his medications as prescribed and tolerates them well. Patient denies current edema, chest pain, sob, palpitations, dizziness or syncope. His QRS duration by twelve-lead ECG is excellent.   Chest x-ray demonstrates a lateral LV lead location. Past Medical History:   has a past medical history of AICD (automatic cardioverter/defibrillator) present, Arthritis, CAD (coronary artery disease), CHF (congestive heart failure) ([facility] [address] Chronic systolic CHF (congestive heart failure), NYHA class 3 ([facility] [address] GERD (gastroesophageal reflux disease), Hearing loss, Hyperlipidemia, MI (myocardial infarction) ([facility] [address] Rash, and Type 2 diabetes mellitus with chronic kidney disease. Surgical History:   has a past surgical history that includes Coronary angioplasty with stent; Cardiac defibrillator placement (Left, 12/17/2020); knee surgery; Colonoscopy; Cataract removal with implant (Right, 02/01/2017); Cataract removal with implant (Left, 03/01/2017); and eye surgery. Social History:   reports that he has been smoking cigarettes. He started smoking about 59 years ago. He has a 40.50 pack-year smoking history. He has never used smokeless tobacco. He reports that he does not drink alcohol and does not use drugs. Family History:  family history includes Cancer in his father; Diabetes in his mother.      Home Medications:  Outpatient Encounter Medications as of 8/11/2021   Medication Sig Dispense Refill    ipratropium-albuterol (DUONEB) 0.5-2.5 (3) MG/3ML SOLN nebulizer solution Inhale 3 mLs into the lungs 4 times daily 120 vial 3    Respiratory Therapy Supplies (NEBULIZER/TUBING/MOUTHPIECE) KIT 1 kit by Does not apply route 3 times daily 1 kit 0    amitriptyline (ELAVIL) 25 MG tablet Take 1 tablet by mouth nightly 90 tablet 1    omeprazole (PRILOSEC) 20 MG delayed release capsule Take 1 capsule by mouth every morning (before breakfast) 90 capsule 1    metoprolol succinate (TOPROL XL) 50 MG extended release tablet Take 0.5 tablets by mouth daily 3 tablet 0    spironolactone (ALDACTONE) 25 MG tablet TAKE 1/2 TABLET EVERY DAY 90 tablet 0    rosuvastatin (CRESTOR) 40 MG tablet TAKE 1 TABLET EVERY EVENING 90 tablet 3    clopidogrel (PLAVIX) 75 MG tablet TAKE 1 TABLET EVERY DAY 90 tablet 3    lisinopril (PRINIVIL;ZESTRIL) 5 MG tablet TAKE 1 TABLET EVERY DAY 90 tablet 3    ondansetron (ZOFRAN-ODT) 4 MG disintegrating tablet DISSOLVE 1 TABLET ON THE TONGUE EVERY 8 HOURS AS NEEDED FOR NAUSEA  OR  VOMITING 30 tablet 1    metFORMIN (GLUCOPHAGE-XR) 500 MG extended release tablet Take 1 tablet by mouth Daily with supper 90 tablet 1    nitroGLYCERIN (NITROSTAT) 0.4 MG SL tablet Place 1 tablet under the tongue every 5 minutes as needed for Chest pain 25 tablet 1    Handicap Placard MISC by Does not apply route Exp: 1/1/2024 2 each 0    gemfibrozil (LOPID) 600 MG tablet Take 1 tablet by mouth 2 times daily (before meals) 180 tablet 3    ipratropium-albuterol (DUONEB) 0.5-2.5 (3) MG/3ML SOLN nebulizer solution Inhale 3 mLs into the lungs every 6 hours as needed for Shortness of Breath 120 vial 5    finasteride (PROSCAR) 5 MG tablet Take 5 mg by mouth daily Indications: Rx by Dr. Tasia Lew methotrexate (RHEUMATREX) 2.5 MG chemo tablet Take 1 tablet by mouth once a week RX directions are 4 tablets weekly. Patient takes one tablet Monday/Wednesday/Friday. 1 tablet 0    folic acid (FOLVITE) 1 MG tablet Take 1 mg by mouth daily      tamsulosin (FLOMAX) 0.4 MG capsule Take 0.4 mg by mouth daily      aspirin 81 MG tablet Take 81 mg by mouth daily. No facility-administered encounter medications on file as of 8/11/2021. DATA:  ECG 8/11/21: Personally reviewed. All current cardiac medications reviewed and adjusted accordingly  8/11/21    Device interrogation reviewed and adjusted accordingly 8/11/21    Stress test 6/28/2020  Summary Severe LV dysfunction, LVEF 24%.   Anteroseptal/apical akinesis  Inferior wall hypokinesis  Fixed defect in anteroseptal/apical, inferior walls consistent with large  scar  No ischemia     Echo 6/28/2020   Summary   The left ventricular systolic function is severely reduced with an ejection   fraction of 15-20 %. Severe global dysfunction with more prominent anterior hypokinesis. Left ventricular cavity size is moderately dilated. Grade II diastolic dysfunction with elevated left ventricular filling   pressure. The left atrium is at the upper limits of normal in size. The right atrium is mildly dilated. Moderate mitral regurgitation. Mild tricuspid regurgitation. Systolic pulmonary artery pressure (SPAP) is normal estimated at 38 mmHg   (Right atrial pressure of 3 mmHg). Allergies:  Patient has no known allergies. Review of Systems   Constitutional: Negative. HENT: Negative. Eyes: Negative. Respiratory: Negative. Cardiovascular: Negative. Gastrointestinal: Negative. Genitourinary: Negative. Musculoskeletal: Negative. Skin: Negative. Neurological: Negative. Hematological: Negative. Psychiatric/Behavioral: Negative. There were no vitals taken for this visit. Objective:  Physical Exam   Constitutional: He is oriented to person, place, and time. He appears well-developed and well-nourished. HENT:   Head: Normocephalic and atraumatic. Eyes: Pupils are equal, round, and reactive to light. Neck: Normal range of motion. Cardiovascular: Normal rate, regular rhythm and normal heart sounds. Pulmonary/Chest: Effort normal and breath sounds normal.   Abdominal: Soft. No tenderness. Musculoskeletal: Normal range of motion. He exhibits no edema. Neurological: He is alert and oriented to person, place, and time. Skin: Skin is warm and dry. Psychiatric: He has a normal mood and affect. Assessment:  1. Ischemic CM  2. S/P VF episode  6/20/20  3. LBBB- CRT-D with excellent ECG response  4. CHF- on aldactone    Plan:  1. Continue with remote device transmissions every 3 months. 2. Continue medications as prescribed. 3. Follow up with EP NP in 6 months and 6 months with JMB.         José Andrade RN, am scribing

## 2021-08-16 DIAGNOSIS — R60.9 EDEMA, UNSPECIFIED TYPE: ICD-10-CM

## 2021-08-19 PROCEDURE — 93284 PRGRMG EVAL IMPLANTABLE DFB: CPT | Performed by: INTERNAL MEDICINE

## 2021-08-19 RX ORDER — METOPROLOL SUCCINATE 50 MG/1
TABLET, EXTENDED RELEASE ORAL
Qty: 90 TABLET | Refills: 3 | Status: SHIPPED | OUTPATIENT
Start: 2021-08-19 | End: 2021-12-08 | Stop reason: SDUPTHER

## 2021-09-21 RX ORDER — GEMFIBROZIL 600 MG/1
600 TABLET, FILM COATED ORAL
Qty: 180 TABLET | Refills: 5 | Status: SHIPPED | OUTPATIENT
Start: 2021-09-21 | End: 2022-04-27 | Stop reason: SDUPTHER

## 2021-09-25 PROCEDURE — 93297 REM INTERROG DEV EVAL ICPMS: CPT | Performed by: INTERNAL MEDICINE

## 2021-09-25 PROCEDURE — 93295 DEV INTERROG REMOTE 1/2/MLT: CPT | Performed by: INTERNAL MEDICINE

## 2021-09-25 PROCEDURE — 93296 REM INTERROG EVL PM/IDS: CPT | Performed by: INTERNAL MEDICINE

## 2021-09-27 ENCOUNTER — NURSE ONLY (OUTPATIENT)
Dept: CARDIOLOGY CLINIC | Age: 71
End: 2021-09-27
Payer: MEDICARE

## 2021-09-27 DIAGNOSIS — I42.9 SECONDARY CARDIOMYOPATHY (HCC): ICD-10-CM

## 2021-09-27 DIAGNOSIS — Z95.810 AICD (AUTOMATIC CARDIOVERTER/DEFIBRILLATOR) PRESENT: ICD-10-CM

## 2021-09-27 DIAGNOSIS — I50.22 CHRONIC SYSTOLIC CONGESTIVE HEART FAILURE (HCC): ICD-10-CM

## 2021-09-27 DIAGNOSIS — I25.5 ISCHEMIC CARDIOMYOPATHY: ICD-10-CM

## 2021-09-28 NOTE — PROGRESS NOTES
Remote transmission received for patients CRT-D. Transmission shows normal sensing and pacing function. EP physician will review. See interrogation under the cardiology tab in the [facility] [address] field for more details. Will continue to monitor remotely. Presenting EGM shows RV/LV trigger pacing and ectopy. Heart[employer] Heart Failure Index has crossed the alert threshold of 16. The alert recovery threshold is 6. Heart [employer] @ 18-will call pt. RVP 2% %. Trends consistent. No sustained arrhythmias.

## 2021-10-21 NOTE — TELEPHONE ENCOUNTER
Refill Request     Last Seen: Last Seen Department: 8/10/2021  Last Seen by PCP: 5/7/2021    Last Written: 5/13/2021 #90 x 1    Next Appointment:   Future Appointments   Date Time Provider Danni Dennis   12/8/2021  1:45 PM Aleena Casarez MD [facility]   12/27/2021  7:10 AM SCHEDULE, Rico JUNG   2/15/2022  1:30 PM MD JAMSHID Hoskins  [place] [facility] [place]   3/28/2022  7:10 AM SCHEDULE, Rico [facility]   6/27/2022  7:10 AM SCHEDULE, Rico Lucio [facility]         Requested Prescriptions     Pending Prescriptions Disp Refills    metFORMIN (GLUCOPHAGE-XR) 500 MG extended release tablet [Pharmacy Med Name: METFORMIN HYDROCHLORIDE  MG Tablet Extended Release 24 Hour] 90 tablet 1     Sig: TAKE 1 TABLET BY MOUTH DAILY WITH SUPPER

## 2021-10-22 RX ORDER — METFORMIN HYDROCHLORIDE 500 MG/1
500 TABLET, EXTENDED RELEASE ORAL
Qty: 90 TABLET | Refills: 1 | Status: SHIPPED | OUTPATIENT
Start: 2021-10-22 | End: 2021-11-18 | Stop reason: SINTOL

## 2021-11-05 ENCOUNTER — TELEPHONE (OUTPATIENT)
Dept: FAMILY MEDICINE CLINIC | Age: 71
End: 2021-11-05

## 2021-11-05 NOTE — TELEPHONE ENCOUNTER
Refill request for     BD SINGLE USE SWAB    HUMANA TRUE METRIX TEST STRIP    TRUE METRIX BLOOD GUCOSE [facility]  Be faxed to [facility]

## 2021-11-08 ENCOUNTER — NURSE ONLY (OUTPATIENT)
Dept: FAMILY MEDICINE CLINIC | Age: 71
End: 2021-11-08
Payer: MEDICARE

## 2021-11-08 DIAGNOSIS — Z23 FLU VACCINE NEED: Primary | ICD-10-CM

## 2021-11-08 PROCEDURE — G0008 ADMIN INFLUENZA VIRUS VAC: HCPCS | Performed by: INTERNAL MEDICINE

## 2021-11-08 PROCEDURE — 90694 VACC AIIV4 NO PRSRV 0.5ML IM: CPT | Performed by: INTERNAL MEDICINE

## 2021-11-08 NOTE — PROGRESS NOTES
2021 - 2022 Flu Vaccine Questionnaire    VIS given -  Yes    1. Have you received any other vaccine within the last 14 days? No  2. Do you currently have an active infectious or acute respiratory illness or fever? No  3. Are you taking steroids or immune suppressive drugs? No  4. Have you ever had a reaction to a flu vaccine? No  5. Are you allergic to eggs, egg products, chicken, Thimerosal (preservative) Gentamycin, polymixin, neomycin or Latex? No  6. Have you ever had Guillian [place] Syndrome?   No

## 2021-11-09 RX ORDER — CALCIUM CITRATE/VITAMIN D3 200MG-6.25
TABLET ORAL
Qty: 100 EACH | Refills: 3 | Status: SHIPPED | OUTPATIENT
Start: 2021-11-09

## 2021-11-09 RX ORDER — BLOOD-GLUCOSE METER
EACH MISCELLANEOUS
Qty: 1 EACH | Refills: 0 | Status: SHIPPED | OUTPATIENT
Start: 2021-11-09

## 2021-11-09 NOTE — TELEPHONE ENCOUNTER
Received fax from [facility] requesting,    BD Single Use Swab  [employer] True Metrix Test Strips  True Metrix Blood Glucose MTR    I do not see these on patients current medication list.

## 2021-11-11 RX ORDER — BLOOD-GLUCOSE METER
EACH MISCELLANEOUS
Qty: 1 EACH | Refills: 0 | OUTPATIENT
Start: 2021-11-11

## 2021-11-11 NOTE — TELEPHONE ENCOUNTER
Refill Request     Last Seen: Last Seen Department: 8/10/2021  Last Seen by PCP: 8/10/2021    Last Written: 11/9/21 1 each 0 refill     Next Appointment:   Future Appointments   Date Time Provider Danni Dennis   12/8/2021  1:45 PM Gricelda Valladares MD P CLER [facility]   12/27/2021  7:10 AM SCHEDULE, Abner JUNG   2/15/2022  1:30 PM Larissa Francis MD [facility] [place]   3/28/2022  7:10 AM SCHEDULE, Abner Bourgeois REMOTE German Bridges [facility]   6/27/2022  7:10 AM SCHEDULE, Abner Bourgeois REMOTE TRANSMISSION Adilia Laird [facility]       Message to Alo7 to schedule appointment. Requested Prescriptions     Pending Prescriptions Disp Refills    Blood Glucose Monitoring Suppl (TRUE METRIX METER) YELENA 1 each 0     Sig: Check glucose daily.  E11.9

## 2021-11-15 ENCOUNTER — OFFICE VISIT (OUTPATIENT)
Dept: FAMILY MEDICINE CLINIC | Age: 71
End: 2021-11-15
Payer: MEDICARE

## 2021-11-15 VITALS
OXYGEN SATURATION: 98 % | WEIGHT: 186 LBS | HEART RATE: 68 BPM | DIASTOLIC BLOOD PRESSURE: 68 MMHG | BODY MASS INDEX: 26.31 KG/M2 | SYSTOLIC BLOOD PRESSURE: 110 MMHG

## 2021-11-15 DIAGNOSIS — N17.9 AKI (ACUTE KIDNEY INJURY) (HCC): ICD-10-CM

## 2021-11-15 DIAGNOSIS — N17.9 AKI (ACUTE KIDNEY INJURY) (HCC): Primary | ICD-10-CM

## 2021-11-15 DIAGNOSIS — E87.1 HYPONATREMIA: ICD-10-CM

## 2021-11-15 DIAGNOSIS — R60.9 EDEMA, UNSPECIFIED TYPE: ICD-10-CM

## 2021-11-15 LAB
ALBUMIN SERPL-MCNC: 5.1 G/DL (ref 3.4–5)
ANION GAP SERPL CALCULATED.3IONS-SCNC: 17 MMOL/L (ref 3–16)
BILIRUBIN URINE: NEGATIVE
BLOOD, URINE: ABNORMAL
BUN BLDV-MCNC: 35 MG/DL (ref 7–20)
CALCIUM SERPL-MCNC: 9.9 MG/DL (ref 8.3–10.6)
CHLORIDE BLD-SCNC: 99 MMOL/L (ref 99–110)
CLARITY: CLEAR
CO2: 20 MMOL/L (ref 21–32)
COLOR: YELLOW
COMMENT UA: ABNORMAL
CREAT SERPL-MCNC: 1.5 MG/DL (ref 0.8–1.3)
EPITHELIAL CELLS, UA: 4 /HPF (ref 0–5)
GFR AFRICAN AMERICAN: 56
GFR NON-AFRICAN AMERICAN: 46
GLUCOSE BLD-MCNC: 102 MG/DL (ref 70–99)
GLUCOSE URINE: NEGATIVE MG/DL
HYALINE CASTS: 3 /LPF (ref 0–8)
KETONES, URINE: NEGATIVE MG/DL
LEUKOCYTE ESTERASE, URINE: NEGATIVE
MICROSCOPIC EXAMINATION: YES
NITRITE, URINE: NEGATIVE
PH UA: 6 (ref 5–8)
PHOSPHORUS: 2.9 MG/DL (ref 2.5–4.9)
POTASSIUM SERPL-SCNC: 5.1 MMOL/L (ref 3.5–5.1)
PROTEIN UA: 100 MG/DL
RBC UA: 1 /HPF (ref 0–4)
SODIUM BLD-SCNC: 136 MMOL/L (ref 136–145)
SPECIFIC GRAVITY UA: 1.02 (ref 1–1.03)
URINE TYPE: ABNORMAL
UROBILINOGEN, URINE: 0.2 E.U./DL
WBC UA: 2 /HPF (ref 0–5)

## 2021-11-15 PROCEDURE — 99213 OFFICE O/P EST LOW 20 MIN: CPT | Performed by: NURSE PRACTITIONER

## 2021-11-15 ASSESSMENT — ENCOUNTER SYMPTOMS
DIARRHEA: 0
VOMITING: 0
SHORTNESS OF BREATH: 0
ABDOMINAL DISTENTION: 0
CONSTIPATION: 0
EYES NEGATIVE: 1
NAUSEA: 0
CHEST TIGHTNESS: 0
WHEEZING: 0
COUGH: 0
ABDOMINAL PAIN: 0

## 2021-11-15 NOTE — PROGRESS NOTES
11/15/2021  Irene Stevens (: 1950)  66 y.o.    ASSESSMENT and PLAN:  Silverio Araiza was seen today for discuss labs. Diagnoses and all orders for this visit:    SID (acute kidney injury) ([facility] [address]  -     RENAL FUNCTION PANEL; Future  -     Protein / Creatinine Ratio, Urine; Future  -     URINALYSIS WITH MICROSCOPIC  -     SODIUM, URINE, RANDOM; Future  -     OSMOLALITY, URINE  -Recheck today, check urine as well  -Ensure adequate hydration, may be related to dehydration  -Recent Metformin addition in 2021.   -On ace/spironlactone  -No Nsaid use. -May need to adjust meds depending on results like ACE/Methotrexate. May need to remove metformin if renal function does not improve. Hyponatremia-       SODIUM, URINE, RANDOM; Future  -     OSMOLALITY, URINE  -Recheck today     Return in about 3 months (around 2/15/2022) for DM. HPI  DM:Just got glucose machine yesterday. Taking Metformin 500 mg daily, started in May, 2021. No missed doses. A1c 6.1% on 8/10/21. Diet/exercise habits unchanged. Denies vision changes, polyuria, polydipsia, hyper/hypoglycemic episodes, SOB, chest pain, neuropathy. Started on Metformin in May. Appetite is good. Feels like he doesn't drink enough water. Recently decreased dose of methotrexate     CKD 3-Cr 1.5, hx of sid/ckd. Recently decreased weekly Methotrexate. Was started on Metformin 500 mg daily in May, 2021. Feels he may be dehydrated. Denies lightheadedness/dizziness, weight changes, medication changes. Denies NSAID use. Heart-CHF, CAD/V.fib, ICD/Pacemaker. On lisinopril 5mg, spironolactone 12.5 mg, metoprolol XL,  Crestor 40 mg, Plavix/Asa. Weights unchanged. Not on diuretics. Review of Systems   Constitutional: Negative for activity change, appetite change, chills, fatigue, fever and unexpected weight change. HENT: Negative. Eyes: Negative. Respiratory: Negative for cough, chest tightness, shortness of breath and wheezing.     Cardiovascular: Negative for chest pain, palpitations and leg swelling. Gastrointestinal: Negative for abdominal distention, abdominal pain, constipation, diarrhea, nausea and vomiting. Endocrine: Negative for polydipsia, polyphagia and polyuria. Genitourinary: Negative. Negative for decreased urine volume, flank pain, frequency, hematuria and urgency. Musculoskeletal: Negative. Skin: Negative. Neurological: Negative for dizziness, weakness, light-headedness, numbness and headaches. Hematological: Negative. Psychiatric/Behavioral: Negative. Allergies, past medical history, family history, and social history reviewed and unchanged from previous encounter. Current Outpatient Medications   Medication Sig Dispense Refill    Alcohol Swabstick 70 % PADS Use prior to checking glucose daily. E11.9 100 each 3    blood glucose test strips (TRUE METRIX BLOOD GLUCOSE TEST) strip Check glucose daily. DM 2 E 11.9 100 each 3    Blood Glucose Monitoring Suppl (TRUE METRIX METER) YELENA Check glucose daily.  E11.9 1 each 0    metFORMIN (GLUCOPHAGE-XR) 500 MG extended release tablet TAKE 1 TABLET BY MOUTH DAILY WITH SUPPER 90 tablet 1    gemfibrozil (LOPID) 600 MG tablet Take 1 tablet by mouth 2 times daily (before meals) 180 tablet 5    metoprolol succinate (TOPROL XL) 50 MG extended release tablet TAKE 1 TABLET EVERY DAY 90 tablet 3    ipratropium-albuterol (DUONEB) 0.5-2.5 (3) MG/3ML SOLN nebulizer solution Inhale 3 mLs into the lungs 4 times daily 120 vial 3    Respiratory Therapy Supplies (NEBULIZER/TUBING/MOUTHPIECE) KIT 1 kit by Does not apply route 3 times daily 1 kit 0    amitriptyline (ELAVIL) 25 MG tablet Take 1 tablet by mouth nightly 90 tablet 1    omeprazole (PRILOSEC) 20 MG delayed release capsule Take 1 capsule by mouth every morning (before breakfast) 90 capsule 1    spironolactone (ALDACTONE) 25 MG tablet TAKE 1/2 TABLET EVERY DAY 90 tablet 0    rosuvastatin (CRESTOR) 40 MG tablet TAKE 1 TABLET EVERY EVENING 90 tablet 3    clopidogrel (PLAVIX) 75 MG tablet TAKE 1 TABLET EVERY DAY 90 tablet 3    lisinopril (PRINIVIL;ZESTRIL) 5 MG tablet TAKE 1 TABLET EVERY DAY 90 tablet 3    ondansetron (ZOFRAN-ODT) 4 MG disintegrating tablet DISSOLVE 1 TABLET ON THE TONGUE EVERY 8 HOURS AS NEEDED FOR NAUSEA  OR  VOMITING 30 tablet 1    nitroGLYCERIN (NITROSTAT) 0.4 MG SL tablet Place 1 tablet under the tongue every 5 minutes as needed for Chest pain 25 tablet 1    Handicap Placard MISC by Does not apply route Exp: 1/1/2024 2 each 0    ipratropium-albuterol (DUONEB) 0.5-2.5 (3) MG/3ML SOLN nebulizer solution Inhale 3 mLs into the lungs every 6 hours as needed for Shortness of Breath 120 vial 5    finasteride (PROSCAR) 5 MG tablet Take 5 mg by mouth daily Indications: Rx by Dr. Renetta Chisholm methotrexate (RHEUMATREX) 2.5 MG chemo tablet Take 1 tablet by mouth once a week RX directions are 4 tablets weekly. Patient takes one tablet Monday/Wednesday/Friday. 1 tablet 0    folic acid (FOLVITE) 1 MG tablet Take 1 mg by mouth daily      tamsulosin (FLOMAX) 0.4 MG capsule Take 0.4 mg by mouth daily      aspirin 81 MG tablet Take 81 mg by mouth daily. No current facility-administered medications for this visit. Vitals:    11/15/21 1129   Weight: 186 lb (84.4 kg)     Estimated body mass index is 26.66 kg/m² as calculated from the following:    Height as of 8/11/21: 5' 10.5\" (1.791 m). Weight as of 8/11/21: 188 lb 8 oz (85.5 kg). Physical Exam  Vitals reviewed. Constitutional:       Appearance: Normal appearance. He is normal weight. HENT:      Head: Normocephalic and atraumatic. Nose: Nose normal.   Eyes:      Conjunctiva/sclera: Conjunctivae normal.   Cardiovascular:      Rate and Rhythm: Normal rate and regular rhythm. Pulses: Normal pulses. Heart sounds: Normal heart sounds. Pulmonary:      Effort: Pulmonary effort is normal.      Breath sounds: Normal breath sounds.    Abdominal: General: Abdomen is flat. Bowel sounds are normal.      Palpations: Abdomen is soft. Tenderness: There is no abdominal tenderness. Musculoskeletal:         General: Normal range of motion. Cervical back: Normal range of motion and neck supple. Skin:     General: Skin is warm and dry. Capillary Refill: Capillary refill takes less than 2 seconds. Neurological:      General: No focal deficit present. Mental Status: He is alert and oriented to person, place, and time. Mental status is at baseline. Psychiatric:         Mood and Affect: Mood normal.         Behavior: Behavior normal.         Thought Content:  Thought content normal.         Judgment: Judgment normal.

## 2021-11-16 LAB — OSMOLALITY URINE: 554 MOSM/KG (ref 390–1070)

## 2021-11-16 RX ORDER — SPIRONOLACTONE 25 MG/1
TABLET ORAL
Qty: 45 TABLET | Refills: 1 | Status: SHIPPED | OUTPATIENT
Start: 2021-11-16 | End: 2022-04-27 | Stop reason: SDUPTHER

## 2021-11-16 NOTE — TELEPHONE ENCOUNTER
Received refill request for Spironolactone from [facility].      Last OV: 06/23/2021 w/ SMM    Last Labs: 11/08/2021 CMP     Last Refill: 05/21/2021 #90 w/ 0 refills     Next Appointment: 12/08/2021 w/ SMM

## 2021-11-18 DIAGNOSIS — N18.2 TYPE 2 DIABETES MELLITUS WITH STAGE 2 CHRONIC KIDNEY DISEASE, WITHOUT LONG-TERM CURRENT USE OF INSULIN (HCC): ICD-10-CM

## 2021-11-18 DIAGNOSIS — N28.9 IMPAIRED RENAL FUNCTION: ICD-10-CM

## 2021-11-18 DIAGNOSIS — N17.9 AKI (ACUTE KIDNEY INJURY) (HCC): Primary | ICD-10-CM

## 2021-11-18 DIAGNOSIS — E11.22 TYPE 2 DIABETES MELLITUS WITH STAGE 2 CHRONIC KIDNEY DISEASE, WITHOUT LONG-TERM CURRENT USE OF INSULIN (HCC): ICD-10-CM

## 2021-11-28 NOTE — TELEPHONE ENCOUNTER
.  Refill Request     Last Seen: Last Seen Department: 11/15/2021  Last Seen by PCP: Visit date not found    Last Written: 7/20/21 90 with 1     Next Appointment:   Future Appointments   Date Time Provider Danni Dennis   12/8/2021  1:45 PM Boaz Sales MD [facility] CLER CAR [facility]   12/27/2021  7:10 AM SCHEDULE, [place] REMOTE Sherra Breath [facility]   2/15/2022  1:30 PM [facility]  [place] [facility]   3/28/2022  7:10 AM SCHEDULE, [place] REMOTE TRANSMISSION Oleg Esquivel [facility]   6/27/2022  7:10 AM SCHEDULE, [place] REMOTE TRANSMISSION Oleg Esquivel [facility]         Requested Prescriptions     Pending Prescriptions Disp Refills    omeprazole (PRILOSEC) 20 MG delayed release capsule [Pharmacy Med Name: OMEPRAZOLE 20 MG Capsule Delayed Release] 90 capsule 1     Sig: TAKE 1 CAPSULE BY MOUTH EVERY MORNING (BEFORE BREAKFAST)

## 2021-11-29 RX ORDER — OMEPRAZOLE 20 MG/1
20 CAPSULE, DELAYED RELEASE ORAL
Qty: 90 CAPSULE | Refills: 1 | Status: SHIPPED | OUTPATIENT
Start: 2021-11-29 | End: 2022-04-27 | Stop reason: SDUPTHER

## 2021-12-07 NOTE — PROGRESS NOTES
[facility]   Cardiac Consultation    Referring Provider:  Brock Barthel, MD     No chief complaint on file. Subjective: Mr Leesa Sanchez is being seen today for cardiology follow up of CAD, HTN, HLD, severe ischemic CM; c/o ***    Past Medical History:    Samuel Jacinto is a 64yo male who has PMH of chronic severe ischemic CM, hx VT, s/p ICD and shock, CAD s/p RCA stents prior. He has a Guidant ICD for h/o VT. Cardiac cath 6/2014 --> stable known ischemic heart disease. He also has hx MI, HLD, and chronic systolic CHF. Underwent Lexiscan myoview 12/28/18 no evidence of stress induced ischemia; moderate-large sized anterior, anteroseptal, and entire apex fixed defects c/w infarction; apical akinesis;  LVEF 33% (no change from 7/16, 11/15, or 11/13 studies). Note ECHO 6/29/20 showed EF=15-20% (EF=20-25% in 12/18, 15-20% in 7/16, and 25% in 2014 ); moderate LV dilation; grade II DD with elevated filling pressure; mod MR/mild TR. On 6/28/20 he presented to ER for ICD shock. Upon interrogation of device showed rapid VT which deteriorated into Vfib. Device appropriately treated the VF with a single-high energy shock. Review of the device at that time demonstrated a few episodes of NSVT and brief PAT. Most recent lexiscan stress test 6/28/20 demonstrated large scar anteroseptal/apical and inferior walls; severe LV dysfunction, LVEF 24%, no ischemia. Note EKG 7/20/20 shows NSR; 1st degree AV block; LBBB. He upgraded to BiV-ICD on 12/17/2020 with Dr. Tasha Armstrong. Last device check 2/24/21 demonstrated normal function: AP 33%, RV 3%, LV 99%, no AT/AF noted, PVC increased 1.7K to 2.3K. At 6/23/21 OV he c/o occasional dizziness with position changes. He noted that his chest pain has improved since upgrade of device. He occasionally gets fatigued. He remains active and cut grass recently without issues. Dr. Tasha Armstrong cut his metoprolol to 25 mg daily due to orthostasis symptoms.   He did get Debbrah Neighbor vaccine. Since last OV, he saw Dr. Marga London on 8/11/21, no changes were made. Most recent device check 9/25/21 showed 11 yr RRT, normal sensing and pacing function. Presenting EGM shows RV/LV trigger pacing and ectopy. HeartLogic Heart Failure Index has crossed the alert threshold of 16. The alert recovery threshold is 6. Heart Logic @ 18-will call pt. RVP 2% %. Trends consistent. No sustained arrhythmias. Most recent EKG 8/11/21 Sinus  Rhythm-60 bmp, Nonspecific T-abnormality. Low voltage with rightward P-axis and rotation -possible pulmonary disease. Past Medical History:   has a past medical history of AICD (automatic cardioverter/defibrillator) present, Arthritis, CAD (coronary artery disease), CHF (congestive heart failure) ([address] Chronic systolic CHF (congestive heart failure), NYHA class 3 ([address] GERD (gastroesophageal reflux disease), Hearing loss, Hyperlipidemia, MI (myocardial infarction) ([address] Rash, and Type 2 diabetes mellitus with chronic kidney disease. Surgical History:   has a past surgical history that includes Coronary angioplasty with stent; Cardiac defibrillator placement (Left, 12/17/2020); knee surgery; Colonoscopy; Cataract removal with implant (Right, 02/01/2017); Cataract removal with implant (Left, 03/01/2017); and eye surgery. Social History:   reports that he has been smoking cigarettes. He started smoking about 59 years ago. He has a 40.50 pack-year smoking history. He has never used smokeless tobacco. He reports that he does not drink alcohol and does not use drugs. Family History:  family history includes Cancer in his father; Diabetes in his mother. Home Medications:  Prior to Admission medications    Medication Sig Start Date End Date Taking?  Authorizing Provider   omeprazole (PRILOSEC) 20 MG delayed release capsule TAKE 1 CAPSULE BY MOUTH EVERY MORNING (BEFORE BREAKFAST) 11/29/21   SUSHMA Horta - CNP   spironolactone (ALDACTONE) 25 MG tablet TAKE 1/2 TABLET EVERY DAY 11/16/21   Chyna Zhu MD   Alcohol Swabstick 70 % PADS Use prior to checking glucose daily. E11.9 11/9/21   Brandon Sharma MD   blood glucose test strips (TRUE METRIX BLOOD GLUCOSE TEST) strip Check glucose daily. DM 2 E 11.9 11/9/21   Brandon Sharma MD   Blood Glucose Monitoring Suppl (TRUE METRIX METER) YELENA Check glucose daily.  E11.9 11/9/21   Brandon Sharma MD   gemfibrozil (LOPID) 600 MG tablet Take 1 tablet by mouth 2 times daily (before meals) 9/21/21   Chyna Zhu MD   metoprolol succinate (TOPROL XL) 50 MG extended release tablet TAKE 1 TABLET EVERY DAY 8/19/21   SUSHMA Hubbard CNP   ipratropium-albuterol (DUONEB) 0.5-2.5 (3) MG/3ML SOLN nebulizer solution Inhale 3 mLs into the lungs 4 times daily 8/10/21   Brandon Sharma MD   Respiratory Therapy Supplies (NEBULIZER/TUBING/MOUTHPIECE) KIT 1 kit by Does not apply route 3 times daily 8/10/21   Brandon Sharma MD   amitriptyline (ELAVIL) 25 MG tablet Take 1 tablet by mouth nightly 8/10/21   Barndon Sharma MD   rosuvastatin (CRESTOR) 40 MG tablet TAKE 1 TABLET EVERY EVENING 5/20/21   Adonay Bay MD   clopidogrel (PLAVIX) 75 MG tablet TAKE 1 TABLET EVERY DAY 5/20/21   Adonay Bay MD   lisinopril (PRINIVIL;ZESTRIL) 5 MG tablet TAKE 1 TABLET EVERY DAY 5/20/21   Adonay Bay MD   ondansetron (ZOFRAN-ODT) 4 MG disintegrating tablet DISSOLVE 1 TABLET ON THE TONGUE EVERY 8 HOURS AS NEEDED FOR NAUSEA  OR  VOMITING 5/14/21   WILBERTO Soto   nitroGLYCERIN (NITROSTAT) 0.4 MG SL tablet Place 1 tablet under the tongue every 5 minutes as needed for Chest pain 2/25/21   Adonay Bay MD   Handicap Placard MISC by Does not apply route Exp: 1/1/2024 1/15/21   WILBERTO Soto   ipratropium-albuterol (DUONEB) 0.5-2.5 (3) MG/3ML SOLN nebulizer solution Inhale 3 mLs into the lungs every 6 hours as needed for Shortness of Breath 5/10/19   Brandon Sharma MD   finasteride (PROSCAR) 5 MG tablet Take 5 mg by mouth daily Indications: Rx by Dr. Tere Davila MD   methotrexate (RHEUMATREX) 2.5 MG chemo tablet Take 1 tablet by mouth once a week RX directions are 4 tablets weekly. Patient takes one tablet Monday/Wednesday/Friday. 3/5/19   Yolande Randall MD   folic acid (FOLVITE) 1 MG tablet Take 1 mg by mouth daily    Historical Provider, MD   tamsulosin (FLOMAX) 0.4 MG capsule Take 0.4 mg by mouth daily    Diana Villalobos MD   aspirin 81 MG tablet Take 81 mg by mouth daily. Historical Provider, MD        Allergies:  Patient has no known allergies. Review of Systems:   · Constitutional: there has been no unanticipated weight loss. There's been no change in energy level, sleep pattern, or activity level. · Eyes: No visual changes or diplopia. No scleral icterus. · ENT: No Headaches, hearing loss or vertigo. No mouth sores or sore throat. · Cardiovascular: Reviewed in HPI  · Respiratory: No cough or wheezing, no sputum production. No hematemesis. · Gastrointestinal: No abdominal pain, appetite loss, blood in stools. No change in bowel or bladder habits. · Genitourinary: No dysuria, trouble voiding, or hematuria. · Musculoskeletal:  No gait disturbance, weakness or joint complaints. · Integumentary: No rash or pruritis. · Neurological: No headache, diplopia, change in muscle strength, numbness or tingling. No change in gait, balance, coordination, mood, affect, memory, mentation, behavior. · Psychiatric: No anxiety, no depression. · Endocrine: No malaise, fatigue or temperature intolerance. No excessive thirst, fluid intake, or urination. No tremor. · Hematologic/Lymphatic: No abnormal bruising or bleeding, blood clots or swollen lymph nodes. · Allergic/Immunologic: No nasal congestion or hives. Physical Examination:    There were no vitals filed for this visit.       Constitutional and General Appearance: NAD   Respiratory:  · Normal excursion and expansion without use of accessory muscles  · Resp Auscultation: Clear, no crackles or wheezes   Cardiovascular:  · The apical impulses not displaced  · Heart tones are crisp and normal  · Cervical veins are not engorged  · The carotid upstroke is normal in amplitude and contour without delay or bruit  · Distant soft S1S2, No S3, +soft ELVIA  · Peripheral pulses are symmetrical and full  · There is no clubbing, cyanosis of the extremities.   · No edema  · Femoral Arteries: 2+ and equal  · Pedal Pulses: 2+ and equal   Abdomen:  · No masses or tenderness  · Liver/Spleen: No Abnormalities Noted  Neurological/Psychiatric:  · Alert and oriented in all spheres  · Moves all extremities well  · Exhibits normal gait balance and coordination  · No abnormalities of mood, affect, memory, mentation, or behavior are noted  ·   Lab Results   Component Value Date    CHOL 133 08/11/2021    CHOL 142 12/17/2020    CHOL 146 07/10/2020     Lab Results   Component Value Date    TRIG 148 08/11/2021    TRIG 132 12/17/2020    TRIG 96 07/10/2020     Lab Results   Component Value Date    HDL 37 (L) 08/11/2021    HDL 45 12/17/2020    HDL 43 07/10/2020     Lab Results   Component Value Date    LDLCALC 66 08/11/2021    LDLCALC 71 12/17/2020    LDLCALC 84 07/10/2020     Lab Results   Component Value Date    LABVLDL 30 08/11/2021    LABVLDL 26 12/17/2020    LABVLDL 19 07/10/2020     Lab Results   Component Value Date     11/15/2021    K 5.1 11/15/2021    K 4.0 12/14/2020    CL 99 11/15/2021    CO2 20 11/15/2021    BUN 35 11/15/2021    CREATININE 1.5 11/15/2021    GLUCOSE 102 11/15/2021    CALCIUM 9.9 11/15/2021      Lab Results   Component Value Date    WBC 5.8 11/08/2021    HGB 12.3 (L) 11/08/2021    HCT 36.9 (L) 11/08/2021    .3 (H) 11/08/2021     11/08/2021       LABS: 6/29/20 - Magnesium 2.00, , K 4.3, BUN/creat 19/1.0, H/H 13.1/3/.6  LABS: 1/21/20 TSH 2.93    I have personally reviewed all labs including lipids 8/11/21    Assessment:   1. Ischemic cardiomyopathy: Note ECHO 6/29/20 showed EF=15-20% (EF=20-25% in 12/18, 15-20% in 7/16, and 25% in 2014 ). Clinically compensated NYHA Class II and will continue current CHF medical regimen. 2. Hyperlipidemia:  Most recent lipids 12/17/2020 see results I personally reviewed (see above). Well controlled and will continue current medical regimen. 3. CAD:   s/p RCA stents prior. Cardiac cath 6/2014 --> stable known ischemic heart disease. Most recent lexiscan stress test 6/28/20 demonstrated large scar anteroseptal/apical and inferior walls; severe LV dysfunction, LVEF 24%, no ischemia. There are no concerning symptoms for angina currently. 4. Orthostatic hypotension: Mild symptoms reported but I do not want to take him off current CHF medical regimen and he agrees. He can live with occasional dizziness and he will be careful getting out of bed and chair. Plan:  1. ***    Follow up with me in ***    Cost of prescription medications and patient compliance have been reviewed with patient. All questions answered. Thank you for allowing me to participate in the care of this individual.    ***    ***    Arvsuzy Part.  Shilpa Drew M.D., [facility] [place]

## 2021-12-07 NOTE — PROGRESS NOTES
[facility]   Cardiac Follow UP    Referring Provider:  Xi Hall MD     Chief Complaint   Patient presents with    6 Month Follow-Up    Cardiomyopathy    Atrial Fibrillation    Congestive Heart Failure      Subjective: Mr Samantha Deleon is being seen today for cardiology follow up of CAD, HTN, HLD, severe ischemic CM; no complaints today    Past Medical History:    Rodriguez Jacinto is a 64yo male who has PMH of chronic severe ischemic CM, hx VT, s/p ICD and shock, CAD s/p RCA stents prior. He has a Guidant ICD for h/o VT. Cardiac cath 6/2014 --> stable known ischemic heart disease. He also has hx MI, HLD, and chronic systolic CHF. Note ECHO 6/29/20 EF=15-20% (EF=20-25% in 12/18, 15-20% in 7/16, and 25% in 2014 ); moderate LV dilation; grade II DD with elevated filling pressure; mod MR/mild TR. On 6/28/20 he presented to ER for ICD shock. Upon interrogation of device showed rapid VT which deteriorated into Vfib. Device appropriately treated the VF with a single-high energy shock. Review of the device at that time demonstrated a few episodes of NSVT and brief PAT. Most recent lexiscan stress test 6/28/20 demonstrated large scar anteroseptal/apical and inferior walls; severe LV dysfunction, LVEF 24%, no ischemia (no change from prior studies 7/16, 11/15, 11/13). . Note EKG 7/20/20 shows NSR; 1st degree AV block; LBBB. He upgraded to BiV-ICD on 12/17/2020 with Dr. Blue Menjivar who cut his metoprolol to 25 mg daily due to orthostasis symptoms. He did get Moderna vaccine. Since last OV, he saw Dr. Blue Menjivar on 8/11/21, no changes were made. Most recent device check 9/25/21 showed 11 yr RRT, normal sensing and pacing function. No sustained arrhythmias. Most recent EKG 8/11/21 Sinus  Rhythm-60 bmp, Nonspecific T-abnormality. Low voltage. Today, he reports doing well without complaints. He states that he is having a urolith in January with Dr. Selin Gonzalez in January.   He notes that he is complaint with medications. Patient with no complaints of chest pain, SOB, palpitations, dizziness, edema, or orthopnea/PND. Note he was taken off lisinopril in November by PCP due to elevated K+ and BUN/Cr. Past Medical History:   has a past medical history of AICD (automatic cardioverter/defibrillator) present, Arthritis, CAD (coronary artery disease), CHF (congestive heart failure) ([facility] [address] Chronic systolic CHF (congestive heart failure), NYHA class 3 ([address] GERD (gastroesophageal reflux disease), Hearing loss, Hyperlipidemia, MI (myocardial infarction) ([address] Rash, and Type 2 diabetes mellitus with chronic kidney disease. Surgical History:   has a past surgical history that includes Coronary angioplasty with stent; Cardiac defibrillator placement (Left, 12/17/2020); knee surgery; Colonoscopy; Cataract removal with implant (Right, 02/01/2017); Cataract removal with implant (Left, 03/01/2017); and eye surgery. Social History:   reports that he has been smoking cigarettes. He started smoking about 59 years ago. He has a 40.50 pack-year smoking history. He has never used smokeless tobacco. He reports that he does not drink alcohol and does not use drugs. Family History:  family history includes Cancer in his father; Diabetes in his mother. Home Medications:  Prior to Admission medications    Medication Sig Start Date End Date Taking? Authorizing Provider   omeprazole (PRILOSEC) 20 MG delayed release capsule TAKE 1 CAPSULE BY MOUTH EVERY MORNING (BEFORE BREAKFAST) 11/29/21  Yes SUSHMA Rios - CNP   spironolactone (ALDACTONE) 25 MG tablet TAKE 1/2 TABLET EVERY DAY 11/16/21  Yes Kartik Stephenson MD   Alcohol Swabstick 70 % PADS Use prior to checking glucose daily. E11.9 11/9/21  Yes Naila Espino MD   blood glucose test strips (TRUE METRIX BLOOD GLUCOSE TEST) strip Check glucose daily.   DM 2 E 11.9 11/9/21  Yes Naila Espino MD   Blood Glucose Monitoring Suppl (TRUE METRIX METER) YELENA Check glucose daily. E11.9 11/9/21  Yes Nikki Ayers MD   gemfibrozil (LOPID) 600 MG tablet Take 1 tablet by mouth 2 times daily (before meals) 9/21/21  Yes Doreen Black MD   metoprolol succinate (TOPROL XL) 50 MG extended release tablet TAKE 1 TABLET EVERY DAY 8/19/21  Yes SUSHMA Mercado - MOHAN   ipratropium-albuterol (DUONEB) 0.5-2.5 (3) MG/3ML SOLN nebulizer solution Inhale 3 mLs into the lungs 4 times daily 8/10/21  Yes Nikki Ayers MD   Respiratory Therapy Supplies (NEBULIZER/TUBING/MOUTHPIECE) KIT 1 kit by Does not apply route 3 times daily 8/10/21  Yes Nikki Ayers MD   amitriptyline (ELAVIL) 25 MG tablet Take 1 tablet by mouth nightly 8/10/21  Yes Nikki Ayers MD   rosuvastatin (CRESTOR) 40 MG tablet TAKE 1 TABLET EVERY EVENING 5/20/21  Yes Claudette Finch, MD   clopidogrel (PLAVIX) 75 MG tablet TAKE 1 TABLET EVERY DAY 5/20/21  Yes Claudette Finch, MD   ondansetron (ZOFRAN-ODT) 4 MG disintegrating tablet DISSOLVE 1 TABLET ON THE TONGUE EVERY 8 HOURS AS NEEDED FOR NAUSEA  OR  VOMITING 5/14/21  Yes WILBERTO Yi   nitroGLYCERIN (NITROSTAT) 0.4 MG SL tablet Place 1 tablet under the tongue every 5 minutes as needed for Chest pain 2/25/21  Yes Claudette Finch, MD   Handicap Placard MISC by Does not apply route Exp: 1/1/2024 1/15/21  Yes WILBERTO Yi   ipratropium-albuterol (DUONEB) 0.5-2.5 (3) MG/3ML SOLN nebulizer solution Inhale 3 mLs into the lungs every 6 hours as needed for Shortness of Breath 5/10/19  Yes Nikki Ayers MD   finasteride (PROSCAR) 5 MG tablet Take 5 mg by mouth daily Indications: Rx by Dr. Iker Young MD   methotrexate (RHEUMATREX) 2.5 MG chemo tablet Take 1 tablet by mouth once a week RX directions are 4 tablets weekly. Patient takes one tablet Monday/Wednesday/Friday.  3/5/19  Yes Patrick Beckman MD   folic acid (FOLVITE) 1 MG tablet Take 1 mg by mouth daily   Yes Historical Provider, MD   tamsulosin (FLOMAX) 0.4 MG capsule Take full  · There is no clubbing, cyanosis of the extremities.   · No edema  · Femoral Arteries: 2+ and equal  · Pedal Pulses: 2+ and equal   Abdomen:  · No masses or tenderness  · Liver/Spleen: No Abnormalities Noted  Neurological/Psychiatric:  · Alert and oriented in all spheres  · Moves all extremities well  · Exhibits normal gait balance and coordination  · No abnormalities of mood, affect, memory, mentation, or behavior are noted    Lab Results   Component Value Date     11/15/2021    K 5.1 11/15/2021    CL 99 11/15/2021    CO2 20 (L) 11/15/2021    BUN 35 (H) 11/15/2021    CREATININE 1.5 (H) 11/15/2021    GLUCOSE 102 (H) 11/15/2021    CALCIUM 9.9 11/15/2021    PROT 7.4 11/08/2021    LABALBU 5.1 (H) 11/15/2021    BILITOT 0.3 11/08/2021    ALKPHOS 106 11/08/2021    AST 20 11/08/2021    ALT 14 11/08/2021    LABGLOM 46 (A) 11/15/2021    GFRAA 56 (A) 11/15/2021    AGRATIO 2.2 11/08/2021    GLOB 2.3 05/07/2021     Lab Results   Component Value Date    WBC 5.8 11/08/2021    HGB 12.3 (L) 11/08/2021    HCT 36.9 (L) 11/08/2021    .3 (H) 11/08/2021     11/08/2021     ·   Lab Results   Component Value Date    CHOL 133 08/11/2021    CHOL 142 12/17/2020    CHOL 146 07/10/2020     Lab Results   Component Value Date    TRIG 148 08/11/2021    TRIG 132 12/17/2020    TRIG 96 07/10/2020     Lab Results   Component Value Date    HDL 37 (L) 08/11/2021    HDL 45 12/17/2020    HDL 43 07/10/2020     Lab Results   Component Value Date    LDLCALC 66 08/11/2021    LDLCALC 71 12/17/2020    LDLCALC 84 07/10/2020     Lab Results   Component Value Date    LABVLDL 30 08/11/2021    LABVLDL 26 12/17/2020    LABVLDL 19 07/10/2020     Lab Results   Component Value Date     11/15/2021    K 5.1 11/15/2021    K 4.0 12/14/2020    CL 99 11/15/2021    CO2 20 11/15/2021    BUN 35 11/15/2021    CREATININE 1.5 11/15/2021    GLUCOSE 102 11/15/2021    CALCIUM 9.9 11/15/2021      Lab Results   Component Value Date    WBC 5.8 11/08/2021 HGB 12.3 (L) 11/08/2021    HCT 36.9 (L) 11/08/2021    .3 (H) 11/08/2021     11/08/2021       LABS: 6/29/20 - Magnesium 2.00, , K 4.3, BUN/creat 19/1.0, H/H 13.1/3/.6  LABS: 1/21/20 TSH 2.93    I have personally reviewed all labs including lipids 8/11/21    Assessment:   1. Ischemic cardiomyopathy: Note ECHO 6/29/20 showed EF=15-20% (EF=20-25% in 12/18, 15-20% in 7/16, and 25% in 2014 ). Clinically compensated NYHA Class II and will continue current CHF medical regimen. 2. Hyperlipidemia:  Most recent lipids 11/15/21 see results I personally reviewed (see above). Well controlled and will continue current medical regimen. 3. CAD:   s/p RCA stents prior. Cardiac cath 6/2014 --> stable known ischemic heart disease. Most recent lexiscan stress test 6/28/20 demonstrated large scar anteroseptal/apical and inferior walls; severe LV dysfunction, LVEF 24%, no ischemia. There are no concerning symptoms for angina currently. 4. Orthostatic hypotension: Improved. Mild symptoms reported prior have resolved. I do not want to take him off current CHF medical regimen and he agrees. Plan:  1. Medications reviewed. No refills warranted. 2. We will await Renal lab results today  3. Note he was taken off lisinopril in November by PCP due to elevated K+ and BUN/Cr. 4. He is having repeat labs drawn and PCP following up. I will have NP see next available to see if can restart ACE-I if able given low EF. He is taking Toprol XL and aldactone now for LV dysfunction. 5. Patient is higher risk clinically for intermediate risk type of surgery. May proceed as planned with urolift. Follow up with RB NP in 6 weeks to reconsider starting lisinopril and with me in 6 months. Cost of prescription medications and patient compliance have been reviewed with patient. All questions answered.      Thank you for allowing me to participate in the care of this individual.    This note was scribed in the presence of Saleem Rodriguez MD by Rylan Connelly RN. I, Dr. Sindy Perrin, personally performed the services described in this documentation, as scribed by the above signed scribe in my presence. It is both accurate and complete to my knowledge. I agree with the details independently gathered by the clinical support staff, while the remaining scribed note accurately describes my personal service to the patient. [place].  Deny Stafford M.D., [facility] [place]

## 2021-12-08 ENCOUNTER — OFFICE VISIT (OUTPATIENT)
Dept: CARDIOLOGY CLINIC | Age: 71
End: 2021-12-08
Payer: MEDICARE

## 2021-12-08 VITALS
HEIGHT: 70 IN | DIASTOLIC BLOOD PRESSURE: 54 MMHG | SYSTOLIC BLOOD PRESSURE: 98 MMHG | BODY MASS INDEX: 27.49 KG/M2 | WEIGHT: 192 LBS | HEART RATE: 63 BPM | OXYGEN SATURATION: 99 %

## 2021-12-08 DIAGNOSIS — E78.2 MIXED HYPERLIPIDEMIA: ICD-10-CM

## 2021-12-08 DIAGNOSIS — I25.10 CORONARY ARTERY DISEASE INVOLVING NATIVE CORONARY ARTERY OF NATIVE HEART WITHOUT ANGINA PECTORIS: Primary | ICD-10-CM

## 2021-12-08 DIAGNOSIS — I25.5 ISCHEMIC CARDIOMYOPATHY: ICD-10-CM

## 2021-12-08 DIAGNOSIS — I42.9 SECONDARY CARDIOMYOPATHY (HCC): ICD-10-CM

## 2021-12-08 DIAGNOSIS — I44.7 LBBB (LEFT BUNDLE BRANCH BLOCK): ICD-10-CM

## 2021-12-08 DIAGNOSIS — I50.22 CHRONIC SYSTOLIC CONGESTIVE HEART FAILURE (HCC): ICD-10-CM

## 2021-12-08 DIAGNOSIS — Z72.0 TOBACCO ABUSE: ICD-10-CM

## 2021-12-08 PROCEDURE — 4004F PT TOBACCO SCREEN RCVD TLK: CPT | Performed by: INTERNAL MEDICINE

## 2021-12-08 PROCEDURE — 99214 OFFICE O/P EST MOD 30 MIN: CPT | Performed by: INTERNAL MEDICINE

## 2021-12-08 PROCEDURE — G8484 FLU IMMUNIZE NO ADMIN: HCPCS | Performed by: INTERNAL MEDICINE

## 2021-12-08 PROCEDURE — 1123F ACP DISCUSS/DSCN MKR DOCD: CPT | Performed by: INTERNAL MEDICINE

## 2021-12-08 PROCEDURE — G8417 CALC BMI ABV UP PARAM F/U: HCPCS | Performed by: INTERNAL MEDICINE

## 2021-12-08 PROCEDURE — 3017F COLORECTAL CA SCREEN DOC REV: CPT | Performed by: INTERNAL MEDICINE

## 2021-12-08 PROCEDURE — 4040F PNEUMOC VAC/ADMIN/RCVD: CPT | Performed by: INTERNAL MEDICINE

## 2021-12-08 PROCEDURE — G8427 DOCREV CUR MEDS BY ELIG CLIN: HCPCS | Performed by: INTERNAL MEDICINE

## 2021-12-08 RX ORDER — METOPROLOL SUCCINATE 50 MG/1
25 TABLET, EXTENDED RELEASE ORAL DAILY
Qty: 90 TABLET | Refills: 0
Start: 2021-12-08 | End: 2022-04-27 | Stop reason: SDUPTHER

## 2021-12-08 NOTE — LETTER
[address]  [address]  [address] [address] [address] [address]  Phone: [phone]  Fax: [phone]    Linh Caceres MD        December 8, 2021      [place] 1950      Patient is higher risk clinically for intermediate risk type of procedure. May proceed as planned.     Sincerely,        Linh Caceres MD

## 2021-12-15 DIAGNOSIS — G44.229 CHRONIC TENSION-TYPE HEADACHE, NOT INTRACTABLE: ICD-10-CM

## 2021-12-16 NOTE — TELEPHONE ENCOUNTER
.  Refill Request     Last Seen: Last Seen Department: 11/15/2021  Last Seen by PCP: 8/10/2021    Last Written: 8/10/21 90 with 1     Next Appointment:   Future Appointments   Date Time Provider Danni Dennis   12/27/2021  7:10 AM [facility]   12/29/2021 11:30 AM SUSHMA Marti CNP  [place] [facility]   1/21/2022  8:30 AM SUSHMA Alcaraz CNP [facility]   2/15/2022  1:30 PM MD JAMSHID Rose  [place] [facility]   3/28/2022  7:10 AM SCHEDULE, [facility]   6/21/2022  1:45 PM Boaz Sales MD [facility] CL[facility] CAR [facility]   6/27/2022  7:10 AM SCHEDULE, [facility] REMOTE TRANSMISSION NVELO [facility]           Requested Prescriptions     Pending Prescriptions Disp Refills    amitriptyline (ELAVIL) 25 MG tablet [Pharmacy Med Name: AMITRIPTYLINE HCL 25 MG Tablet] 90 tablet 1     Sig: TAKE 1 TABLET EVERY NIGHT

## 2021-12-17 ENCOUNTER — TELEPHONE (OUTPATIENT)
Dept: CARDIOLOGY CLINIC | Age: 71
End: 2021-12-17

## 2021-12-17 RX ORDER — AMITRIPTYLINE HYDROCHLORIDE 25 MG/1
TABLET, FILM COATED ORAL
Qty: 90 TABLET | Refills: 1 | Status: SHIPPED | OUTPATIENT
Start: 2021-12-17 | End: 2022-04-26 | Stop reason: SDUPTHER

## 2021-12-17 NOTE — TELEPHONE ENCOUNTER
Hortensia Peres, 1950    Cardiac Risk Assessment    What type of procedure are you having? CYSTOSCOPY UROLIFT    When is your procedure scheduled for?  1.5.22    Medications to be stopped. PLAVIX-7 DAYS PRIOR  ASA 81 MG-7 DAYS PRIOR    What physician is performing your procedure? Anel Ramírez     Phone Number:   [phone]    Fax number to send the letter:   [phone]    Cardiologist:   DR. Lizbeth Locke    Last Appointment:   12.8.21    Assessment:   1. Ischemic cardiomyopathy: Note ECHO 6/29/20 showed EF=15-20% (EF=20-25% in 12/18, 15-20% in 7/16, and 25% in 2014 ). Clinically compensated NYHA Class II and will continue current CHF medical regimen.     2. Hyperlipidemia:  Most recent lipids 11/15/21 see results I personally reviewed (see above). Well controlled and will continue current medical regimen.     3. CAD:   s/p RCA stents prior. Cardiac cath 6/2014 --> stable known ischemic heart disease. Most recent lexiscan stress test 6/28/20 demonstrated large scar anteroseptal/apical and inferior walls; severe LV dysfunction, LVEF 24%, no ischemia. There are no concerning symptoms for angina currently. 4. Orthostatic hypotension: Improved. Mild symptoms reported prior have resolved. I do not want to take him off current CHF medical regimen and he agrees.      Plan:  1. Medications reviewed. No refills warranted. 2. We will await Renal lab results today  3. Note he was taken off lisinopril in November by PCP due to elevated K+ and BUN/Cr. 4. He is having repeat labs drawn and PCP following up. I will have NP see next available to see if can restart ACE-I if able given low EF. He is taking Toprol XL and aldactone now for LV dysfunction. 5. Patient is higher risk clinically for intermediate risk type of surgery.  May proceed as planned with urolift.

## 2021-12-20 ENCOUNTER — TELEPHONE (OUTPATIENT)
Dept: CASE MANAGEMENT | Age: 71
End: 2021-12-20

## 2021-12-20 DIAGNOSIS — F17.200 TOBACCO DEPENDENCE: Primary | ICD-10-CM

## 2021-12-20 NOTE — TELEPHONE ENCOUNTER
Patient due for annual CT Lung Screening. If you would like patient to have screening, please place order [place] CT [place] Screening [place] [address] Patient due for annual CT Lung Screening. Reminder letter mailed.       Thank you,  Jane Cabezas RN  [facility] Lung Navigator  [phone]

## 2021-12-20 NOTE — TELEPHONE ENCOUNTER
Higher risk clinically due to severe cardiomyopathy and CAD for low-intermediate risk procedure. Stable cardiac status. Proceed as planned. OK to hold plavix. Ideally would continue baby aspirin but if cannot then minimize time off as much as possible. Thanks.

## 2021-12-20 NOTE — TELEPHONE ENCOUNTER
Higher risk clinically due to severe cardiomyopathy and CAD for low-intermediate risk procedure. Stable cardiac status. Proceed as planned. OK to hold plavix. Ideally would continue baby aspirin but if cannot then minimize time off as much as possible. Thanks. Higher risk clinically due to severe cardiomyopathy and CAD for low-intermediate risk procedure. Stable cardiac status. Proceed as planned. OK to hold plavix. Ideally would continue baby aspirin but if cannot then minimize time off as much as possible. Thanks.

## 2021-12-21 NOTE — PROGRESS NOTES
Remote transmission received for patients CRT-D. Transmission shows normal sensing and pacing function. EP physician will review. See interrogation under the cardiology tab in the [address] [place] [address] [address] field for more details. Will continue to monitor remotely. [employer] Heart Failure Index-19. Thoracic Impedance-47. 1. HF index has remained above the threshold of 6 since 12/1/21. Will call pt. AP 5%. RVP 2%  %. Ventricular Pacing Chamber LV Only   No sustained arrhythmias recorded.

## 2021-12-27 ENCOUNTER — NURSE ONLY (OUTPATIENT)
Dept: CARDIOLOGY CLINIC | Age: 71
End: 2021-12-27
Payer: MEDICARE

## 2021-12-27 ENCOUNTER — TELEPHONE (OUTPATIENT)
Dept: CARDIOLOGY CLINIC | Age: 71
End: 2021-12-27

## 2021-12-27 DIAGNOSIS — I50.22 CHRONIC SYSTOLIC CONGESTIVE HEART FAILURE (HCC): ICD-10-CM

## 2021-12-27 DIAGNOSIS — I42.9 SECONDARY CARDIOMYOPATHY (HCC): ICD-10-CM

## 2021-12-27 DIAGNOSIS — Z95.810 AICD (AUTOMATIC CARDIOVERTER/DEFIBRILLATOR) PRESENT: ICD-10-CM

## 2021-12-27 DIAGNOSIS — I25.5 ISCHEMIC CARDIOMYOPATHY: ICD-10-CM

## 2021-12-27 PROCEDURE — 93297 REM INTERROG DEV EVAL ICPMS: CPT | Performed by: INTERNAL MEDICINE

## 2021-12-27 NOTE — TELEPHONE ENCOUNTER
[employer] Heart Failure Index-19. Thoracic Impedance-47. 1. HF index has remained above the threshold of 6 since 12/1/21. Indicating a increase in fluid      Please call pt and assess for s/s of chf and forward to RKG (NPRB/SMM OOT) for review if pt is symptomatic. See PACEART report under Cardiology tab.

## 2021-12-27 NOTE — TELEPHONE ENCOUNTER
Spoke to pt and relayed message. Pt sts that he is not having any symptoms and feels great. Pt sts that he will eventually be having surg on his kidneys and is following up w/his PCP re: that.  RKG OOT and will send to him once in office to review and go over w/pt

## 2021-12-28 PROCEDURE — 93296 REM INTERROG EVL PM/IDS: CPT | Performed by: INTERNAL MEDICINE

## 2021-12-28 PROCEDURE — 93295 DEV INTERROG REMOTE 1/2/MLT: CPT | Performed by: INTERNAL MEDICINE

## 2021-12-28 NOTE — TELEPHONE ENCOUNTER
This is Dr Price Began patient but I spoke to patient any way  He is not SOB he does not weigh himself. Advised him to weigh atleast three times a week. Report to doctor if weight up by 5 or more pounds in a week.

## 2021-12-29 NOTE — PROGRESS NOTES
PRE OP INSTRUCTION SHEET   1. Do not eat or drink anything after 12 midnight  prior to surgery. This includes no water, chewing gum or mints. 2. Take the following pills will a small sip of water (see MAR)                                        3. Aspirin, Ibuprofen, Advil, Naproxen, Vitamin E, fish oil and other Anti-inflammatory products should be stopped for 5 days before surgery or as directed by your physician. 4. Check with your Doctor regarding stopping Plavix, Coumadin, Lovenox, Fragmin or other blood thinners   5. Do not smoke, and do not drink any alcoholic beverages 24 hours prior to surgery. This includes NA Beer. 6. You may brush your teeth and gargle the morning of surgery. DO NOT SWALLOW WATER   7. You MUST make arrangements for a responsible adult to take you home after your surgery. You will not be allowed to leave alone or drive yourself home. It is strongly suggested someone stay with you the first 24 hrs. Your surgery will be cancelled if you do not have a ride home. 8. A parent/legal guardian must accompany a child scheduled for surgery and plan to stay at the hospital until the child is discharged. Please do not bring other children with you. 9. Please wear simple, loose fitting clothing to the hospital.  Regla Fus not bring valuables (money, credit cards, checkbooks, etc.) Do not wear any makeup (including no eye makeup) or nail polish on your fingers or toes. 10. DO NOT wear any jewelry or piercings on day of surgery. All body piercing jewelry must be removed. 11. If you have dentures,glasses, or contacts they will be removed before going to the OR; we will provide you a container. 12. Please see your family doctor/and cardiologist for a history & physical and/or concerning medications. Bring any test results/reports from your physician's office. Have history and labs faxed to [phone].  Remember to bring Blood Bank bracelet on the day of surgery. 14. If you have a Living Will and Durable Power of  for Healthcare, please bring in a copy. 13. Notify your Surgeon if you develop any illness between now and surgery  time, cough, cold, fever, sore throat, nausea, vomiting, etc.  Please notify your surgeon if you experience dizziness, shortness of breath or blurred vision between now & the time of your surgery   16. DO NOT shave your operative site 96 hours prior to surgery. For face & neck surgery, men may use an electric razor 48 hours prior to surgery. 17. Shower with _x__Antibacterial soap (x_chlorhexidine for total joint  Pt's) shower two times before surgery.(the morning of and the night before. 18. To provide excellent care visitors will be limited to one in the room at any given time.   Please call pre admission testing if you any further questions [phone] or [phone]

## 2021-12-30 ENCOUNTER — HOSPITAL ENCOUNTER (OUTPATIENT)
Age: 71
Discharge: HOME | End: 2021-12-30
Payer: MEDICARE

## 2021-12-30 PROCEDURE — U0005 INFEC AGEN DETEC AMPLI PROBE: HCPCS

## 2021-12-30 PROCEDURE — U0003 INFECTIOUS AGENT DETECTION BY NUCLEIC ACID (DNA OR RNA); SEVERE ACUTE RESPIRATORY SYNDROME CORONAVIRUS 2 (SARS-COV-2) (CORONAVIRUS DISEASE [COVID-19]), AMPLIFIED PROBE TECHNIQUE, MAKING USE OF HIGH THROUGHPUT TECHNOLOGIES AS DESCRIBED BY CMS-2020-01-R: HCPCS

## 2021-12-31 LAB — SARS-COV-2: NOT DETECTED

## 2022-01-03 ENCOUNTER — OFFICE VISIT (OUTPATIENT)
Dept: FAMILY MEDICINE CLINIC | Age: 72
End: 2022-01-03
Payer: MEDICARE

## 2022-01-03 VITALS
HEIGHT: 71 IN | DIASTOLIC BLOOD PRESSURE: 86 MMHG | HEART RATE: 77 BPM | WEIGHT: 191.4 LBS | OXYGEN SATURATION: 98 % | SYSTOLIC BLOOD PRESSURE: 128 MMHG | BODY MASS INDEX: 26.8 KG/M2

## 2022-01-03 DIAGNOSIS — Z01.818 PRE-OP EXAM: ICD-10-CM

## 2022-01-03 DIAGNOSIS — R39.14 BENIGN PROSTATIC HYPERPLASIA WITH INCOMPLETE BLADDER EMPTYING: Primary | ICD-10-CM

## 2022-01-03 DIAGNOSIS — J41.0 SIMPLE CHRONIC BRONCHITIS (HCC): ICD-10-CM

## 2022-01-03 DIAGNOSIS — E11.22 TYPE 2 DIABETES MELLITUS WITH STAGE 2 CHRONIC KIDNEY DISEASE, WITHOUT LONG-TERM CURRENT USE OF INSULIN (HCC): ICD-10-CM

## 2022-01-03 DIAGNOSIS — F33.42 RECURRENT MAJOR DEPRESSIVE DISORDER, IN FULL REMISSION (HCC): ICD-10-CM

## 2022-01-03 DIAGNOSIS — N18.2 TYPE 2 DIABETES MELLITUS WITH STAGE 2 CHRONIC KIDNEY DISEASE, WITHOUT LONG-TERM CURRENT USE OF INSULIN (HCC): ICD-10-CM

## 2022-01-03 DIAGNOSIS — N40.1 BENIGN PROSTATIC HYPERPLASIA WITH INCOMPLETE BLADDER EMPTYING: Primary | ICD-10-CM

## 2022-01-03 DIAGNOSIS — N18.30 CHRONIC RENAL FAILURE SYNDROME, STAGE 3 (MODERATE) (HCC): ICD-10-CM

## 2022-01-03 DIAGNOSIS — I42.9 SECONDARY CARDIOMYOPATHY (HCC): ICD-10-CM

## 2022-01-03 DIAGNOSIS — I47.29 PAROXYSMAL VENTRICULAR TACHYCARDIA: ICD-10-CM

## 2022-01-03 LAB — HBA1C MFR BLD: 6 %

## 2022-01-03 PROCEDURE — 3044F HG A1C LEVEL LT 7.0%: CPT | Performed by: INTERNAL MEDICINE

## 2022-01-03 PROCEDURE — 3017F COLORECTAL CA SCREEN DOC REV: CPT | Performed by: INTERNAL MEDICINE

## 2022-01-03 PROCEDURE — 4004F PT TOBACCO SCREEN RCVD TLK: CPT | Performed by: INTERNAL MEDICINE

## 2022-01-03 PROCEDURE — 2022F DILAT RTA XM EVC RTNOPTHY: CPT | Performed by: INTERNAL MEDICINE

## 2022-01-03 PROCEDURE — 1123F ACP DISCUSS/DSCN MKR DOCD: CPT | Performed by: INTERNAL MEDICINE

## 2022-01-03 PROCEDURE — G8484 FLU IMMUNIZE NO ADMIN: HCPCS | Performed by: INTERNAL MEDICINE

## 2022-01-03 PROCEDURE — G8417 CALC BMI ABV UP PARAM F/U: HCPCS | Performed by: INTERNAL MEDICINE

## 2022-01-03 PROCEDURE — 99214 OFFICE O/P EST MOD 30 MIN: CPT | Performed by: INTERNAL MEDICINE

## 2022-01-03 PROCEDURE — G8427 DOCREV CUR MEDS BY ELIG CLIN: HCPCS | Performed by: INTERNAL MEDICINE

## 2022-01-03 PROCEDURE — 4040F PNEUMOC VAC/ADMIN/RCVD: CPT | Performed by: INTERNAL MEDICINE

## 2022-01-03 PROCEDURE — 3023F SPIROM DOC REV: CPT | Performed by: INTERNAL MEDICINE

## 2022-01-03 PROCEDURE — 83036 HEMOGLOBIN GLYCOSYLATED A1C: CPT | Performed by: INTERNAL MEDICINE

## 2022-01-03 NOTE — PROGRESS NOTES
Preoperative Consultation      Marce Pierce  YOB: 1950    Date of Service:  1/3/2022    Vitals:    01/03/22 1624   BP: 128/86   Site: Right Upper Arm   Position: Sitting   Cuff Size: Medium Adult   Pulse: 77   SpO2: 98%   Weight: 191 lb 6.4 oz (86.8 kg)   Height: 5' 10.5\" (1.791 m)      Wt Readings from Last 2 Encounters:   01/03/22 191 lb 6.4 oz (86.8 kg)   12/08/21 192 lb (87.1 kg)     BP Readings from Last 3 Encounters:   01/03/22 128/86   12/08/21 (!) 98/54   11/15/21 110/68        Chief Complaint   Patient presents with    Pre-op Exam     1/5/22 nadine, kidney lift, no ekg previous one looked good, Dr. Oscar Bales      No Known Allergies  Outpatient Medications Marked as Taking for the 1/3/22 encounter (Office Visit) with Shelby Pearson MD   Medication Sig Dispense Refill    amitriptyline (ELAVIL) 25 MG tablet TAKE 1 TABLET EVERY NIGHT 90 tablet 1    metoprolol succinate (TOPROL XL) 50 MG extended release tablet Take 0.5 tablets by mouth daily 90 tablet 0    omeprazole (PRILOSEC) 20 MG delayed release capsule TAKE 1 CAPSULE BY MOUTH EVERY MORNING (BEFORE BREAKFAST) 90 capsule 1    spironolactone (ALDACTONE) 25 MG tablet TAKE 1/2 TABLET EVERY DAY 45 tablet 1    Alcohol Swabstick 70 % PADS Use prior to checking glucose daily. E11.9 100 each 3    blood glucose test strips (TRUE METRIX BLOOD GLUCOSE TEST) strip Check glucose daily. DM 2 E 11.9 100 each 3    Blood Glucose Monitoring Suppl (TRUE METRIX METER) YELENA Check glucose daily.  E11.9 1 each 0    gemfibrozil (LOPID) 600 MG tablet Take 1 tablet by mouth 2 times daily (before meals) 180 tablet 5    Respiratory Therapy Supplies (NEBULIZER/TUBING/MOUTHPIECE) KIT 1 kit by Does not apply route 3 times daily 1 kit 0    rosuvastatin (CRESTOR) 40 MG tablet TAKE 1 TABLET EVERY EVENING 90 tablet 3    clopidogrel (PLAVIX) 75 MG tablet TAKE 1 TABLET EVERY DAY 90 tablet 3    ondansetron (ZOFRAN-ODT) 4 MG disintegrating tablet DISSOLVE 1 TABLET ON THE TONGUE EVERY 8 HOURS AS NEEDED FOR NAUSEA  OR  VOMITING 30 tablet 1    nitroGLYCERIN (NITROSTAT) 0.4 MG SL tablet Place 1 tablet under the tongue every 5 minutes as needed for Chest pain 25 tablet 1    Handicap Placard MISC by Does not apply route Exp: 1/1/2024 2 each 0    ipratropium-albuterol (DUONEB) 0.5-2.5 (3) MG/3ML SOLN nebulizer solution Inhale 3 mLs into the lungs every 6 hours as needed for Shortness of Breath 120 vial 5    finasteride (PROSCAR) 5 MG tablet Take 5 mg by mouth daily Indications: Rx by Dr. Joanie Clay methotrexate (RHEUMATREX) 2.5 MG chemo tablet Take 1 tablet by mouth once a week RX directions are 4 tablets weekly. Patient takes one tablet Monday/Wednesday/Friday. (Patient taking differently: Take 2.5 mg by mouth once a week Takes 2 tablets on friday.) 1 tablet 0    folic acid (FOLVITE) 1 MG tablet Take 1 mg by mouth daily      tamsulosin (FLOMAX) 0.4 MG capsule Take 0.4 mg by mouth daily      aspirin 81 MG tablet Take 81 mg by mouth daily. This patient presents to the office today for a preoperative consultation at the request of surgeon, Dr. Silvestre Beach, who plans on performing cysprolift on January 5 at [facility].  The current problem began 1 year ago, and symptoms have been worsening with time.   Conservative therapy: No.    Planned anesthesia: General   Known anesthesia problems: None   Bleeding risk: No recent or remote history of abnormal bleeding  Personal or FH of DVT/PE: No    Patient objection to receiving blood products: No    Patient Active Problem List   Diagnosis    Hyperlipidemia    CAD (coronary artery disease)    Chronic systolic congestive heart failure (HCC)    Other forms of angina pectoris (HCC)    Presence of cardiac resynchronization therapy defibrillator (CRT-D)    Arthritis of knee    Hypotension    Ischemic cardiomyopathy    Pulmonary nodule    SOB (shortness of breath)    Anemia    Simple chronic bronchitis ([address]    History of acute anterior wall MI    Chronic low back pain    [facility] failure syndrome, stage 3 (moderate) [facility])    Mitral valve disorder    Disorder of bursae and tendons in shoulder region    Pain in joint, multiple sites    Paroxysmal ventricular tachycardia (HCC)    Polymyalgia rheumatica (HCC)    Primary pulmonary hypertension ([address]    Secondary cardiomyopathy ([address]    Tobacco abuse    Asymmetrical hearing loss of left ear    TMJ (temporomandibular joint syndrome)    Sensorineural hearing loss, bilateral    Defibrillator discharge    Other fatigue    LBBB (left bundle branch block)    Type 2 diabetes mellitus with chronic kidney disease    Major depressive disorder, recurrent, mild    Major depressive disorder, recurrent, moderate    Major depressive disorder, recurrent, unspecified       Past Medical History:   Diagnosis Date    AICD (automatic cardioverter/defibrillator) present 02/10/2015    Upgraded to BiV ICD 12/17/20 [employer]    Arthritis     CAD (coronary artery disease)     CHF [facility]congestive heart failure) ([facility])     Chronic systolic CHF (congestive heart failure), NYHA class 3 ([facility])     GERD (gastroesophageal reflux disease)     Hearing loss     left    Hyperlipidemia     MI (myocardial infarction) ([address]     Rash     Type 2 diabetes mellitus with chronic kidney disease 8/10/2021     Past Surgical History:   Procedure Laterality Date    CARDIAC DEFIBRILLATOR PLACEMENT Left 12/17/2020    BiV ICD [employer]    CATARACT REMOVAL WITH IMPLANT Right 02/01/2017    CATARACT REMOVAL WITH IMPLANT Left 03/01/2017    COLONOSCOPY      CORONARY ANGIOPLASTY WITH STENT PLACEMENT      EYE SURGERY      KNEE SURGERY       Family History   Problem Relation Age of Onset    Diabetes Mother     Cancer Father      Social History     Socioeconomic History    Marital status:      Spouse name: Not on file    Number of children: Not on file    Years of education: Not on file    Highest education level: Not on file   Occupational History    Not on file   Tobacco Use    Smoking status: Current Every Day Smoker     Packs/day: 0.75     Years: 54.00     Pack years: 40.50     Types: Cigarettes     Start date: 1/1/1962    Smokeless tobacco: Never Used    Tobacco comment: pt currently smokes 2/3 or less a day   Vaping Use    Vaping Use: Never used   Substance and Sexual Activity    Alcohol use: No    Drug use: No    Sexual activity: Yes     Partners: Female   Other Topics Concern    Not on file   Social History Narrative    Not on file     Social Determinants of Health     Financial Resource Strain:     Difficulty of Paying Living Expenses: Not on file   Food Insecurity:     Worried About Running Out of Food in the Last Year: Not on file    [place] of Food in the Last Year: Not on file   Transportation Needs:     Lack of Transportation (Medical): Not on file    Lack of Transportation (Non-Medical):  Not on file   Physical Activity:     Days of Exercise per Week: Not on file    Minutes of Exercise per Session: Not on file   Stress:     Feeling of Stress : Not on file   Social Connections:     Frequency of Communication with Friends and Family: Not on file    Frequency of Social Gatherings with Friends and Family: Not on file    Attends [employer] Services: Not on file    Active Member of [address] [place] [address] or Organizations: Not on file    Attends Club or Organization Meetings: Not on file    Marital Status: Not on file   Intimate Partner Violence:     Fear of Current or Ex-Partner: Not on file    Emotionally Abused: Not on file    Physically Abused: Not on file    Sexually Abused: Not on file   Housing Stability:     Unable to Pay for Housing in the Last Year: Not on file    Number of [place] in the Last Year: Not on file    Unstable Housing in the Last Year: Not on file       Review of Systems  A comprehensive review of systems was negative except for what was noted in the HPI. Physical Exam   Constitutional: He is oriented to person, place, and time. He appears well-developed and well-nourished. No distress. HENT:   Head: Normocephalic and atraumatic. Mouth/Throat: Uvula is midline, oropharynx is clear and moist and mucous membranes are normal.   Eyes: Conjunctivae and EOM are normal. Pupils are equal, round, and reactive to light. Neck: Trachea normal and normal range of motion. Neck supple. No JVD present. Carotid bruit is not present. No mass and no thyromegaly present. Cardiovascular: Normal rate, regular rhythm, normal heart sounds and intact distal pulses. Exam reveals no gallop and no friction rub. No murmur heard. Pulmonary/Chest: Effort normal and breath sounds normal. No respiratory distress. He has no wheezes. He has no rales. Abdominal: Soft. Normal aorta and bowel sounds are normal. He exhibits no distension and no mass. There is no hepatosplenomegaly. No tenderness. Musculoskeletal: He exhibits no edema and no tenderness. Neurological: He is alert and oriented to person, place, and time. He has normal strength. No cranial nerve deficit or sensory deficit. Coordination and gait normal.   Skin: Skin is warm and dry. No rash noted. No erythema. Psychiatric: He has a normal mood and affect. His behavior is normal.     EKG Interpretation:  Cleared by cardiology. Lab Review not applicable        Assessment:       66 y.o. patient with planned surgery as above.     Known risk factors for perioperative complications: Coronary artery disease, COPD, Diabetes mellitus, Hypertension  Current medications which may produce withdrawal symptoms if withheld perioperatively: none   Regla Anthony was seen today for pre-op exam.    Diagnoses and all orders for this visit:    Pre-op exam    Type 2 diabetes mellitus with stage 2 chronic kidney disease, without long-term current use of insulin (HCC)  -     POCT glycosylated hemoglobin (Hb

## 2022-01-04 DIAGNOSIS — N18.30 CHRONIC RENAL FAILURE SYNDROME, STAGE 3 (MODERATE) (HCC): ICD-10-CM

## 2022-01-04 LAB
ALBUMIN SERPL-MCNC: 4.9 G/DL (ref 3.4–5)
ANION GAP SERPL CALCULATED.3IONS-SCNC: 15 MMOL/L (ref 3–16)
BASOPHILS ABSOLUTE: 0.1 K/UL (ref 0–0.2)
BASOPHILS RELATIVE PERCENT: 1 %
BUN BLDV-MCNC: 22 MG/DL (ref 7–20)
CALCIUM SERPL-MCNC: 9.8 MG/DL (ref 8.3–10.6)
CHLORIDE BLD-SCNC: 101 MMOL/L (ref 99–110)
CO2: 20 MMOL/L (ref 21–32)
CREAT SERPL-MCNC: 1.4 MG/DL (ref 0.8–1.3)
EOSINOPHILS ABSOLUTE: 0.2 K/UL (ref 0–0.6)
EOSINOPHILS RELATIVE PERCENT: 3.3 %
GFR AFRICAN AMERICAN: >60
GFR NON-AFRICAN AMERICAN: 50
GLUCOSE BLD-MCNC: 122 MG/DL (ref 70–99)
HCT VFR BLD CALC: 39.1 % (ref 40.5–52.5)
HEMOGLOBIN: 12.9 G/DL (ref 13.5–17.5)
LYMPHOCYTES ABSOLUTE: 1.2 K/UL (ref 1–5.1)
LYMPHOCYTES RELATIVE PERCENT: 19.5 %
MCH RBC QN AUTO: 34.4 PG (ref 26–34)
MCHC RBC AUTO-ENTMCNC: 33.1 G/DL (ref 31–36)
MCV RBC AUTO: 103.9 FL (ref 80–100)
MONOCYTES ABSOLUTE: 0.5 K/UL (ref 0–1.3)
MONOCYTES RELATIVE PERCENT: 7.3 %
NEUTROPHILS ABSOLUTE: 4.3 K/UL (ref 1.7–7.7)
NEUTROPHILS RELATIVE PERCENT: 68.9 %
PDW BLD-RTO: 15.7 % (ref 12.4–15.4)
PHOSPHORUS: 2.9 MG/DL (ref 2.5–4.9)
PLATELET # BLD: 199 K/UL (ref 135–450)
PMV BLD AUTO: 8.4 FL (ref 5–10.5)
POTASSIUM SERPL-SCNC: 4.8 MMOL/L (ref 3.5–5.1)
RBC # BLD: 3.76 M/UL (ref 4.2–5.9)
SODIUM BLD-SCNC: 136 MMOL/L (ref 136–145)
WBC # BLD: 6.3 K/UL (ref 4–11)

## 2022-01-05 ENCOUNTER — ANESTHESIA EVENT (OUTPATIENT)
Dept: OPERATING ROOM | Age: 72
End: 2022-01-05
Payer: MEDICARE

## 2022-01-05 ENCOUNTER — HOSPITAL ENCOUNTER (OUTPATIENT)
Age: 72
Setting detail: OUTPATIENT SURGERY
Discharge: HOME | End: 2022-01-05
Attending: UROLOGY | Admitting: UROLOGY
Payer: MEDICARE

## 2022-01-05 ENCOUNTER — ANESTHESIA (OUTPATIENT)
Dept: OPERATING ROOM | Age: 72
End: 2022-01-05
Payer: MEDICARE

## 2022-01-05 VITALS — OXYGEN SATURATION: 94 % | DIASTOLIC BLOOD PRESSURE: 66 MMHG | SYSTOLIC BLOOD PRESSURE: 105 MMHG

## 2022-01-05 VITALS
HEART RATE: 63 BPM | DIASTOLIC BLOOD PRESSURE: 75 MMHG | OXYGEN SATURATION: 96 % | RESPIRATION RATE: 16 BRPM | HEIGHT: 70 IN | TEMPERATURE: 97 F | SYSTOLIC BLOOD PRESSURE: 114 MMHG | WEIGHT: 191 LBS | BODY MASS INDEX: 27.35 KG/M2

## 2022-01-05 DIAGNOSIS — N13.8 BENIGN PROSTATIC HYPERPLASIA WITH URINARY OBSTRUCTION: Primary | ICD-10-CM

## 2022-01-05 DIAGNOSIS — N40.1 BENIGN PROSTATIC HYPERPLASIA WITH URINARY OBSTRUCTION: Primary | ICD-10-CM

## 2022-01-05 LAB
ANION GAP SERPL CALCULATED.3IONS-SCNC: 9 MMOL/L (ref 3–16)
BUN BLDV-MCNC: 26 MG/DL (ref 7–20)
CALCIUM SERPL-MCNC: 9.3 MG/DL (ref 8.3–10.6)
CHLORIDE BLD-SCNC: 106 MMOL/L (ref 99–110)
CO2: 21 MMOL/L (ref 21–32)
CREAT SERPL-MCNC: 1.4 MG/DL (ref 0.8–1.3)
EKG ATRIAL RATE: 60 BPM
EKG DIAGNOSIS: NORMAL
EKG P AXIS: -12 DEGREES
EKG P-R INTERVAL: 228 MS
EKG Q-T INTERVAL: 438 MS
EKG QRS DURATION: 96 MS
EKG QTC CALCULATION (BAZETT): 438 MS
EKG R AXIS: 66 DEGREES
EKG T AXIS: 95 DEGREES
EKG VENTRICULAR RATE: 60 BPM
GFR AFRICAN AMERICAN: >60
GFR NON-AFRICAN AMERICAN: 50
GLUCOSE BLD-MCNC: 105 MG/DL (ref 70–99)
GLUCOSE BLD-MCNC: 109 MG/DL (ref 70–99)
PERFORMED ON: ABNORMAL
POTASSIUM SERPL-SCNC: 4.7 MMOL/L (ref 3.5–5.1)
SODIUM BLD-SCNC: 136 MMOL/L (ref 136–145)

## 2022-01-05 PROCEDURE — 6360000002 HC RX W HCPCS

## 2022-01-05 PROCEDURE — 7100000010 HC PHASE II RECOVERY - FIRST 15 MIN: Performed by: UROLOGY

## 2022-01-05 PROCEDURE — 93010 ELECTROCARDIOGRAM REPORT: CPT | Performed by: INTERNAL MEDICINE

## 2022-01-05 PROCEDURE — 7100000000 HC PACU RECOVERY - FIRST 15 MIN: Performed by: UROLOGY

## 2022-01-05 PROCEDURE — C1889 IMPLANT/INSERT DEVICE, NOC: HCPCS

## 2022-01-05 PROCEDURE — 3600000014 HC SURGERY LEVEL 4 ADDTL 15MIN: Performed by: UROLOGY

## 2022-01-05 PROCEDURE — 2709999900 HC NON-CHARGEABLE SUPPLY: Performed by: UROLOGY

## 2022-01-05 PROCEDURE — C1889 IMPLANT/INSERT DEVICE, NOC: HCPCS | Performed by: UROLOGY

## 2022-01-05 PROCEDURE — 2580000003 HC RX 258: Performed by: ANESTHESIOLOGY

## 2022-01-05 PROCEDURE — 3600000004 HC SURGERY LEVEL 4 BASE: Performed by: UROLOGY

## 2022-01-05 PROCEDURE — 80048 BASIC METABOLIC PNL TOTAL CA: CPT

## 2022-01-05 PROCEDURE — 6360000002 HC RX W HCPCS: Performed by: UROLOGY

## 2022-01-05 PROCEDURE — 93005 ELECTROCARDIOGRAM TRACING: CPT | Performed by: ANESTHESIOLOGY

## 2022-01-05 PROCEDURE — 7100000001 HC PACU RECOVERY - ADDTL 15 MIN: Performed by: UROLOGY

## 2022-01-05 PROCEDURE — 36415 COLL VENOUS BLD VENIPUNCTURE: CPT

## 2022-01-05 PROCEDURE — 7100000011 HC PHASE II RECOVERY - ADDTL 15 MIN: Performed by: UROLOGY

## 2022-01-05 PROCEDURE — 6370000000 HC RX 637 (ALT 250 FOR IP): Performed by: UROLOGY

## 2022-01-05 PROCEDURE — 2580000003 HC RX 258: Performed by: UROLOGY

## 2022-01-05 PROCEDURE — 3700000000 HC ANESTHESIA ATTENDED CARE: Performed by: UROLOGY

## 2022-01-05 PROCEDURE — 3700000001 HC ADD 15 MINUTES (ANESTHESIA): Performed by: UROLOGY

## 2022-01-05 PROCEDURE — 2500000003 HC RX 250 WO HCPCS

## 2022-01-05 DEVICE — SYSTEM URO W/ IMPL DEL DEV FOR TREAT OF URIN OUTFLO: Type: IMPLANTABLE DEVICE | Status: FUNCTIONAL

## 2022-01-05 RX ORDER — OXYCODONE HYDROCHLORIDE AND ACETAMINOPHEN 5; 325 MG/1; MG/1
1 TABLET ORAL PRN
Status: DISCONTINUED | OUTPATIENT
Start: 2022-01-05 | End: 2022-01-05 | Stop reason: HOSPADM

## 2022-01-05 RX ORDER — SODIUM CHLORIDE 0.9 % (FLUSH) 0.9 %
5-40 SYRINGE (ML) INJECTION EVERY 12 HOURS SCHEDULED
Status: DISCONTINUED | OUTPATIENT
Start: 2022-01-05 | End: 2022-01-05 | Stop reason: HOSPADM

## 2022-01-05 RX ORDER — DOCUSATE SODIUM 100 MG/1
100 CAPSULE, LIQUID FILLED ORAL 2 TIMES DAILY
Qty: 60 CAPSULE | Refills: 0 | Status: SHIPPED | OUTPATIENT
Start: 2022-01-05 | End: 2022-02-04

## 2022-01-05 RX ORDER — ONDANSETRON 2 MG/ML
4 INJECTION INTRAMUSCULAR; INTRAVENOUS EVERY 10 MIN PRN
Status: DISCONTINUED | OUTPATIENT
Start: 2022-01-05 | End: 2022-01-05 | Stop reason: HOSPADM

## 2022-01-05 RX ORDER — OXYCODONE HYDROCHLORIDE AND ACETAMINOPHEN 5; 325 MG/1; MG/1
0.5 TABLET ORAL EVERY 4 HOURS PRN
Qty: 10 TABLET | Refills: 0 | Status: SHIPPED | OUTPATIENT
Start: 2022-01-05 | End: 2022-01-10

## 2022-01-05 RX ORDER — ETOMIDATE 2 MG/ML
INJECTION INTRAVENOUS PRN
Status: DISCONTINUED | OUTPATIENT
Start: 2022-01-05 | End: 2022-01-05 | Stop reason: SDUPTHER

## 2022-01-05 RX ORDER — SODIUM CHLORIDE, SODIUM LACTATE, POTASSIUM CHLORIDE, CALCIUM CHLORIDE 600; 310; 30; 20 MG/100ML; MG/100ML; MG/100ML; MG/100ML
INJECTION, SOLUTION INTRAVENOUS CONTINUOUS
Status: DISCONTINUED | OUTPATIENT
Start: 2022-01-05 | End: 2022-01-05 | Stop reason: HOSPADM

## 2022-01-05 RX ORDER — SULFAMETHOXAZOLE AND TRIMETHOPRIM 400; 80 MG/1; MG/1
1 TABLET ORAL 2 TIMES DAILY
Qty: 8 TABLET | Refills: 0 | Status: SHIPPED | OUTPATIENT
Start: 2022-01-05 | End: 2022-01-09

## 2022-01-05 RX ORDER — LIDOCAINE HYDROCHLORIDE 20 MG/ML
INJECTION, SOLUTION EPIDURAL; INFILTRATION; INTRACAUDAL; PERINEURAL PRN
Status: DISCONTINUED | OUTPATIENT
Start: 2022-01-05 | End: 2022-01-05 | Stop reason: SDUPTHER

## 2022-01-05 RX ORDER — LABETALOL HYDROCHLORIDE 5 MG/ML
5 INJECTION, SOLUTION INTRAVENOUS EVERY 10 MIN PRN
Status: DISCONTINUED | OUTPATIENT
Start: 2022-01-05 | End: 2022-01-05 | Stop reason: HOSPADM

## 2022-01-05 RX ORDER — LIDOCAINE HYDROCHLORIDE 10 MG/ML
0.3 INJECTION, SOLUTION EPIDURAL; INFILTRATION; INTRACAUDAL; PERINEURAL
Status: DISCONTINUED | OUTPATIENT
Start: 2022-01-05 | End: 2022-01-05 | Stop reason: HOSPADM

## 2022-01-05 RX ORDER — LIDOCAINE HYDROCHLORIDE 20 MG/ML
JELLY TOPICAL PRN
Status: DISCONTINUED | OUTPATIENT
Start: 2022-01-05 | End: 2022-01-05 | Stop reason: ALTCHOICE

## 2022-01-05 RX ORDER — ONDANSETRON 2 MG/ML
INJECTION INTRAMUSCULAR; INTRAVENOUS PRN
Status: DISCONTINUED | OUTPATIENT
Start: 2022-01-05 | End: 2022-01-05 | Stop reason: SDUPTHER

## 2022-01-05 RX ORDER — MEPERIDINE HYDROCHLORIDE 25 MG/ML
12.5 INJECTION INTRAMUSCULAR; INTRAVENOUS; SUBCUTANEOUS EVERY 5 MIN PRN
Status: DISCONTINUED | OUTPATIENT
Start: 2022-01-05 | End: 2022-01-05 | Stop reason: HOSPADM

## 2022-01-05 RX ORDER — HYDRALAZINE HYDROCHLORIDE 20 MG/ML
5 INJECTION INTRAMUSCULAR; INTRAVENOUS EVERY 10 MIN PRN
Status: DISCONTINUED | OUTPATIENT
Start: 2022-01-05 | End: 2022-01-05 | Stop reason: HOSPADM

## 2022-01-05 RX ORDER — OXYCODONE HYDROCHLORIDE AND ACETAMINOPHEN 5; 325 MG/1; MG/1
2 TABLET ORAL PRN
Status: DISCONTINUED | OUTPATIENT
Start: 2022-01-05 | End: 2022-01-05 | Stop reason: HOSPADM

## 2022-01-05 RX ORDER — MAGNESIUM HYDROXIDE 1200 MG/15ML
LIQUID ORAL PRN
Status: DISCONTINUED | OUTPATIENT
Start: 2022-01-05 | End: 2022-01-05 | Stop reason: ALTCHOICE

## 2022-01-05 RX ORDER — SODIUM CHLORIDE 9 MG/ML
25 INJECTION, SOLUTION INTRAVENOUS PRN
Status: DISCONTINUED | OUTPATIENT
Start: 2022-01-05 | End: 2022-01-05 | Stop reason: HOSPADM

## 2022-01-05 RX ORDER — FENTANYL CITRATE 50 UG/ML
INJECTION, SOLUTION INTRAMUSCULAR; INTRAVENOUS PRN
Status: DISCONTINUED | OUTPATIENT
Start: 2022-01-05 | End: 2022-01-05 | Stop reason: SDUPTHER

## 2022-01-05 RX ORDER — PHENAZOPYRIDINE HYDROCHLORIDE 200 MG/1
200 TABLET, FILM COATED ORAL 3 TIMES DAILY PRN
Qty: 15 TABLET | Refills: 0 | Status: SHIPPED | OUTPATIENT
Start: 2022-01-05 | End: 2022-01-10

## 2022-01-05 RX ORDER — PROPOFOL 10 MG/ML
INJECTION, EMULSION INTRAVENOUS PRN
Status: DISCONTINUED | OUTPATIENT
Start: 2022-01-05 | End: 2022-01-05 | Stop reason: SDUPTHER

## 2022-01-05 RX ORDER — SODIUM CHLORIDE 0.9 % (FLUSH) 0.9 %
5-40 SYRINGE (ML) INJECTION PRN
Status: DISCONTINUED | OUTPATIENT
Start: 2022-01-05 | End: 2022-01-05 | Stop reason: HOSPADM

## 2022-01-05 RX ADMIN — FENTANYL CITRATE 50 MCG: 50 INJECTION INTRAMUSCULAR; INTRAVENOUS at 13:47

## 2022-01-05 RX ADMIN — LIDOCAINE HYDROCHLORIDE 100 MG: 20 INJECTION, SOLUTION EPIDURAL; INFILTRATION; INTRACAUDAL; PERINEURAL at 13:46

## 2022-01-05 RX ADMIN — PROPOFOL 40 MG: 10 INJECTION, EMULSION INTRAVENOUS at 13:53

## 2022-01-05 RX ADMIN — ETOMIDATE INJECTION 4 MG: 2 SOLUTION INTRAVENOUS at 13:55

## 2022-01-05 RX ADMIN — SODIUM CHLORIDE, POTASSIUM CHLORIDE, SODIUM LACTATE AND CALCIUM CHLORIDE: 600; 310; 30; 20 INJECTION, SOLUTION INTRAVENOUS at 13:46

## 2022-01-05 RX ADMIN — PROPOFOL 20 MG: 10 INJECTION, EMULSION INTRAVENOUS at 13:56

## 2022-01-05 RX ADMIN — FENTANYL CITRATE 50 MCG: 50 INJECTION INTRAMUSCULAR; INTRAVENOUS at 13:52

## 2022-01-05 RX ADMIN — ONDANSETRON HYDROCHLORIDE 4 MG: 2 INJECTION, SOLUTION INTRAMUSCULAR; INTRAVENOUS at 13:46

## 2022-01-05 RX ADMIN — Medication 2000 MG: at 13:55

## 2022-01-05 RX ADMIN — ETOMIDATE INJECTION 12 MG: 2 SOLUTION INTRAVENOUS at 13:53

## 2022-01-05 ASSESSMENT — PULMONARY FUNCTION TESTS
PIF_VALUE: 1
PIF_VALUE: 0
PIF_VALUE: 1
PIF_VALUE: 0
PIF_VALUE: 1
PIF_VALUE: 1
PIF_VALUE: 0
PIF_VALUE: 0
PIF_VALUE: 1

## 2022-01-05 ASSESSMENT — PAIN - FUNCTIONAL ASSESSMENT: PAIN_FUNCTIONAL_ASSESSMENT: 0-10

## 2022-01-05 ASSESSMENT — ENCOUNTER SYMPTOMS: SHORTNESS OF BREATH: 1

## 2022-01-05 ASSESSMENT — LIFESTYLE VARIABLES: SMOKING_STATUS: 1

## 2022-01-05 ASSESSMENT — PAIN SCALES - GENERAL
PAINLEVEL_OUTOF10: 0
PAINLEVEL_OUTOF10: 0

## 2022-01-05 NOTE — PROGRESS NOTES
Discharge instructions given to patient and patient friend. Both deny any questions at this time. Lena Richardson RN

## 2022-01-05 NOTE — PROGRESS NOTES
Patient discharged via wheelchair in stable condition with all belongings to private car. Rhina Shaikh RN

## 2022-01-05 NOTE — ANESTHESIA PRE PROCEDURE
Department of Anesthesiology  Preprocedure Note       Name:  Cheryl Bush   Age:  66 y.o.  :  1950                                          MRN:  [id number]         Date:  2022      Surgeon: Tenzin Yoo):  Elsi Arevalo MD    Procedure: Procedure(s):  CYSTOSCOPY, UROLIFT    Medications prior to admission:   Prior to Admission medications    Medication Sig Start Date End Date Taking? Authorizing Provider   amitriptyline (ELAVIL) 25 MG tablet TAKE 1 TABLET EVERY NIGHT 21  Yes SUSHMA Julio - CNP   metoprolol succinate (TOPROL XL) 50 MG extended release tablet Take 0.5 tablets by mouth daily 21  Yes Mari Ricardo MD   omeprazole (PRILOSEC) 20 MG delayed release capsule TAKE 1 CAPSULE BY MOUTH EVERY MORNING (BEFORE BREAKFAST) 21  Yes SUSHMA Julio - CNP   spironolactone (ALDACTONE) 25 MG tablet TAKE 1/2 TABLET EVERY DAY 21  Yes Mari Ricardo MD   gemfibrozil (LOPID) 600 MG tablet Take 1 tablet by mouth 2 times daily (before meals) 21  Yes Mari Ricardo MD   rosuvastatin (CRESTOR) 40 MG tablet TAKE 1 TABLET EVERY EVENING 21  Yes Emmett Petit MD   clopidogrel (PLAVIX) 75 MG tablet TAKE 1 TABLET EVERY DAY 21  Yes Emmett Petit MD   finasteride (PROSCAR) 5 MG tablet Take 5 mg by mouth daily Indications: Rx by Dr. Kindra Min MD   methotrexate (RHEUMATREX) 2.5 MG chemo tablet Take 1 tablet by mouth once a week RX directions are 4 tablets weekly. Patient takes one tablet Monday/Wednesday/Friday. Patient taking differently: Take 2.5 mg by mouth once a week Takes 2 tablets on friday. 3/5/19  Yes Julia Gr MD   folic acid (FOLVITE) 1 MG tablet Take 1 mg by mouth daily   Yes Historical Provider, MD   tamsulosin (FLOMAX) 0.4 MG capsule Take 0.4 mg by mouth daily   Yes Elsi Arevalo MD   aspirin 81 MG tablet Take 81 mg by mouth daily.    Yes Historical Provider, MD   Alcohol Swabstick 70 % PADS Use prior to checking glucose daily. E11.9 11/9/21   Bhargavi Albrecht MD   blood glucose test strips (TRUE METRIX BLOOD GLUCOSE TEST) strip Check glucose daily. DM 2 E 11.9 11/9/21   Bhargavi Albrecht MD   Blood Glucose Monitoring Suppl (TRUE METRIX METER) YELENA Check glucose daily.  E11.9 11/9/21   Bhargavi Albrecht MD   Respiratory Therapy Supplies (NEBULIZER/TUBING/MOUTHPIECE) KIT 1 kit by Does not apply route 3 times daily 8/10/21   Bhargavi Albrecht MD   ondansetron (ZOFRAN-ODT) 4 MG disintegrating tablet DISSOLVE 1 TABLET ON THE TONGUE EVERY 8 HOURS AS NEEDED FOR NAUSEA  OR  VOMITING 5/14/21   WILBERTO Cohn   nitroGLYCERIN (NITROSTAT) 0.4 MG SL tablet Place 1 tablet under the tongue every 5 minutes as needed for Chest pain 2/25/21   Hung Nicole MD   Handicap Placard MISC by Does not apply route Exp: 1/1/2024 1/15/21   WILBERTO Cohn   ipratropium-albuterol (DUONEB) 0.5-2.5 (3) MG/3ML SOLN nebulizer solution Inhale 3 mLs into the lungs every 6 hours as needed for Shortness of Breath 5/10/19   Bhargavi Albrecht MD       Current medications:    Current Facility-Administered Medications   Medication Dose Route Frequency Provider Last Rate Last Admin    ceFAZolin (ANCEF) 2000 mg in sterile water 20 mL IV syringe  2,000 mg IntraVENous Once Soraida Charles MD        lactated ringers infusion   IntraVENous Continuous Eitan Tee MD        sodium chloride flush 0.9 % injection 5-40 mL  5-40 mL IntraVENous 2 times per day Eitan Tee MD        sodium chloride flush 0.9 % injection 5-40 mL  5-40 mL IntraVENous PRN Eitan Tee MD        0.9 % sodium chloride infusion  25 mL IntraVENous PRN Eitan Tee MD        lidocaine PF 1 % injection 0.3 mL  0.3 mL IntraDERmal Once PRN Eitan Tee MD           Allergies:  No Known Allergies    Problem List:    Patient Active Problem List   Diagnosis Code    Hyperlipidemia E78.5    CAD (coronary artery disease) I25.10    Chronic systolic congestive heart failure (HCC) I50.22    Other forms of angina pectoris ([facility] I20.8    Presence of cardiac resynchronization therapy defibrillator (CRT-D) Z95.810    Arthritis of knee M17.10    Hypotension I95.9    Ischemic cardiomyopathy I25.5    Pulmonary nodule R91.1    SOB (shortness of breath) R06.02    Anemia D64.9    Simple chronic bronchitis ([facility]) J41.0    History of acute anterior wall MI I25.2    Chronic low back pain M54.50, G89.29    Chronic renal failure syndrome, stage 3 (moderate) ([facility]) N18.30    Mitral valve disorder I05.9    Disorder of bursae and tendons in shoulder region M71.9, M67.919    Pain in joint, multiple sites M25.50    Paroxysmal ventricular tachycardia ([facility]) I47.2    Polymyalgia rheumatica ([facility]) M35.3    Primary pulmonary hypertension ([facility]) I27.0    Secondary cardiomyopathy ([facility]) I42.9    Tobacco abuse Z72.0    Asymmetrical hearing loss of left ear DZI0019    TMJ (temporomandibular joint syndrome) M26.609    Sensorineural hearing loss, bilateral H90.3    Defibrillator discharge Z45.02    Other fatigue R53.83    LBBB (left bundle branch block) I44.7    Type 2 diabetes mellitus with chronic kidney disease E11.22    Major depressive disorder, recurrent, mild F33.0    Major depressive disorder, recurrent, moderate F33.1    Major depressive disorder, recurrent, unspecified F33.9       Past Medical History:        Diagnosis Date    AICD (automatic cardioverter/defibrillator) present 02/10/2015    Upgraded to BiV ICD 12/17/20 [employer]    Arthritis     CAD (coronary artery disease)     CHF (congestive heart failure) (HCC)     Chronic systolic CHF (congestive heart failure), NYHA class 3 ([facility])     GERD (gastroesophageal reflux disease)     Hearing loss     left    Hyperlipidemia     [facility]) [facility]     Rash     Type 2 diabetes mellitus with chronic kidney disease 8/10/2021       Past Surgical History: Procedure Laterality Date    CARDIAC DEFIBRILLATOR PLACEMENT Left 12/17/2020    BiV ICD [employer]    CATARACT REMOVAL WITH IMPLANT Right 02/01/2017    CATARACT REMOVAL WITH IMPLANT Left 03/01/2017    COLONOSCOPY      CORONARY ANGIOPLASTY WITH STENT PLACEMENT      EYE SURGERY      KNEE SURGERY         Social History:    Social History     Tobacco Use    Smoking status: Current Every Day Smoker     Packs/day: 0.75     Years: 54.00     Pack years: 40.50     Types: Cigarettes     Start date: 1/1/1962    Smokeless tobacco: Never Used    Tobacco comment: pt currently smokes 2/3 or less a day   Substance Use Topics    Alcohol use: No                                Ready to quit: Not Answered  Counseling given: Not Answered  Comment: pt currently smokes 2/3 or less a day      Vital Signs (Current):   Vitals:    12/29/21 1645 12/29/21 1650 01/05/22 1229   BP:   128/77   Pulse:   79   Resp:   16   Temp:   97.1 °F (36.2 °C)   TempSrc:   Temporal   SpO2:   99%   Weight:  180 lb (81.6 kg) 191 lb (86.6 kg)   Height: 5' 10\" (1.778 m)  5' 10\" (1.778 m)                                              BP Readings from Last 3 Encounters:   01/05/22 128/77   01/03/22 128/86   12/08/21 (!) 98/54       NPO Status: Time of last liquid consumption: 0830                        Time of last solid consumption: 2230                        Date of last liquid consumption: 01/05/22                        Date of last solid food consumption: 01/04/22    BMI:   Wt Readings from Last 3 Encounters:   01/05/22 191 lb (86.6 kg)   01/03/22 191 lb 6.4 oz (86.8 kg)   12/08/21 192 lb (87.1 kg)     Body mass index is 27.41 kg/m².     CBC:   Lab Results   Component Value Date    WBC 6.3 01/04/2022    RBC 3.76 01/04/2022    HGB 12.9 01/04/2022    HCT 39.1 01/04/2022    .9 01/04/2022    RDW 15.7 01/04/2022     01/04/2022       CMP:   Lab Results   Component Value Date     01/04/2022    K 4.8 01/04/2022    K 4.0 12/14/2020  01/04/2022    CO2 20 01/04/2022    BUN 22 01/04/2022    CREATININE 1.4 01/04/2022    GFRAA >60 01/04/2022    AGRATIO 2.2 11/08/2021    LABGLOM 50 01/04/2022    GLUCOSE 122 01/04/2022    PROT 7.4 11/08/2021    CALCIUM 9.8 01/04/2022    BILITOT 0.3 11/08/2021    ALKPHOS 106 11/08/2021    AST 20 11/08/2021    ALT 14 11/08/2021       POC Tests: No results for input(s): POCGLU, POCNA, POCK, POCCL, POCBUN, POCHEMO, POCHCT in the last 72 hours.     Coags:   Lab Results   Component Value Date    PROTIME 11.6 12/17/2020    PROTIME 34.0 01/20/2010    INR 1.00 12/17/2020    APTT 27.5 02/11/2015       HCG (If Applicable): No results found for: PREGTESTUR, PREGSERUM, HCG, HCGQUANT     ABGs:   Lab Results   Component Value Date    PHART 7.447 03/02/2019    PO2ART 64.6 03/02/2019    GMU3OCJ 29.0 03/02/2019    AHJ6AJQ 19.6 03/02/2019    BEART -3.6 03/02/2019    A9PUUXIA 93.8 03/02/2019        Type & Screen (If Applicable):  No results found for: LABABO, LABRH    Drug/Infectious Status (If Applicable):  No results found for: HIV, HEPCAB    COVID-19 Screening (If Applicable):   Lab Results   Component Value Date    COVID19 Not Detected 12/30/2021    COVID19 NOT DETECTED 12/14/2020           Anesthesia Evaluation  Patient summary reviewed and Nursing notes reviewed no history of anesthetic complications:   Airway: Mallampati: II  TM distance: >3 FB   Neck ROM: full  Mouth opening: > = 3 FB Dental:          Pulmonary:   (+) shortness of breath:  current smoker                           Cardiovascular:    (+) angina:, pacemaker: AICD, past MI:, CAD:, CABG/stent (stent):, CHF (EF  75-65%): systolic,                   Neuro/Psych:   Negative Neuro/Psych ROS  (+) psychiatric history:            GI/Hepatic/Renal: Neg GI/Hepatic/Renal ROS  (+) GERD:, renal disease: CRI,      (-) liver disease       Endo/Other:    (+) DiabetesType II DM, , : arthritis:., .                 Abdominal:             Vascular: negative vascular ROS.         Other Findings:           Anesthesia Plan      general     ASA 3     (I discussed with the patient the risks and benefits of PIV, general anesthesia, IV Narcotics, PACU. All questions were answered the patient agrees with the plan)  Induction: intravenous. MIPS: Prophylactic antiemetics administered. Anesthetic plan and risks discussed with patient. Plan discussed with CRNA.                   Caroline Jerez MD   1/5/2022

## 2022-01-05 NOTE — BRIEF OP NOTE
Brief Postoperative Note      Patient: Lucía Newman  YOB: 1950  MRN: [id number]    Date of Procedure: 1/5/2022    Pre-Op Diagnosis: BENIGN PROSTATIC HYPERTROPHY    Post-Op Diagnosis: Same       Procedure(s):  CYSTOSCOPY, UROLIFT, 3 left, 2 right. Surgeon(s):  Christy Ruiz MD    Assistant:  Surgical Assistant: Jefferson Gaucher Boling    Anesthesia: General    Estimated Blood Loss (mL): Minimal    Complications: None    Specimens:   * No specimens in log *    Implants:  Implant Name Type Inv.  Item Serial No.  Lot No. LRB No. Used Action   SYSTEM URO W/ IMPL DEL DEV FOR TREAT OF URIN OUTFLO  SYSTEM URO W/ IMPL DEL DEV FOR TREAT OF URIN OUTFLO  [employer] [id number] N/A 5 Implanted         Drains: * No LDAs found *    Findings: BPH    Electronically signed by Omar Masters MD on 1/5/2022 at 2:10 PM

## 2022-01-10 NOTE — OP NOTE
cystoscope was then replaced with the UroLift cystoscope. A total  of five implants were placed with three on the left and two on the  right. With all five implants in position done at the 10 and 2 o'clock  positions, a wide open anterior prostatic urethral channel was noted. Clips were seen just inside the bladder neck as well as proximal to the  verumontanum. No significant bleeding was noted. At this point, I  decided not to place a Arellano catheter. Overall, he tolerated the procedure well and was brought back to the  postop recovery room in stable condition.           Willy Coe MD    D: 01/09/2022 15:07:13       T: 01/09/2022 15:09:29     [email or web address]  Job#: [id number]     Doc#: [id number]    CC:

## 2022-01-21 ENCOUNTER — OFFICE VISIT (OUTPATIENT)
Dept: CARDIOLOGY CLINIC | Age: 72
End: 2022-01-21
Payer: MEDICARE

## 2022-01-21 ENCOUNTER — TELEPHONE (OUTPATIENT)
Dept: CARDIOLOGY CLINIC | Age: 72
End: 2022-01-21

## 2022-01-21 VITALS
DIASTOLIC BLOOD PRESSURE: 60 MMHG | HEIGHT: 71 IN | WEIGHT: 196 LBS | HEART RATE: 63 BPM | OXYGEN SATURATION: 97 % | BODY MASS INDEX: 27.44 KG/M2 | SYSTOLIC BLOOD PRESSURE: 102 MMHG

## 2022-01-21 DIAGNOSIS — I44.7 LBBB (LEFT BUNDLE BRANCH BLOCK): ICD-10-CM

## 2022-01-21 DIAGNOSIS — I25.5 ISCHEMIC CARDIOMYOPATHY: ICD-10-CM

## 2022-01-21 DIAGNOSIS — I50.22 CHRONIC SYSTOLIC CONGESTIVE HEART FAILURE (HCC): Primary | ICD-10-CM

## 2022-01-21 DIAGNOSIS — Z95.810 AICD (AUTOMATIC CARDIOVERTER/DEFIBRILLATOR) PRESENT: ICD-10-CM

## 2022-01-21 PROCEDURE — 4040F PNEUMOC VAC/ADMIN/RCVD: CPT | Performed by: NURSE PRACTITIONER

## 2022-01-21 PROCEDURE — 99214 OFFICE O/P EST MOD 30 MIN: CPT | Performed by: NURSE PRACTITIONER

## 2022-01-21 PROCEDURE — G8417 CALC BMI ABV UP PARAM F/U: HCPCS | Performed by: NURSE PRACTITIONER

## 2022-01-21 PROCEDURE — G8427 DOCREV CUR MEDS BY ELIG CLIN: HCPCS | Performed by: NURSE PRACTITIONER

## 2022-01-21 PROCEDURE — 1123F ACP DISCUSS/DSCN MKR DOCD: CPT | Performed by: NURSE PRACTITIONER

## 2022-01-21 PROCEDURE — 4004F PT TOBACCO SCREEN RCVD TLK: CPT | Performed by: NURSE PRACTITIONER

## 2022-01-21 PROCEDURE — G8484 FLU IMMUNIZE NO ADMIN: HCPCS | Performed by: NURSE PRACTITIONER

## 2022-01-21 PROCEDURE — 3017F COLORECTAL CA SCREEN DOC REV: CPT | Performed by: NURSE PRACTITIONER

## 2022-01-21 NOTE — PATIENT INSTRUCTIONS
Plan:   1. Check your dose of the lisinopril at home and call the office  2. Check BMP Labs in the next 1 week- not fasting  3. If kidney numbers are improved then will plan to restart the lisinopril  4. Will set up a remote device check again in the next 1 week   5. Continue current meds  6. Continue to stay active   7. Continue to check your weights at home  8.  Follow up with Dr. Steffanie Hobbs as planned

## 2022-01-21 NOTE — PROGRESS NOTES
[address]   Cardiac Follow-up    Primary Care Doctor:  Cris Kaufman MD    Chief Complaint   Patient presents with    1 Month Follow-Up        History of Present Illness:   I had the pleasure of seeing Carlo Conrad in follow up for CHF. History of severe ischemic cardiomyopathy, VT, s/p ICD, CAD s/p RCA stents, MI, HLD. Cardiac story:  Cardiac cath June 2014 stable known ischemic heart disease  Echocardiogram 2014 EF 25%  Echocardiogram July 2016 EF 15 to 20%  Echocardiogram December 2018 EF 20 to 25%  Echocardiogram June 2020 EF 15 to 20%  ICD shock June 2020 for rapid VT deteriorated to V. Fib. Stress test June 2020 showed scar, no ischemia. BiV upgrade December 2020  Taken off of lisinopril in November 2021-due to elevated potassium and worse renal function    Since his last visit, he had a UroLift procedure completed beginning this month. He feels like he is emptying his bladder more effectively now compared to before. Overall he feels like he is doing well. Denies any shortness of breath, no edema, no chest pain. Occasional palpitations when he is laying down. No syncope, no lightheadedness or dizziness, no nausea vomiting or diarrhea. Has occasional low appetite with early satiety. Denies ever having any lower extremity edema. He is only taking the methotrexate 2 tablets every Friday now. He has lisinopril pills at home but unaware of the current dosage that he had just received from the pharmacy. Denies any bleeding. Compliant with medications. Continues to smoke half pack to 2/3 pack a day.     Home weights:189-190    Past Medical History:   has a past medical history of AICD (automatic cardioverter/defibrillator) present, Arthritis, CAD (coronary artery disease), CHF (congestive heart failure) ([address] Chronic systolic CHF (congestive heart failure), NYHA class 3 ([address] GERD (gastroesophageal reflux disease), Hearing loss, Hyperlipidemia, MI (myocardial infarction) ([facility] Rash, and Type 2 diabetes mellitus with chronic kidney disease. Surgical History:   has a past surgical history that includes Coronary angioplasty with stent; Cardiac defibrillator placement (Left, 12/17/2020); knee surgery; Colonoscopy; Cataract removal with implant (Right, 02/01/2017); Cataract removal with implant (Left, 03/01/2017); eye surgery; and Cystoscopy (N/A, 1/5/2022). Social History:   reports that he has been smoking cigarettes. He started smoking about 60 years ago. He has a 40.50 pack-year smoking history. He has never used smokeless tobacco. He reports that he does not drink alcohol and does not use drugs. Family History:   Family History   Problem Relation Age of Onset    Diabetes Mother     Cancer Father        Home Medications:  Prior to Admission medications    Medication Sig Start Date End Date Taking? Authorizing Provider   metoprolol succinate (TOPROL XL) 50 MG extended release tablet Take 0.5 tablets by mouth daily 12/8/21  Yes Janelle Mitchell MD   omeprazole (PRILOSEC) 20 MG delayed release capsule TAKE 1 CAPSULE BY MOUTH EVERY MORNING (BEFORE BREAKFAST) 11/29/21  Yes SUSHMA Lucia - CNP   spironolactone (ALDACTONE) 25 MG tablet TAKE 1/2 TABLET EVERY DAY 11/16/21  Yes Janelle Mitchell MD   Alcohol Swabstick 70 % PADS Use prior to checking glucose daily. E11.9 11/9/21  Yes Lula Page MD   blood glucose test strips (TRUE METRIX BLOOD GLUCOSE TEST) strip Check glucose daily. DM 2 E 11.9 11/9/21  Yes Lula Page MD   Blood Glucose Monitoring Suppl (TRUE METRIX METER) YELENA Check glucose daily.  E11.9 11/9/21  Yes Lula Page MD   gemfibrozil (LOPID) 600 MG tablet Take 1 tablet by mouth 2 times daily (before meals) 9/21/21  Yes Janelle Mitchell MD   Respiratory Therapy Supplies (NEBULIZER/TUBING/MOUTHPIECE) KIT 1 kit by Does not apply route 3 times daily 8/10/21  Yes Lula Page MD   rosuvastatin (CRESTOR) 40 MG tablet TAKE 1 TABLET EVERY EVENING 5/20/21  Yes Taina Vinson MD   clopidogrel (PLAVIX) 75 MG tablet TAKE 1 TABLET EVERY DAY 5/20/21  Yes Taina Vinson MD   ondansetron (ZOFRAN-ODT) 4 MG disintegrating tablet DISSOLVE 1 TABLET ON THE TONGUE EVERY 8 HOURS AS NEEDED FOR NAUSEA  OR  VOMITING 5/14/21  Yes WILBERTO Camarena   nitroGLYCERIN (NITROSTAT) 0.4 MG SL tablet Place 1 tablet under the tongue every 5 minutes as needed for Chest pain 2/25/21  Yes Taina Vinson MD   Handadilia Angeles MISC by Does not apply route Exp: 1/1/2024 1/15/21  Yes WILBERTO Camarena   ipratropium-albuterol (DUONEB) 0.5-2.5 (3) MG/3ML SOLN nebulizer solution Inhale 3 mLs into the lungs every 6 hours as needed for Shortness of Breath 5/10/19  Yes Malia Hall MD   finasteride (PROSCAR) 5 MG tablet Take 5 mg by mouth daily Indications: Rx by Dr. Vci Meyers MD   methotrexate (RHEUMATREX) 2.5 MG chemo tablet Take 1 tablet by mouth once a week RX directions are 4 tablets weekly. Patient takes one tablet Monday/Wednesday/Friday. Patient taking differently: Take 2.5 mg by mouth once a week Takes 2 tablets on friday. 3/5/19  Yes Red Champagne MD   folic acid (FOLVITE) 1 MG tablet Take 1 mg by mouth daily   Yes Historical Provider, MD   tamsulosin (FLOMAX) 0.4 MG capsule Take 0.4 mg by mouth daily   Yes Windle Spurling, MD   aspirin 81 MG tablet Take 81 mg by mouth daily. Yes Historical Provider, MD   docusate sodium (COLACE) 100 MG capsule Take 1 capsule by mouth 2 times daily  Patient not taking: Reported on 1/21/2022 1/5/22 2/4/22  Windle Spurling, MD   amitriptyline (ELAVIL) 25 MG tablet TAKE 1 TABLET EVERY NIGHT 12/17/21   Shanelle JuarezutaSUSHMA Wren - CNP        Allergies:  Patient has no known allergies.        Physical Examination:    Vitals:    01/21/22 0825   BP: 102/60   Pulse: 63   SpO2: 97%   Weight: 196 lb (88.9 kg)   Height: 5' 10.5\" (1.791 m)        Constitutional and General Appearance: no apparent distress, Eek  HEENT: non-icteric sclera, mask in place  Neck: JVP less than  cm H20  Respiratory:  · No use of accessory muscles  · Clear breath sounds throughout, no wheezing, no crackles, no rhonchi  Cardiovascular:  · The apical impulses not displaced  · Heart tones are crisp and normal, no murmur/rub/gallop  · Regular rate and rhythm, S1,S2 normal  · Radial pulses 2+ and equal bilaterally  · No edema  · Pedal Pulses: 2+ and equal   Abdomen:  · No masses or tenderness  · Liver: No Abnormalities Noted  Musculoskeletal/Skin:  · Right hand fingers staying from nicotine  · There is no clubbing, cyanosis of the extremities  · Skin is warm and dry  · Moves all extremities well  Neurological/Psychiatric:  · Alert and oriented in all spheres  · No abnormalities of mood, affect, memory, mentation, or behavior are noted    Lab Data reviewed and analyzed   CBC:   Lab Results   Component Value Date    WBC 6.3 01/04/2022    WBC 5.8 11/08/2021    WBC 7.8 12/17/2020    RBC 3.76 01/04/2022    RBC 3.61 11/08/2021    RBC 3.79 12/17/2020    HGB 12.9 01/04/2022    HGB 12.3 11/08/2021    HGB 12.4 12/17/2020    HCT 39.1 01/04/2022    HCT 36.9 11/08/2021    HCT 37.5 12/17/2020    .9 01/04/2022    .3 11/08/2021    MCV 98.9 12/17/2020    RDW 15.7 01/04/2022    RDW 15.3 11/08/2021    RDW 14.5 12/17/2020     01/04/2022     11/08/2021     12/17/2020     Iron: No results found for: IRON, TIBC, FERRITIN  BMP:   Lab Results   Component Value Date     01/05/2022     01/04/2022     11/15/2021    K 4.7 01/05/2022    K 4.8 01/04/2022    K 5.1 11/15/2021    K 4.0 12/14/2020    K 4.3 06/29/2020     01/05/2022     01/04/2022    CL 99 11/15/2021    CO2 21 01/05/2022    CO2 20 01/04/2022    CO2 20 11/15/2021    PHOS 2.9 01/04/2022    PHOS 2.9 11/15/2021    PHOS 3.4 06/11/2020    BUN 26 01/05/2022    BUN 22 01/04/2022    BUN 35 11/15/2021    CREATININE 1.4 01/05/2022    CREATININE 1.4 01/04/2022    CREATININE 1.5 11/15/2021     BNP:   Lab Results   Component Value Date    PROBNP 746 03/02/2019    PROBNP 400 03/13/2015    PROBNP 557 02/11/2015     Lipids:   Lab Results   Component Value Date    CHOL 133 08/11/2021        Lab Results   Component Value Date    TRIG 148 08/11/2021        Lab Results   Component Value Date    HDL 37 (L) 08/11/2021        Lab Results   Component Value Date    LDLCALC 66 08/11/2021        Lab Results   Component Value Date    LABVLDL 30 08/11/2021      No results found for: CHOLHDLRATIO    EF:   Lab Results   Component Value Date    LVEF 24 06/29/2020       NYHA:   III  ACC/ AHA Stage:    C      Assessment:  · Ischemic cardiomyopathy  · HFrEF LVEF <= 40%  · Chronic systolic heart failure  · CAD s/p RCA stents  · History of VT s/p BiV ICD 2020  · HLD  · CKD stage 3a Baseline creatinine 1.3-1.5  · Tobacco abuse      Visit Diagnosis:    1. Chronic systolic congestive heart failure ([address]    2. Ischemic cardiomyopathy    3. AICD (automatic cardioverter/defibrillator) present    4. LBBB (left bundle branch block)      Plan:   1. Check your dose of the lisinopril at home and call the office  2. Check BMP Labs in the next 1 week- not fasting  3. If kidney numbers are improved then will plan to restart the lisinopril  4. Will set up a remote device check again in the next 1 week   5. Continue current meds  6. Continue to stay active   7. Continue to check your weights at home  8. Follow up with Dr. Rhina Gunderson as planned     I appreciate the opportunity for caring for this patient.      SUSHMA Jovel - CNP, CNP, 1/21/2022, 9:09 AM

## 2022-01-21 NOTE — LETTER
[address]  Phone: [phone]  Fax: [phone]    SUSHMA Harrison CNP    January 21, 2022     Andriy Stout [facility] [address]    Patient: Breanna Hodge   MR Number: [id number]   YOB: 1950   Date of Visit: 1/21/2022       Dear Andriy Stout: Thank you for referring Nancy Mortensen to me for evaluation/treatment. Below are the relevant portions of my assessment and plan of care. If you have questions, please do not hesitate to call me. I look forward to following Aman Mcdaniel along with you.     Sincerely,      SUSHMA Harrison CNP

## 2022-01-31 DIAGNOSIS — I25.5 ISCHEMIC CARDIOMYOPATHY: ICD-10-CM

## 2022-01-31 DIAGNOSIS — I50.22 CHRONIC SYSTOLIC CONGESTIVE HEART FAILURE (HCC): ICD-10-CM

## 2022-02-01 DIAGNOSIS — I25.5 ISCHEMIC CARDIOMYOPATHY: Primary | ICD-10-CM

## 2022-02-01 LAB
ANION GAP SERPL CALCULATED.3IONS-SCNC: 15 MMOL/L (ref 3–16)
BUN BLDV-MCNC: 18 MG/DL (ref 7–20)
CALCIUM SERPL-MCNC: 9.4 MG/DL (ref 8.3–10.6)
CHLORIDE BLD-SCNC: 103 MMOL/L (ref 99–110)
CO2: 20 MMOL/L (ref 21–32)
CREAT SERPL-MCNC: 1.2 MG/DL (ref 0.8–1.3)
GFR AFRICAN AMERICAN: >60
GFR NON-AFRICAN AMERICAN: 59
GLUCOSE BLD-MCNC: 97 MG/DL (ref 70–99)
POTASSIUM SERPL-SCNC: 4.7 MMOL/L (ref 3.5–5.1)
SODIUM BLD-SCNC: 138 MMOL/L (ref 136–145)

## 2022-02-01 RX ORDER — LISINOPRIL 5 MG/1
5 TABLET ORAL DAILY
Qty: 90 TABLET | Refills: 0
Start: 2022-02-01 | End: 2022-03-06

## 2022-02-02 ENCOUNTER — TELEPHONE (OUTPATIENT)
Dept: CARDIOLOGY CLINIC | Age: 72
End: 2022-02-02

## 2022-02-02 PROCEDURE — 93297 REM INTERROG DEV EVAL ICPMS: CPT | Performed by: NURSE PRACTITIONER

## 2022-02-02 PROCEDURE — G2066 INTER DEVC REMOTE 30D: HCPCS | Performed by: NURSE PRACTITIONER

## 2022-02-02 NOTE — PROGRESS NOTES
HEART LOGIC PER NPRB. Latest Device Transmission: Feb 01, 2022 03:41 EST    HeartLogic Heart Failure Index 1.   TI 47.2    Remote transmission received for patients CRT-D. Transmission shows normal sensing and pacing function. EP physician will review. See interrogation under the cardiology tab in the [address] [place] [address] [address] field for more details. Will continue to monitor remotely. HR 60-94 w/ mean 70 bpm.  AP 4%. RVP 2%  %. -Ventricular Pacing Chamber LV Only. No  arrhythmias recorded.

## 2022-02-03 ENCOUNTER — NURSE ONLY (OUTPATIENT)
Dept: CARDIOLOGY CLINIC | Age: 72
End: 2022-02-03
Payer: MEDICARE

## 2022-02-03 DIAGNOSIS — Z95.810 AICD (AUTOMATIC CARDIOVERTER/DEFIBRILLATOR) PRESENT: ICD-10-CM

## 2022-02-03 DIAGNOSIS — I50.22 CHRONIC SYSTOLIC CONGESTIVE HEART FAILURE (HCC): ICD-10-CM

## 2022-02-11 ENCOUNTER — TELEPHONE (OUTPATIENT)
Dept: FAMILY MEDICINE CLINIC | Age: 72
End: 2022-02-11

## 2022-02-11 DIAGNOSIS — I25.5 ISCHEMIC CARDIOMYOPATHY: ICD-10-CM

## 2022-02-11 NOTE — TELEPHONE ENCOUNTER
Spoke with patient and told him that once we receive the results from the labs drawn today we will fax to Dr. Partha Blackburn office.

## 2022-02-12 LAB
ANION GAP SERPL CALCULATED.3IONS-SCNC: 17 MMOL/L (ref 3–16)
BUN BLDV-MCNC: 27 MG/DL (ref 7–20)
CALCIUM SERPL-MCNC: 9.4 MG/DL (ref 8.3–10.6)
CHLORIDE BLD-SCNC: 101 MMOL/L (ref 99–110)
CO2: 19 MMOL/L (ref 21–32)
CREAT SERPL-MCNC: 1.4 MG/DL (ref 0.8–1.3)
GFR AFRICAN AMERICAN: >60
GFR NON-AFRICAN AMERICAN: 50
GLUCOSE BLD-MCNC: 101 MG/DL (ref 70–99)
POTASSIUM SERPL-SCNC: 4.4 MMOL/L (ref 3.5–5.1)
SODIUM BLD-SCNC: 137 MMOL/L (ref 136–145)

## 2022-02-15 ENCOUNTER — TELEPHONE (OUTPATIENT)
Dept: CARDIOLOGY CLINIC | Age: 72
End: 2022-02-15

## 2022-02-15 NOTE — TELEPHONE ENCOUNTER
----- Message from SUSHMA Reaves CNP sent at 2/14/2022  4:28 PM EST -----  Kidney  labs are stable since starting the lisinopril 5mg daily. Continue current regimen.

## 2022-02-17 ENCOUNTER — NURSE TRIAGE (OUTPATIENT)
Dept: OTHER | Facility: CLINIC | Age: 72
End: 2022-02-17

## 2022-02-17 NOTE — TELEPHONE ENCOUNTER
Received call from [facility]  at [facility] with Red Flag Complaint. Subjective: Caller states \"right chest pain\"     Current Symptoms: pain for a month, worse with reaching or turning in bed.  unable to reproduce pain with palpation    Onset: 1 month ago; gradual    Associated Symptoms: NA    Pain Severity: 5/10; muscle sore  ; constant    Temperature: denies     What has been tried: nothing    LMP: NA Pregnant: No    Recommended disposition: pcp in 3 days    Care advice provided, patient verbalizes understanding; denies any other questions or concerns; instructed to call back for any new or worsening symptoms. Patient/Caller agrees with recommended disposition; writer provided warm transfer to [facility] at [facility] for appointment scheduling     Attention Provider: Thank you for allowing me to participate in the care of your patient. The patient was connected to triage in response to information provided to the ECC/PSC. Please do not respond through this encounter as the response is not directed to a shared pool.               Reason for Disposition   [1] Chest pain(s) lasting a few seconds AND [2] persists > 3 days    Protocols used: CHEST PAIN-ADULT-AH

## 2022-02-18 ENCOUNTER — TELEPHONE (OUTPATIENT)
Dept: FAMILY MEDICINE CLINIC | Age: 72
End: 2022-02-18

## 2022-02-18 NOTE — TELEPHONE ENCOUNTER
Please follow up with patient regarding nurse triage call 2/17, was recommended to f/u in 3 days with PCP, pt is not scheduled until 3/2022. Would recommend pt having appt this week or early next week. If chest pain worsens, pt develops sob, palpitations, lightheadedness/dizziness, n/v, or worsening please call office or go to ER.

## 2022-02-18 NOTE — TELEPHONE ENCOUNTER
----- Message from Javad Strong sent at 2/17/2022  4:59 PM EST -----  Subject: Appointment Request    Reason for Call: Semi-Urgent Return from RN Triage    QUESTIONS  Type of Appointment? Established Patient  Reason for appointment request? No appointments available during search  Additional Information for Provider? patient is having right sided   soreness kind of like chest pain but he described it as a soreness for the   last month was triage by our nurses to be seen in the next 3 days no   appointments available to meet time frame please call and make an   appointment as soon as possible   ---------------------------------------------------------------------------  --------------  CALL BACK INFO  What is the best way for the office to contact you? OK to leave message on   voicemail  Preferred Call Back Phone Number? [phone]  ---------------------------------------------------------------------------  --------------  SCRIPT ANSWERS  Patient needs to be seen within 3 days? Yes   Nurse Name? hemant  Have you been diagnosed with, awaiting test results for, or told that you   are suspected of having COVID-19 (Coronavirus)? (If patient has tested   negative or was tested as a requirement for work, school, or travel and   not based on symptoms, answer no)? No  Within the past 10 days have you developed any of the following symptoms   (answer no if symptoms have been present longer than 10 days or began   more than 10 days ago)? Fever or Chills, Cough, Shortness of breath or   difficulty breathing, Loss of taste or smell, Sore throat, Nasal   congestion, Sneezing or runny nose, Fatigue or generalized body aches   (answer no if pain is specific to a body part e.g. back pain), Diarrhea,   Headache? No  Have you had close contact with someone with COVID-19 in the last 7 days? No  (Service Expert  click yes below to proceed with [employer] As Usual   Scheduling)?  Yes

## 2022-02-21 ENCOUNTER — TELEPHONE (OUTPATIENT)
Dept: FAMILY MEDICINE CLINIC | Age: 72
End: 2022-02-21

## 2022-02-21 ENCOUNTER — OFFICE VISIT (OUTPATIENT)
Dept: FAMILY MEDICINE CLINIC | Age: 72
End: 2022-02-21
Payer: MEDICARE

## 2022-02-21 VITALS
HEART RATE: 40 BPM | WEIGHT: 190 LBS | BODY MASS INDEX: 28.14 KG/M2 | SYSTOLIC BLOOD PRESSURE: 86 MMHG | OXYGEN SATURATION: 98 % | DIASTOLIC BLOOD PRESSURE: 48 MMHG | TEMPERATURE: 98 F | HEIGHT: 69 IN

## 2022-02-21 DIAGNOSIS — R07.9 CHEST PAIN, UNSPECIFIED TYPE: Primary | ICD-10-CM

## 2022-02-21 DIAGNOSIS — I95.2 HYPOTENSION DUE TO DRUGS: ICD-10-CM

## 2022-02-21 DIAGNOSIS — R07.89 CHEST WALL PAIN: ICD-10-CM

## 2022-02-21 PROCEDURE — G8484 FLU IMMUNIZE NO ADMIN: HCPCS | Performed by: PHYSICIAN ASSISTANT

## 2022-02-21 PROCEDURE — G8417 CALC BMI ABV UP PARAM F/U: HCPCS | Performed by: PHYSICIAN ASSISTANT

## 2022-02-21 PROCEDURE — 4004F PT TOBACCO SCREEN RCVD TLK: CPT | Performed by: PHYSICIAN ASSISTANT

## 2022-02-21 PROCEDURE — 1123F ACP DISCUSS/DSCN MKR DOCD: CPT | Performed by: PHYSICIAN ASSISTANT

## 2022-02-21 PROCEDURE — 4040F PNEUMOC VAC/ADMIN/RCVD: CPT | Performed by: PHYSICIAN ASSISTANT

## 2022-02-21 PROCEDURE — G8427 DOCREV CUR MEDS BY ELIG CLIN: HCPCS | Performed by: PHYSICIAN ASSISTANT

## 2022-02-21 PROCEDURE — 99214 OFFICE O/P EST MOD 30 MIN: CPT | Performed by: PHYSICIAN ASSISTANT

## 2022-02-21 PROCEDURE — 3017F COLORECTAL CA SCREEN DOC REV: CPT | Performed by: PHYSICIAN ASSISTANT

## 2022-02-21 PROCEDURE — 93000 ELECTROCARDIOGRAM COMPLETE: CPT | Performed by: PHYSICIAN ASSISTANT

## 2022-02-21 RX ORDER — TIZANIDINE 2 MG/1
2 TABLET ORAL 3 TIMES DAILY PRN
Qty: 30 TABLET | Refills: 0 | Status: SHIPPED | OUTPATIENT
Start: 2022-02-21 | End: 2022-05-02 | Stop reason: SDUPTHER

## 2022-02-21 SDOH — ECONOMIC STABILITY: HOUSING INSECURITY: IN THE LAST 12 MONTHS, HOW MANY PLACES HAVE YOU LIVED?: 1

## 2022-02-21 SDOH — ECONOMIC STABILITY: TRANSPORTATION INSECURITY
IN THE PAST 12 MONTHS, HAS THE LACK OF TRANSPORTATION KEPT YOU FROM MEDICAL APPOINTMENTS OR FROM GETTING MEDICATIONS?: NO

## 2022-02-21 SDOH — ECONOMIC STABILITY: TRANSPORTATION INSECURITY
IN THE PAST 12 MONTHS, HAS LACK OF TRANSPORTATION KEPT YOU FROM MEETINGS, WORK, OR FROM GETTING THINGS NEEDED FOR DAILY LIVING?: NO

## 2022-02-21 SDOH — ECONOMIC STABILITY: FOOD INSECURITY: WITHIN THE PAST 12 MONTHS, YOU WORRIED THAT YOUR FOOD WOULD RUN OUT BEFORE YOU GOT MONEY TO BUY MORE.: NEVER TRUE

## 2022-02-21 SDOH — ECONOMIC STABILITY: FOOD INSECURITY: WITHIN THE PAST 12 MONTHS, THE FOOD YOU BOUGHT JUST DIDN'T LAST AND YOU DIDN'T HAVE MONEY TO GET MORE.: NEVER TRUE

## 2022-02-21 SDOH — ECONOMIC STABILITY: INCOME INSECURITY: IN THE LAST 12 MONTHS, WAS THERE A TIME WHEN YOU WERE NOT ABLE TO PAY THE MORTGAGE OR RENT ON TIME?: NO

## 2022-02-21 SDOH — ECONOMIC STABILITY: HOUSING INSECURITY
IN THE LAST 12 MONTHS, WAS THERE A TIME WHEN YOU DID NOT HAVE A STEADY PLACE TO SLEEP OR SLEPT IN A SHELTER (INCLUDING NOW)?: NO

## 2022-02-21 ASSESSMENT — PATIENT HEALTH QUESTIONNAIRE - PHQ9
1. LITTLE INTEREST OR PLEASURE IN DOING THINGS: 0
6. FEELING BAD ABOUT YOURSELF - OR THAT YOU ARE A FAILURE OR HAVE LET YOURSELF OR YOUR FAMILY DOWN: 0
9. THOUGHTS THAT YOU WOULD BE BETTER OFF DEAD, OR OF HURTING YOURSELF: 0
SUM OF ALL RESPONSES TO PHQ QUESTIONS 1-9: 0
10. IF YOU CHECKED OFF ANY PROBLEMS, HOW DIFFICULT HAVE THESE PROBLEMS MADE IT FOR YOU TO DO YOUR WORK, TAKE CARE OF THINGS AT HOME, OR GET ALONG WITH OTHER PEOPLE: 0
SUM OF ALL RESPONSES TO PHQ QUESTIONS 1-9: 0
SUM OF ALL RESPONSES TO PHQ QUESTIONS 1-9: 0
3. TROUBLE FALLING OR STAYING ASLEEP: 0
8. MOVING OR SPEAKING SO SLOWLY THAT OTHER PEOPLE COULD HAVE NOTICED. OR THE OPPOSITE, BEING SO FIGETY OR RESTLESS THAT YOU HAVE BEEN MOVING AROUND A LOT MORE THAN USUAL: 0
7. TROUBLE CONCENTRATING ON THINGS, SUCH AS READING THE NEWSPAPER OR WATCHING TELEVISION: 0
2. FEELING DOWN, DEPRESSED OR HOPELESS: 0
SUM OF ALL RESPONSES TO PHQ9 QUESTIONS 1 & 2: 0
4. FEELING TIRED OR HAVING LITTLE ENERGY: 0
SUM OF ALL RESPONSES TO PHQ QUESTIONS 1-9: 0
5. POOR APPETITE OR OVEREATING: 0

## 2022-02-21 ASSESSMENT — SOCIAL DETERMINANTS OF HEALTH (SDOH): HOW HARD IS IT FOR YOU TO PAY FOR THE VERY BASICS LIKE FOOD, HOUSING, MEDICAL CARE, AND HEATING?: NOT VERY HARD

## 2022-02-21 NOTE — PROGRESS NOTES
3/6/2022  Toma Pelayo (: 1950)  67 y.o.    ASSESSMENT and PLAN:  Lacie Kaiser was seen today for other. Diagnoses and all orders for this visit:    Chest pain, unspecified type  -     EKG 12 lead  Chest wall pain  -     tiZANidine (ZANAFLEX) 2 MG tablet; Take 1 tablet by mouth 3 times daily as needed (muscle tension)  - pain is reproducible, will treat as msk injury. If pain worsens/changes pt to notify office. Hypotension due to drugs  - likely 2/2 to recent addition of lisinopril. Patient is asymptomatic. Decrease to 2.5 mg daily. Pt to continue to monitor will notify office or cardiology with persistent lows. No follow-ups on file. HPI    Patient presents fo revaluation of right sided chest pain  Intermittently occurring over the last few weeks, acutely worsened over the last few days. Denies left sided chest pain. Sometimes radiates into the shoulder. Affects his most at night when tryign to sleep, has difficulty rolling over. BP and HR low in office today. Reports that he was recently started on lisinopril   Denies dizziness/lightheadedness. Review of Systems   Constitutional: Negative for activity change, chills and fever. HENT: Negative for congestion, ear pain, rhinorrhea and sore throat. Eyes: Negative for visual disturbance. Respiratory: Negative for cough and shortness of breath. Cardiovascular: Negative for chest pain and palpitations. Gastrointestinal: Negative for abdominal pain, constipation, diarrhea, nausea and vomiting. Genitourinary: Negative for difficulty urinating and dysuria. Musculoskeletal: Positive for myalgias. Negative for arthralgias. Skin: Negative for rash. Neurological: Negative for dizziness, weakness and numbness. Psychiatric/Behavioral: Negative for sleep disturbance. Allergies, past medical history, family history, and social history reviewed and unchanged from previous encounter.      Current Outpatient Medications Medication Sig Dispense Refill    tiZANidine (ZANAFLEX) 2 MG tablet Take 1 tablet by mouth 3 times daily as needed (muscle tension) 30 tablet 0    lisinopril (PRINIVIL;ZESTRIL) 5 MG tablet Take 1 tablet by mouth daily 90 tablet 0    Blood Glucose Monitoring Suppl (TRUE METRIX METER) w/Device KIT       amitriptyline (ELAVIL) 25 MG tablet TAKE 1 TABLET EVERY NIGHT 90 tablet 1    metoprolol succinate (TOPROL XL) 50 MG extended release tablet Take 0.5 tablets by mouth daily 90 tablet 0    omeprazole (PRILOSEC) 20 MG delayed release capsule TAKE 1 CAPSULE BY MOUTH EVERY MORNING (BEFORE BREAKFAST) 90 capsule 1    spironolactone (ALDACTONE) 25 MG tablet TAKE 1/2 TABLET EVERY DAY 45 tablet 1    Alcohol Swabstick 70 % PADS Use prior to checking glucose daily. E11.9 100 each 3    blood glucose test strips (TRUE METRIX BLOOD GLUCOSE TEST) strip Check glucose daily. DM 2 E 11.9 100 each 3    Blood Glucose Monitoring Suppl (TRUE METRIX METER) YELENA Check glucose daily.  E11.9 1 each 0    gemfibrozil (LOPID) 600 MG tablet Take 1 tablet by mouth 2 times daily (before meals) 180 tablet 5    Respiratory Therapy Supplies (NEBULIZER/TUBING/MOUTHPIECE) KIT 1 kit by Does not apply route 3 times daily 1 kit 0    rosuvastatin (CRESTOR) 40 MG tablet TAKE 1 TABLET EVERY EVENING 90 tablet 3    clopidogrel (PLAVIX) 75 MG tablet TAKE 1 TABLET EVERY DAY 90 tablet 3    ondansetron (ZOFRAN-ODT) 4 MG disintegrating tablet DISSOLVE 1 TABLET ON THE TONGUE EVERY 8 HOURS AS NEEDED FOR NAUSEA  OR  VOMITING 30 tablet 1    nitroGLYCERIN (NITROSTAT) 0.4 MG SL tablet Place 1 tablet under the tongue every 5 minutes as needed for Chest pain 25 tablet 1    Handicap Placard MISC by Does not apply route Exp: 1/1/2024 2 each 0    ipratropium-albuterol (DUONEB) 0.5-2.5 (3) MG/3ML SOLN nebulizer solution Inhale 3 mLs into the lungs every 6 hours as needed for Shortness of Breath 120 vial 5    finasteride (PROSCAR) 5 MG tablet Take 5 mg by mouth daily Indications: Rx by Dr. Bianca Jose methotrexate (RHEUMATREX) 2.5 MG chemo tablet Take 1 tablet by mouth once a week RX directions are 4 tablets weekly. Patient takes one tablet Monday/Wednesday/Friday. (Patient taking differently: Take 2.5 mg by mouth once a week Takes 2 tablets on friday.) 1 tablet 0    folic acid (FOLVITE) 1 MG tablet Take 1 mg by mouth daily      tamsulosin (FLOMAX) 0.4 MG capsule Take 0.4 mg by mouth daily      aspirin 81 MG tablet Take 81 mg by mouth daily. No current facility-administered medications for this visit. Vitals:    02/21/22 1431 02/21/22 1444   BP: (!) 78/44 (!) 86/48   Site: Left Upper Arm Right Upper Arm   Position: Sitting Sitting   Cuff Size: Medium Adult Large Adult   Pulse: 58 (!) 40   Temp: 98 °F (36.7 °C)    TempSrc: Oral    SpO2: 98%    Weight: 190 lb (86.2 kg)    Height: 5' 9\" (1.753 m)      Estimated body mass index is 28.06 kg/m² as calculated from the following:    Height as of this encounter: 5' 9\" (1.753 m). Weight as of this encounter: 190 lb (86.2 kg). Physical Exam  Constitutional:       General: He is not in acute distress. Appearance: He is well-developed. HENT:      Head: Normocephalic and atraumatic. Eyes:      Conjunctiva/sclera: Conjunctivae normal.      Pupils: Pupils are equal, round, and reactive to light. Cardiovascular:      Rate and Rhythm: Normal rate and regular rhythm. Heart sounds: Normal heart sounds. No murmur heard. Pulmonary:      Effort: Pulmonary effort is normal.      Breath sounds: Normal breath sounds. No wheezing. Abdominal:      General: Bowel sounds are normal.      Palpations: Abdomen is soft. Tenderness: There is no abdominal tenderness. Musculoskeletal:      Cervical back: Neck supple. Comments: Pain with palpations of the right pectoral muscle. Worse with rotation of the right shoulder. Lymphadenopathy:      Cervical: No cervical adenopathy.    Skin:     General: Skin is warm and dry. Findings: No rash. Neurological:      Mental Status: He is alert and oriented to person, place, and time. Deep Tendon Reflexes: Reflexes are normal and symmetric.

## 2022-02-21 NOTE — TELEPHONE ENCOUNTER
Tizanidine cancelled at [facility]. Approved at [facility].   Per patient's request

## 2022-02-24 ENCOUNTER — TELEPHONE (OUTPATIENT)
Dept: FAMILY MEDICINE CLINIC | Age: 72
End: 2022-02-24

## 2022-02-28 NOTE — TELEPHONE ENCOUNTER
----- Message from Sharan Rios sent at 2/24/2022  9:36 AM EST -----  Subject: Medication Problem    QUESTIONS  Name of Medication? tiZANidine (ZANAFLEX) 2 MG tablet  Patient-reported dosage and instructions? 1 25 MG tablet 3 times a day  What question or problem do you have with the medication? Says he is still   in pain. Preferred Pharmacy? [address] [place] [address], [address] [place] [address] [phone] - f [phone]  Pharmacy phone number (if available)? [phone]  Additional Information for Provider? pt says he picks it up at the   pharmacy \"downstairs\". It isn't listed in his file.   ---------------------------------------------------------------------------  --------------  CALL BACK INFO  What is the best way for the office to contact you? OK to leave message on   voicemail  Preferred Call Back Phone Number? [phone]  ---------------------------------------------------------------------------  --------------  SCRIPT ANSWERS  Relationship to Patient?  Self
Looks like patient seen you on the 21st not sure if this is something you gave him. Saying hes still in a lot of pain.  Please advise
Spoke with patient. He reports that pain has been much improved over the weekend, \"almost gone. \"   Discussed coming back in for blood work, he is also due for ct lung screen, patient declines for now. Will call back if he changes his mind or pain returns/worsens.
Jevity 1.2 Jaam @ 70cc/hr continuous

## 2022-03-06 RX ORDER — LISINOPRIL 5 MG/1
2.5 TABLET ORAL DAILY
Qty: 90 TABLET | Refills: 0
Start: 2022-03-06 | End: 2022-06-21

## 2022-03-06 ASSESSMENT — ENCOUNTER SYMPTOMS
DIARRHEA: 0
VOMITING: 0
SHORTNESS OF BREATH: 0
COUGH: 0
NAUSEA: 0
RHINORRHEA: 0
ABDOMINAL PAIN: 0
CONSTIPATION: 0
SORE THROAT: 0

## 2022-03-10 ENCOUNTER — TELEPHONE (OUTPATIENT)
Dept: FAMILY MEDICINE CLINIC | Age: 72
End: 2022-03-10

## 2022-03-10 NOTE — TELEPHONE ENCOUNTER
----- Message from [employer] sent at 3/10/2022  2:08 PM EST -----  Subject: Appointment Request    Reason for Call: Routine Existing Condition Follow Up    QUESTIONS  Type of Appointment? Established Patient  Reason for appointment request? No appointments available during search  Additional Information for Provider? Pt called and stated his chest is   still doing the same thing that he was last seen for with Tash. Wants   office to call him to maybe order a CT or something. No available appts. ---------------------------------------------------------------------------  --------------  Emily SANON  What is the best way for the office to contact you? OK to leave message on   voicemail  Preferred Call Back Phone Number? [phone]  ---------------------------------------------------------------------------  --------------  SCRIPT ANSWERS  Relationship to Patient? Self  Is this follow up request related to routine Diabetes Management? No  Have you been diagnosed with, awaiting test results for, or told that you   are suspected of having COVID-19 (Coronavirus)? (If patient has tested   negative or was tested as a requirement for work, school, or travel and   not based on symptoms, answer no)? No  Within the past 10 days have you developed any of the following symptoms   (answer no if symptoms have been present longer than 10 days or began   more than 10 days ago)? Fever or Chills, Cough, Shortness of breath or   difficulty breathing, Loss of taste or smell, Sore throat, Nasal   congestion, Sneezing or runny nose, Fatigue or generalized body aches   (answer no if pain is specific to a body part e.g. back pain), Diarrhea,   Headache? No  Have you had close contact with someone with COVID-19 in the last 7 days? No  (Service Expert  click yes below to proceed with COM DEV As Usual   Scheduling)?  Yes

## 2022-03-11 NOTE — TELEPHONE ENCOUNTER
Please schedule with me Monday, ok to double book if needed. Need to repeat vitals, do PE and update labs. Can discuss ct scan at the appointment.

## 2022-03-14 ENCOUNTER — APPOINTMENT (OUTPATIENT)
Dept: CT IMAGING | Age: 72
End: 2022-03-14
Payer: MEDICARE

## 2022-03-14 ENCOUNTER — HOSPITAL ENCOUNTER (EMERGENCY)
Age: 72
Discharge: HOME | End: 2022-03-14
Attending: EMERGENCY MEDICINE
Payer: MEDICARE

## 2022-03-14 ENCOUNTER — APPOINTMENT (OUTPATIENT)
Dept: GENERAL RADIOLOGY | Age: 72
End: 2022-03-14
Payer: MEDICARE

## 2022-03-14 VITALS
HEART RATE: 79 BPM | SYSTOLIC BLOOD PRESSURE: 90 MMHG | RESPIRATION RATE: 18 BRPM | WEIGHT: 190 LBS | HEIGHT: 70 IN | DIASTOLIC BLOOD PRESSURE: 64 MMHG | BODY MASS INDEX: 27.2 KG/M2 | TEMPERATURE: 97.4 F | OXYGEN SATURATION: 93 %

## 2022-03-14 DIAGNOSIS — R07.89 CHEST WALL PAIN: Primary | ICD-10-CM

## 2022-03-14 LAB
A/G RATIO: 1.9 (ref 1.1–2.2)
ALBUMIN SERPL-MCNC: 5 G/DL (ref 3.4–5)
ALP BLD-CCNC: 121 U/L (ref 40–129)
ALT SERPL-CCNC: 15 U/L (ref 10–40)
ANION GAP SERPL CALCULATED.3IONS-SCNC: 12 MMOL/L (ref 3–16)
AST SERPL-CCNC: 19 U/L (ref 15–37)
BASOPHILS ABSOLUTE: 0.1 K/UL (ref 0–0.2)
BASOPHILS RELATIVE PERCENT: 0.7 %
BILIRUB SERPL-MCNC: 1.4 MG/DL (ref 0–1)
BUN BLDV-MCNC: 25 MG/DL (ref 7–20)
CALCIUM SERPL-MCNC: 10 MG/DL (ref 8.3–10.6)
CHLORIDE BLD-SCNC: 100 MMOL/L (ref 99–110)
CO2: 22 MMOL/L (ref 21–32)
CREAT SERPL-MCNC: 1.4 MG/DL (ref 0.8–1.3)
EKG ATRIAL RATE: 75 BPM
EKG DIAGNOSIS: NORMAL
EKG P AXIS: 71 DEGREES
EKG P-R INTERVAL: 208 MS
EKG Q-T INTERVAL: 384 MS
EKG QRS DURATION: 92 MS
EKG QTC CALCULATION (BAZETT): 428 MS
EKG R AXIS: 78 DEGREES
EKG T AXIS: 88 DEGREES
EKG VENTRICULAR RATE: 75 BPM
EOSINOPHILS ABSOLUTE: 0.1 K/UL (ref 0–0.6)
EOSINOPHILS RELATIVE PERCENT: 1 %
GFR AFRICAN AMERICAN: >60
GFR NON-AFRICAN AMERICAN: 50
GLUCOSE BLD-MCNC: 110 MG/DL (ref 70–99)
HCT VFR BLD CALC: 36.9 % (ref 40.5–52.5)
HEMOGLOBIN: 12.7 G/DL (ref 13.5–17.5)
LACTIC ACID: 1.1 MMOL/L (ref 0.4–2)
LYMPHOCYTES ABSOLUTE: 1.2 K/UL (ref 1–5.1)
LYMPHOCYTES RELATIVE PERCENT: 11.7 %
MCH RBC QN AUTO: 34.5 PG (ref 26–34)
MCHC RBC AUTO-ENTMCNC: 34.3 G/DL (ref 31–36)
MCV RBC AUTO: 100.6 FL (ref 80–100)
MONOCYTES ABSOLUTE: 0.6 K/UL (ref 0–1.3)
MONOCYTES RELATIVE PERCENT: 5.9 %
NEUTROPHILS ABSOLUTE: 8.4 K/UL (ref 1.7–7.7)
NEUTROPHILS RELATIVE PERCENT: 80.7 %
PDW BLD-RTO: 13.9 % (ref 12.4–15.4)
PLATELET # BLD: 186 K/UL (ref 135–450)
PMV BLD AUTO: 8.7 FL (ref 5–10.5)
POTASSIUM REFLEX MAGNESIUM: 4.9 MMOL/L (ref 3.5–5.1)
RBC # BLD: 3.67 M/UL (ref 4.2–5.9)
SODIUM BLD-SCNC: 134 MMOL/L (ref 136–145)
TOTAL PROTEIN: 7.7 G/DL (ref 6.4–8.2)
TROPONIN: <0.01 NG/ML
WBC # BLD: 10.4 K/UL (ref 4–11)

## 2022-03-14 PROCEDURE — 84484 ASSAY OF TROPONIN QUANT: CPT

## 2022-03-14 PROCEDURE — 83605 ASSAY OF LACTIC ACID: CPT

## 2022-03-14 PROCEDURE — 6370000000 HC RX 637 (ALT 250 FOR IP): Performed by: EMERGENCY MEDICINE

## 2022-03-14 PROCEDURE — 71045 X-RAY EXAM CHEST 1 VIEW: CPT

## 2022-03-14 PROCEDURE — 80053 COMPREHEN METABOLIC PANEL: CPT

## 2022-03-14 PROCEDURE — 85025 COMPLETE CBC W/AUTO DIFF WBC: CPT

## 2022-03-14 PROCEDURE — 99284 EMERGENCY DEPT VISIT MOD MDM: CPT

## 2022-03-14 PROCEDURE — 93005 ELECTROCARDIOGRAM TRACING: CPT | Performed by: EMERGENCY MEDICINE

## 2022-03-14 PROCEDURE — 71260 CT THORAX DX C+: CPT

## 2022-03-14 PROCEDURE — 6360000004 HC RX CONTRAST MEDICATION: Performed by: EMERGENCY MEDICINE

## 2022-03-14 PROCEDURE — 93010 ELECTROCARDIOGRAM REPORT: CPT | Performed by: INTERNAL MEDICINE

## 2022-03-14 PROCEDURE — 2580000003 HC RX 258: Performed by: EMERGENCY MEDICINE

## 2022-03-14 PROCEDURE — 36415 COLL VENOUS BLD VENIPUNCTURE: CPT

## 2022-03-14 RX ORDER — SODIUM CHLORIDE, SODIUM LACTATE, POTASSIUM CHLORIDE, AND CALCIUM CHLORIDE .6; .31; .03; .02 G/100ML; G/100ML; G/100ML; G/100ML
1000 INJECTION, SOLUTION INTRAVENOUS ONCE
Status: COMPLETED | OUTPATIENT
Start: 2022-03-14 | End: 2022-03-14

## 2022-03-14 RX ORDER — HYDROCODONE BITARTRATE AND ACETAMINOPHEN 5; 325 MG/1; MG/1
1 TABLET ORAL EVERY 6 HOURS PRN
Qty: 11 TABLET | Refills: 0 | Status: SHIPPED | OUTPATIENT
Start: 2022-03-14 | End: 2022-03-17

## 2022-03-14 RX ORDER — HYDROCODONE BITARTRATE AND ACETAMINOPHEN 5; 325 MG/1; MG/1
1 TABLET ORAL ONCE
Status: COMPLETED | OUTPATIENT
Start: 2022-03-14 | End: 2022-03-14

## 2022-03-14 RX ADMIN — IOPAMIDOL 85 ML: 755 INJECTION, SOLUTION INTRAVENOUS at 14:41

## 2022-03-14 RX ADMIN — HYDROCODONE BITARTRATE AND ACETAMINOPHEN 1 TABLET: 5; 325 TABLET ORAL at 16:16

## 2022-03-14 RX ADMIN — SODIUM CHLORIDE, POTASSIUM CHLORIDE, SODIUM LACTATE AND CALCIUM CHLORIDE 1000 ML: 600; 310; 30; 20 INJECTION, SOLUTION INTRAVENOUS at 14:06

## 2022-03-14 ASSESSMENT — PAIN DESCRIPTION - LOCATION: LOCATION: CHEST;SHOULDER

## 2022-03-14 ASSESSMENT — PAIN DESCRIPTION - DESCRIPTORS: DESCRIPTORS: SORE

## 2022-03-14 ASSESSMENT — PAIN DESCRIPTION - PAIN TYPE: TYPE: ACUTE PAIN

## 2022-03-14 ASSESSMENT — PAIN SCALES - GENERAL
PAINLEVEL_OUTOF10: 7
PAINLEVEL_OUTOF10: 6

## 2022-03-14 ASSESSMENT — PAIN DESCRIPTION - ORIENTATION: ORIENTATION: LEFT;RIGHT

## 2022-03-14 NOTE — ED NOTES
Patient ambulated in the department, patient's oxygen level is 90% on room air and pulse of 71, patient states he is not dizzy or short of breathe, Dr Phoenix Reyna at bedside and aware of results.      Marcial Retana RN  03/14/22 6522

## 2022-03-14 NOTE — ED NOTES
.Reviewed discharge instructions with patient. Patient verbalized understanding. No distress noted. Ambulatory from department.      Kartik Chase RN  03/14/22 6615

## 2022-03-14 NOTE — ED PROVIDER NOTES
Emergency Physician Note        Note Open Time: 4:08 PM EDT    Chief Complaint  Chest Pain (Patient arrives complaining of chest pain for 2 weeks, saw PMD and started on muscle relaxers with no relief states painful in his shoulders, increased with movement)       History of Present Illness  Nigel Crawford is a 67 y.o. male who presents to the ED for chest pain. Patient reports he had chest pain for last 2 weeks and was given some muscle relaxers but did not get any relief. He states that initially the pain was anterior bilaterally and has since resolved on the right side and now just has anterior left-sided chest pain. It is worsened by movement of the arm. He does not however have any tenderness. He denies any lightheadedness, diaphoresis, palpitations, nausea or shortness of breath. He does not recall any injury. He has history of coronary disease. He states recently they started him on lisinopril despite the fact that he had what he thought was stable blood pressures. 10 systems reviewed, pertinent positives per HPI otherwise noted to be negative    I have reviewed the following from the nursing documentation:      Prior to Admission medications    Medication Sig Start Date End Date Taking?  Authorizing Provider   lisinopril (PRINIVIL;ZESTRIL) 5 MG tablet Take 0.5 tablets by mouth daily 3/6/22   WILBERTO Gallardo   tiZANidine (ZANAFLEX) 2 MG tablet Take 1 tablet by mouth 3 times daily as needed (muscle tension) 2/21/22   WILBERTO Gallardo   Blood Glucose Monitoring Suppl (TRUE METRIX METER) w/Device KIT  11/10/21   Historical Provider, MD   amitriptyline (ELAVIL) 25 MG tablet TAKE 1 TABLET EVERY NIGHT 12/17/21   SUSHMA Clark - CNP   metoprolol succinate (TOPROL XL) 50 MG extended release tablet Take 0.5 tablets by mouth daily 12/8/21   Lamin Oliver MD   omeprazole (PRILOSEC) 20 MG delayed release capsule TAKE 1 CAPSULE BY MOUTH EVERY MORNING (BEFORE BREAKFAST) 11/29/21   Ny Sawant SUSHMA Valdez - CNP   spironolactone (ALDACTONE) 25 MG tablet TAKE 1/2 TABLET EVERY DAY 11/16/21   Haskel Meigs, MD   Alcohol Swabstick 70 % PADS Use prior to checking glucose daily. E11.9 11/9/21   Nolvia Keith MD   blood glucose test strips (TRUE METRIX BLOOD GLUCOSE TEST) strip Check glucose daily. DM 2 E 11.9 11/9/21   Nolvia Keith MD   Blood Glucose Monitoring Suppl (TRUE METRIX METER) YELENA Check glucose daily. E11.9 11/9/21   Nolvia Keith MD   gemfibrozil (LOPID) 600 MG tablet Take 1 tablet by mouth 2 times daily (before meals) 9/21/21   Haskel Meigs, MD   Respiratory Therapy Supplies (NEBULIZER/TUBING/MOUTHPIECE) KIT 1 kit by Does not apply route 3 times daily 8/10/21   Nolvia Keith MD   rosuvastatin (CRESTOR) 40 MG tablet TAKE 1 TABLET EVERY EVENING 5/20/21   Ari Tamayo MD   clopidogrel (PLAVIX) 75 MG tablet TAKE 1 TABLET EVERY DAY 5/20/21   Ari Tamayo MD   ondansetron (ZOFRAN-ODT) 4 MG disintegrating tablet DISSOLVE 1 TABLET ON THE TONGUE EVERY 8 HOURS AS NEEDED FOR NAUSEA  OR  VOMITING 5/14/21   WILBERTO Alford   nitroGLYCERIN (NITROSTAT) 0.4 MG SL tablet Place 1 tablet under the tongue every 5 minutes as needed for Chest pain 2/25/21   Ari Tamayo MD   Handicap Placard MISC by Does not apply route Exp: 1/1/2024 1/15/21   WILBERTO Alford   ipratropium-albuterol (DUONEB) 0.5-2.5 (3) MG/3ML SOLN nebulizer solution Inhale 3 mLs into the lungs every 6 hours as needed for Shortness of Breath 5/10/19   Aurora Sanz MD   finasteride (PROSCAR) 5 MG tablet Take 5 mg by mouth daily Indications: Rx by Dr. Kassi Singh MD   methotrexate (RHEUMATREX) 2.5 MG chemo tablet Take 1 tablet by mouth once a week RX directions are 4 tablets weekly. Patient takes one tablet Monday/Wednesday/Friday. Patient taking differently: Take 2.5 mg by mouth once a week Takes 2 tablets on friday.  3/5/19   Ruben Lee MD   folic acid (FOLVITE) 1 MG tablet Take 1 mg by mouth daily    Historical Provider, MD   tamsulosin (FLOMAX) 0.4 MG capsule Take 0.4 mg by mouth daily    Tommy Cosme MD   aspirin 81 MG tablet Take 81 mg by mouth daily.     Historical Provider, MD       Allergies as of 03/14/2022    (No Known Allergies)       Past Medical History:   Diagnosis Date    AICD (automatic cardioverter/defibrillator) present 02/10/2015    Upgraded to BiV ICD 12/17/20 [employer]    Arthritis     CAD (coronary artery disease)     [facility]     Chronic systolic CHF (congestive heart failure), NYHA class 3 (HCC)     GERD (gastroesophageal reflux disease)     Hearing loss     left    Hyperlipidemia     [facility] infarction) [facility] [address]     Rash     Type 2 diabetes mellitus with chronic kidney disease 8/10/2021        Surgical History:   Past Surgical History:   Procedure Laterality Date    CARDIAC DEFIBRILLATOR PLACEMENT Left 12/17/2020    BiV ICD [employer]    CATARACT REMOVAL WITH IMPLANT Right 02/01/2017    CATARACT REMOVAL WITH IMPLANT Left 03/01/2017    COLONOSCOPY      CORONARY ANGIOPLASTY WITH STENT PLACEMENT      CYSTOSCOPY N/A 1/5/2022    CYSTOSCOPY, UROLIFT performed by Tommy Cosme MD at [id number] [place] [address]          Family History:    Family History   Problem Relation Age of Onset    Diabetes Mother     Cancer Father        Social History     Socioeconomic History    Marital status:      Spouse name: Not on file    Number of children: Not on file    Years of education: Not on file    Highest education level: Not on file   Occupational History    Not on file   Tobacco Use    Smoking status: Current Every Day Smoker     Packs/day: 0.75     Years: 54.00     Pack years: 40.50     Types: Cigarettes     Start date: 1/1/1962    Smokeless tobacco: Never Used    Tobacco comment: pt currently smokes 2/3 or less a day   Vaping Use    Vaping Use: Never used Substance and Sexual Activity    Alcohol use: No    Drug use: No    Sexual activity: Yes     Partners: Female   Other Topics Concern    Not on file   Social History Narrative    Not on file     Social Determinants of Health     Financial Resource Strain: Low Risk     Difficulty of Paying Living Expenses: Not very hard   Food Insecurity: No Food Insecurity    Worried About Running Out of Food in the Last Year: Never true    [place] of Food in the Last Year: Never true   Transportation Needs: No Transportation Needs    Lack of Transportation (Medical): No    Lack of Transportation (Non-Medical): No   Physical Activity:     Days of Exercise per Week: Not on file    Minutes of Exercise per Session: Not on file   Stress:     Feeling of Stress : Not on file   Social Connections:     Frequency of Communication with Friends and Family: Not on file    Frequency of Social Gatherings with Friends and Family: Not on file    Attends [employer] Services: Not on file    Active Member of Clubs or Organizations: Not on file    Attends Club or Organization Meetings: Not on file    Marital Status: Not on file   Intimate Partner Violence:     Fear of Current or Ex-Partner: Not on file    Emotionally Abused: Not on file    Physically Abused: Not on file    Sexually Abused: Not on file   Housing Stability: [address] Unable to Pay for Housing in the Last Year: No    Number of [place] in the Last Year: 1    Unstable Housing in the Last Year: No       Nursing notes reviewed. ED Triage Vitals   Enc Vitals Group      BP 03/14/22 1227 115/72      Pulse 03/14/22 1226 71      Resp 03/14/22 1226 15      Temp 03/14/22 1226 97.4 °F (36.3 °C)      Temp Source 03/14/22 1226 Temporal      SpO2 03/14/22 1226 98 %      Weight 03/14/22 1226 190 lb (86.2 kg)      Height 03/14/22 1226 5' 9.5\" (1.765 m)      Head Circumference --       Peak Flow --       Pain Score --       Pain Loc --       Pain Edu? --       Excl.  in GC? --        GENERAL:  Awake, alert. Well developed, well nourished with no apparent distress. HENT:  Normocephalic, Atraumatic, moist mucous membranes. EYES:  Pupils equal round and reactive to light, Conjunctiva normal, extraocular movements normal.  NECK:  No meningeal signs, Supple. CHEST:  Regular rate and rhythm, chest wall non-tender. LUNGS:  Clear to auscultation bilaterally. ABDOMEN:  Soft, non-tender, no rebound, rigidity or guarding, non-distended, normal bowel sounds. No costovertebral angle tenderness to palpation. BACK:  No tenderness. EXTREMITIES:  Normal range of motion, no edema, no bony tenderness, no deformity, distal pulses present. SKIN: Warm, dry and intact. NEUROLOGIC: Normal mental status. Moving all extremities to command. LABS and DIAGNOSTIC RESULTS  EKG  The Ekg interpreted by me shows  normal sinus rhythm with a rate of 75  Axis is   Normal  QTc is  normal  Unifocal PVCs    ST Segments: no acute change  Delta waves, Brugada Syndrome, and Short FL are not present. No significant change from prior EKG dated 5 jan 2022    RADIOLOGY  X-RAYS:  I have reviewed radiologic plain film image(s). ALL OTHER NON-PLAIN FILM IMAGES SUCH AS CT, ULTRASOUND AND MRI HAVE BEEN READ BY THE RADIOLOGIST. CT CHEST PULMONARY EMBOLISM W CONTRAST   Preliminary Result   1. No evidence of pulmonary embolic disease. 2. No acute pulmonary findings. Stable pulmonary nodules on the right. Continued imaging surveillance is recommended per screening chest CT   guidelines. 3. Atherosclerotic calcification in the aorta and coronary circulation. XR CHEST PORTABLE   Final Result   Mild asymmetric left basilar airspace disease, atelectasis versus pneumonia. Follow-up to resolution recommended.               LABS  Labs Reviewed   CBC WITH AUTO DIFFERENTIAL - Abnormal; Notable for the following components:       Result Value    RBC 3.67 (*)     Hemoglobin 12.7 (*)     Hematocrit 36.9 (*) .6 (*)     MCH 34.5 (*)     Neutrophils Absolute 8.4 (*)     All other components within normal limits   COMPREHENSIVE METABOLIC PANEL W/ REFLEX TO MG FOR LOW K - Abnormal; Notable for the following components:    Sodium 134 (*)     Glucose 110 (*)     BUN 25 (*)     CREATININE 1.4 (*)     GFR Non- 50 (*)     Total Bilirubin 1.4 (*)     All other components within normal limits   TROPONIN   LACTIC ACID       MEDICAL DECISION MAKING        Given the constant chest pain and negative troponin and EKG I believe that he does not have ACS. He does have some slightly low blood pressures and advised him to discontinue lisinopril for any warning when his blood pressure is less than 741 systolic. I do believe that his pain is musculoskeletal in nature given that it is elicited by certain movements. He ambulated in the ER twice around the nurses station and had no dyspnea at all maintain oxygen saturations in the 90s the entire way. I do not believe he has pneumonia. I advised the patient to return to the emergency department immediately for any new or worsening symptoms, such as worsening chest pain or shortness of breath. The patient voiced agreement and understanding of the treatment plan.       Results for orders placed or performed during the hospital encounter of 03/14/22   CBC with Auto Differential   Result Value Ref Range    WBC 10.4 4.0 - 11.0 K/uL    RBC 3.67 (L) 4.20 - 5.90 M/uL    Hemoglobin 12.7 (L) 13.5 - 17.5 g/dL    Hematocrit 36.9 (L) 40.5 - 52.5 %    .6 (H) 80.0 - 100.0 fL    MCH 34.5 (H) 26.0 - 34.0 pg    MCHC 34.3 31.0 - 36.0 g/dL    RDW 13.9 12.4 - 15.4 %    Platelets 510 635 - 459 K/uL    MPV 8.7 5.0 - 10.5 fL    Neutrophils % 80.7 %    Lymphocytes % 11.7 %    Monocytes % 5.9 %    Eosinophils % 1.0 %    Basophils % 0.7 %    Neutrophils Absolute 8.4 (H) 1.7 - 7.7 K/uL    Lymphocytes Absolute 1.2 1.0 - 5.1 K/uL    Monocytes Absolute 0.6 0.0 - 1.3 K/uL    Eosinophils Absolute 0.1 0.0 - 0.6 K/uL    Basophils Absolute 0.1 0.0 - 0.2 K/uL   Comprehensive Metabolic Panel w/ Reflex to MG   Result Value Ref Range    Sodium 134 (L) 136 - 145 mmol/L    Potassium reflex Magnesium 4.9 3.5 - 5.1 mmol/L    Chloride 100 99 - 110 mmol/L    CO2 22 21 - 32 mmol/L    Anion Gap 12 3 - 16    Glucose 110 (H) 70 - 99 mg/dL    BUN 25 (H) 7 - 20 mg/dL    CREATININE 1.4 (H) 0.8 - 1.3 mg/dL    GFR Non-African American 50 (A) >60    GFR African American >60 >60    Calcium 10.0 8.3 - 10.6 mg/dL    Total Protein 7.7 6.4 - 8.2 g/dL    Albumin 5.0 3.4 - 5.0 g/dL    Albumin/Globulin Ratio 1.9 1.1 - 2.2    Total Bilirubin 1.4 (H) 0.0 - 1.0 mg/dL    Alkaline Phosphatase 121 40 - 129 U/L    ALT 15 10 - 40 U/L    AST 19 15 - 37 U/L   Troponin   Result Value Ref Range    Troponin <0.01 <0.01 ng/mL   Lactic Acid   Result Value Ref Range    Lactic Acid 1.1 0.4 - 2.0 mmol/L   EKG 12 Lead   Result Value Ref Range    Ventricular Rate 75 BPM    Atrial Rate 75 BPM    P-R Interval 208 ms    QRS Duration 92 ms    Q-T Interval 384 ms    QTc Calculation (Bazett) 428 ms    P Axis 71 degrees    R Axis 78 degrees    T Axis 88 degrees    Diagnosis       Sinus rhythm with frequent Premature ventricular complexesLow voltage QRSBorderline ECGWhen compared with ECG of 05-JAN-2022 12:46,Sinus rhythm has replaced Electronic atrial pacemaker       I estimate there is LOW risk for PULMONARY EMBOLISM, ACUTE CORONARY SYNDROME, OR THORACIC AORTIC DISSECTION, thus I consider the discharge disposition reasonable. Tess Padilla and I have discussed the diagnosis and risks, and we agree with discharging home to follow-up with their primary doctor. We also discussed returning to the Emergency Department immediately if new or worsening symptoms occur. We have discussed the symptoms which are most concerning (e.g., bloody sputum, fever, worsening pain or shortness of breath, vomiting) that necessitate immediate return. FINAL Impression    1. Chest wall pain        Blood pressure 90/64, pulse 79, temperature 97.4 °F (36.3 °C), temperature source Temporal, resp. rate 18, height 5' 9.5\" (1.765 m), weight 190 lb (86.2 kg), SpO2 93 %. Patient was given scripts for the following medications. I counseled patient how to take these medications. Discharge Medication List as of 3/14/2022  4:19 PM      START taking these medications    Details   HYDROcodone-acetaminophen (NORCO) 5-325 MG per tablet Take 1 tablet by mouth every 6 hours as needed for Pain for up to 3 days. , Disp-11 tablet, R-0Print             Disposition  Pt is in good condition upon Discharge to home. This chart was generated using the [address] [place] [address] [address]  dictation system. I created this record but it may contain dictation errors.           Denise Ji MD  03/14/22 0484

## 2022-03-15 ENCOUNTER — CARE COORDINATION (OUTPATIENT)
Dept: CARE COORDINATION | Age: 72
End: 2022-03-15

## 2022-03-15 NOTE — CARE COORDINATION
Ambulatory Care Coordination Note  3/15/2022  CM Risk Score: 4  Charlson 10 Year Mortality Risk Score: 100%     ACC: Michelle Adams RN    Summary Note: ACM completed ED F/U call with patient. Patient said he is feeling much better after stronger pain medication was prescribed in ER for muscle pain. Patient said that Lisinopril was stopped in ER d/t BP being low. ACM offered care coordination at this time to monitor BP trends with stopped BP medications. Patient agreed to future follow up calls with ACM at this time. ACM will call in 2 weeks to obtain BP logs. Plan:    Encourage F/U PCP  Complete enrollment into CC  BP logs  F/U call 2 weeks   Send out zone management tools     Ambulatory Care Coordination Assessment    Care Coordination Protocol  Program Enrollment: Rising Risk  Referral from Primary Care Provider: No  Week 1 - Initial Assessment     Do you have all of your prescriptions and are they filled?: No  Barriers to medication adherence: None  Are you able to afford your medications?: Yes  How often do you have trouble taking your medications the way you have been told to take them?: Sometimes I take them as prescribed. Do you have Home O2 Therapy?: No      Ability to seek help/take action for Emergent Urgent situations i.e. fire, crime, inclement weather or health crisis. : Independent  Ability to ambulate to restroom: Independent  Ability handle personal hygeine needs (bathing/dressing/grooming): Independent  Ability to manage Medications: Independent  Ability to prepare Food Preparation: Independent  Ability to maintain home (clean home, laundry): Independent  Ability to drive and/or has transportation: Independent  Ability to do shopping: Independent  Ability to manage finances:  Independent  Is patient able to live independently?: Yes     Current Housing: Private Residence              Are you experiencing loss of meaning?: No  Are you experiencing loss of hope and peace?: No     Suggested Interventions and Community Resources   Zone Management Tools: In Process         Set up/Review an Education Plan, Set up/Review Goals              Prior to Admission medications    Medication Sig Start Date End Date Taking? Authorizing Provider   HYDROcodone-acetaminophen (NORCO) 5-325 MG per tablet Take 1 tablet by mouth every 6 hours as needed for Pain for up to 3 days. 3/14/22 3/17/22  Roxi Fernandez MD   lisinopril (PRINIVIL;ZESTRIL) 5 MG tablet Take 0.5 tablets by mouth daily 3/6/22   Lacretia Libman, PA   tiZANidine (ZANAFLEX) 2 MG tablet Take 1 tablet by mouth 3 times daily as needed (muscle tension) 2/21/22   Lacretia Libman, PA   Blood Glucose Monitoring Suppl (TRUE METRIX METER) w/Device KIT  11/10/21   Historical Provider, MD   amitriptyline (ELAVIL) 25 MG tablet TAKE 1 TABLET EVERY NIGHT 12/17/21   SUSHMA Campos CNP   metoprolol succinate (TOPROL XL) 50 MG extended release tablet Take 0.5 tablets by mouth daily 12/8/21   Thang Forte MD   omeprazole (PRILOSEC) 20 MG delayed release capsule TAKE 1 CAPSULE BY MOUTH EVERY MORNING (BEFORE BREAKFAST) 11/29/21   SUSHMA Campos CNP   spironolactone (ALDACTONE) 25 MG tablet TAKE 1/2 TABLET EVERY DAY 11/16/21   Thang Forte MD   Alcohol Swabstick 70 % PADS Use prior to checking glucose daily. E11.9 11/9/21   Jonny Montero MD   blood glucose test strips (TRUE METRIX BLOOD GLUCOSE TEST) strip Check glucose daily. DM 2 E 11.9 11/9/21   Jonny Montero MD   Blood Glucose Monitoring Suppl (TRUE METRIX METER) YELENA Check glucose daily.  E11.9 11/9/21   Jonny Montero MD   gemfibrozil (LOPID) 600 MG tablet Take 1 tablet by mouth 2 times daily (before meals) 9/21/21   Thang Forte MD   Respiratory Therapy Supplies (NEBULIZER/TUBING/MOUTHPIECE) KIT 1 kit by Does not apply route 3 times daily 8/10/21   Jonny Montero MD   rosuvastatin (CRESTOR) 40 MG tablet TAKE 1 TABLET EVERY EVENING 5/20/21   Khalif Dhaliwal MD clopidogrel (PLAVIX) 75 MG tablet TAKE 1 TABLET EVERY DAY 5/20/21   Erika Silva MD   ondansetron (ZOFRAN-ODT) 4 MG disintegrating tablet DISSOLVE 1 TABLET ON THE TONGUE EVERY 8 HOURS AS NEEDED FOR NAUSEA  OR  VOMITING 5/14/21   WILBERTO Ramos   nitroGLYCERIN (NITROSTAT) 0.4 MG SL tablet Place 1 tablet under the tongue every 5 minutes as needed for Chest pain 2/25/21   Erika Silva MD   Handicap Placard MISC by Does not apply route Exp: 1/1/2024 1/15/21   WILBERTO Ramos   ipratropium-albuterol (DUONEB) 0.5-2.5 (3) MG/3ML SOLN nebulizer solution Inhale 3 mLs into the lungs every 6 hours as needed for Shortness of Breath 5/10/19   Nina Sanz MD   finasteride (PROSCAR) 5 MG tablet Take 5 mg by mouth daily Indications: Rx by Dr. Adrián Schroeder MD   methotrexate (RHEUMATREX) 2.5 MG chemo tablet Take 1 tablet by mouth once a week RX directions are 4 tablets weekly. Patient takes one tablet Monday/Wednesday/Friday. Patient taking differently: Take 2.5 mg by mouth once a week Takes 2 tablets on friday. 3/5/19   Esvin Luu MD   folic acid (FOLVITE) 1 MG tablet Take 1 mg by mouth daily    Historical Provider, MD   tamsulosin (FLOMAX) 0.4 MG capsule Take 0.4 mg by mouth daily    Luis Harper MD   aspirin 81 MG tablet Take 81 mg by mouth daily.     Historical Provider, MD       Future Appointments   Date Time Provider Danni Dennis   3/28/2022  7:10 AM SCHEDULE, [facility] REMOTE Rosa Iselanoschuyler Downs [facility]   6/21/2022  1:45 PM Georgia Lo MD [facility] CLER CAR [facility]   6/27/2022  7:10 AM SCHEDULE, [facility] REMOTE TRANSMISSION John Lopez [facility]

## 2022-03-28 ENCOUNTER — NURSE ONLY (OUTPATIENT)
Dept: CARDIOLOGY CLINIC | Age: 72
End: 2022-03-28
Payer: MEDICARE

## 2022-03-28 DIAGNOSIS — I42.9 SECONDARY CARDIOMYOPATHY (HCC): ICD-10-CM

## 2022-03-28 DIAGNOSIS — I50.22 CHRONIC SYSTOLIC CONGESTIVE HEART FAILURE (HCC): ICD-10-CM

## 2022-03-28 DIAGNOSIS — I25.5 ISCHEMIC CARDIOMYOPATHY: ICD-10-CM

## 2022-03-28 DIAGNOSIS — Z95.810 AICD (AUTOMATIC CARDIOVERTER/DEFIBRILLATOR) PRESENT: ICD-10-CM

## 2022-03-28 PROCEDURE — 93295 DEV INTERROG REMOTE 1/2/MLT: CPT | Performed by: INTERNAL MEDICINE

## 2022-03-28 PROCEDURE — 93296 REM INTERROG EVL PM/IDS: CPT | Performed by: INTERNAL MEDICINE

## 2022-03-28 NOTE — PROGRESS NOTES
HEART LOGIC PER NPRB. [employer] Heart Failure Index 0 and TI 48. Remote transmission received for patients CRT-D. Transmission shows normal sensing and pacing function. EP physician will review. See interrogation under the cardiology tab in the [address] [place] [address] [address] field for more details. Will continue to monitor remotely. LVP . -Ventricular Pacing Chamber LV Only. AP 4%  RVP 2%  %. No  arrhythmias recorded. PMT noted.

## 2022-03-29 ENCOUNTER — CARE COORDINATION (OUTPATIENT)
Dept: CARE COORDINATION | Age: 72
End: 2022-03-29

## 2022-03-29 NOTE — CARE COORDINATION
Ambulatory Care Coordination Note  3/29/2022  CM Risk Score: 4  Charlson 10 Year Mortality Risk Score: 100%     ACC: Tamara Hines, RN    Summary Note: ACM completed follow up call with patient. Patient said he is doing well and his insurance has changed. ACM asked for patient to provide new insurance cards to PCP office when he was able to. Patient said it would take a few weeks before he would be able to update that information. ACM asked if patient had been taking Bps and patient declined. Patient said since stopping lisinopril he was feeling better and did not see the purpose in monitoring BP. ACM said it was to ensure his BP levels were within normal limits and not becoming elevated. Patient said he would begin checking BP and keeping track of them. ACM said she would call in a few weeks to obtain numbers. Patient said he was reviewing his medication with someone at [facility]. Patient said he was asked why he was being given Metoprolol and Aldactone as the medications had similar effects on BP. ACM told patient both medications lower BP but Aldactone will help with keeping fluid off and Metoprolol will help improve conduction of heart. Patient thanked ACM for explanation of medications. Patient said he does not typically obtain daily weights and does not notice any swelling in his legs. ACM advised patient on importance of daily weights with history of CHF. Patient said he would need to find a new Dermatologist as the one he currently sees would be 155 dollars each time. Patient said that was not affordable. ACM said once insurance cards are updated in system ACM would help patient find a new in-network Dermatologist. Patient thanked ACM:    Plan:    F/U call 2 weeks  BP logs  [facility]        Care Coordination Interventions    Program Enrollment: Rising Risk  Referral from Primary Care Provider: No  Suggested Interventions and Community Resources  Zone Management Tools:  In Process (Comment: CHF, DM 3/15/22 ADANT)         Goals Addressed                 This Visit's Progress     Self Monitoring   No change     Blood Pressure - I will take my blood pressure as directed - Daily    None Recently Recorded    Barriers: time constraints  Plan for overcoming my barriers: ACM will call in 2 weeks for BP logs  Confidence: 8/10  Anticipated Goal Completion Date: 5/15/22                Prior to Admission medications    Medication Sig Start Date End Date Taking? Authorizing Provider   lisinopril (PRINIVIL;ZESTRIL) 5 MG tablet Take 0.5 tablets by mouth daily  Patient not taking: Reported on 3/29/2022 3/6/22   WILBERTO Christiansen   tiZANidine (ZANAFLEX) 2 MG tablet Take 1 tablet by mouth 3 times daily as needed (muscle tension) 2/21/22   WILBERTO Christiansen   Blood Glucose Monitoring Suppl (TRUE METRIX METER) w/Device KIT  11/10/21   Historical Provider, MD   amitriptyline (ELAVIL) 25 MG tablet TAKE 1 TABLET EVERY NIGHT 12/17/21   SUSHMA Ernst CNP   metoprolol succinate (TOPROL XL) 50 MG extended release tablet Take 0.5 tablets by mouth daily 12/8/21   Kieran Councilman, MD   omeprazole (PRILOSEC) 20 MG delayed release capsule TAKE 1 CAPSULE BY MOUTH EVERY MORNING (BEFORE BREAKFAST) 11/29/21   SUSHMA Ernst CNP   spironolactone (ALDACTONE) 25 MG tablet TAKE 1/2 TABLET EVERY DAY 11/16/21   Kieran Councilman, MD   Alcohol Swabstick 70 % PADS Use prior to checking glucose daily. E11.9 11/9/21   Wendy Bell MD   blood glucose test strips (TRUE METRIX BLOOD GLUCOSE TEST) strip Check glucose daily. DM 2 E 11.9 11/9/21   Wendy Bell MD   Blood Glucose Monitoring Suppl (TRUE METRIX METER) YELENA Check glucose daily.  E11.9 11/9/21   Wendy Bell MD   gemfibrozil (LOPID) 600 MG tablet Take 1 tablet by mouth 2 times daily (before meals) 9/21/21   Kieran Councilman, MD   Respiratory Therapy Supplies (NEBULIZER/TUBING/MOUTHPIECE) KIT 1 kit by Does not apply route 3 times daily 8/10/21   Wendy Bell MD rosuvastatin (CRESTOR) 40 MG tablet TAKE 1 TABLET EVERY EVENING 5/20/21   Kayden Ochoa MD   clopidogrel (PLAVIX) 75 MG tablet TAKE 1 TABLET EVERY DAY 5/20/21   Kayden Ochoa MD   ondansetron (ZOFRAN-ODT) 4 MG disintegrating tablet DISSOLVE 1 TABLET ON THE TONGUE EVERY 8 HOURS AS NEEDED FOR NAUSEA  OR  VOMITING 5/14/21   WILBERTO Baltazar   nitroGLYCERIN (NITROSTAT) 0.4 MG SL tablet Place 1 tablet under the tongue every 5 minutes as needed for Chest pain 2/25/21   Kayden Ochoa MD   Handicap Placard MISC by Does not apply route Exp: 1/1/2024 1/15/21   WILBERTO Baltazar   ipratropium-albuterol (DUONEB) 0.5-2.5 (3) MG/3ML SOLN nebulizer solution Inhale 3 mLs into the lungs every 6 hours as needed for Shortness of Breath 5/10/19   Osman Sanz MD   finasteride (PROSCAR) 5 MG tablet Take 5 mg by mouth daily Indications: Rx by Dr. Mary Cuevas MD   methotrexate (RHEUMATREX) 2.5 MG chemo tablet Take 1 tablet by mouth once a week RX directions are 4 tablets weekly. Patient takes one tablet Monday/Wednesday/Friday. Patient taking differently: Take 2.5 mg by mouth once a week Takes 2 tablets on friday. 3/5/19   Melissa Mendieta MD   folic acid (FOLVITE) 1 MG tablet Take 1 mg by mouth daily    Historical Provider, MD   tamsulosin (FLOMAX) 0.4 MG capsule Take 0.4 mg by mouth daily    Dannielle Howard MD   aspirin 81 MG tablet Take 81 mg by mouth daily. Historical Provider, MD       Future Appointments   Date Time Provider Danni Dennis   6/21/2022  1:45 PM MD MADHU CaraballoP [facility]   6/27/2022  7:10 AM SCHEDULE, Diaz Efra REMOTE TRANSMISSION [facility]     ,   Diabetes Assessment    Meal Planning: None   How often do you test your blood sugar?: No Testing   Do you have barriers with adherence to non-pharmacologic self-management interventions?  (Nutrition/Exercise/Self-Monitoring): No   Have you ever had to go to the ED for symptoms of low blood sugar?: No No patient-reported symptoms      ,   Congestive Heart Failure Assessment    Do you understand a low sodium diet?: No  Do you understand how to read food labels?: No  How many restaurant meals do you eat per week?: 0  Do you salt your food before tasting it?: No     No patient-reported symptoms      Symptoms:         and   General Assessment

## 2022-03-31 PROCEDURE — 93297 REM INTERROG DEV EVAL ICPMS: CPT | Performed by: INTERNAL MEDICINE

## 2022-04-12 ENCOUNTER — CARE COORDINATION (OUTPATIENT)
Dept: CARE COORDINATION | Age: 72
End: 2022-04-12

## 2022-04-15 ENCOUNTER — CARE COORDINATION (OUTPATIENT)
Dept: CARE COORDINATION | Age: 72
End: 2022-04-15

## 2022-04-15 NOTE — CARE COORDINATION
Ambulatory Care Coordination Note  4/15/2022  CM Risk Score: 4  Charlson 10 Year Mortality Risk Score: 100%     ACC: Soraya Mendieta RN    Summary Note: ACM completed follow up call with patient who has no concerns at this time. Patient said he is feeling better since stopping lisinopril. Patient has been obtaining BP and weights as instructed. Patient had no further complaints at this time. Day Systolic Diastolic        4/4 151 84   4/5 108 87   4/7  106 83   4/8 117 79   4/10 106 81   4/13 119 79     AVG  82     Day Weight       4/4 190   4/5 190   4/7 190   4/8 190   4/10 190   4/13 190      AVG        Plan:    CC f/U 1 month  Graduation (?)              Care Coordination Interventions    Program Enrollment: Rising Risk  Referral from Primary Care Provider: No  Suggested Interventions and Community Resources  Zone Management Tools: In Process (Comment: CHF, DM 3/15/22 CJT)         Goals Addressed                 This Visit's Progress     Self Monitoring   Improving     Blood Pressure - I will take my blood pressure as directed - Daily    None Recently Recorded    Barriers: time constraints  Plan for overcoming my barriers: ACM will call in 2 weeks for BP logs  Confidence: 8/10  Anticipated Goal Completion Date: 5/15/22                Prior to Admission medications    Medication Sig Start Date End Date Taking?  Authorizing Provider   lisinopril (PRINIVIL;ZESTRIL) 5 MG tablet Take 0.5 tablets by mouth daily  Patient not taking: Reported on 3/29/2022 3/6/22   WILBERTO Manjarrez   tiZANidine (ZANAFLEX) 2 MG tablet Take 1 tablet by mouth 3 times daily as needed (muscle tension) 2/21/22   WILBERTO Manjarrez   Blood Glucose Monitoring Suppl (TRUE METRIX METER) w/Device KIT  11/10/21   Historical Provider, MD   amitriptyline (ELAVIL) 25 MG tablet TAKE 1 TABLET EVERY NIGHT 12/17/21   SUSHMA Irby - CNP   metoprolol succinate (TOPROL XL) 50 MG extended release tablet Take 0.5 tablets by mouth daily 12/8/21   Shira Schulz MD   omeprazole (PRILOSEC) 20 MG delayed release capsule TAKE 1 CAPSULE BY MOUTH EVERY MORNING (BEFORE BREAKFAST) 11/29/21   SUSHMA Jordan - CNP   spironolactone (ALDACTONE) 25 MG tablet TAKE 1/2 TABLET EVERY DAY 11/16/21   Shira Schulz MD   Alcohol Swabstick 70 % PADS Use prior to checking glucose daily. E11.9 11/9/21   Jory Guerrero MD   blood glucose test strips (TRUE METRIX BLOOD GLUCOSE TEST) strip Check glucose daily. DM 2 E 11.9 11/9/21   Jory Guerrero MD   Blood Glucose Monitoring Suppl (TRUE METRIX METER) YELENA Check glucose daily. E11.9 11/9/21   Jory Guerrero MD   gemfibrozil (LOPID) 600 MG tablet Take 1 tablet by mouth 2 times daily (before meals) 9/21/21   Shira Schulz MD   Respiratory Therapy Supplies (NEBULIZER/TUBING/MOUTHPIECE) KIT 1 kit by Does not apply route 3 times daily 8/10/21   Jory Guerrero MD   rosuvastatin (CRESTOR) 40 MG tablet TAKE 1 TABLET EVERY EVENING 5/20/21   Lawrence Cortez MD   clopidogrel (PLAVIX) 75 MG tablet TAKE 1 TABLET EVERY DAY 5/20/21   Lawrence Cortez MD   ondansetron (ZOFRAN-ODT) 4 MG disintegrating tablet DISSOLVE 1 TABLET ON THE TONGUE EVERY 8 HOURS AS NEEDED FOR NAUSEA  OR  VOMITING 5/14/21   WILBERTO River   nitroGLYCERIN (NITROSTAT) 0.4 MG SL tablet Place 1 tablet under the tongue every 5 minutes as needed for Chest pain 2/25/21   Lawrence Cortez MD   Handicap Placard MISC by Does not apply route Exp: 1/1/2024 1/15/21   WILBERTO River   ipratropium-albuterol (DUONEB) 0.5-2.5 (3) MG/3ML SOLN nebulizer solution Inhale 3 mLs into the lungs every 6 hours as needed for Shortness of Breath 5/10/19   Jolene Sanz MD   finasteride (PROSCAR) 5 MG tablet Take 5 mg by mouth daily Indications: Rx by Dr. Lyubov rGeene MD   methotrexate (RHEUMATREX) 2.5 MG chemo tablet Take 1 tablet by mouth once a week RX directions are 4 tablets weekly.   Patient takes one tablet Monday/Wednesday/Friday. Patient taking differently: Take 2.5 mg by mouth once a week Takes 2 tablets on friday. 3/5/19   Polly Jones MD   folic acid (FOLVITE) 1 MG tablet Take 1 mg by mouth daily    Historical Provider, MD   tamsulosin (FLOMAX) 0.4 MG capsule Take 0.4 mg by mouth daily    Antoine De La Garza MD   aspirin 81 MG tablet Take 81 mg by mouth daily. Historical Provider, MD       Future Appointments   Date Time Provider Danni Dennis   6/21/2022  1:45 PM Nasrin Mahmood MD [facility] CLER [facility]   6/27/2022  7:10 AM SCHEDULE, Doug Chamorro REMOTE TRANSMISSION [facility]     ,   Diabetes Assessment    Meal Planning: None   How often do you test your blood sugar?: No Testing   Do you have barriers with adherence to non-pharmacologic self-management interventions?  (Nutrition/Exercise/Self-Monitoring): No   Have you ever had to go to the ED for symptoms of low blood sugar?: No       No patient-reported symptoms      ,   Congestive Heart Failure Assessment    Do you understand a low sodium diet?: No  Do you understand how to read food labels?: No  How many restaurant meals do you eat per week?: 0  Do you salt your food before tasting it?: No     No patient-reported symptoms      Symptoms:         and   General Assessment

## 2022-04-26 DIAGNOSIS — G44.229 CHRONIC TENSION-TYPE HEADACHE, NOT INTRACTABLE: ICD-10-CM

## 2022-04-26 NOTE — TELEPHONE ENCOUNTER
Refill Request     Last Seen: Last Seen Department: 2/21/2022  Last Seen by PCP: 1/3/2022    Last Written: Amitriptyline 90 with 1 12/17/2021     Omeprazole 11/29/2021 90 with 1     Next Appointment:   Future Appointments   Date Time Provider Danni Dennis   6/21/2022  1:45 PM Kendrick Enriquez MD P CLER [facility]   6/27/2022  7:10 AM SCHEDULE, Nikky Greer REMOTE TRANSMISSION [facility]       Future appointment scheduled      Requested Prescriptions     Pending Prescriptions Disp Refills    omeprazole (PRILOSEC) 20 MG delayed release capsule 90 capsule 1     Sig: Take 1 capsule by mouth every morning (before breakfast)    amitriptyline (ELAVIL) 25 MG tablet 90 tablet 1     Sig: TAKE 1 TABLET EVERY NIGHT

## 2022-04-27 DIAGNOSIS — R60.9 EDEMA, UNSPECIFIED TYPE: ICD-10-CM

## 2022-04-27 RX ORDER — SPIRONOLACTONE 25 MG/1
TABLET ORAL
Qty: 45 TABLET | Refills: 5 | Status: SHIPPED | OUTPATIENT
Start: 2022-04-27

## 2022-04-27 RX ORDER — OMEPRAZOLE 20 MG/1
20 CAPSULE, DELAYED RELEASE ORAL
Qty: 90 CAPSULE | Refills: 1 | Status: SHIPPED | OUTPATIENT
Start: 2022-04-27 | End: 2022-05-02 | Stop reason: SDUPTHER

## 2022-04-27 RX ORDER — CLOPIDOGREL BISULFATE 75 MG/1
TABLET ORAL
Qty: 90 TABLET | Refills: 5 | Status: SHIPPED | OUTPATIENT
Start: 2022-04-27

## 2022-04-27 RX ORDER — METOPROLOL SUCCINATE 50 MG/1
25 TABLET, EXTENDED RELEASE ORAL DAILY
Qty: 90 TABLET | Refills: 5 | Status: SHIPPED | OUTPATIENT
Start: 2022-04-27

## 2022-04-27 RX ORDER — ROSUVASTATIN CALCIUM 40 MG/1
TABLET, COATED ORAL
Qty: 90 TABLET | Refills: 5 | Status: SHIPPED | OUTPATIENT
Start: 2022-04-27

## 2022-04-27 RX ORDER — GEMFIBROZIL 600 MG/1
600 TABLET, FILM COATED ORAL
Qty: 180 TABLET | Refills: 5 | Status: SHIPPED | OUTPATIENT
Start: 2022-04-27

## 2022-04-27 RX ORDER — AMITRIPTYLINE HYDROCHLORIDE 25 MG/1
TABLET, FILM COATED ORAL
Qty: 90 TABLET | Refills: 1 | Status: SHIPPED | OUTPATIENT
Start: 2022-04-27 | End: 2022-05-02 | Stop reason: SDUPTHER

## 2022-05-02 DIAGNOSIS — G44.229 CHRONIC TENSION-TYPE HEADACHE, NOT INTRACTABLE: ICD-10-CM

## 2022-05-02 DIAGNOSIS — R07.89 CHEST WALL PAIN: ICD-10-CM

## 2022-05-02 RX ORDER — AMITRIPTYLINE HYDROCHLORIDE 25 MG/1
TABLET, FILM COATED ORAL
Qty: 90 TABLET | Refills: 1 | Status: SHIPPED | OUTPATIENT
Start: 2022-05-02 | End: 2023-02-10 | Stop reason: SDUPTHER

## 2022-05-02 RX ORDER — OMEPRAZOLE 20 MG/1
20 CAPSULE, DELAYED RELEASE ORAL
Qty: 90 CAPSULE | Refills: 1 | Status: SHIPPED | OUTPATIENT
Start: 2022-05-02 | End: 2022-05-20 | Stop reason: SDUPTHER

## 2022-05-02 RX ORDER — TIZANIDINE 2 MG/1
2 TABLET ORAL 3 TIMES DAILY PRN
Qty: 30 TABLET | Refills: 1 | Status: SHIPPED | OUTPATIENT
Start: 2022-05-02

## 2022-05-04 ENCOUNTER — TELEPHONE (OUTPATIENT)
Dept: FAMILY MEDICINE CLINIC | Age: 72
End: 2022-05-04

## 2022-05-04 NOTE — TELEPHONE ENCOUNTER
Prescription Clarification request     Omprazol 20 mg     Take 1 capsule every morning before breakfast.

## 2022-05-13 ENCOUNTER — TELEPHONE (OUTPATIENT)
Dept: FAMILY MEDICINE CLINIC | Age: 72
End: 2022-05-13

## 2022-05-13 NOTE — TELEPHONE ENCOUNTER
----- Message from Angelina Willingham sent at 5/13/2022 12:26 PM EDT -----  Subject: Message to Provider    QUESTIONS  Information for Provider? Patient states the he has gotten 2 out 3 of the   medications that he requested on 5/2, Omeprazole has not yet arrived. He   would like to know if the nurse could help him and call the pharmacy and   see if it is running behind, He cannot get ahold of them. Pharmacy is   listed as [facility]  ---------------------------------------------------------------------------  --------------  CALL BACK INFO  What is the best way for the office to contact you? OK to leave message on   voicemail  Preferred Call Back Phone Number?  [phone]  ---------------------------------------------------------------------------  --------------  SCRIPT ANSWERS  undefined

## 2022-05-13 NOTE — TELEPHONE ENCOUNTER
[employer]    It appears the order is in processing since 5/6, they are going to correct the issue to have it sent to the patient. Patient receive in 2-5 days. Patient informed.

## 2022-05-17 ENCOUNTER — TELEPHONE (OUTPATIENT)
Dept: FAMILY MEDICINE CLINIC | Age: 72
End: 2022-05-17

## 2022-05-17 ENCOUNTER — CARE COORDINATION (OUTPATIENT)
Dept: CARE COORDINATION | Age: 72
End: 2022-05-17

## 2022-05-17 NOTE — TELEPHONE ENCOUNTER
I called the company back and their recording stated they are [employer] based out of [place], [place]. Due to a high volume of scam requests from out of state companies I decided to call the patient to verify if he requested these supplies. I spoke with Jamil Thornton on the phone and he stated that he does not want any supplies from this company.

## 2022-05-17 NOTE — CARE COORDINATION
Ambulatory Care Coordination Note  5/17/2022  CM Risk Score: 4  Charlson 10 Year Mortality Risk Score: 100%     ACC: Jena Long RN    Summary Note: ACM completed follow up call with patient. Patient had no active issues to report at this time. Patient said his Bps have been stable and weight has been as well. Patient has no other questions. CHF education will be mailed out for [employer]. No other questions at this time. ACM will graduate from care coordination and encouraged patient to call if there were any concerns. Plan:    Graduate CC        Care Coordination Interventions    Program Enrollment: Rising Risk  Referral from Primary Care Provider: No  Suggested Interventions and Community Resources  Zone Management Tools: Completed (Comment: CHF, DM 3/15/22 CJT)         Goals Addressed                 This Visit's Progress     COMPLETED: Self Monitoring   Improving     Blood Pressure - I will take my blood pressure as directed - Daily    None Recently Recorded    Barriers: time constraints  Plan for overcoming my barriers: ACM will call in 2 weeks for BP logs  Confidence: 8/10  Anticipated Goal Completion Date: 5/15/22                Prior to Admission medications    Medication Sig Start Date End Date Taking?  Authorizing Provider   omeprazole (PRILOSEC) 20 MG delayed release capsule Take 1 capsule by mouth every morning (before breakfast) 5/2/22   SUSHMA Wu CNP   amitriptyline (ELAVIL) 25 MG tablet TAKE 1 TABLET EVERY NIGHT 5/2/22   SUSHMA Wu CNP   tiZANidine (ZANAFLEX) 2 MG tablet Take 1 tablet by mouth 3 times daily as needed (muscle tension) 5/2/22   SUSHMA Wu CNP   gemfibrozil (LOPID) 600 MG tablet Take 1 tablet by mouth 2 times daily (before meals) 4/27/22   Dick Samuel MD   clopidogrel (PLAVIX) 75 MG tablet TAKE 1 TABLET EVERY DAY 4/27/22   Dick Samuel MD   metoprolol succinate (TOPROL XL) 50 MG extended release tablet Take 0.5 tablets by mouth daily 4/27/22   Whitney De Luna MD   rosuvastatin (CRESTOR) 40 MG tablet TAKE 1 TABLET EVERY EVENING 4/27/22   Whitney De Luna MD   spironolactone (ALDACTONE) 25 MG tablet TAKE 1/2 TABLET EVERY DAY 4/27/22   Whitney De Luna MD   lisinopril (PRINIVIL;ZESTRIL) 5 MG tablet Take 0.5 tablets by mouth daily  Patient not taking: Reported on 3/29/2022 3/6/22   WILBERTO Lamas   Blood Glucose Monitoring Suppl (TRUE METRIX METER) w/Device KIT  11/10/21   Historical Provider, MD   Alcohol Swabstick 70 % PADS Use prior to checking glucose daily. E11.9 11/9/21   Edwige Lazo MD   blood glucose test strips (TRUE METRIX BLOOD GLUCOSE TEST) strip Check glucose daily. DM 2 E 11.9 11/9/21   Edwige Lazo MD   Blood Glucose Monitoring Suppl (TRUE METRIX METER) YELENA Check glucose daily. E11.9 11/9/21   Edwige Lazo MD   Respiratory Therapy Supplies (NEBULIZER/TUBING/MOUTHPIECE) KIT 1 kit by Does not apply route 3 times daily 8/10/21   Mariella Sanz MD   ondansetron (ZOFRAN-ODT) 4 MG disintegrating tablet DISSOLVE 1 TABLET ON THE TONGUE EVERY 8 HOURS AS NEEDED FOR NAUSEA  OR  VOMITING 5/14/21   WILBERTO Lamas   nitroGLYCERIN (NITROSTAT) 0.4 MG SL tablet Place 1 tablet under the tongue every 5 minutes as needed for Chest pain 2/25/21   Avery Locke MD   Handicap Placard MISC by Does not apply route Exp: 1/1/2024 1/15/21   WILBERTO Lamas   ipratropium-albuterol (DUONEB) 0.5-2.5 (3) MG/3ML SOLN nebulizer solution Inhale 3 mLs into the lungs every 6 hours as needed for Shortness of Breath 5/10/19   Mariella Sanz MD   finasteride (PROSCAR) 5 MG tablet Take 5 mg by mouth daily Indications: Rx by Dr. Ha Pittman MD   methotrexate (RHEUMATREX) 2.5 MG chemo tablet Take 1 tablet by mouth once a week RX directions are 4 tablets weekly. Patient takes one tablet Monday/Wednesday/Friday. Patient taking differently: Take 2.5 mg by mouth once a week Takes 2 tablets on friday.  3/5/19 Anupama Collado MD   folic acid (FOLVITE) 1 MG tablet Take 1 mg by mouth daily    Historical Provider, MD   tamsulosin (FLOMAX) 0.4 MG capsule Take 0.4 mg by mouth daily    De Clay MD   aspirin 81 MG tablet Take 81 mg by mouth daily. Historical Provider, MD       Future Appointments   Date Time Provider Danni Sotoi   6/21/2022  1:45 PM Kendrick Enriquez MD P [facility]   6/27/2022  7:10 AM SCHEDULE, Nikky Greer REMOTE TRANSMISSION [facility]     ,   Diabetes Assessment    Meal Planning: None   How often do you test your blood sugar?: No Testing   Do you have barriers with adherence to non-pharmacologic self-management interventions?  (Nutrition/Exercise/Self-Monitoring): No   Have you ever had to go to the ED for symptoms of low blood sugar?: No       No patient-reported symptoms      ,   Congestive Heart Failure Assessment    Do you understand a low sodium diet?: No  Do you understand how to read food labels?: No  How many restaurant meals do you eat per week?: 0  Do you salt your food before tasting it?: No     No patient-reported symptoms      Symptoms:         and   General Assessment    Do you have any symptoms that are causing concern?: No

## 2022-05-17 NOTE — TELEPHONE ENCOUNTER
----- Message from [facility] sent at 5/16/2022 12:07 PM EDT -----  Subject: Message to Provider    QUESTIONS  Information for Provider? Kaila Carrera is following up on diabetic supplies. Sienna Corral was faxed about a month ago. . please call [phone]  ---------------------------------------------------------------------------  --------------  Rg SANON  What is the best way for the office to contact you? OK to leave message on   voicemail  Preferred Call Back Phone Number? [phone]  ---------------------------------------------------------------------------  --------------  SCRIPT ANSWERS  Relationship to Patient? Third Party  Third Party Type?  Other  Other Third Party Type? home delivery  Representative Name? Clarissa Almanza

## 2022-05-19 ENCOUNTER — CARE COORDINATION (OUTPATIENT)
Dept: CARE COORDINATION | Age: 72
End: 2022-05-19

## 2022-05-19 NOTE — CARE COORDINATION
ACM received an incoming call yesterday but ACM missed call. Patient had left a voicemail saying he still had no received medication from 5/2/22 that was ordered. ACM attempted to call patient back to see if medication had been received. ACM unable to leave a message for patient d/t voicemail not being setup.

## 2022-05-20 ENCOUNTER — CARE COORDINATION (OUTPATIENT)
Dept: CARE COORDINATION | Age: 72
End: 2022-05-20

## 2022-05-20 RX ORDER — OMEPRAZOLE 20 MG/1
20 CAPSULE, DELAYED RELEASE ORAL
Qty: 30 CAPSULE | Refills: 1 | Status: SHIPPED | OUTPATIENT
Start: 2022-05-20

## 2022-05-20 NOTE — CARE COORDINATION
Patient has not received Omeprazole through mail order pharmacy yet. Are you able to send a month supply to [facility] in [facility] for patient? Thanks.

## 2022-05-26 ENCOUNTER — CARE COORDINATION (OUTPATIENT)
Dept: CARE COORDINATION | Age: 72
End: 2022-05-26

## 2022-05-26 NOTE — CARE COORDINATION
ACM received call from patient who left voicemail that Omeprazole still had no been delivered by mail order. ACM outreached to patient and informed him a one time supply was sent to [facility] to . Patient said he bought medication OTC while he waited for the mail order. ACM reviewed office notes it looked like office contact mail order pharmacy on 5/13/22 and was told this would be processed and be delivered to patient in 2-5 days. Patient said he has not received this. ACM called and spoke to [employer] who said there is a drug interaction of increased risk of GI bleed between Methotrexate and Omeprazole. Pharmacy Tech said Omeprazole was placed on hold until provider confirmed it was okay for Omeprazole to be started. ACM called and spoke to patient and told him to stop taking OTC Omeprazole until Dr. Goldie Pimentel confirmed it was either okay to resume Omeprazole with Methotrexate or change Omeprazole to something different. Patient thanked ACM at this time for clarification and will not take any more Omeprazole until Dr. Goldie Pimentel clarifies the order.

## 2022-05-27 NOTE — TELEPHONE ENCOUNTER
Spoke with patient, he's asking if he is to stop the Omeprazole or not? Said he doesn't know why he would need to stop taking it and has been taking it for years.  Asking for you to call him Catalina Bustos

## 2022-05-27 NOTE — TELEPHONE ENCOUNTER
Okay, if Omeprazole is to be stopped would there be another option to consider to help with GERD symptoms?

## 2022-05-27 NOTE — TELEPHONE ENCOUNTER
I will call the patient. He had confusion regarding the methotrexate and omeprazole yesterday. He thought I was telling him to stop methotrexate, which I did not. I had to re-explain a few times and he understood to stop Omeprazole and continue Methotrexate. Thanks.

## 2022-05-27 NOTE — CARE COORDINATION
ACM explained in detail to stop the Omeprazole as directed by Dr. Latoya Andujar and to start taking Pepcid/ Famotidine 20mg PO Daily for acid reflux. ACM directed patient to continue taking Methotrexate. Patient was able to provide teach back and voiced understanding.

## 2022-06-20 ENCOUNTER — TELEPHONE (OUTPATIENT)
Dept: FAMILY MEDICINE CLINIC | Age: 72
End: 2022-06-20

## 2022-06-20 NOTE — TELEPHONE ENCOUNTER
Pt called in with back pain for the last 14 days. Pt has always had back pain but gotten worst the last 14 days. Please advise if I can use your PF for tomorrow at 1130.

## 2022-06-20 NOTE — PROGRESS NOTES
[address]   Cardiac Follow UP    Referring Provider:  Raleigh Llanos MD     Chief Complaint   Patient presents with    Follow-up     6 month office visit    Coronary Artery Disease    Congestive Heart Failure      Subjective: Mr Demi Tucker is being seen today for cardiology follow up; c/o some fatigue today    History of Present Illness:    Adrianne Jacinto is a 67 y.o. male who has PMH severe ICM EF=15-20%, hx VT, s/p ICD and shock, CAD s/p RCA stents prior. He has a Guidant ICD for h/o VT. Cardiac cath 6/2014 --> stable known ischemic heart disease. He also has hx MI, HLD, and chronic systolic CHF. Note ECHO 6/29/20 EF=15-20% (EF=20-25% in 12/18, 15-20% in 7/16, and 25% in 2014 ); moderate LV dilation; grade II DD elevated filling pressure; mod MR/mild TR. On 6/28/20 he presented to ER for ICD shock. He has rapid VT which deteriorated into Vfib. Device appropriately treated. Most recent lexiscan stress test 6/28/20 demonstrated large scar anteroseptal/apical and inferior walls; LVEF 24%, no ischemia (no change from prior studies 7/16, 11/15, 11/13). He upgraded to BiV-ICD on 12/17/2020 with Dr. Andre Chopra who cut his metoprolol to 25 mg daily due to orthostasis symptoms. Taken off lisinopril 11/21 by PCP due to elevated K+ and BUN/Cr. He underwent urolith in January 2021 with Dr. Dominic Dejesus. Followed up with Sejal Zapata NP 1/21/22 and lisinopril 5mg was restarted. Presented to ED 3/14/22 with chest pain which was diagnoses as musculoskeletal and lisinopril was stopped due to low BP. Most recent EKG 3/14/22 NSR; frequent PVC's; First degree AV block; LV; NST and T wave abnormality. Most recent Device Check 3/28/22 Transmission shows normal sensing and pacing function. AP 4% RVP 2% %. No arrhythmias recorded. PMT noted. Today, he reports he feels OK except for the fatigue. He is still able to get around and do the things. He lost his sister in 12/21 due to cancer at 76 yrs old. Patient denies current edema, chest pain, sob, palpitations, dizziness or syncope. Patient is taking all cardiac medications as prescribed and tolerates them well. Weight today is 187#  (down 5# from 12/21)    Patient is vaccinated against Covid. Jameel Garcia 2/2    Past Medical History:   has a past medical history of AICD (automatic cardioverter/defibrillator) present, Arthritis, CAD (coronary artery disease), CHF (congestive heart failure) ([address] Chronic systolic CHF (congestive heart failure), NYHA class 3 ([address] GERD (gastroesophageal reflux disease), Hearing loss, Hyperlipidemia, MI (myocardial infarction) ([address] Rash, and Type 2 diabetes mellitus with chronic kidney disease. Surgical History:   has a past surgical history that includes Coronary angioplasty with stent; Cardiac defibrillator placement (Left, 12/17/2020); knee surgery; Colonoscopy; Cataract removal with implant (Right, 02/01/2017); Cataract removal with implant (Left, 03/01/2017); eye surgery; and Cystoscopy (N/A, 1/5/2022). Social History:   reports that he has been smoking cigarettes. He started smoking about 60 years ago. He has a 40.50 pack-year smoking history. He has never used smokeless tobacco. He reports that he does not drink alcohol and does not use drugs. Family History:  family history includes Cancer in his father; Diabetes in his mother. Home Medications:  Prior to Admission medications    Medication Sig Start Date End Date Taking?  Authorizing Provider   omeprazole (PRILOSEC) 20 MG delayed release capsule Take 1 capsule by mouth every morning (before breakfast) 5/20/22  Yes Dunia Ramirez MD   amitriptyline (ELAVIL) 25 MG tablet TAKE 1 TABLET EVERY NIGHT 5/2/22  Yes Esha Pop, APRN - CNP   tiZANidine (ZANAFLEX) 2 MG tablet Take 1 tablet by mouth 3 times daily as needed (muscle tension) 5/2/22  Yes Esha Jackson, APRN - CNP   gemfibrozil (LOPID) 600 MG tablet Take 1 tablet by mouth 2 times daily (before meals) 4/27/22  Yes Ovidio Mcdowell MD   clopidogrel (PLAVIX) 75 MG tablet TAKE 1 TABLET EVERY DAY 4/27/22  Yes Ovidio Mcdowell MD   metoprolol succinate (TOPROL XL) 50 MG extended release tablet Take 0.5 tablets by mouth daily 4/27/22  Yes Ovidio Mcdowell MD   rosuvastatin (CRESTOR) 40 MG tablet TAKE 1 TABLET EVERY EVENING 4/27/22  Yes Ovidio Mcdowell MD   spironolactone (ALDACTONE) 25 MG tablet TAKE 1/2 TABLET EVERY DAY 4/27/22  Yes Ovidio Mcdowell MD   Blood Glucose Monitoring Suppl (TRUE METRIX METER) w/Device KIT  11/10/21  Yes Historical Provider, MD   Alcohol Swabstick 70 % PADS Use prior to checking glucose daily. E11.9 11/9/21  Yes Eloisa Brown MD   blood glucose test strips (TRUE METRIX BLOOD GLUCOSE TEST) strip Check glucose daily. DM 2 E 11.9 11/9/21  Yes Eloisa Brown MD   Blood Glucose Monitoring Suppl (TRUE METRIX METER) YELENA Check glucose daily. E11.9 11/9/21  Yes Eloisa Brown MD   Respiratory Therapy Supplies (NEBULIZER/TUBING/MOUTHPIECE) KIT 1 kit by Does not apply route 3 times daily 8/10/21  Yes Eloisa Brown MD   ondansetron (ZOFRAN-ODT) 4 MG disintegrating tablet DISSOLVE 1 TABLET ON THE TONGUE EVERY 8 HOURS AS NEEDED FOR NAUSEA  OR  VOMITING 5/14/21  Yes WILBERTO Garber   nitroGLYCERIN (NITROSTAT) 0.4 MG SL tablet Place 1 tablet under the tongue every 5 minutes as needed for Chest pain 2/25/21  Yes Juvencio Brock MD   Handicap Placard MISC by Does not apply route Exp: 1/1/2024 1/15/21  Yes WILBERTO Garber   ipratropium-albuterol (DUONEB) 0.5-2.5 (3) MG/3ML SOLN nebulizer solution Inhale 3 mLs into the lungs every 6 hours as needed for Shortness of Breath 5/10/19  Yes Eloisa Brown MD   finasteride (PROSCAR) 5 MG tablet Take 5 mg by mouth daily Indications: Rx by Dr. Charisse Chirinos MD   methotrexate (RHEUMATREX) 2.5 MG chemo tablet Take 1 tablet by mouth once a week RX directions are 4 tablets weekly.   Patient takes one tablet Monday/Wednesday/Friday. Patient taking differently: Take 2.5 mg by mouth once a week Takes 2 tablets on friday. 3/5/19  Yes Torrie Guerrero MD   folic acid (FOLVITE) 1 MG tablet Take 1 mg by mouth daily   Yes Historical Provider, MD   tamsulosin (FLOMAX) 0.4 MG capsule Take 0.4 mg by mouth daily   Yes Emilie Ewing MD   aspirin 81 MG tablet Take 81 mg by mouth daily. Yes Historical Provider, MD        Allergies:  Patient has no known allergies. Review of Systems:   · Constitutional: there has been no unanticipated weight loss. There's been no change in energy level, sleep pattern, or activity level. · Eyes: No visual changes or diplopia. No scleral icterus. · ENT: No Headaches, hearing loss or vertigo. No mouth sores or sore throat. · Cardiovascular: Reviewed in HPI  · Respiratory: No cough or wheezing, no sputum production. No hematemesis. · Gastrointestinal: No abdominal pain, appetite loss, blood in stools. No change in bowel or bladder habits. · Genitourinary: No dysuria, trouble voiding, or hematuria. · Musculoskeletal:  No gait disturbance, weakness or joint complaints. · Integumentary: No rash or pruritis. · Neurological: No headache, diplopia, change in muscle strength, numbness or tingling. No change in gait, balance, coordination, mood, affect, memory, mentation, behavior. · Psychiatric: No anxiety, no depression. · Endocrine: No malaise, fatigue or temperature intolerance. No excessive thirst, fluid intake, or urination. No tremor. · Hematologic/Lymphatic: No abnormal bruising or bleeding, blood clots or swollen lymph nodes. · Allergic/Immunologic: No nasal congestion or hives.     Physical Examination:    Vitals:    06/21/22 1333   BP: 100/60   Pulse: 76   Temp: 98.4 °F (36.9 °C)   SpO2: 98%       Constitutional and General Appearance: NAD   Respiratory:  · Normal excursion and expansion without use of accessory muscles  · Resp Auscultation: diffuse soft clear breath sounds  Cardiovascular:  · The apical impulses not displaced  · Heart tones are crisp and normal  · Cervical veins are not engorged  · The carotid upstroke is normal in amplitude and contour without delay or bruit  · Distant soft S1S2, No S3, ? soft ELVIA; regular with ectopy  · Peripheral pulses are symmetrical and full  · There is no clubbing, cyanosis of the extremities.   · No edema  · Femoral Arteries: 2+ and equal  · Pedal Pulses: 2+ and equal   Abdomen:  · No masses or tenderness  · Liver/Spleen: No Abnormalities Noted  Neurological/Psychiatric:  · Alert and oriented in all spheres  · Moves all extremities well  · Exhibits normal gait balance and coordination  · No abnormalities of mood, affect, memory, mentation, or behavior are noted    Lab Results   Component Value Date     (L) 03/14/2022    K 4.9 03/14/2022     03/14/2022    CO2 22 03/14/2022    BUN 25 (H) 03/14/2022    CREATININE 1.4 (H) 03/14/2022    GLUCOSE 110 (H) 03/14/2022    CALCIUM 10.0 03/14/2022    PROT 7.7 03/14/2022    LABALBU 5.0 03/14/2022    BILITOT 1.4 (H) 03/14/2022    ALKPHOS 121 03/14/2022    AST 19 03/14/2022    ALT 15 03/14/2022    LABGLOM 50 (A) 03/14/2022    GFRAA >60 03/14/2022    AGRATIO 1.9 03/14/2022    GLOB 2.3 05/07/2021     Lab Results   Component Value Date    WBC 10.4 03/14/2022    HGB 12.7 (L) 03/14/2022    HCT 36.9 (L) 03/14/2022    .6 (H) 03/14/2022     03/14/2022     ·   Lab Results   Component Value Date    CHOL 133 08/11/2021    CHOL 142 12/17/2020    CHOL 146 07/10/2020     Lab Results   Component Value Date    TRIG 148 08/11/2021    TRIG 132 12/17/2020    TRIG 96 07/10/2020     Lab Results   Component Value Date    HDL 37 (L) 08/11/2021    HDL 45 12/17/2020    HDL 43 07/10/2020     Lab Results   Component Value Date    LDLCALC 66 08/11/2021    LDLCALC 71 12/17/2020    LDLCALC 84 07/10/2020     Lab Results   Component Value Date    LABVLDL 30 08/11/2021    LABVLDL 26 12/17/2020 LABVLDL 19 07/10/2020     Lab Results   Component Value Date     03/14/2022    K 4.9 03/14/2022     03/14/2022    CO2 22 03/14/2022    BUN 25 03/14/2022    CREATININE 1.4 03/14/2022    GLUCOSE 110 03/14/2022    CALCIUM 10.0 03/14/2022      Lab Results   Component Value Date    WBC 10.4 03/14/2022    HGB 12.7 (L) 03/14/2022    HCT 36.9 (L) 03/14/2022    .6 (H) 03/14/2022     03/14/2022       I have personally reviewed all labs including lipids 8/11/21    Assessment:   1. Ischemic cardiomyopathy: Note ECHO 6/29/20 EF=15-20%. Clinically compensated NYHA Class II and will continue current CHF medical regimen. I will try to add back low dose lisinopril 2.5mg qhs. If SBP > 90mmHg and renal fcn and K+ stable I want him on GDMT with ACE-I and BB. 2. Hyperlipidemia:  Most recent lipids 11/15/21 see results I personally reviewed (see above). Well controlled and will continue current medical regimen. 3. CAD:   s/p RCA stents prior. Cardiac cath 6/2014 --> stable known ischemic heart disease. Most recent lexiscan stress test 6/28/20 demonstrated large scar anteroseptal/apical and inferior walls; LVEF 24%, no ischemia. There are no concerning symptoms for angina currently. 4. Orthostatic hypotension: Improved. Mild symptoms prior resolved. I do not want to take him off current CHF medical regimen and he agrees. Plan:  1. Start taking lisinopril 2.5 mg at night before you go to bed   -do not let another doctor stop your lisinopril. You can have them call my office to discuss it. 2. I would like for you to take home your after visit medication list from today's visit and compare it with your home medications. Please call my office if any of your medications are different so I can update your list in the computer.   -Then we can print and mail you a new medication list if needed  3. Current medications reviewed. Refills given as warranted. 4. No cardiac testing at this time.   5.

## 2022-06-21 ENCOUNTER — OFFICE VISIT (OUTPATIENT)
Dept: CARDIOLOGY CLINIC | Age: 72
End: 2022-06-21
Payer: MEDICARE

## 2022-06-21 VITALS
OXYGEN SATURATION: 98 % | DIASTOLIC BLOOD PRESSURE: 60 MMHG | HEIGHT: 71 IN | SYSTOLIC BLOOD PRESSURE: 100 MMHG | BODY MASS INDEX: 26.29 KG/M2 | WEIGHT: 187.8 LBS | HEART RATE: 76 BPM | TEMPERATURE: 98.4 F

## 2022-06-21 DIAGNOSIS — R53.83 OTHER FATIGUE: ICD-10-CM

## 2022-06-21 DIAGNOSIS — Z72.0 TOBACCO ABUSE: ICD-10-CM

## 2022-06-21 DIAGNOSIS — I50.22 CHRONIC SYSTOLIC CONGESTIVE HEART FAILURE (HCC): ICD-10-CM

## 2022-06-21 DIAGNOSIS — I25.10 CORONARY ARTERY DISEASE INVOLVING NATIVE CORONARY ARTERY OF NATIVE HEART WITHOUT ANGINA PECTORIS: Primary | ICD-10-CM

## 2022-06-21 DIAGNOSIS — I49.3 PVC (PREMATURE VENTRICULAR CONTRACTION): ICD-10-CM

## 2022-06-21 DIAGNOSIS — I25.5 ISCHEMIC CARDIOMYOPATHY: ICD-10-CM

## 2022-06-21 DIAGNOSIS — Z79.899 MEDICATION MANAGEMENT: ICD-10-CM

## 2022-06-21 DIAGNOSIS — I95.9 HYPOTENSION, UNSPECIFIED HYPOTENSION TYPE: ICD-10-CM

## 2022-06-21 PROCEDURE — 93000 ELECTROCARDIOGRAM COMPLETE: CPT | Performed by: INTERNAL MEDICINE

## 2022-06-21 PROCEDURE — 1123F ACP DISCUSS/DSCN MKR DOCD: CPT | Performed by: INTERNAL MEDICINE

## 2022-06-21 PROCEDURE — 99214 OFFICE O/P EST MOD 30 MIN: CPT | Performed by: INTERNAL MEDICINE

## 2022-06-21 RX ORDER — LISINOPRIL 2.5 MG/1
2.5 TABLET ORAL DAILY
Qty: 30 TABLET | Refills: 6 | Status: SHIPPED | OUTPATIENT
Start: 2022-06-21

## 2022-06-21 NOTE — PATIENT INSTRUCTIONS
Plan:  1. Start taking lisinopril 2.5 mg at night before you go to bed              -do not let another doctor stop your lisinopril. You can have them call my office to discuss it. 2. I would like for you to take home your after visit medication list from today's visit and compare it with your home medications. Please call my office if any of your medications are different so I can update your list in the computer.              -Then we can print and mail you a new medication list if needed  3. Current medications reviewed. Refills given as warranted. 4. No cardiac testing at this time. 5. Avoid heavy exertion in extreme hot and cold temperatures  6.  Repeat blood work in 7-10 days to check your kidney function    Follow up with Ami Elise 3 months  Follow up with me in 6 months

## 2022-06-27 ENCOUNTER — NURSE ONLY (OUTPATIENT)
Dept: CARDIOLOGY CLINIC | Age: 72
End: 2022-06-27
Payer: MEDICARE

## 2022-06-27 DIAGNOSIS — Z95.810 AICD (AUTOMATIC CARDIOVERTER/DEFIBRILLATOR) PRESENT: ICD-10-CM

## 2022-06-27 DIAGNOSIS — I25.5 ISCHEMIC CARDIOMYOPATHY: Primary | ICD-10-CM

## 2022-06-27 DIAGNOSIS — I50.22 CHRONIC SYSTOLIC CONGESTIVE HEART FAILURE (HCC): ICD-10-CM

## 2022-06-27 DIAGNOSIS — I42.9 SECONDARY CARDIOMYOPATHY (HCC): ICD-10-CM

## 2022-06-27 PROCEDURE — 93295 DEV INTERROG REMOTE 1/2/MLT: CPT | Performed by: INTERNAL MEDICINE

## 2022-06-27 PROCEDURE — 93296 REM INTERROG EVL PM/IDS: CPT | Performed by: INTERNAL MEDICINE

## 2022-06-27 PROCEDURE — 93297 REM INTERROG DEV EVAL ICPMS: CPT | Performed by: INTERNAL MEDICINE

## 2022-06-27 NOTE — PROGRESS NOTES
Remote transmission received for patients CRT-D. Transmission shows normal sensing and pacing function. EP physician will review. See interrogation under the cardiology tab in the [address] [place] [address] [address] field for more details. Will continue to monitor remotely. LVP . -Ventricular Pacing Chamber LV Only. End of 91-day monitoring period 6/27/22. No  arrhythmias recorded. PMT recorded. AP 3%  RVP 3%  %. Heart logic 0 and TI 45.

## 2022-07-06 DIAGNOSIS — Z79.899 MEDICATION MANAGEMENT: ICD-10-CM

## 2022-07-07 LAB
ANION GAP SERPL CALCULATED.3IONS-SCNC: 16 MMOL/L (ref 3–16)
BUN BLDV-MCNC: 18 MG/DL (ref 7–20)
CALCIUM SERPL-MCNC: 9.5 MG/DL (ref 8.3–10.6)
CHLORIDE BLD-SCNC: 102 MMOL/L (ref 99–110)
CO2: 18 MMOL/L (ref 21–32)
CREAT SERPL-MCNC: 1.4 MG/DL (ref 0.8–1.3)
GFR AFRICAN AMERICAN: >60
GFR NON-AFRICAN AMERICAN: 50
GLUCOSE BLD-MCNC: 177 MG/DL (ref 70–99)
POTASSIUM SERPL-SCNC: 4.1 MMOL/L (ref 3.5–5.1)
SODIUM BLD-SCNC: 136 MMOL/L (ref 136–145)

## 2022-07-18 ENCOUNTER — TELEMEDICINE (OUTPATIENT)
Dept: FAMILY MEDICINE CLINIC | Age: 72
End: 2022-07-18
Payer: MEDICARE

## 2022-07-18 DIAGNOSIS — Z00.00 MEDICARE ANNUAL WELLNESS VISIT, SUBSEQUENT: Primary | ICD-10-CM

## 2022-07-18 PROCEDURE — G0439 PPPS, SUBSEQ VISIT: HCPCS | Performed by: INTERNAL MEDICINE

## 2022-07-18 PROCEDURE — 1123F ACP DISCUSS/DSCN MKR DOCD: CPT | Performed by: INTERNAL MEDICINE

## 2022-07-18 ASSESSMENT — LIFESTYLE VARIABLES
HOW OFTEN DO YOU HAVE A DRINK CONTAINING ALCOHOL: NEVER
HOW MANY STANDARD DRINKS CONTAINING ALCOHOL DO YOU HAVE ON A TYPICAL DAY: PATIENT DOES NOT DRINK

## 2022-07-18 ASSESSMENT — PATIENT HEALTH QUESTIONNAIRE - PHQ9
5. POOR APPETITE OR OVEREATING: 0
SUM OF ALL RESPONSES TO PHQ QUESTIONS 1-9: 1
SUM OF ALL RESPONSES TO PHQ9 QUESTIONS 1 & 2: 0
10. IF YOU CHECKED OFF ANY PROBLEMS, HOW DIFFICULT HAVE THESE PROBLEMS MADE IT FOR YOU TO DO YOUR WORK, TAKE CARE OF THINGS AT HOME, OR GET ALONG WITH OTHER PEOPLE: 0
7. TROUBLE CONCENTRATING ON THINGS, SUCH AS READING THE NEWSPAPER OR WATCHING TELEVISION: 0
SUM OF ALL RESPONSES TO PHQ QUESTIONS 1-9: 1
SUM OF ALL RESPONSES TO PHQ QUESTIONS 1-9: 1
1. LITTLE INTEREST OR PLEASURE IN DOING THINGS: 0
3. TROUBLE FALLING OR STAYING ASLEEP: 0
SUM OF ALL RESPONSES TO PHQ QUESTIONS 1-9: 1
8. MOVING OR SPEAKING SO SLOWLY THAT OTHER PEOPLE COULD HAVE NOTICED. OR THE OPPOSITE, BEING SO FIGETY OR RESTLESS THAT YOU HAVE BEEN MOVING AROUND A LOT MORE THAN USUAL: 0
2. FEELING DOWN, DEPRESSED OR HOPELESS: 0
4. FEELING TIRED OR HAVING LITTLE ENERGY: 1
9. THOUGHTS THAT YOU WOULD BE BETTER OFF DEAD, OR OF HURTING YOURSELF: 0
6. FEELING BAD ABOUT YOURSELF - OR THAT YOU ARE A FAILURE OR HAVE LET YOURSELF OR YOUR FAMILY DOWN: 0

## 2022-07-18 NOTE — PROGRESS NOTES
Medicare Annual Wellness Visit    Saida Stroud is here for No chief complaint on file. Assessment & Plan   Medicare annual wellness visit, subsequent    Recommendations for Preventive Services Due: see orders and patient instructions/AVS.  Recommended screening schedule for the next 5-10 years is provided to the patient in written form: see Patient Instructions/AVS.     No follow-ups on file. Subjective       Patient's complete Health Risk Assessment and screening values have been reviewed and are found in Flowsheets. The following problems were reviewed today and where indicated follow up appointments were made and/or referrals ordered.     Positive Risk Factor Screenings with Interventions:       Tobacco Use:  Tobacco Use: High Risk    Smoking Tobacco Use: Every Day    Smokeless Tobacco Use: Never     E-cigarette/Vaping       Questions Responses    E-cigarette/Vaping Use Never User    Start Date     Passive Exposure     Quit Date     Counseling Given     Comments Unknown          Substance Use - Tobacco Interventions:  patient is not ready to work toward tobacco cessation at this time         General Health and ACP:  General  In general, how would you say your health is?: Good  In the past 7 days, have you experienced any of the following: New or Increased Pain, New or Increased Fatigue, Loneliness, Social Isolation, Stress or Anger?: No  Do you get the social and emotional support that you need?: Yes  Do you have a Living Will?: (!) No    Advance Directives       Power of  Living Will ACP-Advance Directive ACP-Power of     Not on File Not on File Not on File Not on File          General Health Risk Interventions:  No Living Will: Patient declines ACP discussion/assistance     Hearing/Vision:  Do you or your family notice any trouble with your hearing that hasn't been managed with hearing aids?: (!) Yes  Do you have difficulty driving, watching TV, or doing any of your daily activities because of your eyesight?: No  Have you had an eye exam within the past year?: (!) No  No results found. Hearing/Vision Interventions:  Hearing concerns:  patient declines any further evaluation/treatment for hearing issues            Objective      Patient-Reported Vitals  Patient-Reported Systolic (Top): 191 mmHg  Patient-Reported Diastolic (Bottom): 69 mmHg  Patient-Reported Pulse: 73  Patient-Reported Weight: 187lb  Patient-Reported Height: 5' 10.5\"            No Known Allergies  Prior to Visit Medications    Medication Sig Taking? Authorizing Provider   lisinopril (PRINIVIL;ZESTRIL) 2.5 MG tablet Take 1 tablet by mouth daily  Doreen Black MD   omeprazole (PRILOSEC) 20 MG delayed release capsule Take 1 capsule by mouth every morning (before breakfast)  Nikki Ayers MD   amitriptyline (ELAVIL) 25 MG tablet TAKE 1 TABLET EVERY NIGHT  Lillian DownSUSHMA del real CNP   tiZANidine (ZANAFLEX) 2 MG tablet Take 1 tablet by mouth 3 times daily as needed (muscle tension)  Lillian Downy, APRN - CNP   gemfibrozil (LOPID) 600 MG tablet Take 1 tablet by mouth 2 times daily (before meals)  Doreen Black MD   clopidogrel (PLAVIX) 75 MG tablet TAKE 1 TABLET EVERY DAY  Doreen Black MD   metoprolol succinate (TOPROL XL) 50 MG extended release tablet Take 0.5 tablets by mouth daily  Doreen Black MD   rosuvastatin (CRESTOR) 40 MG tablet TAKE 1 TABLET EVERY EVENING  Doreen Black MD   spironolactone (ALDACTONE) 25 MG tablet TAKE 1/2 TABLET EVERY Mary MD García   Blood Glucose Monitoring Suppl (TRUE METRIX METER) w/Device KIT   Historical Provider, MD   Alcohol Swabstick 70 % PADS Use prior to checking glucose daily. E11.9  Nikki Ayers MD   blood glucose test strips (TRUE METRIX BLOOD GLUCOSE TEST) strip Check glucose daily. DM 2 E 11.9  Nikki Ayers MD   Blood Glucose Monitoring Suppl (TRUE METRIX METER) YELENA Check glucose daily.  E11.9  Nikki Ayers MD   Respiratory Therapy Supplies (NEBULIZER/TUBING/MOUTHPIECE) KIT 1 kit by Does not apply route 3 times daily  Anjali Sanz MD   ondansetron (ZOFRAN-ODT) 4 MG disintegrating tablet DISSOLVE 1 TABLET ON THE TONGUE EVERY 8 HOURS AS NEEDED FOR NAUSEA  OR  VOMITING  WILBERTO Diaz   nitroGLYCERIN (NITROSTAT) 0.4 MG SL tablet Place 1 tablet under the tongue every 5 minutes as needed for Chest pain  Parker Monson MD   Handicap Placard MISC by Does not apply route Exp: 1/1/2024  WILBERTO Diaz   ipratropium-albuterol (DUONEB) 0.5-2.5 (3) MG/3ML SOLN nebulizer solution Inhale 3 mLs into the lungs every 6 hours as needed for Shortness of Breath  Anjali Sanz MD   finasteride (PROSCAR) 5 MG tablet Take 5 mg by mouth daily Indications: Rx by Dr. Deyvi Ren MD   methotrexate (RHEUMATREX) 2.5 MG chemo tablet Take 1 tablet by mouth once a week RX directions are 4 tablets weekly. Patient takes one tablet Monday/Wednesday/Friday. Patient taking differently: Take 2.5 mg by mouth once a week Takes 2 tablets on friday. Kaden Campbell MD   folic acid (FOLVITE) 1 MG tablet Take 1 mg by mouth daily  Historical Provider, MD   tamsulosin (FLOMAX) 0.4 MG capsule Take 0.4 mg by mouth daily  Glory Mathews MD   aspirin 81 MG tablet Take 81 mg by mouth daily. Historical Provider, MD Scott (Including outside providers/suppliers regularly involved in providing care):   Patient Care Team:  Briana Knott MD as PCP - General (Internal Medicine)  Briana Knott MD as [facility]  Mei Rodriguez MD as Consulting Physician (Cardiology)     Reviewed and updated this visit:  Jose Albright, was evaluated through a synchronous (real-time) audio-video encounter. The patient (or guardian if applicable) is aware that this is a billable service, which includes applicable co-pays. This Virtual Visit was conducted with patient's (and/or legal guardian's) consent.  The visit was conducted pursuant to the emergency declaration under the [facility], [facility] authority and the [employer] General Act. Patient identification was verified, and a caregiver was present when appropriate. The patient was located at Home: [address] [place] [address],[address] [id number]. Provider was located at [place] [address] (Appt Dept): [address] [address],[address] [facility]  [address] [facility] [address]. Alan Valero LPN, 8/19/[birth year]250, performed the documented evaluation under the direct supervision of the attending physician. This encounter was performed under my, Phong Naranjo, direct supervision, 7/18/2022.

## 2022-07-18 NOTE — PATIENT INSTRUCTIONS
Personalized Preventive Plan for Gume Meraz - 7/18/2022  Medicare offers a range of preventive health benefits. Some of the tests and screenings are paid in full while other may be subject to a deductible, co-insurance, and/or copay. Some of these benefits include a comprehensive review of your medical history including lifestyle, illnesses that may run in your family, and various assessments and screenings as appropriate. After reviewing your medical record and screening and assessments performed today your provider may have ordered immunizations, labs, imaging, and/or referrals for you. A list of these orders (if applicable) as well as your Preventive Care list are included within your After Visit Summary for your review. Other Preventive Recommendations:    A preventive eye exam performed by an eye specialist is recommended every 1-2 years to screen for glaucoma; cataracts, macular degeneration, and other eye disorders. A preventive dental visit is recommended every 6 months. Try to get at least 150 minutes of exercise per week or 10,000 steps per day on a pedometer . Order or download the FREE \"Exercise & Physical Activity: Your Everyday Guide\" from [employer]. Call 1-364.609.1296 or search [employer] online. You need 1284-6446 mg of calcium and 2468-6579 IU of vitamin D per day. It is possible to meet your calcium requirement with diet alone, but a vitamin D supplement is usually necessary to meet this goal.  When exposed to the sun, use a sunscreen that protects against both UVA and UVB radiation with an SPF of 30 or greater. Reapply every 2 to 3 hours or after sweating, drying off with a towel, or swimming. Always wear a seat belt when traveling in a car. Always wear a helmet when riding a bicycle or motorcycle.

## 2022-09-29 ENCOUNTER — NURSE ONLY (OUTPATIENT)
Dept: CARDIOLOGY CLINIC | Age: 72
End: 2022-09-29
Payer: MEDICARE

## 2022-09-29 DIAGNOSIS — Z95.810 AICD (AUTOMATIC CARDIOVERTER/DEFIBRILLATOR) PRESENT: ICD-10-CM

## 2022-09-29 DIAGNOSIS — I42.9 SECONDARY CARDIOMYOPATHY (HCC): Primary | ICD-10-CM

## 2022-09-29 DIAGNOSIS — I47.29 PAROXYSMAL VENTRICULAR TACHYCARDIA: ICD-10-CM

## 2022-09-29 DIAGNOSIS — I50.22 CHRONIC SYSTOLIC CONGESTIVE HEART FAILURE (HCC): ICD-10-CM

## 2022-09-29 DIAGNOSIS — I25.5 ISCHEMIC CARDIOMYOPATHY: ICD-10-CM

## 2022-09-29 PROCEDURE — 93295 DEV INTERROG REMOTE 1/2/MLT: CPT | Performed by: INTERNAL MEDICINE

## 2022-09-29 PROCEDURE — 93296 REM INTERROG EVL PM/IDS: CPT | Performed by: INTERNAL MEDICINE

## 2022-09-29 PROCEDURE — 93297 REM INTERROG DEV EVAL ICPMS: CPT | Performed by: NURSE PRACTITIONER

## 2022-09-30 ENCOUNTER — OFFICE VISIT (OUTPATIENT)
Dept: CARDIOLOGY CLINIC | Age: 72
End: 2022-09-30
Payer: MEDICARE

## 2022-09-30 VITALS
HEART RATE: 81 BPM | HEIGHT: 71 IN | SYSTOLIC BLOOD PRESSURE: 108 MMHG | DIASTOLIC BLOOD PRESSURE: 60 MMHG | BODY MASS INDEX: 24.92 KG/M2 | WEIGHT: 178 LBS | OXYGEN SATURATION: 90 %

## 2022-09-30 DIAGNOSIS — I25.5 ISCHEMIC CARDIOMYOPATHY: Primary | ICD-10-CM

## 2022-09-30 DIAGNOSIS — I25.10 CORONARY ARTERY DISEASE INVOLVING NATIVE CORONARY ARTERY OF NATIVE HEART WITHOUT ANGINA PECTORIS: ICD-10-CM

## 2022-09-30 DIAGNOSIS — Z95.810 AICD (AUTOMATIC CARDIOVERTER/DEFIBRILLATOR) PRESENT: ICD-10-CM

## 2022-09-30 DIAGNOSIS — I50.22 CHRONIC SYSTOLIC CONGESTIVE HEART FAILURE (HCC): ICD-10-CM

## 2022-09-30 PROCEDURE — 99213 OFFICE O/P EST LOW 20 MIN: CPT | Performed by: NURSE PRACTITIONER

## 2022-09-30 PROCEDURE — 1123F ACP DISCUSS/DSCN MKR DOCD: CPT | Performed by: NURSE PRACTITIONER

## 2022-09-30 NOTE — PATIENT INSTRUCTIONS
Plan:   Continue current meds  Continue to stay active   Follow up with Dr. Jocelyn Connolly as planned

## 2022-09-30 NOTE — PROGRESS NOTES
[facility]   Cardiac Follow-up    Primary Care Doctor:  Papito Briscoe MD    Chief Complaint   Patient presents with    3 Month Follow-Up    Chest Pain    Coronary Artery Disease       History of Present Illness:   I had the pleasure of seeing Stephen Akbar in follow up for CHF. History of severe ischemic cardiomyopathy, VT, s/p ICD, CAD s/p RCA stents, MI, HLD. Cardiac story:  Cardiac cath June 2014 stable known ischemic heart disease  Echocardiogram 2014 EF 25%  Echocardiogram July 2016 EF 15 to 20%  Echocardiogram December 2018 EF 20 to 25%  Echocardiogram June 2020 EF 15 to 20%  ICD shock June 2020 for rapid VT deteriorated to V. Fib. Stress test June 2020 showed scar, no ischemia. BiV upgrade December 2020  Taken off of lisinopril in November 2021-due to elevated potassium and worse renal function    Since last visit, seen by Dr. Alina Skinner and restarted on lisniopril 2.5mg nightly; repeat renal panel showed normal potassium and stable kidney function. He is doing well. Not much lightheadedness or dizziness, except with he changes positions very quickly. No bleeding. No abd complaints. No shortness of breath. No issues with UOP. Taking medications as prescribed without side effects. Getting around well; occasionally will ride his motorcycle. Continues to smoke     Stephen Akbar denies chest pain, dyspnea, palpitations, orthopnea, edema, syncope. Home weights: not checking   Appetite: good     Past Medical History:   has a past medical history of AICD (automatic cardioverter/defibrillator) present, Arthritis, CAD (coronary artery disease), CHF (congestive heart failure) ([address] Chronic systolic CHF (congestive heart failure), NYHA class 3 ([address] GERD (gastroesophageal reflux disease), Hearing loss, Hyperlipidemia, MI (myocardial infarction) ([address] Rash, and Type 2 diabetes mellitus with chronic kidney disease.   Surgical History:   has a past surgical history that includes Coronary angioplasty with stent; Cardiac defibrillator placement (Left, 12/17/2020); knee surgery; Colonoscopy; Cataract removal with implant (Right, 02/01/2017); Cataract removal with implant (Left, 03/01/2017); eye surgery; and Cystoscopy (N/A, 1/5/2022). Social History:   reports that he has been smoking cigarettes. He started smoking about 60 years ago. He has a 40.50 pack-year smoking history. He has never used smokeless tobacco. He reports that he does not drink alcohol and does not use drugs. Family History:   Family History   Problem Relation Age of Onset    Diabetes Mother     Cancer Father        Home Medications:  Prior to Admission medications    Medication Sig Start Date End Date Taking? Authorizing Provider   lisinopril (PRINIVIL;ZESTRIL) 2.5 MG tablet Take 1 tablet by mouth daily 6/21/22  Yes Chyna Zhu MD   omeprazole (PRILOSEC) 20 MG delayed release capsule Take 1 capsule by mouth every morning (before breakfast) 5/20/22  Yes Brandon Sharma MD   amitriptyline (ELAVIL) 25 MG tablet TAKE 1 TABLET EVERY NIGHT 5/2/22  Yes SUSHMA Sharma CNP   tiZANidine (ZANAFLEX) 2 MG tablet Take 1 tablet by mouth 3 times daily as needed (muscle tension) 5/2/22  Yes SUSHMA Sharma CNP   gemfibrozil (LOPID) 600 MG tablet Take 1 tablet by mouth 2 times daily (before meals) 4/27/22  Yes Chyna Zhu MD   clopidogrel (PLAVIX) 75 MG tablet TAKE 1 TABLET EVERY DAY 4/27/22  Yes Chyna Zhu MD   metoprolol succinate (TOPROL XL) 50 MG extended release tablet Take 0.5 tablets by mouth daily 4/27/22  Yes Chyna Zhu MD   rosuvastatin (CRESTOR) 40 MG tablet TAKE 1 TABLET EVERY EVENING 4/27/22  Yes Chyna Zhu MD   spironolactone (ALDACTONE) 25 MG tablet TAKE 1/2 TABLET EVERY DAY 4/27/22  Yes Chyna Zhu MD   Blood Glucose Monitoring Suppl (TRUE METRIX METER) w/Device KIT  11/10/21  Yes Historical Provider, MD   Alcohol Swabstick 70 % PADS Use prior to checking glucose daily.  E11.9 updated as below at the time of the visit:     Constitutional and General Appearance: no apparent distress, [place]  HEENT: non-icteric sclera, oropharynx without exudate   Neck: JVP less than  cm H20  Respiratory:  No use of accessory muscles  Clear breath sounds throughout, no wheezing, no crackles, no rhonchi  Cardiovascular:   The apical impulses not displaced   no murmur/rub/gallop  Regular rate and rhythm, S1,S2 normal  Radial pulses 2+ and equal bilaterally  No edema  Pedal Pulses: 2+ and equal   Abdomen:  No masses or tenderness  Liver: No Abnormalities Noted  Musculoskeletal/Skin:  Right hand fingers staying from nicotine  There is no clubbing, cyanosis of the extremities  Skin is warm and dry  Moves all extremities well  Neurological/Psychiatric:  Alert and oriented in all spheres  No abnormalities of mood, affect, memory, mentation, or behavior are noted    Lab Data reviewed and analyzed   CBC:   Lab Results   Component Value Date/Time    WBC 10.4 03/14/2022 01:06 PM    WBC 6.3 01/04/2022 11:30 AM    WBC 5.8 11/08/2021 11:33 AM    RBC 3.67 03/14/2022 01:06 PM    RBC 3.76 01/04/2022 11:30 AM    RBC 3.61 11/08/2021 11:33 AM    HGB 12.7 03/14/2022 01:06 PM    HGB 12.9 01/04/2022 11:30 AM    HGB 12.3 11/08/2021 11:33 AM    HCT 36.9 03/14/2022 01:06 PM    HCT 39.1 01/04/2022 11:30 AM    HCT 36.9 11/08/2021 11:33 AM    .6 03/14/2022 01:06 PM    .9 01/04/2022 11:30 AM    .3 11/08/2021 11:33 AM    RDW 13.9 03/14/2022 01:06 PM    RDW 15.7 01/04/2022 11:30 AM    RDW 15.3 11/08/2021 11:33 AM     03/14/2022 01:06 PM     01/04/2022 11:30 AM     11/08/2021 11:33 AM     Iron: No results found for: IRON, TIBC, FERRITIN  BMP:   Lab Results   Component Value Date/Time     07/06/2022 02:07 PM     03/14/2022 01:06 PM     02/11/2022 01:09 PM    K 4.1 07/06/2022 02:07 PM    K 4.9 03/14/2022 01:06 PM    K 4.4 02/11/2022 01:09 PM    K 4.7 01/31/2022 01:59 PM    K 4.0 12/14/2020 10:15 AM    K 4.3 06/29/2020 08:16 AM     07/06/2022 02:07 PM     03/14/2022 01:06 PM     02/11/2022 01:09 PM    CO2 18 07/06/2022 02:07 PM    CO2 22 03/14/2022 01:06 PM    CO2 19 02/11/2022 01:09 PM    PHOS 2.9 01/04/2022 11:30 AM    PHOS 2.9 11/15/2021 12:36 PM    PHOS 3.4 06/11/2020 12:28 PM    BUN 18 07/06/2022 02:07 PM    BUN 25 03/14/2022 01:06 PM    BUN 27 02/11/2022 01:09 PM    CREATININE 1.4 07/06/2022 02:07 PM    CREATININE 1.4 03/14/2022 01:06 PM    CREATININE 1.4 02/11/2022 01:09 PM     BNP:   Lab Results   Component Value Date/Time    PROBNP 746 03/02/2019 10:44 PM    PROBNP 400 03/13/2015 05:10 PM    PROBNP 557 02/11/2015 08:18 PM     Lipids:   Lab Results   Component Value Date    CHOL 133 08/11/2021        Lab Results   Component Value Date    TRIG 148 08/11/2021        Lab Results   Component Value Date    HDL 37 (L) 08/11/2021        Lab Results   Component Value Date    LDLCALC 66 08/11/2021        Lab Results   Component Value Date    LABVLDL 30 08/11/2021      No results found for: CHOLHDLRATIO    EF:   Lab Results   Component Value Date    LVEF 24 06/29/2020     Echo 6/2020  Summary   The left ventricular systolic function is severely reduced with an ejection   fraction of 15-20 %. Severe global dysfunction with more prominent anterior hypokinesis. Left ventricular cavity size is moderately dilated. Grade II diastolic dysfunction with elevated left ventricular filling   pressure. The left atrium is at the upper limits of normal in size. The right atrium is mildly dilated. Moderate mitral regurgitation. Mild tricuspid regurgitation. Systolic pulmonary artery pressure (SPAP) is normal estimated at 38 mmHg   (Right atrial pressure of 3 mmHg).     NYHA:   I  ACC/ AHA Stage:    C    Assessment:  Ischemic cardiomyopathy  HFrEF LVEF <= 40%  Chronic systolic heart failure  CAD s/p RCA stents  History of VT s/p BiV ICD 2020  HLD  CKD stage 3a Baseline

## 2022-10-14 ENCOUNTER — OFFICE VISIT (OUTPATIENT)
Dept: FAMILY MEDICINE CLINIC | Age: 72
End: 2022-10-14
Payer: MEDICARE

## 2022-10-14 VITALS
HEIGHT: 71 IN | OXYGEN SATURATION: 98 % | HEART RATE: 70 BPM | BODY MASS INDEX: 25.34 KG/M2 | SYSTOLIC BLOOD PRESSURE: 100 MMHG | WEIGHT: 181 LBS | DIASTOLIC BLOOD PRESSURE: 58 MMHG

## 2022-10-14 DIAGNOSIS — Z23 NEED FOR INFLUENZA VACCINATION: ICD-10-CM

## 2022-10-14 DIAGNOSIS — N18.31 STAGE 3A CHRONIC KIDNEY DISEASE (HCC): ICD-10-CM

## 2022-10-14 DIAGNOSIS — E11.22 TYPE 2 DIABETES MELLITUS WITH STAGE 3A CHRONIC KIDNEY DISEASE, UNSPECIFIED WHETHER LONG TERM INSULIN USE (HCC): ICD-10-CM

## 2022-10-14 DIAGNOSIS — N18.31 TYPE 2 DIABETES MELLITUS WITH STAGE 3A CHRONIC KIDNEY DISEASE, UNSPECIFIED WHETHER LONG TERM INSULIN USE (HCC): ICD-10-CM

## 2022-10-14 DIAGNOSIS — M25.50 ARTHRALGIA, UNSPECIFIED JOINT: ICD-10-CM

## 2022-10-14 DIAGNOSIS — M25.50 ARTHRALGIA, UNSPECIFIED JOINT: Primary | ICD-10-CM

## 2022-10-14 PROCEDURE — 1123F ACP DISCUSS/DSCN MKR DOCD: CPT | Performed by: NURSE PRACTITIONER

## 2022-10-14 PROCEDURE — G0008 ADMIN INFLUENZA VIRUS VAC: HCPCS | Performed by: NURSE PRACTITIONER

## 2022-10-14 PROCEDURE — 99214 OFFICE O/P EST MOD 30 MIN: CPT | Performed by: NURSE PRACTITIONER

## 2022-10-14 PROCEDURE — 3044F HG A1C LEVEL LT 7.0%: CPT | Performed by: NURSE PRACTITIONER

## 2022-10-14 PROCEDURE — 90694 VACC AIIV4 NO PRSRV 0.5ML IM: CPT | Performed by: NURSE PRACTITIONER

## 2022-10-14 ASSESSMENT — ANXIETY QUESTIONNAIRES
6. BECOMING EASILY ANNOYED OR IRRITABLE: 0
IF YOU CHECKED OFF ANY PROBLEMS ON THIS QUESTIONNAIRE, HOW DIFFICULT HAVE THESE PROBLEMS MADE IT FOR YOU TO DO YOUR WORK, TAKE CARE OF THINGS AT HOME, OR GET ALONG WITH OTHER PEOPLE: NOT DIFFICULT AT ALL
1. FEELING NERVOUS, ANXIOUS, OR ON EDGE: 0
5. BEING SO RESTLESS THAT IT IS HARD TO SIT STILL: 0
7. FEELING AFRAID AS IF SOMETHING AWFUL MIGHT HAPPEN: 0
4. TROUBLE RELAXING: 1
3. WORRYING TOO MUCH ABOUT DIFFERENT THINGS: 0
GAD7 TOTAL SCORE: 1
2. NOT BEING ABLE TO STOP OR CONTROL WORRYING: 0

## 2022-10-14 ASSESSMENT — PATIENT HEALTH QUESTIONNAIRE - PHQ9
SUM OF ALL RESPONSES TO PHQ QUESTIONS 1-9: 1
8. MOVING OR SPEAKING SO SLOWLY THAT OTHER PEOPLE COULD HAVE NOTICED. OR THE OPPOSITE, BEING SO FIGETY OR RESTLESS THAT YOU HAVE BEEN MOVING AROUND A LOT MORE THAN USUAL: 0
3. TROUBLE FALLING OR STAYING ASLEEP: 0
6. FEELING BAD ABOUT YOURSELF - OR THAT YOU ARE A FAILURE OR HAVE LET YOURSELF OR YOUR FAMILY DOWN: 0
9. THOUGHTS THAT YOU WOULD BE BETTER OFF DEAD, OR OF HURTING YOURSELF: 0
SUM OF ALL RESPONSES TO PHQ9 QUESTIONS 1 & 2: 0
1. LITTLE INTEREST OR PLEASURE IN DOING THINGS: 0
7. TROUBLE CONCENTRATING ON THINGS, SUCH AS READING THE NEWSPAPER OR WATCHING TELEVISION: 0
SUM OF ALL RESPONSES TO PHQ QUESTIONS 1-9: 1
SUM OF ALL RESPONSES TO PHQ QUESTIONS 1-9: 1
4. FEELING TIRED OR HAVING LITTLE ENERGY: 1
10. IF YOU CHECKED OFF ANY PROBLEMS, HOW DIFFICULT HAVE THESE PROBLEMS MADE IT FOR YOU TO DO YOUR WORK, TAKE CARE OF THINGS AT HOME, OR GET ALONG WITH OTHER PEOPLE: 0
SUM OF ALL RESPONSES TO PHQ QUESTIONS 1-9: 1
2. FEELING DOWN, DEPRESSED OR HOPELESS: 0
5. POOR APPETITE OR OVEREATING: 0

## 2022-10-14 NOTE — PROGRESS NOTES
10/14/2022  Rosa Elena Martinez (: 1950)  67 y.o.    ASSESSMENT and PLAN:  Ruperto Brennan was seen today for joint pain. Diagnoses and all orders for this visit:    Arthralgia, unspecified joint  -     Comprehensive Metabolic Panel; Future  -     CK; Future  -     CBC; Future  -     Magnesium; Future  -     Hemoglobin A1C; Future  -     TSH with Reflex; Future  -     C-Reactive Protein; Future  -     Sedimentation Rate; Future  -update labs. -no s/sx of infectious etiology. -recommend f/u with rheum to discuss due to recent cessation of methotrexate.   -daily stretching, heat, rest. Keep symptom log. Monitor closely for s/sx of infection. Stage 3a chronic kidney disease ([facility] [address]  -     Comprehensive Metabolic Panel; Future  -     CK; Future  -     CBC; Future  -     Magnesium; Future  -     Hemoglobin A1C; Future  -     TSH with Reflex; Future  -     C-Reactive Protein; Future  -     Sedimentation Rate; Future  -update labs. -avoid nsaids. -no s/sx of infectious etiology. -recommend f/u with rheum to discuss due to recent cessation of methotrexate.   -daily stretching, heat, rest. Keep symptom log. Monitor closely for s/sx of infection. Type 2 diabetes mellitus with stage 3a chronic kidney disease, unspecified whether long term insulin use (HCC)  -     Hemoglobin A1C; Future  -update A1c    Need for influenza vaccination  -     Influenza, FLUAD, (age 72 y+), IM, Preservative Free, 0.5 mL  -given today. Return today (on 10/14/2022), or if symptoms worsen or fail to improve. HPI  Presenting for increase in generalized joint pain. Onset 3 wks ago. Associate s/sx feeling tired, muscle stiffness/sore. Denies lightheadedness/dizziness,cp, sob, palpitations. Denies erythema, warm, inflamed joints. Denies fevers, chills. Denies known exposure to sick contacts. Denies injury or falls. Improves when going to bed. Denies swelling, sob. Follows with Dr. Corine Sales, was taking methotrexate. Stopped about one month ago. Otherwise no changes. Appetite not as great. Review of Systems   Constitutional:  Positive for appetite change and fatigue. Negative for activity change, chills, fever and unexpected weight change. HENT: Negative. Respiratory:  Negative for cough, chest tightness, shortness of breath and wheezing. Cardiovascular:  Negative for chest pain, palpitations and leg swelling. Gastrointestinal:  Negative for abdominal distention, abdominal pain, constipation, diarrhea, nausea and vomiting. Genitourinary: Negative. Musculoskeletal:  Positive for arthralgias. Skin: Negative. Neurological:  Negative for dizziness, weakness, light-headedness, numbness and headaches. Hematological: Negative. Psychiatric/Behavioral: Negative. Allergies, past medical history, family history, and social history reviewed and unchanged from previous encounter.      Current Outpatient Medications   Medication Sig Dispense Refill    lisinopril (PRINIVIL;ZESTRIL) 2.5 MG tablet Take 1 tablet by mouth daily 30 tablet 6    omeprazole (PRILOSEC) 20 MG delayed release capsule Take 1 capsule by mouth every morning (before breakfast) 30 capsule 1    amitriptyline (ELAVIL) 25 MG tablet TAKE 1 TABLET EVERY NIGHT 90 tablet 1    tiZANidine (ZANAFLEX) 2 MG tablet Take 1 tablet by mouth 3 times daily as needed (muscle tension) 30 tablet 1    gemfibrozil (LOPID) 600 MG tablet Take 1 tablet by mouth 2 times daily (before meals) 180 tablet 5    clopidogrel (PLAVIX) 75 MG tablet TAKE 1 TABLET EVERY DAY 90 tablet 5    metoprolol succinate (TOPROL XL) 50 MG extended release tablet Take 0.5 tablets by mouth daily 90 tablet 5    rosuvastatin (CRESTOR) 40 MG tablet TAKE 1 TABLET EVERY EVENING 90 tablet 5    spironolactone (ALDACTONE) 25 MG tablet TAKE 1/2 TABLET EVERY DAY 45 tablet 5    Respiratory Therapy Supplies (NEBULIZER/TUBING/MOUTHPIECE) KIT 1 kit by Does not apply route 3 times daily 1 kit 0 ondansetron (ZOFRAN-ODT) 4 MG disintegrating tablet DISSOLVE 1 TABLET ON THE TONGUE EVERY 8 HOURS AS NEEDED FOR NAUSEA  OR  VOMITING 30 tablet 1    nitroGLYCERIN (NITROSTAT) 0.4 MG SL tablet Place 1 tablet under the tongue every 5 minutes as needed for Chest pain 25 tablet 1    Handicap Placard MISC by Does not apply route Exp: 1/1/2024 2 each 0    methotrexate (RHEUMATREX) 2.5 MG chemo tablet Take 1 tablet by mouth once a week RX directions are 4 tablets weekly. Patient takes one tablet Monday/Wednesday/Friday. (Patient taking differently: Take 2.5 mg by mouth once a week Takes 2 tablets on friday.) 1 tablet 0    folic acid (FOLVITE) 1 MG tablet Take 1 mg by mouth daily      tamsulosin (FLOMAX) 0.4 MG capsule Take 0.4 mg by mouth daily      aspirin 81 MG tablet Take 81 mg by mouth daily. Blood Glucose Monitoring Suppl (TRUE METRIX METER) w/Device KIT  (Patient not taking: Reported on 10/14/2022)      Alcohol Swabstick 70 % PADS Use prior to checking glucose daily. E11.9 (Patient not taking: Reported on 10/14/2022) 100 each 3    blood glucose test strips (TRUE METRIX BLOOD GLUCOSE TEST) strip Check glucose daily. DM 2 E 11.9 (Patient not taking: Reported on 10/14/2022) 100 each 3    Blood Glucose Monitoring Suppl (TRUE METRIX METER) YELENA Check glucose daily. E11.9 (Patient not taking: Reported on 10/14/2022) 1 each 0    ipratropium-albuterol (DUONEB) 0.5-2.5 (3) MG/3ML SOLN nebulizer solution Inhale 3 mLs into the lungs every 6 hours as needed for Shortness of Breath (Patient not taking: Reported on 10/14/2022) 120 vial 5    finasteride (PROSCAR) 5 MG tablet Take 5 mg by mouth daily Indications: Rx by Dr. Fallon Ball  (Patient not taking: Reported on 10/14/2022)       No current facility-administered medications for this visit.        Vitals:    10/14/22 1428 10/14/22 1446   BP: (!) 96/54 (!) 100/58   Site: Left Upper Arm Right Upper Arm   Position: Sitting Sitting   Cuff Size: Medium Adult Large Adult   Pulse: (!)

## 2022-10-15 LAB
A/G RATIO: 1.6 (ref 1.1–2.2)
ALBUMIN SERPL-MCNC: 4.5 G/DL (ref 3.4–5)
ALP BLD-CCNC: 109 U/L (ref 40–129)
ALT SERPL-CCNC: 6 U/L (ref 10–40)
ANION GAP SERPL CALCULATED.3IONS-SCNC: 16 MMOL/L (ref 3–16)
AST SERPL-CCNC: 15 U/L (ref 15–37)
BILIRUB SERPL-MCNC: 0.3 MG/DL (ref 0–1)
BUN BLDV-MCNC: 23 MG/DL (ref 7–20)
C-REACTIVE PROTEIN: 96.5 MG/L (ref 0–5.1)
CALCIUM SERPL-MCNC: 9.7 MG/DL (ref 8.3–10.6)
CHLORIDE BLD-SCNC: 100 MMOL/L (ref 99–110)
CO2: 21 MMOL/L (ref 21–32)
CREAT SERPL-MCNC: 1.4 MG/DL (ref 0.8–1.3)
ESTIMATED AVERAGE GLUCOSE: 128.4 MG/DL
GFR AFRICAN AMERICAN: 60
GFR NON-AFRICAN AMERICAN: 50
GLUCOSE BLD-MCNC: 101 MG/DL (ref 70–99)
HBA1C MFR BLD: 6.1 %
HCT VFR BLD CALC: 37.3 % (ref 40.5–52.5)
HEMOGLOBIN: 12.3 G/DL (ref 13.5–17.5)
MAGNESIUM: 2.2 MG/DL (ref 1.8–2.4)
MCH RBC QN AUTO: 32.1 PG (ref 26–34)
MCHC RBC AUTO-ENTMCNC: 32.9 G/DL (ref 31–36)
MCV RBC AUTO: 97.7 FL (ref 80–100)
PDW BLD-RTO: 13.1 % (ref 12.4–15.4)
PLATELET # BLD: 302 K/UL (ref 135–450)
PMV BLD AUTO: 7.7 FL (ref 5–10.5)
POTASSIUM SERPL-SCNC: 4.6 MMOL/L (ref 3.5–5.1)
RBC # BLD: 3.82 M/UL (ref 4.2–5.9)
SEDIMENTATION RATE, ERYTHROCYTE: 33 MM/HR (ref 0–20)
SODIUM BLD-SCNC: 137 MMOL/L (ref 136–145)
TOTAL CK: 89 U/L (ref 39–308)
TOTAL PROTEIN: 7.4 G/DL (ref 6.4–8.2)
TSH REFLEX: 1.81 UIU/ML (ref 0.27–4.2)
WBC # BLD: 11.2 K/UL (ref 4–11)

## 2022-10-16 ASSESSMENT — ENCOUNTER SYMPTOMS
ABDOMINAL DISTENTION: 0
CHEST TIGHTNESS: 0
DIARRHEA: 0
COUGH: 0
SHORTNESS OF BREATH: 0
CONSTIPATION: 0
WHEEZING: 0
VOMITING: 0
ABDOMINAL PAIN: 0
NAUSEA: 0

## 2022-10-21 ENCOUNTER — TELEPHONE (OUTPATIENT)
Dept: FAMILY MEDICINE CLINIC | Age: 72
End: 2022-10-21

## 2022-10-21 DIAGNOSIS — R05.1 ACUTE COUGH: Primary | ICD-10-CM

## 2022-10-21 DIAGNOSIS — M25.511 ACUTE PAIN OF RIGHT SHOULDER: ICD-10-CM

## 2022-10-21 NOTE — TELEPHONE ENCOUNTER
[facility] called the patient states it has been a week and he does not know his lab results. ECC said the patient expected results in 48 hours. Please advise on results today.

## 2022-10-21 NOTE — TELEPHONE ENCOUNTER
Pt wife calling for results. States the patient doesn't feel good and they want to know what his labs are.

## 2022-10-21 NOTE — PROGRESS NOTES
Spoke with Pt and pt's wife discussed lab results. Pt's joint pain is generalized and worsening. Has been off methotrexate for one month. C/o intermittent cough, fatigue, weakness. Denies fever, chills, headache, confusion, fall, syncope, sore throat, sob, edema. Hasn't checked bp/hr in awhile, runs low normal at baseline. Denies cp, palpitations. Wife is concerned, says he has gradually been losing weight. Down 6lbs from June. He was eating well earlier today though. Denies n/v/d. Denies urinary symptoms, flank pain. Recheck labs. Check chest xray. Recommend getting tested for covid. Recommend checking bp, hr, weights daily. Encouraged deep breathing exercises. Strict instructions to go to er or urgent care with any decline in symptoms.

## 2022-10-22 ENCOUNTER — HOSPITAL ENCOUNTER (OUTPATIENT)
Age: 72
Discharge: HOME | End: 2022-10-22
Payer: MEDICARE

## 2022-10-22 ENCOUNTER — HOSPITAL ENCOUNTER (OUTPATIENT)
Dept: GENERAL RADIOLOGY | Age: 72
Discharge: HOME | End: 2022-10-22
Payer: MEDICARE

## 2022-10-22 DIAGNOSIS — M25.511 ACUTE PAIN OF RIGHT SHOULDER: ICD-10-CM

## 2022-10-22 DIAGNOSIS — R05.1 ACUTE COUGH: ICD-10-CM

## 2022-10-22 PROCEDURE — 73030 X-RAY EXAM OF SHOULDER: CPT

## 2022-10-22 PROCEDURE — 71046 X-RAY EXAM CHEST 2 VIEWS: CPT

## 2022-10-24 ENCOUNTER — OFFICE VISIT (OUTPATIENT)
Dept: FAMILY MEDICINE CLINIC | Age: 72
End: 2022-10-24
Payer: MEDICARE

## 2022-10-24 VITALS
WEIGHT: 176 LBS | OXYGEN SATURATION: 96 % | DIASTOLIC BLOOD PRESSURE: 58 MMHG | HEART RATE: 68 BPM | BODY MASS INDEX: 24.9 KG/M2 | SYSTOLIC BLOOD PRESSURE: 108 MMHG

## 2022-10-24 DIAGNOSIS — R79.82 ELEVATED C-REACTIVE PROTEIN (CRP): ICD-10-CM

## 2022-10-24 DIAGNOSIS — E78.2 MIXED HYPERLIPIDEMIA: ICD-10-CM

## 2022-10-24 DIAGNOSIS — G89.29 CHRONIC RIGHT SHOULDER PAIN: ICD-10-CM

## 2022-10-24 DIAGNOSIS — M25.511 CHRONIC RIGHT SHOULDER PAIN: ICD-10-CM

## 2022-10-24 DIAGNOSIS — K21.9 GASTROESOPHAGEAL REFLUX DISEASE WITHOUT ESOPHAGITIS: Primary | ICD-10-CM

## 2022-10-24 DIAGNOSIS — R30.0 DYSURIA: ICD-10-CM

## 2022-10-24 DIAGNOSIS — K21.9 GASTROESOPHAGEAL REFLUX DISEASE WITHOUT ESOPHAGITIS: ICD-10-CM

## 2022-10-24 DIAGNOSIS — N18.31 STAGE 3A CHRONIC KIDNEY DISEASE (HCC): ICD-10-CM

## 2022-10-24 LAB
BILIRUBIN, POC: ABNORMAL
BLOOD URINE, POC: ABNORMAL
CLARITY, POC: CLEAR
COLOR, POC: YELLOW
GLUCOSE URINE, POC: ABNORMAL
KETONES, POC: ABNORMAL
LEUKOCYTE EST, POC: ABNORMAL
NITRITE, POC: ABNORMAL
PH, POC: 6
PROTEIN, POC: 100
SPECIFIC GRAVITY, POC: >=1.03
UROBILINOGEN, POC: 0.2

## 2022-10-24 PROCEDURE — 1123F ACP DISCUSS/DSCN MKR DOCD: CPT | Performed by: NURSE PRACTITIONER

## 2022-10-24 PROCEDURE — 99213 OFFICE O/P EST LOW 20 MIN: CPT | Performed by: NURSE PRACTITIONER

## 2022-10-24 PROCEDURE — 81002 URINALYSIS NONAUTO W/O SCOPE: CPT | Performed by: NURSE PRACTITIONER

## 2022-10-24 NOTE — PROGRESS NOTES
10/24/2022  Louie Alcocer (: 1950)  67 y.o.    ASSESSMENT and PLAN:  Gilberto Alexandre was seen today for follow-up. Diagnoses and all orders for this visit:    Gastroesophageal reflux disease without esophagitis  -     Gamma GT; Future  -     Lipase; Future  -     Comprehensive Metabolic Panel; Future  -update labs. -reviewed lifestyle recommendations like small meals throughout the day  -update liver, pancreatic enzymes   -increase diet as tolerated. Stage 3a chronic kidney disease (HCC)  -     POCT Urinalysis no Micro  -     Culture, Urine  -update labs. Mixed hyperlipidemia  -     LIPID PANEL; Future  -update, fasting today. -Advised exercise and low salt diet, high in vegetables, whole grains, and lean meats. Reduce intake of saturated/trans fats. Chronic right shoulder pain  -     Blayne Cuevas MD, Orthopedics and Sports Medicine (Shoulder; Hip; Knee), [facility]  -would like to see ortho for possible injections for pain. -no redness, joint swelling. Elevated C-reactive protein (CRP  -     C-Reactive Protein; Future  -     Lipase; Future  -     Comprehensive Metabolic Panel; Future  -repeat, if trending down will continue to monitor. Dysuria  -     POCT Urinalysis no Micro  -     Culture, Urine  -     Lipase; Future  -     Comprehensive Metabolic Panel; Future  Repeat crp  -rule out UTI. -Drink plenty of fluids. (Limit caffeine, spicy foods, and alcohol). Practice good hand hygiene. Empty bladder as soon as you have the urge to do so. Return in about 1 month (around 2022), or if symptoms worsen or fail to improve. HPI  Presenting for f/u. Spoke with pt and wife last Friday to review lab results, and they had described pt feeling more weak, and sounded congested. Got xray over the weekend which was normal, unable to get labs at hospital.   Shoulder xray showed tendonitis. Still having right shoulder pain.    Feels like he doesn't want to move due to the pain in shoulder. Had been working on car and likely was cause of worsening symptoms. Used to follow with dr. Carmen Martins for knee, otherwise not following with anyone for shoulder. Has not spoken to Dr. Aga Berry regarding recent discontinuation of methotrexate. Feeling fatigue, appetite decreased. Taking omprazole for acid reflux. Not drinking fluids  Having regular BMs daily or every other day. Denies cp, sob, palpitations, syncope, lightheadedness/dizziness. Review of Systems   Constitutional:  Negative for activity change, appetite change, chills, fatigue, fever and unexpected weight change. HENT: Negative. Respiratory:  Negative for cough, chest tightness, shortness of breath and wheezing. Cardiovascular:  Negative for chest pain, palpitations and leg swelling. Gastrointestinal:  Negative for abdominal distention, abdominal pain, constipation, diarrhea, nausea and vomiting. Genitourinary: Negative. Musculoskeletal: Negative. Skin: Negative. Neurological:  Negative for dizziness, weakness, light-headedness, numbness and headaches. Hematological: Negative. Psychiatric/Behavioral: Negative. Allergies, past medical history, family history, and social history reviewed and unchanged from previous encounter.      Current Outpatient Medications   Medication Sig Dispense Refill    lisinopril (PRINIVIL;ZESTRIL) 2.5 MG tablet Take 1 tablet by mouth daily 30 tablet 6    omeprazole (PRILOSEC) 20 MG delayed release capsule Take 1 capsule by mouth every morning (before breakfast) 30 capsule 1    amitriptyline (ELAVIL) 25 MG tablet TAKE 1 TABLET EVERY NIGHT 90 tablet 1    tiZANidine (ZANAFLEX) 2 MG tablet Take 1 tablet by mouth 3 times daily as needed (muscle tension) 30 tablet 1    gemfibrozil (LOPID) 600 MG tablet Take 1 tablet by mouth 2 times daily (before meals) 180 tablet 5    clopidogrel (PLAVIX) 75 MG tablet TAKE 1 TABLET EVERY DAY 90 tablet 5    metoprolol succinate (TOPROL XL) 50 MG extended release tablet Take 0.5 tablets by mouth daily 90 tablet 5    rosuvastatin (CRESTOR) 40 MG tablet TAKE 1 TABLET EVERY EVENING 90 tablet 5    spironolactone (ALDACTONE) 25 MG tablet TAKE 1/2 TABLET EVERY DAY 45 tablet 5    Blood Glucose Monitoring Suppl (TRUE METRIX METER) w/Device KIT       Alcohol Swabstick 70 % PADS Use prior to checking glucose daily. E11.9 100 each 3    blood glucose test strips (TRUE METRIX BLOOD GLUCOSE TEST) strip Check glucose daily. DM 2 E 11.9 (Patient taking differently: Check glucose daily. DM 2 E 11.9) 100 each 3    Blood Glucose Monitoring Suppl (TRUE METRIX METER) YELENA Check glucose daily. E11.9 1 each 0    Respiratory Therapy Supplies (NEBULIZER/TUBING/MOUTHPIECE) KIT 1 kit by Does not apply route 3 times daily 1 kit 0    ondansetron (ZOFRAN-ODT) 4 MG disintegrating tablet DISSOLVE 1 TABLET ON THE TONGUE EVERY 8 HOURS AS NEEDED FOR NAUSEA  OR  VOMITING 30 tablet 1    nitroGLYCERIN (NITROSTAT) 0.4 MG SL tablet Place 1 tablet under the tongue every 5 minutes as needed for Chest pain 25 tablet 1    Handicap Placard MISC by Does not apply route Exp: 1/1/2024 2 each 0    ipratropium-albuterol (DUONEB) 0.5-2.5 (3) MG/3ML SOLN nebulizer solution Inhale 3 mLs into the lungs every 6 hours as needed for Shortness of Breath 120 vial 5    finasteride (PROSCAR) 5 MG tablet Take 5 mg by mouth daily Indications: Rx by Dr. Stephani Taylor      methotrexate (RHEUMATREX) 2.5 MG chemo tablet Take 1 tablet by mouth once a week RX directions are 4 tablets weekly. Patient takes one tablet Monday/Wednesday/Friday. (Patient taking differently: Take 2.5 mg by mouth once a week Takes 2 tablets on friday.) 1 tablet 0    folic acid (FOLVITE) 1 MG tablet Take 1 mg by mouth daily      tamsulosin (FLOMAX) 0.4 MG capsule Take 0.4 mg by mouth daily      aspirin 81 MG tablet Take 81 mg by mouth daily. No current facility-administered medications for this visit.        Vitals:    10/24/22 1301   BP: (!) 108/58   Site: Right Upper Arm   Position: Sitting   Cuff Size: Large Adult   Pulse: 68   SpO2: 96%   Weight: 176 lb (79.8 kg)     Estimated body mass index is 24.9 kg/m² as calculated from the following:    Height as of 10/14/22: 5' 10.5\" (1.791 m). Weight as of this encounter: 176 lb (79.8 kg). Physical Exam  Vitals reviewed. Constitutional:       General: He is not in acute distress. Appearance: Normal appearance. He is not ill-appearing, toxic-appearing or diaphoretic. HENT:      Head: Normocephalic and atraumatic. Nose: Nose normal.   Eyes:      Conjunctiva/sclera: Conjunctivae normal.   Cardiovascular:      Rate and Rhythm: Normal rate and regular rhythm. Pulses: Normal pulses. Heart sounds: Normal heart sounds. Pulmonary:      Effort: Pulmonary effort is normal. No respiratory distress. Breath sounds: Normal breath sounds. No wheezing or rhonchi. Chest:      Chest wall: No tenderness. Abdominal:      General: Abdomen is flat. Bowel sounds are normal. There is no distension. Palpations: Abdomen is soft. There is no mass. Tenderness: There is no abdominal tenderness. There is no right CVA tenderness, left CVA tenderness, guarding or rebound. Hernia: No hernia is present. Musculoskeletal:         General: Tenderness (right shoulder) present. Normal range of motion. Cervical back: Normal range of motion and neck supple. Right lower leg: No edema. Left lower leg: No edema. Skin:     General: Skin is warm and dry. Capillary Refill: Capillary refill takes less than 2 seconds. Findings: No bruising, erythema, lesion or rash. Neurological:      General: No focal deficit present. Mental Status: He is alert and oriented to person, place, and time. Mental status is at baseline. Cranial Nerves: No cranial nerve deficit.       Gait: Gait normal.   Psychiatric:         Mood and Affect: Mood normal.         Behavior: Behavior normal.         Thought Content:  Thought content normal.         Judgment: Judgment normal.

## 2022-10-25 LAB
A/G RATIO: 1.5 (ref 1.1–2.2)
ALBUMIN SERPL-MCNC: 4.6 G/DL (ref 3.4–5)
ALP BLD-CCNC: 109 U/L (ref 40–129)
ALT SERPL-CCNC: 6 U/L (ref 10–40)
ANION GAP SERPL CALCULATED.3IONS-SCNC: 15 MMOL/L (ref 3–16)
AST SERPL-CCNC: 17 U/L (ref 15–37)
BILIRUB SERPL-MCNC: <0.2 MG/DL (ref 0–1)
BUN BLDV-MCNC: 33 MG/DL (ref 7–20)
C-REACTIVE PROTEIN: 61.5 MG/L (ref 0–5.1)
CALCIUM SERPL-MCNC: 9.9 MG/DL (ref 8.3–10.6)
CHLORIDE BLD-SCNC: 101 MMOL/L (ref 99–110)
CHOLESTEROL, TOTAL: 134 MG/DL (ref 0–199)
CO2: 19 MMOL/L (ref 21–32)
CREAT SERPL-MCNC: 1.6 MG/DL (ref 0.8–1.3)
GAMMA GLUTAMYL TRANSFERASE: 10 U/L (ref 8–61)
GFR SERPL CREATININE-BSD FRML MDRD: 45 ML/MIN/{1.73_M2}
GLUCOSE BLD-MCNC: 99 MG/DL (ref 70–99)
HDLC SERPL-MCNC: 48 MG/DL (ref 40–60)
LDL CHOLESTEROL CALCULATED: 74 MG/DL
LIPASE: 37 U/L (ref 13–60)
POTASSIUM SERPL-SCNC: 4.5 MMOL/L (ref 3.5–5.1)
SODIUM BLD-SCNC: 135 MMOL/L (ref 136–145)
TOTAL PROTEIN: 7.7 G/DL (ref 6.4–8.2)
TRIGL SERPL-MCNC: 62 MG/DL (ref 0–150)
VLDLC SERPL CALC-MCNC: 12 MG/DL

## 2022-10-26 ENCOUNTER — OFFICE VISIT (OUTPATIENT)
Dept: ORTHOPEDIC SURGERY | Age: 72
End: 2022-10-26
Payer: MEDICARE

## 2022-10-26 VITALS — HEIGHT: 70 IN | RESPIRATION RATE: 16 BRPM | BODY MASS INDEX: 25.51 KG/M2 | WEIGHT: 178.2 LBS

## 2022-10-26 DIAGNOSIS — M25.50 POLYARTHRALGIA: Primary | ICD-10-CM

## 2022-10-26 DIAGNOSIS — N18.31 STAGE 3A CHRONIC KIDNEY DISEASE (HCC): Primary | ICD-10-CM

## 2022-10-26 DIAGNOSIS — M54.10 RADICULAR PAIN: ICD-10-CM

## 2022-10-26 DIAGNOSIS — M25.561 CHRONIC PAIN OF BOTH KNEES: ICD-10-CM

## 2022-10-26 DIAGNOSIS — G89.29 CHRONIC PAIN OF BOTH KNEES: ICD-10-CM

## 2022-10-26 DIAGNOSIS — M25.562 CHRONIC PAIN OF BOTH KNEES: ICD-10-CM

## 2022-10-26 LAB — URINE CULTURE, ROUTINE: NORMAL

## 2022-10-26 PROCEDURE — 99204 OFFICE O/P NEW MOD 45 MIN: CPT | Performed by: STUDENT IN AN ORGANIZED HEALTH CARE EDUCATION/TRAINING PROGRAM

## 2022-10-26 PROCEDURE — 1123F ACP DISCUSS/DSCN MKR DOCD: CPT | Performed by: STUDENT IN AN ORGANIZED HEALTH CARE EDUCATION/TRAINING PROGRAM

## 2022-10-26 RX ORDER — METHYLPREDNISOLONE 4 MG/1
TABLET ORAL
Qty: 1 KIT | Refills: 0 | Status: SHIPPED | OUTPATIENT
Start: 2022-10-26 | End: 2022-11-01

## 2022-10-26 NOTE — PROGRESS NOTES
New Patient: CERVICAL SPINE    Referring Provider:  Kavon Morales    CHIEF COMPLAINT:    Chief Complaint   Patient presents with    Shoulder Pain     BILATERAL        HISTORY OF PRESENT ILLNESS:    Mr. Louie Alcocer is a pleasant 67 y.o. old male here for polyarthralgia. He has pain in the bilateral shoulders, elbows and wrists. It started 2-3 weeks ago. He saw his PCP for this and they ordered CRP and Sed rate which were elevated. The pain comes and goes. Today he is not in any pain however yesterday he could not get out of bed. Yesterday he rated pain 10/10. It was worse with any movement. He denies radicular pain, progressive weakness, or neurogenic bowel or bladder. He uses tylenol for pain control. He likes working on cars and his symptoms keep him from doing so.      Current/Past Treatment:   Physical Therapy: none  Chiropractic:  none   Injection:  none   Medications:    NSAIDS:  Contraindicated due to Plavix   Muscle relaxer:   tizanidine   Steriods:  NONE  Neuropathic medications:  NONE  Opioids: NONE  Other: NONE   Surgery/Consult NONE    Past Medical History:   Past Medical History:   Diagnosis Date    AICD (automatic cardioverter/defibrillator) present 02/10/2015    Upgraded to BiV ICD 12/17/20 [employer]    Arthritis     CAD (coronary artery disease)     CHF (congestive heart failure) ([facility])     Chronic systolic CHF (congestive heart failure), NYHA class 3 ([facility])     GERD (gastroesophageal reflux disease)     Hearing loss     left    Hyperlipidemia     MI (myocardial infarction) ([facility] [address]     Rash     Type 2 diabetes mellitus with chronic kidney disease 8/10/2021      Past Surgical History:     Past Surgical History:   Procedure Laterality Date    CARDIAC DEFIBRILLATOR PLACEMENT Left 12/17/2020    BiV ICD [employer]    CATARACT REMOVAL WITH IMPLANT Right 02/01/2017    CATARACT REMOVAL WITH IMPLANT Left 03/01/2017    COLONOSCOPY      CORONARY ANGIOPLASTY WITH STENT PLACEMENT CYSTOSCOPY N/A 1/5/2022    CYSTOSCOPY, UROLIFT performed by Zehra Melendez MD at [address]       Current Medications:     Current Outpatient Medications:     methylPREDNISolone (MEDROL, KRISTIN,) 4 MG tablet, Take by mouth., Disp: 1 kit, Rfl: 0    lisinopril (PRINIVIL;ZESTRIL) 2.5 MG tablet, Take 1 tablet by mouth daily, Disp: 30 tablet, Rfl: 6    omeprazole (PRILOSEC) 20 MG delayed release capsule, Take 1 capsule by mouth every morning (before breakfast), Disp: 30 capsule, Rfl: 1    amitriptyline (ELAVIL) 25 MG tablet, TAKE 1 TABLET EVERY NIGHT, Disp: 90 tablet, Rfl: 1    tiZANidine (ZANAFLEX) 2 MG tablet, Take 1 tablet by mouth 3 times daily as needed (muscle tension), Disp: 30 tablet, Rfl: 1    gemfibrozil (LOPID) 600 MG tablet, Take 1 tablet by mouth 2 times daily (before meals), Disp: 180 tablet, Rfl: 5    clopidogrel (PLAVIX) 75 MG tablet, TAKE 1 TABLET EVERY DAY, Disp: 90 tablet, Rfl: 5    metoprolol succinate (TOPROL XL) 50 MG extended release tablet, Take 0.5 tablets by mouth daily, Disp: 90 tablet, Rfl: 5    rosuvastatin (CRESTOR) 40 MG tablet, TAKE 1 TABLET EVERY EVENING, Disp: 90 tablet, Rfl: 5    spironolactone (ALDACTONE) 25 MG tablet, TAKE 1/2 TABLET EVERY DAY, Disp: 45 tablet, Rfl: 5    Blood Glucose Monitoring Suppl (TRUE METRIX METER) w/Device KIT, , Disp: , Rfl:     Alcohol Swabstick 70 % PADS, Use prior to checking glucose daily. E11.9, Disp: 100 each, Rfl: 3    blood glucose test strips (TRUE METRIX BLOOD GLUCOSE TEST) strip, Check glucose daily. DM 2 E 11.9 (Patient taking differently: Check glucose daily. DM 2 E 11.9), Disp: 100 each, Rfl: 3    Blood Glucose Monitoring Suppl (TRUE METRIX METER) YELENA, Check glucose daily.  E11.9, Disp: 1 each, Rfl: 0    Respiratory Therapy Supplies (NEBULIZER/TUBING/MOUTHPIECE) KIT, 1 kit by Does not apply route 3 times daily, Disp: 1 kit, Rfl: 0    ondansetron (ZOFRAN-ODT) 4 MG disintegrating tablet, DISSOLVE 1 TABLET ON THE TONGUE EVERY 8 HOURS AS NEEDED FOR NAUSEA  OR  VOMITING, Disp: 30 tablet, Rfl: 1    nitroGLYCERIN (NITROSTAT) 0.4 MG SL tablet, Place 1 tablet under the tongue every 5 minutes as needed for Chest pain, Disp: 25 tablet, Rfl: 1    Handicap Placard MISC, by Does not apply route Exp: 1/1/2024, Disp: 2 each, Rfl: 0    ipratropium-albuterol (DUONEB) 0.5-2.5 (3) MG/3ML SOLN nebulizer solution, Inhale 3 mLs into the lungs every 6 hours as needed for Shortness of Breath, Disp: 120 vial, Rfl: 5    finasteride (PROSCAR) 5 MG tablet, Take 5 mg by mouth daily Indications: Rx by Dr. Fallon Ball, Disp: , Rfl:     methotrexate (RHEUMATREX) 2.5 MG chemo tablet, Take 1 tablet by mouth once a week RX directions are 4 tablets weekly. Patient takes one tablet Monday/Wednesday/Friday. (Patient taking differently: Take 2.5 mg by mouth once a week Takes 2 tablets on friday.), Disp: 1 tablet, Rfl: 0    folic acid (FOLVITE) 1 MG tablet, Take 1 mg by mouth daily, Disp: , Rfl:     tamsulosin (FLOMAX) 0.4 MG capsule, Take 0.4 mg by mouth daily, Disp: , Rfl:     aspirin 81 MG tablet, Take 81 mg by mouth daily. , Disp: , Rfl:   Allergies:  Patient has no known allergies. Social History:    reports that he has been smoking cigarettes. He started smoking about 60 years ago. He has a 40.50 pack-year smoking history. He has never used smokeless tobacco. He reports that he does not drink alcohol and does not use drugs.   Family History:   Family History   Problem Relation Age of Onset    Diabetes Mother     Cancer Father        REVIEW OF SYSTEMS: Full ROS noted & scanned   CONSTITUTIONAL: Denies unexplained weight loss, fevers, chills or fatigue  NEUROLOGICAL: Denies unsteady gait or progressive weakness  MUSCULOSKELETAL: Denies joint swelling or redness  PSYCHOLOGICAL: Denies anxiety, depression   SKIN: Denies skin changes, delayed healing, rash, itching   HEMATOLOGIC: Denies easy bleeding or bruising  ENDOCRINE: Denies excessive thirst, urination, heat/cold  RESPIRATORY: Denies current dyspnea, cough  GI: Denies nausea, vomiting, diarrhea   : Denies bowel or bladder issues      PHYSICAL EXAM:    Vitals: Resp. rate 16, height 5' 10\" (1.778 m), weight 178 lb 3.2 oz (80.8 kg). GENERAL EXAM:  General Apparence: Patient is adequately groomed with no evidence of malnutrition. Orientation: The patient is oriented to time, place and person. Mood & Affect:The patient's mood and affect are appropriate. Vascular: Examination reveals no swelling tenderness in upper or lower extremities. Good capillary refill. Lymphatic: The lymphatic examination bilaterally reveals all areas to be without enlargement or induration  Sensation: Sensation is intact without deficit  Coordination/Balance: Good coordination     CERVICAL EXAMINATION:  Inspection: Local inspection shows no step-off or bruising. Cervical alignment is normal.     Palpation: No evidence of tenderness at the midline, and trapezius. Paraspinal tenderness is not present. There is no step-off or paraspinal spasm. Range of Motion: Range of Motion:  pain-free ROM   Strength: 5/5 bilateral upper extremities   Special Tests:      Spurling's, L'Hermitte's & Garza's negative bilaterally. Pryor and Impingement tests are negative bilaterally. Cubital and Carpal tunnel Tinel's negative bilaterally. Skin:There are no rashes, ulcerations or lesions in right & left upper extremities. Reflexes: Bilaterally triceps, biceps and brachioradialis are 2+. Clonus absent bilaterally at the feet. Additional Examinations:       RIGHT UPPER EXTREMITY:  Inspection/examination of the right upper extremity does not show any tenderness, deformity or injury. Range of motion is full. There is no gross instability. There are no rashes, ulcerations or lesions. Strength and tone are normal.  LEFT UPPER EXTREMITY: Inspection/examination of the left upper extremity does not show any tenderness, deformity or injury. Range of motion is full. There is no gross instability. There are no rashes, ulcerations or lesions. Strength and tone are normal.      Diagnostic Testing:    Reviewed AP and lateral cervical spine films taken today in the office: No acute abnormality or fracture noted. Normal height and configuration of vertebral bodies. Straightening of the normal lordotic curve. Degenerative disc disease at C4-5, mild. Posterior spurring. Impression:    Diagnosis Orders   1. [facility]arth[facility]  [facility]    methylPREDNISolone (MEDROL, KRISTIN,) 4 MG tablet      2. Chronic pain of both Ivis Barcenas MD, Orthopedic Surgery (Shoulder; Hip; Knee), [facility]      3. Radicular pain  [facility] CERVICAL SPINE (2-3 VIEWS)            Plan:    Above diagnoses are Worsening    1. Medications: I will prescribe a medrol dose pack to help with the inflammatory component of pain. Risks, benefits and alternatives were discussed. Recommended to stop any NSAIDs to reduce risk of GI bleed during the course of the dose pack. 2. PT:   Not indicated. 3. Further studies:   I discussed with the patient that I do not think his symptoms are of orthopedic origin given the fact that they are cyclical and located in the bilateral shoulders, elbows and wrists. He also had high inflammatory markers on CRP and Sed rate. For these reasons I will refer him to rheumatology for further work up.          Mercedes Cordova PA-C  Board Certified by the [employer] on Certification of [employer]

## 2022-10-27 ASSESSMENT — ENCOUNTER SYMPTOMS
ABDOMINAL DISTENTION: 0
ABDOMINAL PAIN: 0
COUGH: 0
CONSTIPATION: 0
WHEEZING: 0
SHORTNESS OF BREATH: 0
NAUSEA: 0
DIARRHEA: 0
CHEST TIGHTNESS: 0
VOMITING: 0

## 2022-10-28 ENCOUNTER — TELEPHONE (OUTPATIENT)
Dept: ORTHOPEDIC SURGERY | Age: 72
End: 2022-10-28

## 2022-10-28 NOTE — TELEPHONE ENCOUNTER
Prescription Refill     Medication Name:   methylPREDNISolone (MEDROL, KRISTIN,) 4 MG tablet      Pharmacy: [address] [place] [address], [address] [place] [address] [phone] [place] [phone]    Patient Contact Number:  [phone]    Pt  DID NOT RCV MED. Pt IS ASKING THAT THE MED BE SENT TO THE PHARM ABOVE. PLEASE LET HIM KNOW THAT IT HAS BEEN SENT TO THE CORRECTED PHARMACY AT THE # ABOVE.

## 2022-10-28 NOTE — TELEPHONE ENCOUNTER
I spoke to pt and let him know that Rx went to the [facility] in [facility], on 10/26/22. Patient gave verbal understanding.

## 2022-11-07 ENCOUNTER — NURSE ONLY (OUTPATIENT)
Dept: CARDIOLOGY CLINIC | Age: 72
End: 2022-11-07
Payer: MEDICARE

## 2022-11-07 DIAGNOSIS — I50.22 CHRONIC SYSTOLIC CONGESTIVE HEART FAILURE (HCC): ICD-10-CM

## 2022-11-07 DIAGNOSIS — Z95.810 AICD (AUTOMATIC CARDIOVERTER/DEFIBRILLATOR) PRESENT: Primary | ICD-10-CM

## 2022-11-07 PROCEDURE — G2066 INTER DEVC REMOTE 30D: HCPCS | Performed by: NURSE PRACTITIONER

## 2022-11-07 PROCEDURE — 93297 REM INTERROG DEV EVAL ICPMS: CPT | Performed by: NURSE PRACTITIONER

## 2022-11-08 ENCOUNTER — TELEPHONE (OUTPATIENT)
Dept: FAMILY MEDICINE CLINIC | Age: 72
End: 2022-11-08

## 2022-11-08 NOTE — TELEPHONE ENCOUNTER
Patient called in stating that the steroids he received for his arthritis is not helping, please advise.

## 2022-11-08 NOTE — PROGRESS NOTES
End of 31-day monitoring period 11/7. AP 3%  RVP 3%  %. No  arrhythmias recorded. Heart logic 5 and TI 48. NPRB to review. Remote transmission received for patients CRT-D. Transmission shows normal sensing and pacing function. EP physician will review. See interrogation under the cardiology tab in the [address] [place] [address] [address] field for more details. Will continue to monitor remotely. LVP . -Ventricular Pacing Chamber LV Only.

## 2022-11-08 NOTE — TELEPHONE ENCOUNTER
Spoke with pt he stated that this was ordered by Wil Rudd, NP was not ordered by ortho, please advise on next steps

## 2022-11-08 NOTE — TELEPHONE ENCOUNTER
Med not from [facility], see below    methylPREDNISolone (MEDROL, KRISTIN,) 4 MG tablet [[id number]]  ENDED    Order Details  Dose, Route, Frequency: As Directed   Dispense Quantity: 1 kit Refills: 0          Sig: Take by mouth. Start Date: 10/26/22 End Date: 11/01/22   Written Date: 10/26/22 Expiration Date: 10/26/23       Diagnosis Association: Polyarthralgia (M25.50)   Providers    Authorizing Provider: WILBERTO Eid NPI: [id number]   Ordering User:  WILBERTO Eid        It makes sense to follow up with ortho since he is seeing a specialist. If he is having trouble there for some reason, will need an appointment here.

## 2022-11-18 DIAGNOSIS — N18.31 STAGE 3A CHRONIC KIDNEY DISEASE (HCC): ICD-10-CM

## 2022-11-18 DIAGNOSIS — R05.1 ACUTE COUGH: ICD-10-CM

## 2022-11-19 LAB
ALBUMIN SERPL-MCNC: 4.5 G/DL (ref 3.4–5)
ANION GAP SERPL CALCULATED.3IONS-SCNC: 17 MMOL/L (ref 3–16)
BASOPHILS ABSOLUTE: 0 K/UL (ref 0–0.2)
BASOPHILS RELATIVE PERCENT: 0.6 %
BUN BLDV-MCNC: 32 MG/DL (ref 7–20)
CALCIUM SERPL-MCNC: 9.9 MG/DL (ref 8.3–10.6)
CHLORIDE BLD-SCNC: 98 MMOL/L (ref 99–110)
CO2: 19 MMOL/L (ref 21–32)
CREAT SERPL-MCNC: 1.3 MG/DL (ref 0.8–1.3)
EOSINOPHILS ABSOLUTE: 0.1 K/UL (ref 0–0.6)
EOSINOPHILS RELATIVE PERCENT: 1.5 %
GFR SERPL CREATININE-BSD FRML MDRD: 58 ML/MIN/{1.73_M2}
GLUCOSE BLD-MCNC: 94 MG/DL (ref 70–99)
HCT VFR BLD CALC: 35.5 % (ref 40.5–52.5)
HEMOGLOBIN: 11.9 G/DL (ref 13.5–17.5)
LYMPHOCYTES ABSOLUTE: 1.2 K/UL (ref 1–5.1)
LYMPHOCYTES RELATIVE PERCENT: 14.7 %
MCH RBC QN AUTO: 32.1 PG (ref 26–34)
MCHC RBC AUTO-ENTMCNC: 33.7 G/DL (ref 31–36)
MCV RBC AUTO: 95.3 FL (ref 80–100)
MONOCYTES ABSOLUTE: 0.6 K/UL (ref 0–1.3)
MONOCYTES RELATIVE PERCENT: 7.8 %
NEUTROPHILS ABSOLUTE: 6.1 K/UL (ref 1.7–7.7)
NEUTROPHILS RELATIVE PERCENT: 75.4 %
PDW BLD-RTO: 13.7 % (ref 12.4–15.4)
PHOSPHORUS: 3.3 MG/DL (ref 2.5–4.9)
PLATELET # BLD: 225 K/UL (ref 135–450)
PMV BLD AUTO: 8.7 FL (ref 5–10.5)
POTASSIUM SERPL-SCNC: 4.8 MMOL/L (ref 3.5–5.1)
RBC # BLD: 3.72 M/UL (ref 4.2–5.9)
SODIUM BLD-SCNC: 134 MMOL/L (ref 136–145)
WBC # BLD: 8.1 K/UL (ref 4–11)

## 2022-11-28 ENCOUNTER — OFFICE VISIT (OUTPATIENT)
Dept: FAMILY MEDICINE CLINIC | Age: 72
End: 2022-11-28
Payer: MEDICARE

## 2022-11-28 VITALS
WEIGHT: 177 LBS | DIASTOLIC BLOOD PRESSURE: 60 MMHG | OXYGEN SATURATION: 95 % | HEART RATE: 64 BPM | SYSTOLIC BLOOD PRESSURE: 104 MMHG | BODY MASS INDEX: 25.4 KG/M2

## 2022-11-28 DIAGNOSIS — M25.50 POLYARTHRALGIA: Primary | ICD-10-CM

## 2022-11-28 DIAGNOSIS — E87.1 HYPONATREMIA: ICD-10-CM

## 2022-11-28 PROCEDURE — 1123F ACP DISCUSS/DSCN MKR DOCD: CPT | Performed by: NURSE PRACTITIONER

## 2022-11-28 PROCEDURE — 99213 OFFICE O/P EST LOW 20 MIN: CPT | Performed by: NURSE PRACTITIONER

## 2022-11-28 RX ORDER — METHYLPREDNISOLONE 4 MG/1
TABLET ORAL
Qty: 1 KIT | Refills: 0 | Status: SHIPPED | OUTPATIENT
Start: 2022-11-28 | End: 2022-12-05 | Stop reason: ALTCHOICE

## 2022-11-28 ASSESSMENT — ENCOUNTER SYMPTOMS
WHEEZING: 0
NAUSEA: 0
SHORTNESS OF BREATH: 0
ABDOMINAL PAIN: 0
CONSTIPATION: 0
DIARRHEA: 0
ABDOMINAL DISTENTION: 0
VOMITING: 0
COUGH: 0
CHEST TIGHTNESS: 0

## 2022-11-28 NOTE — PROGRESS NOTES
2022  Dawn Tony (: 1950)  67 y.o.    ASSESSMENT and PLAN:  John Ewing was seen today for joint pain and hand pain. Diagnoses and all orders for this visit:    Clement Esposito MD, Rheumatology, [facility] (E[facility]) - Richelle Montalvo MD, Pain Management, [facility]  -methylPREDNISolone (MEDROL DOSEPACK) 4 MG tablet; Take by mouth.  -medrol dose pack, reviewed use and s/e. pt aware of cardiac risk with steroids. Recommend checking bp at home. Notify office if elevated >135/85. Take with food. Avoid NSAID's while on steroids. -recheck sodium in 1 month. Denies headache, no mental status change.   -encouraged f/u with rheum for evaluation to rule out autoimmune cause of symptoms due to polyarthralgia, and recent elevation in sed/crp, hx of polymyalgia rheumatica. Recently stopped methotrexate, symptoms seemed to have worsened since cessation. -pain management referral also placed, recommend work up with rheum first     Hyponatremia  -     Basic Metabolic Panel; Future  -recheck today. Asymptomatic. Return in about 3 months (around 2023), or if symptoms worsen or fail to improve. HPI  Presenting for f/u on joint pain. Wife present for appointment. Onset October. Initially left shoulder was most bothersome, He has pain in the bilateral shoulders, elbows and wrists. Hx of elevated sed rate/crp. Having difficultly getting out of bed some days. Can't raise arms above shoulders some days. Denies fevers, chills, diaphoresis, redness/warmth to joints. Intermittently has tingling in fingers. Denies discoloration to fingers. Saw ortho, had significant relief with steroids, recommended f/u with rheum, not seen rheum yet. Also has tried tylenol as needed, and stretching which helps. Pt is tired of being in pain, open to options for pain relief. Denies saddle anesthesia, denies incontinence of urine/stool.      Review of Systems Constitutional:  Positive for fatigue. Negative for activity change, appetite change, chills, fever and unexpected weight change. HENT: Negative. Respiratory:  Negative for cough, chest tightness, shortness of breath and wheezing. Cardiovascular:  Negative for chest pain, palpitations and leg swelling. Gastrointestinal:  Negative for abdominal distention, abdominal pain, constipation, diarrhea, nausea and vomiting. Genitourinary: Negative. Musculoskeletal:  Positive for arthralgias (shoulders, wrists). Skin: Negative. Neurological:  Negative for dizziness, weakness, light-headedness, numbness and headaches. Hematological: Negative. Psychiatric/Behavioral: Negative. Allergies, past medical history, family history, and social history reviewed and unchanged from previous encounter. Current Outpatient Medications   Medication Sig Dispense Refill    lisinopril (PRINIVIL;ZESTRIL) 2.5 MG tablet Take 1 tablet by mouth daily 30 tablet 6    omeprazole (PRILOSEC) 20 MG delayed release capsule Take 1 capsule by mouth every morning (before breakfast) 30 capsule 1    amitriptyline (ELAVIL) 25 MG tablet TAKE 1 TABLET EVERY NIGHT 90 tablet 1    tiZANidine (ZANAFLEX) 2 MG tablet Take 1 tablet by mouth 3 times daily as needed (muscle tension) 30 tablet 1    gemfibrozil (LOPID) 600 MG tablet Take 1 tablet by mouth 2 times daily (before meals) 180 tablet 5    clopidogrel (PLAVIX) 75 MG tablet TAKE 1 TABLET EVERY DAY 90 tablet 5    metoprolol succinate (TOPROL XL) 50 MG extended release tablet Take 0.5 tablets by mouth daily 90 tablet 5    rosuvastatin (CRESTOR) 40 MG tablet TAKE 1 TABLET EVERY EVENING 90 tablet 5    spironolactone (ALDACTONE) 25 MG tablet TAKE 1/2 TABLET EVERY DAY 45 tablet 5    Blood Glucose Monitoring Suppl (TRUE METRIX METER) w/Device KIT       Alcohol Swabstick 70 % PADS Use prior to checking glucose daily.  E11.9 100 each 3    blood glucose test strips (TRUE METRIX BLOOD GLUCOSE TEST) strip Check glucose daily. DM 2 E 11.9 (Patient taking differently: Check glucose daily. DM 2 E 11.9) 100 each 3    Blood Glucose Monitoring Suppl (TRUE METRIX METER) YELENA Check glucose daily. E11.9 1 each 0    Respiratory Therapy Supplies (NEBULIZER/TUBING/MOUTHPIECE) KIT 1 kit by Does not apply route 3 times daily 1 kit 0    ondansetron (ZOFRAN-ODT) 4 MG disintegrating tablet DISSOLVE 1 TABLET ON THE TONGUE EVERY 8 HOURS AS NEEDED FOR NAUSEA  OR  VOMITING 30 tablet 1    nitroGLYCERIN (NITROSTAT) 0.4 MG SL tablet Place 1 tablet under the tongue every 5 minutes as needed for Chest pain 25 tablet 1    Handicap Placard MISC by Does not apply route Exp: 1/1/2024 2 each 0    ipratropium-albuterol (DUONEB) 0.5-2.5 (3) MG/3ML SOLN nebulizer solution Inhale 3 mLs into the lungs every 6 hours as needed for Shortness of Breath 120 vial 5    finasteride (PROSCAR) 5 MG tablet Take 5 mg by mouth daily Indications: Rx by Dr. Bailey Rodriguez      methotrexate (RHEUMATREX) 2.5 MG chemo tablet Take 1 tablet by mouth once a week RX directions are 4 tablets weekly. Patient takes one tablet Monday/Wednesday/Friday. (Patient taking differently: Take 2.5 mg by mouth once a week Takes 2 tablets on friday.) 1 tablet 0    folic acid (FOLVITE) 1 MG tablet Take 1 mg by mouth daily      tamsulosin (FLOMAX) 0.4 MG capsule Take 0.4 mg by mouth daily      aspirin 81 MG tablet Take 81 mg by mouth daily. No current facility-administered medications for this visit. Vitals:    11/28/22 1444   BP: 104/60   Site: Left Upper Arm   Position: Sitting   Cuff Size: Medium Adult   Pulse: 64   SpO2: 95%   Weight: 177 lb (80.3 kg)     Estimated body mass index is 25.57 kg/m² as calculated from the following:    Height as of 10/26/22: 5' 10\" (1.778 m). Weight as of 10/26/22: 178 lb 3.2 oz (80.8 kg). Physical Exam  Vitals reviewed. Constitutional:       Appearance: Normal appearance. HENT:      Head: Normocephalic and atraumatic. Nose: Nose normal.   Eyes:      Conjunctiva/sclera: Conjunctivae normal.   Cardiovascular:      Rate and Rhythm: Normal rate and regular rhythm. Pulses: Normal pulses. Heart sounds: Normal heart sounds. Pulmonary:      Effort: Pulmonary effort is normal.      Breath sounds: Normal breath sounds. No wheezing or rhonchi. Chest:      Chest wall: No tenderness. Abdominal:      General: Abdomen is flat. Bowel sounds are normal.      Palpations: Abdomen is soft. Tenderness: There is no abdominal tenderness. There is no right CVA tenderness or left CVA tenderness. Musculoskeletal:         General: No signs of injury. Cervical back: Normal range of motion and neck supple. Right lower leg: No edema. Left lower leg: No edema. Comments: Limited ROM, able to reach above shoulders with pain. Bilateral Elbows/wrists with full ROM. Skin:     General: Skin is warm and dry. Capillary Refill: Capillary refill takes 2 to 3 seconds. Coloration: Skin is pale. Neurological:      General: No focal deficit present. Mental Status: He is alert and oriented to person, place, and time. Mental status is at baseline. Sensory: Sensory deficit (intermittent tingling to fingers at times) present. Psychiatric:         Mood and Affect: Mood normal.         Behavior: Behavior normal.         Thought Content:  Thought content normal.         Judgment: Judgment normal.

## 2022-11-29 ENCOUNTER — TELEPHONE (OUTPATIENT)
Dept: ORTHOPEDIC SURGERY | Age: 72
End: 2022-11-29

## 2022-11-30 ENCOUNTER — TELEPHONE (OUTPATIENT)
Dept: FAMILY MEDICINE CLINIC | Age: 72
End: 2022-11-30

## 2022-11-30 NOTE — TELEPHONE ENCOUNTER
Please ensure pt is aware that I recommended pt see rheumatology, and pain management. Dr. Tompkins is an orthopedic physician. He has already seen an orthopedic physician. I would advise pt to see Rheumatology first for evaluation to rule out autoimmune disease since pain is in multiple joints.

## 2022-12-01 NOTE — TELEPHONE ENCOUNTER
Patient informed. He stated that he is going to cancel his appointment with ortho today and call Dr. Celeste Levi office today to schedule an appointment.

## 2022-12-02 NOTE — PROGRESS NOTES
[address]   Cardiac Follow UP    Referring Provider:  Lue Boeck, MD     Chief Complaint   Patient presents with    Follow-up     6 month office visit    Coronary Artery Disease    Congestive Heart Failure    Other     No new concerns        Subjective: Mr Alannah Chaudhary is being seen today for cardiology follow up; no complaints today    History of Present Illness:    Romero Ford is a 67 y.o. male who has PMH severe ICM EF=15-20%, hx VT s/p ICD with upgrade BiV-. CAD s/p RCA stents prior. [facility] 6/14 --> stable known ischemic heart disease. He also has hx MI, HLD, and chronic systolic CHF. Note ECHO 6/29/20 EF=15-20% (EF=20-25% in 12/18, 15-20% in 7/16, and 25% in 2014). On 6/28/20 presented ER for ICD shock. Most recent pia nuc 6/28/20 large scar anteroseptal/apical and inferior walls; LVEF 24%, no ischemia (no change 7/16, 11/15, 11/13). He upgraded to BiV-ICD on 12/17/2020 with Dr. Amparo Petty. He decreased metoprolol to 25 mg daily due to orthostasis issues. Taken off lisinopril 11/21 by PCP due to K+/renal issues but now tolerating. Followed up with Marley Chamberlain NP 1/21/22 and lisinopril 5mg restarted. Note EKG 3/14/22 NSR; frequent PVC's; 1st AV block; LV; NST and T wave abnormality. Most recent EKG 6/21/2022 NSR; Frequent PVC's; IVCD; NST changeMost recent Device Check 11/5/22 noted AP 3%; RVP 3%; % BILLY 10.6 yrs; normal function and pacing. Today, he reports feeling good. He does all daily activities without issues. Patient denies chest pain, sob, palpitations, dizziness or syncope. He is compliant with  medications and tolerates well. He is watching diet better eating the right foods and has lost weight.      Weight today is 176# (Down 11# from 6/21/2022)    Wt Readings from Last 3 Encounters:   12/05/22 176 lb (79.8 kg)   11/28/22 177 lb (80.3 kg)   10/26/22 178 lb 3.2 oz (80.8 kg)       Past Medical History:   has a past medical history of AICD (automatic cardioverter/defibrillator) present, Arthritis, CAD (coronary artery disease), CHF (congestive heart failure) ([facility] [address] Chronic systolic CHF (congestive heart failure), NYHA class 3 ([address] GERD (gastroesophageal reflux disease), Hearing loss, Hyperlipidemia, MI (myocardial infarction) ([facility] [address] Rash, and Type 2 diabetes mellitus with chronic kidney disease. Surgical History:   has a past surgical history that includes Coronary angioplasty with stent; Cardiac defibrillator placement (Left, 12/17/2020); knee surgery; Colonoscopy; Cataract removal with implant (Right, 02/01/2017); Cataract removal with implant (Left, 03/01/2017); eye surgery; and Cystoscopy (N/A, 1/5/2022). Social History:   reports that he has been smoking cigarettes. He started smoking about 60 years ago. He has a 40.50 pack-year smoking history. He has never used smokeless tobacco. He reports that he does not drink alcohol and does not use drugs. Family History:  family history includes Cancer in his father; Diabetes in his mother. Home Medications:  Prior to Admission medications    Medication Sig Start Date End Date Taking?  Authorizing Provider   lisinopril (PRINIVIL;ZESTRIL) 2.5 MG tablet Take 1 tablet by mouth daily 6/21/22  Yes Aminta Ospina MD   omeprazole (PRILOSEC) 20 MG delayed release capsule Take 1 capsule by mouth every morning (before breakfast) 5/20/22  Yes Sandy Jay MD   amitriptyline (ELAVIL) 25 MG tablet TAKE 1 TABLET EVERY NIGHT 5/2/22  Yes SUSHMA Torres CNP   tiZANidine (ZANAFLEX) 2 MG tablet Take 1 tablet by mouth 3 times daily as needed (muscle tension) 5/2/22  Yes SUSHMA Torres CNP   gemfibrozil (LOPID) 600 MG tablet Take 1 tablet by mouth 2 times daily (before meals) 4/27/22  Yes Aminta Ospina MD   clopidogrel (PLAVIX) 75 MG tablet TAKE 1 TABLET EVERY DAY 4/27/22  Yes Aminta Ospina MD   metoprolol succinate (TOPROL XL) 50 MG extended release tablet Take 0.5 tablets by mouth daily 4/27/22  Yes Racheal Pringle MD   rosuvastatin (CRESTOR) 40 MG tablet TAKE 1 TABLET EVERY EVENING 4/27/22  Yes Racheal Pringle MD   spironolactone (ALDACTONE) 25 MG tablet TAKE 1/2 TABLET EVERY DAY 4/27/22  Yes Racheal Pringle MD   Blood Glucose Monitoring Suppl (TRUE METRIX METER) w/Device KIT  11/10/21  Yes Historical Provider, MD   Alcohol Swabstick 70 % PADS Use prior to checking glucose daily. E11.9 11/9/21  Yes Herberth Em MD   blood glucose test strips (TRUE METRIX BLOOD GLUCOSE TEST) strip Check glucose daily. DM 2 E 11.9  Patient taking differently: Check glucose daily. DM 2 E 11.9 11/9/21  Yes Herberth Em MD   Blood Glucose Monitoring Suppl (TRUE METRIX METER) YELENA Check glucose daily. E11.9 11/9/21  Yes Herberth Em MD   Respiratory Therapy Supplies (NEBULIZER/TUBING/MOUTHPIECE) KIT 1 kit by Does not apply route 3 times daily 8/10/21  Yes Herberth Em MD   ondansetron (ZOFRAN-ODT) 4 MG disintegrating tablet DISSOLVE 1 TABLET ON THE TONGUE EVERY 8 HOURS AS NEEDED FOR NAUSEA  OR  VOMITING 5/14/21  Yes WILBERTO Carver   nitroGLYCERIN (NITROSTAT) 0.4 MG SL tablet Place 1 tablet under the tongue every 5 minutes as needed for Chest pain 2/25/21  Yes James Benitez MD   Handicap Placard MISC by Does not apply route Exp: 1/1/2024 1/15/21  Yes WILBERTO Carver   ipratropium-albuterol (DUONEB) 0.5-2.5 (3) MG/3ML SOLN nebulizer solution Inhale 3 mLs into the lungs every 6 hours as needed for Shortness of Breath 5/10/19  Yes Herberth Em MD   methotrexate (RHEUMATREX) 2.5 MG chemo tablet Take 1 tablet by mouth once a week RX directions are 4 tablets weekly. Patient takes one tablet Monday/Wednesday/Friday. Patient taking differently: Take 2.5 mg by mouth once a week Takes 2 tablets on friday.  3/5/19  Yes Dinesh Elizabeth MD   folic acid (FOLVITE) 1 MG tablet Take 1 mg by mouth daily   Yes Historical Provider, MD   tamsulosin (FLOMAX) 0.4 MG capsule Take 0.4 mg by mouth daily   Yes Vanita Díaz MD   aspirin 81 MG tablet Take 81 mg by mouth daily. Yes Historical Provider, MD   finasteride (PROSCAR) 5 MG tablet Take 5 mg by mouth daily Indications: Rx by Dr. Ravi Rodriguez  Patient not taking: Reported on 12/5/2022    Anson Chris MD        Allergies:  Patient has no known allergies. Review of Systems:   Constitutional: there has been no unanticipated weight loss. There's been no change in energy level, sleep pattern, or activity level. Eyes: No visual changes or diplopia. No scleral icterus. ENT: No Headaches, hearing loss or vertigo. No mouth sores or sore throat. Cardiovascular: Reviewed in HPI  Respiratory: No cough or wheezing, no sputum production. No hematemesis. Gastrointestinal: No abdominal pain, appetite loss, blood in stools. No change in bowel or bladder habits. Genitourinary: No dysuria, trouble voiding, or hematuria. Musculoskeletal:  No gait disturbance, weakness or joint complaints. Integumentary: No rash or pruritis. Neurological: No headache, diplopia, change in muscle strength, numbness or tingling. No change in gait, balance, coordination, mood, affect, memory, mentation, behavior. Psychiatric: No anxiety, no depression. Endocrine: No malaise, fatigue or temperature intolerance. No excessive thirst, fluid intake, or urination. No tremor. Hematologic/Lymphatic: No abnormal bruising or bleeding, blood clots or swollen lymph nodes. Allergic/Immunologic: No nasal congestion or hives. Physical Examination:    Vitals:    12/05/22 1500   BP: 114/80   Pulse: 71   Temp: 98.7 °F (37.1 °C)   SpO2: 99%     Constitutional and General Appearance: NAD   Respiratory:  Normal excursion and expansion without use of accessory muscles  Resp Auscultation: soft crackles in right base otherwise clear breath sounds  Cardiovascular:   The apical impulses not displaced  Heart tones are crisp and normal  Cervical veins are not engorged  The carotid upstroke is normal in amplitude and contour without delay or bruit  S1S2, No S3,  ? soft ELVIA; regular   Peripheral pulses are symmetrical and full  There is no clubbing, cyanosis of the extremities.   No edema  Femoral Arteries: 2+ and equal  Pedal Pulses: 2+ and equal   Abdomen:  No masses or tenderness  Liver/Spleen: No Abnormalities Noted  Neurological/Psychiatric:  Alert and oriented in all spheres  Moves all extremities well  Exhibits normal gait balance and coordination  No abnormalities of mood, affect, memory, mentation, or behavior are noted    Lab Results   Component Value Date     (L) 11/18/2022    K 4.8 11/18/2022    CL 98 (L) 11/18/2022    CO2 19 (L) 11/18/2022    BUN 32 (H) 11/18/2022    CREATININE 1.3 11/18/2022    GLUCOSE 94 11/18/2022    CALCIUM 9.9 11/18/2022    PROT 7.7 10/24/2022    LABALBU 4.5 11/18/2022    BILITOT <0.2 10/24/2022    ALKPHOS 109 10/24/2022    AST 17 10/24/2022    ALT 6 (L) 10/24/2022    LABGLOM 58 (A) 11/18/2022    GFRAA 60 (A) 10/14/2022    AGRATIO 1.5 10/24/2022    GLOB 2.3 05/07/2021     Lab Results   Component Value Date    WBC 8.1 11/18/2022    HGB 11.9 (L) 11/18/2022    HCT 35.5 (L) 11/18/2022    MCV 95.3 11/18/2022     11/18/2022       Lab Results   Component Value Date    CHOL 134 10/24/2022    CHOL 133 08/11/2021    CHOL 142 12/17/2020     Lab Results   Component Value Date    TRIG 62 10/24/2022    TRIG 148 08/11/2021    TRIG 132 12/17/2020     Lab Results   Component Value Date    HDL 48 10/24/2022    HDL 37 (L) 08/11/2021    HDL 45 12/17/2020     Lab Results   Component Value Date    LDLCALC 74 10/24/2022    LDLCALC 66 08/11/2021    LDLCALC 71 12/17/2020     Lab Results   Component Value Date    LABVLDL 12 10/24/2022    LABVLDL 30 08/11/2021    LABVLDL 26 12/17/2020     Lab Results   Component Value Date/Time     11/18/2022 01:50 PM    K 4.8 11/18/2022 01:50 PM    K 4.9 03/14/2022 01:06 PM    CL 98 11/18/2022 01:50 PM    CO2 19 11/18/2022 01:50 PM    BUN 32 11/18/2022 01:50 PM    CREATININE 1.3 11/18/2022 01:50 PM    GLUCOSE 94 11/18/2022 01:50 PM    CALCIUM 9.9 11/18/2022 01:50 PM      Lab Results   Component Value Date    WBC 8.1 11/18/2022    HGB 11.9 (L) 11/18/2022    HCT 35.5 (L) 11/18/2022    MCV 95.3 11/18/2022     11/18/2022     Hemoglobin A1C   Date Value Ref Range Status   10/14/2022 6.1 See comment % Final     Comment:     Comment:  Diagnosis of Diabetes: > or = 6.5%  Increased risk of diabetes (Prediabetes): 5.7-6.4%  Glycemic Control: Nonpregnant Adults: <7.0%                    Pregnant: <6.0%             I have personally reviewed all labs including lipids 8/11/21    Assessment:     1. Ischemic cardiomyopathy: Note ECHO 6/29/20 EF=15-20%. Clinically compensated NYHA Class II and will continue current CHF medical regimen. Continue GDMT with Toprol XL 25mg qd, lisinopril 2.5mg qd, and aldactone 12.5mg qd. Will try to add SGLT2i if able to afford. 2. Hyperlipidemia:  Most recent lipids 10/24/22 see results I personally reviewed (see above). Well controlled overall except LDL 74 and prefer lower. He does not want new meds and will continue same regimen. 3. CAD:   s/p RCA stents prior. Cardiac cath 6/14 --> stable known ischemic CAD. Most recent pia nuc 6/28/20 large scar anteroseptal/apical and inferior walls; LVEF 24%, no ischemia. There are no concerning symptoms for angina currently. 4. Orthostatic hypotension: Improved. Continue lower dose Toprol XL 25mg daily. Plan:  1. Medications reviewed. Refill for lisinopril warranted  2. Recommend that you follow up with EP NP in February for device management per Dr. Arlet Sanchez last note  3. Recommend Jardiance 10 mg daily to help. Check with your insurance to see if you can afford this. 4.  Continue current course    Plan to follow up in 6 months    This note was scribed in the presence of Danni Ojeda MD by Leena Salter RN.      I, Dr. Marianne Bella, personally performed the services described in this documentation, as scribed by the above signed scribe in my presence. It is both accurate and complete to my knowledge. I agree with the details independently gathered by the clinical support staff, while the remaining scribed note accurately describes my personal service to the patient. Cost of prescription medications and patient compliance have been reviewed with patient. All questions answered. Thank you for allowing me to participate in the care of this individual.    Smiley Rivera.  Papito Sargent M.D., [address] [facility]

## 2022-12-05 ENCOUNTER — OFFICE VISIT (OUTPATIENT)
Dept: CARDIOLOGY CLINIC | Age: 72
End: 2022-12-05
Payer: MEDICARE

## 2022-12-05 ENCOUNTER — TELEPHONE (OUTPATIENT)
Dept: CARDIOLOGY CLINIC | Age: 72
End: 2022-12-05

## 2022-12-05 VITALS
TEMPERATURE: 98.7 F | BODY MASS INDEX: 24.64 KG/M2 | DIASTOLIC BLOOD PRESSURE: 80 MMHG | OXYGEN SATURATION: 99 % | WEIGHT: 176 LBS | HEART RATE: 71 BPM | HEIGHT: 71 IN | SYSTOLIC BLOOD PRESSURE: 114 MMHG

## 2022-12-05 DIAGNOSIS — I44.7 LBBB (LEFT BUNDLE BRANCH BLOCK): ICD-10-CM

## 2022-12-05 DIAGNOSIS — I25.10 CORONARY ARTERY DISEASE INVOLVING NATIVE CORONARY ARTERY OF NATIVE HEART WITHOUT ANGINA PECTORIS: ICD-10-CM

## 2022-12-05 DIAGNOSIS — I25.5 ISCHEMIC CARDIOMYOPATHY: Primary | ICD-10-CM

## 2022-12-05 DIAGNOSIS — I27.0 PRIMARY PULMONARY HYPERTENSION (HCC): ICD-10-CM

## 2022-12-05 DIAGNOSIS — Z72.0 TOBACCO ABUSE: ICD-10-CM

## 2022-12-05 DIAGNOSIS — I50.22 CHRONIC SYSTOLIC CONGESTIVE HEART FAILURE (HCC): ICD-10-CM

## 2022-12-05 DIAGNOSIS — I05.9 MITRAL VALVE DISORDER: ICD-10-CM

## 2022-12-05 DIAGNOSIS — E78.2 MIXED HYPERLIPIDEMIA: ICD-10-CM

## 2022-12-05 PROCEDURE — 99214 OFFICE O/P EST MOD 30 MIN: CPT | Performed by: INTERNAL MEDICINE

## 2022-12-05 PROCEDURE — 1123F ACP DISCUSS/DSCN MKR DOCD: CPT | Performed by: INTERNAL MEDICINE

## 2022-12-05 RX ORDER — LISINOPRIL 2.5 MG/1
2.5 TABLET ORAL DAILY
Qty: 90 TABLET | Refills: 3 | Status: SHIPPED | OUTPATIENT
Start: 2022-12-05

## 2022-12-05 NOTE — PATIENT INSTRUCTIONS
Plan:  1. Medications reviewed. Refill for lisinopril warranted  2. Recommend that you follow up with EP NP in February for device management per Dr. Anish Ugarte last note  3. Recommend Jardiance 10 mg daily to help. Check with your insurance to see if you can afford this.     4.  Continue current course    Plan to follow up in 6 months

## 2022-12-05 NOTE — TELEPHONE ENCOUNTER
Pt saw SMM in office today, wanted pt to start on Jardiance. Pt stated pharmacy told him it would be $84 for a 90 day supply, pt stated he is unable to afford this at this time and would like to know what SMM would like him to do? While on phone unrelated to medication, pt asked if SMM could provide him with paperwork needed to receive free handicap sticker? Please advise.

## 2022-12-05 NOTE — TELEPHONE ENCOUNTER
If unable to afford then keep on regular regimen. He is doing well with current meds. Yes, OK to give handicap placard and give for longest time available due to severe ischemic cardiomyopathy.

## 2022-12-06 NOTE — TELEPHONE ENCOUNTER
Spoke with pt and relayed message. Handicap letter created and pt requested it be mailed. Letter placed in mail bin.

## 2022-12-12 ENCOUNTER — TELEPHONE (OUTPATIENT)
Dept: FAMILY MEDICINE CLINIC | Age: 72
End: 2022-12-12

## 2022-12-12 NOTE — TELEPHONE ENCOUNTER
Patient calling to inform Dr. Alisia Rosado that he couldn't get in to see Dr. David Thakkar till 1/17 and couldn't get in to see pain management till 1/16.  GALE

## 2023-01-03 ENCOUNTER — TELEPHONE (OUTPATIENT)
Dept: CARDIOLOGY CLINIC | Age: 73
End: 2023-01-03

## 2023-01-03 ENCOUNTER — NURSE ONLY (OUTPATIENT)
Dept: CARDIOLOGY CLINIC | Age: 73
End: 2023-01-03

## 2023-01-03 DIAGNOSIS — I42.9 SECONDARY CARDIOMYOPATHY (HCC): Primary | ICD-10-CM

## 2023-01-03 DIAGNOSIS — I50.22 CHRONIC SYSTOLIC CONGESTIVE HEART FAILURE (HCC): ICD-10-CM

## 2023-01-03 DIAGNOSIS — Z95.810 AICD (AUTOMATIC CARDIOVERTER/DEFIBRILLATOR) PRESENT: ICD-10-CM

## 2023-01-03 DIAGNOSIS — I25.5 ISCHEMIC CARDIOMYOPATHY: ICD-10-CM

## 2023-01-03 NOTE — TELEPHONE ENCOUNTER
PT calling stating that he seen his pain Management Dr. (Dr. Manan Munoz) on 12.29.22 and he prescribed him the pain medication Hydrocodone-acetaminophen 5-325 (Estevan Bee)    He is wanting to know if it is ok to take with his cardiac med's? Next ov 6.12.23  Last seen SMM on 12.5.22    Assessment:      1. Ischemic cardiomyopathy: Note ECHO 6/29/20 EF=15-20%. Clinically compensated NYHA Class II and will continue current CHF medical regimen. Continue GDMT with Toprol XL 25mg qd, lisinopril 2.5mg qd, and aldactone 12.5mg qd. Will try to add SGLT2i if able to afford. 2. Hyperlipidemia:  Most recent lipids 10/24/22 see results I personally reviewed (see above). Well controlled overall except LDL 74 and prefer lower. He does not want new meds and will continue same regimen. 3. CAD:   s/p RCA stents prior. Cardiac cath 6/14 --> stable known ischemic CAD. Most recent pia nuc 6/28/20 large scar anteroseptal/apical and inferior walls; LVEF 24%, no ischemia. There are no concerning symptoms for angina currently. 4. Orthostatic hypotension: Improved. Continue lower dose Toprol XL 25mg daily. Plan:  1. Medications reviewed. Refill for lisinopril warranted  2. Recommend that you follow up with EP NP in February for device management per Dr. Berta Rose last note  3. Recommend Jardiance 10 mg daily to help. Check with your insurance to see if you can afford this. 4.  Continue current course     Plan to follow up in 6 months     This note was scribed in the presence of Chantell Richmond MD by Lester Holstein RN.

## 2023-01-03 NOTE — TELEPHONE ENCOUNTER
Yes, I am OK with him taking Norco PRN. I would try to minimize pain medication if possible. Thanks.

## 2023-01-04 ENCOUNTER — OFFICE VISIT (OUTPATIENT)
Dept: FAMILY MEDICINE CLINIC | Age: 73
End: 2023-01-04
Payer: MEDICARE

## 2023-01-04 VITALS
BODY MASS INDEX: 25.04 KG/M2 | DIASTOLIC BLOOD PRESSURE: 74 MMHG | WEIGHT: 177 LBS | OXYGEN SATURATION: 92 % | HEART RATE: 72 BPM | SYSTOLIC BLOOD PRESSURE: 112 MMHG

## 2023-01-04 DIAGNOSIS — M25.50 POLYARTHRALGIA: Primary | ICD-10-CM

## 2023-01-04 DIAGNOSIS — R53.1 GENERALIZED WEAKNESS: ICD-10-CM

## 2023-01-04 PROCEDURE — 1123F ACP DISCUSS/DSCN MKR DOCD: CPT | Performed by: NURSE PRACTITIONER

## 2023-01-04 PROCEDURE — 99212 OFFICE O/P EST SF 10 MIN: CPT | Performed by: NURSE PRACTITIONER

## 2023-01-04 ASSESSMENT — PATIENT HEALTH QUESTIONNAIRE - PHQ9
8. MOVING OR SPEAKING SO SLOWLY THAT OTHER PEOPLE COULD HAVE NOTICED. OR THE OPPOSITE, BEING SO FIGETY OR RESTLESS THAT YOU HAVE BEEN MOVING AROUND A LOT MORE THAN USUAL: 0
4. FEELING TIRED OR HAVING LITTLE ENERGY: 3
SUM OF ALL RESPONSES TO PHQ QUESTIONS 1-9: 6
3. TROUBLE FALLING OR STAYING ASLEEP: 3
SUM OF ALL RESPONSES TO PHQ9 QUESTIONS 1 & 2: 0
2. FEELING DOWN, DEPRESSED OR HOPELESS: 0
10. IF YOU CHECKED OFF ANY PROBLEMS, HOW DIFFICULT HAVE THESE PROBLEMS MADE IT FOR YOU TO DO YOUR WORK, TAKE CARE OF THINGS AT HOME, OR GET ALONG WITH OTHER PEOPLE: 0
SUM OF ALL RESPONSES TO PHQ QUESTIONS 1-9: 6
9. THOUGHTS THAT YOU WOULD BE BETTER OFF DEAD, OR OF HURTING YOURSELF: 0
1. LITTLE INTEREST OR PLEASURE IN DOING THINGS: 0
SUM OF ALL RESPONSES TO PHQ QUESTIONS 1-9: 6
6. FEELING BAD ABOUT YOURSELF - OR THAT YOU ARE A FAILURE OR HAVE LET YOURSELF OR YOUR FAMILY DOWN: 0
5. POOR APPETITE OR OVEREATING: 0
7. TROUBLE CONCENTRATING ON THINGS, SUCH AS READING THE NEWSPAPER OR WATCHING TELEVISION: 0
SUM OF ALL RESPONSES TO PHQ QUESTIONS 1-9: 6

## 2023-01-04 ASSESSMENT — ENCOUNTER SYMPTOMS
COUGH: 0
ABDOMINAL DISTENTION: 0
NAUSEA: 0
VOMITING: 0
CHEST TIGHTNESS: 0
CONSTIPATION: 0
BACK PAIN: 1
SHORTNESS OF BREATH: 0
DIARRHEA: 0
ABDOMINAL PAIN: 0
WHEEZING: 0

## 2023-01-04 ASSESSMENT — ANXIETY QUESTIONNAIRES
3. WORRYING TOO MUCH ABOUT DIFFERENT THINGS: 1
5. BEING SO RESTLESS THAT IT IS HARD TO SIT STILL: 0
1. FEELING NERVOUS, ANXIOUS, OR ON EDGE: 0
IF YOU CHECKED OFF ANY PROBLEMS ON THIS QUESTIONNAIRE, HOW DIFFICULT HAVE THESE PROBLEMS MADE IT FOR YOU TO DO YOUR WORK, TAKE CARE OF THINGS AT HOME, OR GET ALONG WITH OTHER PEOPLE: NOT DIFFICULT AT ALL
6. BECOMING EASILY ANNOYED OR IRRITABLE: 0
7. FEELING AFRAID AS IF SOMETHING AWFUL MIGHT HAPPEN: 0
GAD7 TOTAL SCORE: 1
4. TROUBLE RELAXING: 0
2. NOT BEING ABLE TO STOP OR CONTROL WORRYING: 0

## 2023-01-04 NOTE — PROGRESS NOTES
2023  [address] (: 1950)  67 y.o.    ASSESSMENT and PLAN:    No follow-ups on file. Armand Fuentes was seen today for other. Diagnoses and all orders for this visit:    [facility] - [facility]  -evaluation for increase in mobility, strength. -improve transfers, and safety.   -would benefit from chair lift to aid getting out of chair, and decreasing pain during transfer. It likely will help encourage patient to get out of bed and to chair during the day.   -scheduled to see rheumatology   -continue to follow with pain management. Generalized weakness  -     [facility] - [facility]  -improve mobility and strength as pain gets controlled with pt. HPI  Presenting for evaluation for chair lift in home. Pt has had ongoing pain to multiple joints, has a history of OA. Currently is working with pain management and scheduled with rheumatology. On norco, without any improvement, difficultly sleeping. Pain is rated 10/10 most days, and worse when using hands, making it difficult to raise himself out of the chair or bed without discomfort. Has significant pain when standing up from a regular armchair. Mood is decreased due to ongoing mobility challenges, would benefit being in chair throughout the day vs bed. Is working with pain management for pain control. Liz Street would benefit from assistance getting out of the chair at home with a chairlift due to Chronic pain, and altered sensation to fingers, and pain with gripping which  is hindering mobility. Review of Systems   Constitutional:  Negative for activity change, appetite change, chills, fatigue, fever and unexpected weight change. HENT: Negative. Respiratory:  Negative for cough, chest tightness, shortness of breath and wheezing. Cardiovascular:  Negative for chest pain, palpitations and leg swelling.    Gastrointestinal:  Negative for abdominal distention, abdominal pain, constipation, diarrhea, nausea and vomiting. Genitourinary: Negative. Musculoskeletal:  Positive for arthralgias and back pain. Skin: Negative. Neurological:  Negative for dizziness, weakness, light-headedness, numbness and headaches. Hematological: Negative. Psychiatric/Behavioral: Negative. Allergies, past medical history, family history, and social history reviewed and unchanged from previous encounter. Current Outpatient Medications   Medication Sig Dispense Refill    HYDROcodone-acetaminophen (NORCO) 5-325 MG per tablet Take 1 tablet by mouth 2 times daily as needed for Pain for up to 30 days. Max Daily Amount: 2 tablets 60 tablet 0    lisinopril (PRINIVIL;ZESTRIL) 2.5 MG tablet Take 1 tablet by mouth daily 90 tablet 3    omeprazole (PRILOSEC) 20 MG delayed release capsule Take 1 capsule by mouth every morning (before breakfast) 30 capsule 1    amitriptyline (ELAVIL) 25 MG tablet TAKE 1 TABLET EVERY NIGHT 90 tablet 1    tiZANidine (ZANAFLEX) 2 MG tablet Take 1 tablet by mouth 3 times daily as needed (muscle tension) 30 tablet 1    gemfibrozil (LOPID) 600 MG tablet Take 1 tablet by mouth 2 times daily (before meals) 180 tablet 5    clopidogrel (PLAVIX) 75 MG tablet TAKE 1 TABLET EVERY DAY 90 tablet 5    metoprolol succinate (TOPROL XL) 50 MG extended release tablet Take 0.5 tablets by mouth daily 90 tablet 5    rosuvastatin (CRESTOR) 40 MG tablet TAKE 1 TABLET EVERY EVENING 90 tablet 5    spironolactone (ALDACTONE) 25 MG tablet TAKE 1/2 TABLET EVERY DAY 45 tablet 5    Blood Glucose Monitoring Suppl (TRUE METRIX METER) w/Device KIT       Alcohol Swabstick 70 % PADS Use prior to checking glucose daily. E11.9 100 each 3    blood glucose test strips (TRUE METRIX BLOOD GLUCOSE TEST) strip Check glucose daily. DM 2 E 11.9 (Patient taking differently: Check glucose daily.   DM 2 E 11.9) 100 each 3    Blood Glucose Monitoring Suppl (TRUE METRIX METER) YELENA Check glucose daily. E11.9 1 each 0    Respiratory Therapy Supplies (NEBULIZER/TUBING/MOUTHPIECE) KIT 1 kit by Does not apply route 3 times daily 1 kit 0    ondansetron (ZOFRAN-ODT) 4 MG disintegrating tablet DISSOLVE 1 TABLET ON THE TONGUE EVERY 8 HOURS AS NEEDED FOR NAUSEA  OR  VOMITING 30 tablet 1    nitroGLYCERIN (NITROSTAT) 0.4 MG SL tablet Place 1 tablet under the tongue every 5 minutes as needed for Chest pain 25 tablet 1    Handicap Placard MISC by Does not apply route Exp: 1/1/2024 2 each 0    ipratropium-albuterol (DUONEB) 0.5-2.5 (3) MG/3ML SOLN nebulizer solution Inhale 3 mLs into the lungs every 6 hours as needed for Shortness of Breath 120 vial 5    methotrexate (RHEUMATREX) 2.5 MG chemo tablet Take 1 tablet by mouth once a week RX directions are 4 tablets weekly. Patient takes one tablet Monday/Wednesday/Friday. (Patient taking differently: Take 2.5 mg by mouth once a week Takes 2 tablets on friday.) 1 tablet 0    folic acid (FOLVITE) 1 MG tablet Take 1 mg by mouth daily      tamsulosin (FLOMAX) 0.4 MG capsule Take 0.4 mg by mouth daily      aspirin 81 MG tablet Take 81 mg by mouth daily. No current facility-administered medications for this visit. Vitals:    01/04/23 1149   BP: 112/74   Site: Right Upper Arm   Position: Sitting   Cuff Size: Medium Adult   Pulse: 72   SpO2: 92%   Weight: 177 lb (80.3 kg)     Estimated body mass index is 25.04 kg/m² as calculated from the following:    Height as of 12/5/22: 5' 10.5\" (1.791 m). Weight as of this encounter: 177 lb (80.3 kg). Physical Exam  Vitals reviewed. Constitutional:       General: He is not in acute distress. Appearance: Normal appearance. He is not toxic-appearing or diaphoretic. HENT:      Head: Normocephalic and atraumatic. Nose: Nose normal.   Eyes:      Conjunctiva/sclera: Conjunctivae normal.   Cardiovascular:      Rate and Rhythm: Normal rate and regular rhythm. Pulses: Normal pulses.            Radial pulses are 2+ on the right side and 2+ on the left side. Heart sounds: Normal heart sounds. Pulmonary:      Effort: Pulmonary effort is normal. No respiratory distress. Breath sounds: Normal breath sounds. No wheezing or rhonchi. Chest:      Chest wall: No tenderness. Abdominal:      General: Abdomen is flat. Bowel sounds are normal. There is no distension. Palpations: Abdomen is soft. Tenderness: There is no abdominal tenderness. Musculoskeletal:         General: No tenderness or signs of injury. Normal range of motion. Cervical back: Normal range of motion and neck supple. Right lower leg: No edema. Left lower leg: No edema. Skin:     General: Skin is warm and dry. Capillary Refill: Capillary refill takes less than 2 seconds. Findings: No bruising, erythema, lesion or rash. Neurological:      General: No focal deficit present. Mental Status: He is alert and oriented to person, place, and time. Mental status is at baseline. Sensory: Sensory deficit (tingling to fingers intermittently to bilateral hands.) present. Motor: No weakness (trace pain bilaterally with hand ). Coordination: Coordination normal.   Psychiatric:         Mood and Affect: Mood normal.         Behavior: Behavior normal.         Thought Content:  Thought content normal.         Judgment: Judgment normal.

## 2023-01-08 NOTE — PROGRESS NOTES
End of 91-day monitoring period 1/3/23. [employer] Heart Failure Index 15-increasing. TI 42-NPRB to review. 1 NSVT x 15 sec. RVP/LVP 3/100%. Remote transmission received for patients CRT-D. Transmission shows normal sensing and pacing function. EP physician will review. See interrogation under the cardiology tab in the [address] [place] [address] [address] field for more details. Will continue to monitor remotely. LVP . -Ventricular Pacing Chamber LV Only.

## 2023-01-09 ENCOUNTER — TELEPHONE (OUTPATIENT)
Dept: FAMILY MEDICINE CLINIC | Age: 73
End: 2023-01-09

## 2023-01-09 NOTE — TELEPHONE ENCOUNTER
Incoming call from patient requesting results of CT completed at [address] [place] [address]. Advised that we have not been sent results & that I will call to obtain a copy. Called [facility] at [phone] to request a copy of results, advised that once reviewed we will call.     Still in review at this time, they should fax us once reviewed by radiologist.

## 2023-01-13 ENCOUNTER — TELEPHONE (OUTPATIENT)
Dept: CARDIOLOGY CLINIC | Age: 73
End: 2023-01-13

## 2023-01-13 NOTE — TELEPHONE ENCOUNTER
Mauricio Garza, 1950    Cardiac Risk Assessment    What type of procedure are you having? CERVICAL EPIDURAL STEROID INJECTION    When is your procedure scheduled for? TBD    Medications to be stopped. PLAVIX 7-DAYS PRIOR TO INJECTION  ASA 6-DAYS PRIOR TO INJECTION    What physician is performing your procedure? Naveen Luke    Phone Number:   [phone]    Fax number to send the letter:   [phone]    Cardiologist:   FABIÁN    Last Appointment:   12.05.22    Assessment:      1. Ischemic cardiomyopathy: Note ECHO 6/29/20 EF=15-20%. Clinically compensated NYHA Class II and will continue current CHF medical regimen. Continue GDMT with Toprol XL 25mg qd, lisinopril 2.5mg qd, and aldactone 12.5mg qd. Will try to add SGLT2i if able to afford. 2. Hyperlipidemia:  Most recent lipids 10/24/22 see results I personally reviewed (see above). Well controlled overall except LDL 74 and prefer lower. He does not want new meds and will continue same regimen. 3. CAD:   s/p RCA stents prior. Cardiac cath 6/14 --> stable known ischemic CAD. Most recent pia nuc 6/28/20 large scar anteroseptal/apical and inferior walls; LVEF 24%, no ischemia. There are no concerning symptoms for angina currently. 4. Orthostatic hypotension: Improved. Continue lower dose Toprol XL 25mg daily. Plan:  1. Medications reviewed. Refill for lisinopril warranted  2. Recommend that you follow up with EP NP in February for device management per Dr. Everett Lo last note  3. Recommend Jardiance 10 mg daily to help. Check with your insurance to see if you can afford this. 4.  Continue current course     Plan to follow up in 6 months     This note was scribed in the presence of Lissy Srivastava MD by Owen Sanchez RN.

## 2023-01-13 NOTE — TELEPHONE ENCOUNTER
Higher risk clinically for lower risk procedure. Would ideally continue baby aspirin and hold plavix only. If unable to do so then hold both for least amount of time possible.

## 2023-01-17 ENCOUNTER — HOSPITAL ENCOUNTER (OUTPATIENT)
Dept: GENERAL RADIOLOGY | Age: 73
Discharge: HOME | End: 2023-01-17
Payer: MEDICARE

## 2023-01-17 ENCOUNTER — OFFICE VISIT (OUTPATIENT)
Dept: RHEUMATOLOGY | Age: 73
End: 2023-01-17
Payer: MEDICARE

## 2023-01-17 VITALS
HEIGHT: 71 IN | DIASTOLIC BLOOD PRESSURE: 78 MMHG | BODY MASS INDEX: 24.78 KG/M2 | SYSTOLIC BLOOD PRESSURE: 132 MMHG | WEIGHT: 177 LBS

## 2023-01-17 DIAGNOSIS — M19.90 INFLAMMATORY ARTHRITIS: ICD-10-CM

## 2023-01-17 DIAGNOSIS — Z11.59 ENCOUNTER FOR SCREENING FOR OTHER VIRAL DISEASES: ICD-10-CM

## 2023-01-17 DIAGNOSIS — L40.9 PSORIASIS: ICD-10-CM

## 2023-01-17 DIAGNOSIS — M19.90 INFLAMMATORY ARTHRITIS: Primary | ICD-10-CM

## 2023-01-17 PROCEDURE — 99205 OFFICE O/P NEW HI 60 MIN: CPT | Performed by: INTERNAL MEDICINE

## 2023-01-17 PROCEDURE — 73130 X-RAY EXAM OF HAND: CPT

## 2023-01-17 PROCEDURE — 1123F ACP DISCUSS/DSCN MKR DOCD: CPT | Performed by: INTERNAL MEDICINE

## 2023-01-17 RX ORDER — PREDNISONE 10 MG/1
TABLET ORAL
Qty: 60 TABLET | Refills: 0 | Status: SHIPPED | OUTPATIENT
Start: 2023-01-17

## 2023-01-17 NOTE — PROGRESS NOTES
[address], MD                                                           [address] [place] [address] 51, [address] [place] [address]                                                             [phone] (P) [phone] (F)      Note is transcribed using voice recognition software. Inadvertent computerized transcription errors may be present. Patient identification: Selena Shirley, male : [id number],73 y.o.        ASSESSMENT AND PLAN:  Sukhdev Trejo was seen today for establish care and joint pain. Diagnoses and all orders for this visit:    Inflammatory arthritis  -     Rheumatoid Factor; Future  -     Cyclic Citrul Peptide Antibody, IgG; Future  -     Quantiferon, Incubated; Future  -     Hepatitis B Surface Antigen; Future  -     Hepatitis C Antibody; Future  -     Comprehensive Metabolic Panel; Future  -     CBC with Auto Differential; Future  -     C-Reactive Protein; Future  -     Sedimentation Rate; Future  -     XR HAND RIGHT (MIN 3 VIEWS); Future  -     XR HAND LEFT (MIN 3 VIEWS); Future    Psoriasis    Encounter for screening for other viral diseases   -     Hepatitis B Surface Antigen; Future    Other orders  -     predniSONE (DELTASONE) 10 MG tablet; 2 tab po daily x 7 days. 1.5 tab po daily x 7 days. 1 tab po daily thereafter      Inflammatory polyarthritis-affecting small, intermediate and large joints. Patient has history of psoriasis, was on methotrexate by dermatologist, discontinued couple of years ago as psoriasis was in remission. Suspect psoriatic arthritis in RA pattern. Differential PMR with puffy hands.  -Update work-up as above. -Prednisone taper for the time being.  -Patient is also followed by pain management, is scheduled for low back back injections for degenerative arthritis.   If he feels better with prednisone, patient is advised to call pain management, it may be good idea to hold off on back injections at this time. I will see him back in 2 weeks to go over test results, and plan for DMARD. Given chronic kidney disease and medical comorbidities, he is high risk for DMARD. Plan for low-dose prednisone-add on low-dose methotrexate or biologic therapy. Patient indicates understanding and agrees with the management plan. #################################################################################################      REASON FOR CONSULTATION:  Patient is being seen at the request of  Marifer Zapata MD / Ovi Amosfor evaluation of joint pain. HISTORY OF PRESENT ILLNESS:  75 y.o. male with multiple medical comorbidities-coronary artery disease-multiple stents, pacemaker/defibrillator, hypertension, hyperlipidemia, chronic kidney disease-has history of psoriasis, used to see Dr. Christina Darnell, took methotrexate for couple of years, discontinued in 2019 when psoriasis went into remission. He does not have active psoriasis at this time. Previously, had in the scalp, upper and lower extremities per patient. He has been experiencing pain, swelling, stiffness in his finger joints, wrist, shoulders, knees, ankles since Thanksgiving to the point that he is not able to make full fist.  This is affecting his ADLs significantly. Wife has been helping with daily chores as patient does not have any  strength, in fact is not able to make fist.  Symptoms are getting worse in last couple of months. He also has low back pain, history sounds like mechanical back pain rather than inflammatory. He denies any infections. No history of Crohn's disease, or any other skin condition other than psoriasis. No rashes, photosensitivity. Acetaminophen does not help. Norco or Percocet is not helping with his symptoms either. Cancer screening up-to-date.   All other ROS are negative. PMH, PSH,Social history , Meds reviewed. FH-no family history of rheumatoid arthritis or inflammatory rheumatic conditions. PHYSICAL EXAM:    Vitals:    /78   Ht 5' 10.5\" (1.791 m)   Wt 177 lb (80.3 kg)   BMI 25.04 kg/m²   AA)x3 well nourished, and well groomed, normal judgement. MKS:   Loose fist bilaterally about 50%. Active synovitis with tenderness noted across all PIPs, MCPs. Both wrists are swollen, tender, range of motion limited. Shoulder range of motion limited by 50% in all planes. Ankles are tender to joint line. OA changes noted in the scattered DIPs, PIPs, knees and feet joints. Hip range of motion are relatively preserved. No dactylitis or enthesitis. Skin: History of psoriasis but no rashes at this time, no induration or skin thickening or nodules. HEENT: Normal lids, lacrimal glands and pupils. No oral or nasal ulcers. Salivary glands reveal no evidence of abnormality. External inspection of the ears and nose within normal limits. No leg edema. S1-S2 regular. DATA:   Lab Results   Component Value Date    SEDRATE 60 (H) 01/17/2023     Lab Results   Component Value Date    CRP 22.2 (H) 01/17/2023     Lab Results   Component Value Date     01/17/2023    K 4.9 01/17/2023    CL 98 (L) 01/17/2023    CO2 21 01/17/2023    BUN 28 (H) 01/17/2023    CREATININE 1.5 (H) 01/17/2023    GLUCOSE 99 01/17/2023    CALCIUM 10.3 01/17/2023    PROT 7.8 01/17/2023    LABALBU 4.7 01/17/2023    BILITOT 0.3 01/17/2023    ALKPHOS 131 (H) 01/17/2023    AST 19 01/17/2023    ALT 9 (L) 01/17/2023    LABGLOM 49 (A) 01/17/2023    GFRAA 60 (A) 10/14/2022    AGRATIO 1.5 01/17/2023    GLOB 2.3 05/07/2021       Lab Results   Component Value Date    WBC 11.2 (H) 01/17/2023    HGB 11.8 (L) 01/17/2023    HCT 36.5 (L) 01/17/2023    MCV 92.2 01/17/2023     01/17/2023     RF, CCP, hep B, hep C serology, negative. TB QuantiFERON pending.   Total time >60 minutes that includes the following-  Preparing to see the patient such as reviewing patients records, pre-charting, preparing the visit on the same day, performing a medically appropriate history and physical examination, counseling and educating patient about diagnosis, management plan, ordering appropriate testings, prescriptions, communicating findings to other care providers, and documenting clinical information in electronic medical record. I thank you for giving me the opportunity to be involved in [address] [place] [address] care and I look forward following Sukhdev Trejo along with you. If you have any questions or concerns please feel free to contact me at any time.   Kristy Gomez MD 01/20/23

## 2023-01-18 LAB
A/G RATIO: 1.5 (ref 1.1–2.2)
ALBUMIN SERPL-MCNC: 4.7 G/DL (ref 3.4–5)
ALP BLD-CCNC: 131 U/L (ref 40–129)
ALT SERPL-CCNC: 9 U/L (ref 10–40)
ANION GAP SERPL CALCULATED.3IONS-SCNC: 17 MMOL/L (ref 3–16)
AST SERPL-CCNC: 19 U/L (ref 15–37)
BASOPHILS ABSOLUTE: 0.1 K/UL (ref 0–0.2)
BASOPHILS RELATIVE PERCENT: 0.8 %
BILIRUB SERPL-MCNC: 0.3 MG/DL (ref 0–1)
BUN BLDV-MCNC: 28 MG/DL (ref 7–20)
C-REACTIVE PROTEIN: 22.2 MG/L (ref 0–5.1)
CALCIUM SERPL-MCNC: 10.3 MG/DL (ref 8.3–10.6)
CHLORIDE BLD-SCNC: 98 MMOL/L (ref 99–110)
CO2: 21 MMOL/L (ref 21–32)
CREAT SERPL-MCNC: 1.5 MG/DL (ref 0.8–1.3)
CYCLIC CITRULLINATED PEPTIDE ANTIBODY IGG: <0.5 U/ML (ref 0–2.9)
EOSINOPHILS ABSOLUTE: 0.2 K/UL (ref 0–0.6)
EOSINOPHILS RELATIVE PERCENT: 1.8 %
GFR SERPL CREATININE-BSD FRML MDRD: 49 ML/MIN/{1.73_M2}
GLUCOSE BLD-MCNC: 99 MG/DL (ref 70–99)
HCT VFR BLD CALC: 36.5 % (ref 40.5–52.5)
HEMOGLOBIN: 11.8 G/DL (ref 13.5–17.5)
HEPATITIS B SURFACE ANTIGEN INTERPRETATION: NORMAL
HEPATITIS C ANTIBODY INTERPRETATION: NORMAL
LYMPHOCYTES ABSOLUTE: 2 K/UL (ref 1–5.1)
LYMPHOCYTES RELATIVE PERCENT: 17.7 %
MCH RBC QN AUTO: 29.7 PG (ref 26–34)
MCHC RBC AUTO-ENTMCNC: 32.2 G/DL (ref 31–36)
MCV RBC AUTO: 92.2 FL (ref 80–100)
MONOCYTES ABSOLUTE: 0.8 K/UL (ref 0–1.3)
MONOCYTES RELATIVE PERCENT: 6.8 %
NEUTROPHILS ABSOLUTE: 8.2 K/UL (ref 1.7–7.7)
NEUTROPHILS RELATIVE PERCENT: 72.9 %
PDW BLD-RTO: 15.1 % (ref 12.4–15.4)
PLATELET # BLD: 304 K/UL (ref 135–450)
PMV BLD AUTO: 8 FL (ref 5–10.5)
POTASSIUM SERPL-SCNC: 4.9 MMOL/L (ref 3.5–5.1)
RBC # BLD: 3.96 M/UL (ref 4.2–5.9)
RHEUMATOID FACTOR: <10 IU/ML
SEDIMENTATION RATE, ERYTHROCYTE: 60 MM/HR (ref 0–20)
SODIUM BLD-SCNC: 136 MMOL/L (ref 136–145)
TOTAL PROTEIN: 7.8 G/DL (ref 6.4–8.2)
WBC # BLD: 11.2 K/UL (ref 4–11)

## 2023-01-20 LAB
QUANTI TB GOLD PLUS: NEGATIVE
QUANTI TB1 MINUS NIL: 0 IU/ML (ref 0–0.34)
QUANTI TB2 MINUS NIL: 0 IU/ML (ref 0–0.34)
QUANTIFERON MITOGEN: 5.12 IU/ML
QUANTIFERON NIL: 0.01 IU/ML

## 2023-01-31 ENCOUNTER — OFFICE VISIT (OUTPATIENT)
Dept: RHEUMATOLOGY | Age: 73
End: 2023-01-31
Payer: MEDICARE

## 2023-01-31 VITALS
WEIGHT: 177 LBS | DIASTOLIC BLOOD PRESSURE: 76 MMHG | HEIGHT: 71 IN | SYSTOLIC BLOOD PRESSURE: 120 MMHG | BODY MASS INDEX: 24.78 KG/M2

## 2023-01-31 DIAGNOSIS — M54.12 CERVICAL RADICULOPATHY AT C7: ICD-10-CM

## 2023-01-31 DIAGNOSIS — L40.9 PSORIASIS: ICD-10-CM

## 2023-01-31 DIAGNOSIS — R06.02 SHORTNESS OF BREATH: ICD-10-CM

## 2023-01-31 DIAGNOSIS — L40.50 PSORIATIC ARTHRITIS (HCC): Primary | ICD-10-CM

## 2023-01-31 DIAGNOSIS — Z79.899 HIGH RISK MEDICATION USE: ICD-10-CM

## 2023-01-31 PROCEDURE — 1123F ACP DISCUSS/DSCN MKR DOCD: CPT | Performed by: INTERNAL MEDICINE

## 2023-01-31 PROCEDURE — 99214 OFFICE O/P EST MOD 30 MIN: CPT | Performed by: INTERNAL MEDICINE

## 2023-01-31 RX ORDER — FOLIC ACID 1 MG/1
TABLET ORAL
Qty: 90 TABLET | Refills: 1 | Status: SHIPPED | OUTPATIENT
Start: 2023-01-31

## 2023-01-31 NOTE — PROGRESS NOTES
[address], MD                                                           [address] [place] [address]                                                             [phone] (P) [phone] (F)      Note is transcribed using voice recognition software. Inadvertent computerized transcription errors may be present. Patient identification: Hemalatha Baumann, male : ,21 y.o.        ASSESSMENT AND PLAN:  Beryle Sou was seen today for follow-up. Diagnoses and all orders for this visit:    Psoriatic arthritis ([facility] [address]  -     methotrexate (RHEUMATREX) 2.5 MG chemo tablet; Take 4 tab po once a week. -     folic acid (FOLVITE) 1 MG tablet; Take 1 tab po daily. Psoriasis    High risk medication use  -     AST(SGOT) & ALT(SGPT); Standing  -     CBC with Auto Differential; Standing  -     C-Reactive Protein; Standing  -     Creatinine; Standing  -     Sedimentation Rate; Standing    Cervical radiculopathy at C7    Shortness of breath  -     ECHO 2D WO Color Doppler Complete; Future        Psoriatic arthritis-affecting finger joints and wrists. Active disease, improvement on prednisone taper. Psoriasis-in remission. Multiple medical comorbidities limited DMARD use including CHF with ejection fraction 15%(cardiology note reviewed), chronic kidney disease, coronary artery disease. The safest option at this time is low-dose methotrexate which may or may not be effective. Continue tapering prednisone, directions were explained to the patient. Patient had taken methotrexate in the past, tolerated it well. Side effects, directions, monitoring reiterated. Lab work in 4 weeks. Repeat echocardiogram, if EF is more than 35%, anti-TNF can be considered.       3.  Cervical radiculopathy-likely right C7 distribution. Follow-up with spine. RTC 2 months. #################################################################################################      [id number] for psoriatic arthritis and history of psoriasis. medical comorbidities-coronary artery disease-multiple stents, pacemaker/defibrillator, hypertension, hyperlipidemia, chronic kidney disease. Last seen couple of weeks ago, started 20 mg of prednisone taper currently taking 15 mg a day, is noticing 80 to 90% improvement in his symptoms in terms of pain, swelling, and  strength. He continues to have tingling, numbness sensation of right long finger-states that it radiates to his forearm, arm and neck. This has been persistent for several months. Prednisone has not helped with the symptoms. Wife tells me that he is able to carry out his ADLs without her assistance unlike last visit. No side effects from prednisone. No other complaints or concerns at this time. PMH, PSH,Social history , Meds reviewed. FH-no family history of rheumatoid arthritis or inflammatory rheumatic conditions. PHYSICAL EXAM:    Vitals:    /76   Ht 5' 10.5\" (1.791 m)   Wt 177 lb (80.3 kg)   BMI 25.04 kg/m²   AA)x3 well nourished, and well groomed, normal judgement. MKS:   Left hand-loose fist.  Right hand full fist.  Mild tenderness and swelling on the dorsum of both wrists. OA changes unchanged across his DIPs, PIPs, knees. Otherwise no focally tender, swollen or inflamed joints at this time. Significant improvement in joint count compared to last visit. No dactylitis or enthesitis. Skin: History of psoriasis but no rashes at this time, no induration or skin thickening or nodules.        DATA:   Lab Results   Component Value Date    SEDRATE 60 (H) 01/17/2023     Lab Results   Component Value Date    CRP 22.2 (H) 01/17/2023     Lab Results   Component Value Date     01/17/2023    K 4.9 01/17/2023    CL 98 (L) 01/17/2023    CO2 21 01/17/2023    BUN 28 (H) 01/17/2023    CREATININE 1.5 (H) 01/17/2023    GLUCOSE 99 01/17/2023    CALCIUM 10.3 01/17/2023    PROT 7.8 01/17/2023    LABALBU 4.7 01/17/2023    BILITOT 0.3 01/17/2023    ALKPHOS 131 (H) 01/17/2023    AST 19 01/17/2023    ALT 9 (L) 01/17/2023    LABGLOM 49 (A) 01/17/2023    GFRAA 60 (A) 10/14/2022    AGRATIO 1.5 01/17/2023    GLOB 2.3 05/07/2021       Lab Results   Component Value Date    WBC 11.2 (H) 01/17/2023    HGB 11.8 (L) 01/17/2023    HCT 36.5 (L) 01/17/2023    MCV 92.2 01/17/2023     01/17/2023     RF, CCP, hep B, hep C serology, negative. TB QuantiFERON pending. Total time 34 minutes that includes the following-  Preparing to see the patient such as reviewing patients records, pre-charting, preparing the visit on the same day, performing a medically appropriate history and physical examination, counseling and educating patient about diagnosis, management plan, ordering appropriate testings, prescriptions, communicating findings to other care providers, and documenting clinical information in electronic medical record. I thank you for giving me the opportunity to be involved in [address] [place] [address] Drive care and I look forward following Will Harry along with you. If you have any questions or concerns please feel free to contact me at any time.   Maria C Kimbrough MD 01/31/23

## 2023-02-10 DIAGNOSIS — G44.229 CHRONIC TENSION-TYPE HEADACHE, NOT INTRACTABLE: ICD-10-CM

## 2023-02-10 DIAGNOSIS — R60.9 EDEMA, UNSPECIFIED TYPE: ICD-10-CM

## 2023-02-10 RX ORDER — AMITRIPTYLINE HYDROCHLORIDE 25 MG/1
TABLET, FILM COATED ORAL
Qty: 90 TABLET | Refills: 1 | Status: SHIPPED | OUTPATIENT
Start: 2023-02-10

## 2023-02-10 RX ORDER — METOPROLOL SUCCINATE 50 MG/1
25 TABLET, EXTENDED RELEASE ORAL DAILY
Qty: 45 TABLET | Refills: 3 | Status: SHIPPED | OUTPATIENT
Start: 2023-02-10

## 2023-02-10 RX ORDER — SPIRONOLACTONE 25 MG/1
TABLET ORAL
Qty: 45 TABLET | Refills: 3 | Status: SHIPPED | OUTPATIENT
Start: 2023-02-10

## 2023-02-10 NOTE — TELEPHONE ENCOUNTER
Refill Request     CONFIRM preferrred pharmacy with the patient. If Mail Order Rx - Pend for 90 day refill. Last Seen: Last Seen Department: 1/4/2023  Last Seen by PCP: 7/18/2022    Last Written: 90 with 1 5/2/2022     If no future appointment scheduled, route STAFF MESSAGE with patient name to the [facility] for scheduling. Next Appointment:   Future Appointments   Date Time Provider Danni Dennis   3/1/2023  1:00 PM SUSHMA Kearney CNP [place] [facility]   3/14/2023  1:30 PM SCHEDULE, [facility]   3/14/2023  1:30 PM SUSHMA Malin CNP [facility]   3/22/2023  1:30 PM SUSHMA Grant CNP [facility] [place]   4/10/2023  7:25 AM SCHEDULE, Santino JERRY [facility]   6/12/2023  2:00 PM Bernie Silva MD P CLER [facility]       Message sent to [address] [place] [address] to schedule appt with patient?   NO      Requested Prescriptions     Pending Prescriptions Disp Refills    amitriptyline (ELAVIL) 25 MG tablet 90 tablet 1     Sig: TAKE 1 TABLET EVERY NIGHT

## 2023-02-10 NOTE — TELEPHONE ENCOUNTER
Pt is requesting refills of   metoprolol succinate (TOPROL XL) 50 MG extended release tablet   spironolactone (ALDACTONE) 25 MG tablet  amitriptyline (ELAVIL) 25 MG tablet      [facility] [phone]. Last ov 12/05/2022 smm. Upcoming ov 06/12/2023 smm. Pt will be out of medication soon.

## 2023-03-01 ENCOUNTER — OFFICE VISIT (OUTPATIENT)
Dept: FAMILY MEDICINE CLINIC | Age: 73
End: 2023-03-01
Payer: MEDICARE

## 2023-03-01 VITALS
WEIGHT: 187 LBS | DIASTOLIC BLOOD PRESSURE: 62 MMHG | BODY MASS INDEX: 26.45 KG/M2 | OXYGEN SATURATION: 95 % | HEART RATE: 76 BPM | SYSTOLIC BLOOD PRESSURE: 118 MMHG

## 2023-03-01 DIAGNOSIS — J41.0 SIMPLE CHRONIC BRONCHITIS (HCC): ICD-10-CM

## 2023-03-01 DIAGNOSIS — I50.22 CHRONIC SYSTOLIC CONGESTIVE HEART FAILURE (HCC): ICD-10-CM

## 2023-03-01 DIAGNOSIS — I27.0 PRIMARY PULMONARY HYPERTENSION (HCC): ICD-10-CM

## 2023-03-01 DIAGNOSIS — N18.30 CHRONIC RENAL FAILURE SYNDROME, STAGE 3 (MODERATE) (HCC): ICD-10-CM

## 2023-03-01 DIAGNOSIS — Z87.891 PERSONAL HISTORY OF TOBACCO USE: ICD-10-CM

## 2023-03-01 DIAGNOSIS — N18.31 TYPE 2 DIABETES MELLITUS WITH STAGE 3A CHRONIC KIDNEY DISEASE, UNSPECIFIED WHETHER LONG TERM INSULIN USE (HCC): Primary | ICD-10-CM

## 2023-03-01 DIAGNOSIS — L40.50 PSORIATIC ARTHRITIS (HCC): ICD-10-CM

## 2023-03-01 DIAGNOSIS — I20.8 OTHER FORMS OF ANGINA PECTORIS (HCC): ICD-10-CM

## 2023-03-01 DIAGNOSIS — E78.2 MIXED HYPERLIPIDEMIA: ICD-10-CM

## 2023-03-01 DIAGNOSIS — F33.1 MODERATE EPISODE OF RECURRENT MAJOR DEPRESSIVE DISORDER (HCC): ICD-10-CM

## 2023-03-01 DIAGNOSIS — E11.22 TYPE 2 DIABETES MELLITUS WITH STAGE 3A CHRONIC KIDNEY DISEASE, UNSPECIFIED WHETHER LONG TERM INSULIN USE (HCC): Primary | ICD-10-CM

## 2023-03-01 DIAGNOSIS — Z79.899 HIGH RISK MEDICATION USE: ICD-10-CM

## 2023-03-01 LAB
ALT SERPL-CCNC: 16 U/L (ref 10–40)
AST SERPL-CCNC: 18 U/L (ref 15–37)
BASOPHILS ABSOLUTE: 0.1 K/UL (ref 0–0.2)
BASOPHILS RELATIVE PERCENT: 0.7 %
C-REACTIVE PROTEIN: <3 MG/L (ref 0–5.1)
CREAT SERPL-MCNC: 1.3 MG/DL (ref 0.8–1.3)
CREATININE URINE POCT: 100
EOSINOPHILS ABSOLUTE: 0.1 K/UL (ref 0–0.6)
EOSINOPHILS RELATIVE PERCENT: 0.8 %
GFR SERPL CREATININE-BSD FRML MDRD: 58 ML/MIN/{1.73_M2}
HCT VFR BLD CALC: 35.9 % (ref 40.5–52.5)
HEMOGLOBIN: 12 G/DL (ref 13.5–17.5)
LYMPHOCYTES ABSOLUTE: 1.3 K/UL (ref 1–5.1)
LYMPHOCYTES RELATIVE PERCENT: 15.2 %
MCH RBC QN AUTO: 30.9 PG (ref 26–34)
MCHC RBC AUTO-ENTMCNC: 33.4 G/DL (ref 31–36)
MCV RBC AUTO: 92.7 FL (ref 80–100)
MICROALBUMIN/CREAT 24H UR: 150 MG/G{CREAT}
MICROALBUMIN/CREAT UR-RTO: >300
MONOCYTES ABSOLUTE: 0.4 K/UL (ref 0–1.3)
MONOCYTES RELATIVE PERCENT: 4.1 %
NEUTROPHILS ABSOLUTE: 7 K/UL (ref 1.7–7.7)
NEUTROPHILS RELATIVE PERCENT: 79.2 %
PDW BLD-RTO: 17.8 % (ref 12.4–15.4)
PLATELET # BLD: 244 K/UL (ref 135–450)
PMV BLD AUTO: 8.4 FL (ref 5–10.5)
RBC # BLD: 3.87 M/UL (ref 4.2–5.9)
SEDIMENTATION RATE, ERYTHROCYTE: 12 MM/HR (ref 0–20)
WBC # BLD: 8.8 K/UL (ref 4–11)

## 2023-03-01 PROCEDURE — 1123F ACP DISCUSS/DSCN MKR DOCD: CPT | Performed by: NURSE PRACTITIONER

## 2023-03-01 PROCEDURE — 99213 OFFICE O/P EST LOW 20 MIN: CPT | Performed by: NURSE PRACTITIONER

## 2023-03-01 PROCEDURE — 82044 UR ALBUMIN SEMIQUANTITATIVE: CPT | Performed by: NURSE PRACTITIONER

## 2023-03-01 PROCEDURE — G0296 VISIT TO DETERM LDCT ELIG: HCPCS | Performed by: NURSE PRACTITIONER

## 2023-03-01 RX ORDER — CALCIUM CITRATE/VITAMIN D3 200MG-6.25
TABLET ORAL
Qty: 100 EACH | Refills: 3 | Status: SHIPPED | OUTPATIENT
Start: 2023-03-01

## 2023-03-01 ASSESSMENT — ANXIETY QUESTIONNAIRES
IF YOU CHECKED OFF ANY PROBLEMS ON THIS QUESTIONNAIRE, HOW DIFFICULT HAVE THESE PROBLEMS MADE IT FOR YOU TO DO YOUR WORK, TAKE CARE OF THINGS AT HOME, OR GET ALONG WITH OTHER PEOPLE: NOT DIFFICULT AT ALL
5. BEING SO RESTLESS THAT IT IS HARD TO SIT STILL: 0
7. FEELING AFRAID AS IF SOMETHING AWFUL MIGHT HAPPEN: 0
4. TROUBLE RELAXING: 1
6. BECOMING EASILY ANNOYED OR IRRITABLE: 0
2. NOT BEING ABLE TO STOP OR CONTROL WORRYING: 0
1. FEELING NERVOUS, ANXIOUS, OR ON EDGE: 0
3. WORRYING TOO MUCH ABOUT DIFFERENT THINGS: 0
GAD7 TOTAL SCORE: 1

## 2023-03-01 ASSESSMENT — PATIENT HEALTH QUESTIONNAIRE - PHQ9
1. LITTLE INTEREST OR PLEASURE IN DOING THINGS: 0
4. FEELING TIRED OR HAVING LITTLE ENERGY: 0
2. FEELING DOWN, DEPRESSED OR HOPELESS: 0
SUM OF ALL RESPONSES TO PHQ QUESTIONS 1-9: 0
8. MOVING OR SPEAKING SO SLOWLY THAT OTHER PEOPLE COULD HAVE NOTICED. OR THE OPPOSITE, BEING SO FIGETY OR RESTLESS THAT YOU HAVE BEEN MOVING AROUND A LOT MORE THAN USUAL: 0
SUM OF ALL RESPONSES TO PHQ9 QUESTIONS 1 & 2: 0
5. POOR APPETITE OR OVEREATING: 0
SUM OF ALL RESPONSES TO PHQ QUESTIONS 1-9: 0
9. THOUGHTS THAT YOU WOULD BE BETTER OFF DEAD, OR OF HURTING YOURSELF: 0
SUM OF ALL RESPONSES TO PHQ QUESTIONS 1-9: 0
7. TROUBLE CONCENTRATING ON THINGS, SUCH AS READING THE NEWSPAPER OR WATCHING TELEVISION: 0
10. IF YOU CHECKED OFF ANY PROBLEMS, HOW DIFFICULT HAVE THESE PROBLEMS MADE IT FOR YOU TO DO YOUR WORK, TAKE CARE OF THINGS AT HOME, OR GET ALONG WITH OTHER PEOPLE: 0
6. FEELING BAD ABOUT YOURSELF - OR THAT YOU ARE A FAILURE OR HAVE LET YOURSELF OR YOUR FAMILY DOWN: 0
SUM OF ALL RESPONSES TO PHQ QUESTIONS 1-9: 0
3. TROUBLE FALLING OR STAYING ASLEEP: 0

## 2023-03-01 NOTE — PROGRESS NOTES
3/1/2023  Paulo Mcmanus (: 1950)  75 y.o.    ASSESSMENT and PLAN:  Ramona Kimbrough was seen today for 3 month follow-up. Diagnoses and all orders for this visit:    Type 2 diabetes mellitus with stage 3a chronic kidney disease, unspecified whether long term insulin use (HCC)  -     blood glucose test strips (TRUE METRIX BLOOD GLUCOSE TEST) strip; Check glucose daily. DM 2 E 11.9  -     POCT microalbumin  -update urine.   -The current medical regimen is effective;  continue present plan and medications.  -check a1c every 6 months. Simple chronic bronchitis (HCC)  -The current medical regimen is effective;  continue present plan and medications. Primary pulmonary hypertension (HCC)  -The current medical regimen is effective;  continue present plan and medications. Personal history of tobacco use  -     [place] VISIT TO DISCUSS LUNG CA SCREEN W LDCT  -     CT Lung Screen (Annual/Baseline); Future  -discussed CT lung screen  -recommend smoking cessation. Chronic systolic congestive heart failure ([facility] [address]  -continue to follow with cardio. Chronic renal failure syndrome, stage 3 (moderate) (HCC)  -The current medical regimen is effective;  continue present plan and medications. Mixed hyperlipidemia  --Advised to increase regular exercise to most days of the week, and weight loss. Recommend diet low in salt, high in vegetables, whole grains, and lean meats. Reduce intake of saturated/trans fats in diet, especially in processed foods. Increase good fats like salmon, avocado, olive oils. -Monitor BP at home, keep log. Goal BP <135/90  -Reviewed signs of low bp.   -lipid panel due 10/2023    Moderate episode of recurrent major depressive disorder (HCC)  -The current medical regimen is effective;  continue present plan and medications. Psoriatic arthritis ([facility] [address]  -recently started on methotrexate.   -continue to follow with rheum. Return in about 6 months (around 2023).     HPI  Presenting for follow

## 2023-03-01 NOTE — PATIENT INSTRUCTIONS
you decide your lung cancer risk. What are the risks of screening? CT screening for lung cancer isn't perfect. It can show an abnormal result when it turns out there wasn't any cancer. This is called a false-positive result. This means you may need more tests to make sure you don't have cancer. These tests can be harmful and cause a lot of worry. These tests may include more CT scans and invasive testing like a lung biopsy. In a biopsy, the doctor takes a sample of tissue from inside your lung so it can be looked at under a microscope. A biopsy is the only way to tell if you have lung cancer. If the biopsy finds cancer, you and your doctor will have to decide how or whether to treat it. Some lung cancers found on CT scans are harmless and would not have caused a problem if they had not been found through screening. But because doctors can't tell which ones will turn out to be harmless, most will be treated. This means that you may get treatment--including surgery, radiation, or chemotherapy--that you don't need. There is a risk of damage to cells or tissue from being exposed to radiation, including the small amounts used in CTs, X-rays, and other medical tests. Over time, exposure to radiation may cause cancer and other health problems. But in most cases, the risk of getting cancer from being exposed to small amounts of radiation is low. It's not a reason to avoid these tests for most people. What are the benefits of screening? Your scan may be normal (negative). For some people who are at higher risk, screening lowers the chance of dying of lung cancer. How much and how long you smoked helps to determine your risk level. Screening can find some cancers early, when treatment may be more likely to work. What happens after screening? The results of your CT scan will be sent to your doctor. Someone from your care team will explain the results of your scan and answer any questions you may have.  If you need any

## 2023-03-02 ENCOUNTER — TELEPHONE (OUTPATIENT)
Dept: ADMINISTRATIVE | Age: 73
End: 2023-03-02

## 2023-03-02 DIAGNOSIS — E11.22 TYPE 2 DIABETES MELLITUS WITH STAGE 3A CHRONIC KIDNEY DISEASE, UNSPECIFIED WHETHER LONG TERM INSULIN USE (HCC): Primary | ICD-10-CM

## 2023-03-02 DIAGNOSIS — N18.31 TYPE 2 DIABETES MELLITUS WITH STAGE 3A CHRONIC KIDNEY DISEASE, UNSPECIFIED WHETHER LONG TERM INSULIN USE (HCC): Primary | ICD-10-CM

## 2023-03-02 RX ORDER — GLUCOSAMINE HCL/CHONDROITIN SU 500-400 MG
CAPSULE ORAL
Qty: 100 STRIP | Refills: 3 | Status: SHIPPED | OUTPATIENT
Start: 2023-03-02

## 2023-03-02 RX ORDER — FOLIC ACID 1 MG/1
TABLET ORAL
Qty: 90 TABLET | Refills: 1 | Status: SHIPPED | OUTPATIENT
Start: 2023-03-02

## 2023-03-02 RX ORDER — BLOOD-GLUCOSE METER
1 EACH MISCELLANEOUS DAILY
Qty: 1 KIT | Refills: 0 | Status: SHIPPED | OUTPATIENT
Start: 2023-03-02

## 2023-03-02 RX ORDER — PREDNISONE 1 MG/1
TABLET ORAL
Qty: 90 TABLET | Refills: 0 | Status: SHIPPED | OUTPATIENT
Start: 2023-03-02

## 2023-03-02 NOTE — TELEPHONE ENCOUNTER
True Metrix Blood Glucose Test strips are not covered by the patients insurance plan.     OneTouch & Accu-chek are preferred brands for diabetic testing supplies.

## 2023-03-13 NOTE — PROGRESS NOTES
capsule Take 1 capsule by mouth every morning (before breakfast) 5/20/22  Yes Navya Chapa MD   tiZANidine (ZANAFLEX) 2 MG tablet Take 1 tablet by mouth 3 times daily as needed (muscle tension) 5/2/22  Yes SUSHMA Abraham CNP   gemfibrozil (LOPID) 600 MG tablet Take 1 tablet by mouth 2 times daily (before meals) 4/27/22  Yes Austyn Cheema MD   clopidogrel (PLAVIX) 75 MG tablet TAKE 1 TABLET EVERY DAY 4/27/22  Yes Austyn Cheema MD   rosuvastatin (CRESTOR) 40 MG tablet TAKE 1 TABLET EVERY EVENING 4/27/22  Yes Austyn Cheema MD   Blood Glucose Monitoring Suppl (TRUE METRIX METER) w/Device KIT  11/10/21  Yes Historical MD Nestor   Alcohol Swabstick 70 % PADS Use prior to checking glucose daily. E11.9 11/9/21  Yes Navya Chapa MD   Blood Glucose Monitoring Suppl (TRUE METRIX METER) YELENA Check glucose daily. E11.9 11/9/21  Yes Navya Chapa MD   Respiratory Therapy Supplies (NEBULIZER/TUBING/MOUTHPIECE) KIT 1 kit by Does not apply route 3 times daily 8/10/21  Yes Navya Chapa MD   ondansetron (ZOFRAN-ODT) 4 MG disintegrating tablet DISSOLVE 1 TABLET ON THE TONGUE EVERY 8 HOURS AS NEEDED FOR NAUSEA  OR  VOMITING 5/14/21  Yes Raul Kellogg, PA   nitroGLYCERIN (NITROSTAT) 0.4 MG SL tablet Place 1 tablet under the tongue every 5 minutes as needed for Chest pain 2/25/21  Yes Janette Overton MD   Handicap Placard MISC by Does not apply route Exp: 1/1/2024 1/15/21  Yes Raul Kellogg, PA   ipratropium-albuterol (DUONEB) 0.5-2.5 (3) MG/3ML SOLN nebulizer solution Inhale 3 mLs into the lungs every 6 hours as needed for Shortness of Breath 5/10/19  Yes Navya Chapa MD   methotrexate (RHEUMATREX) 2.5 MG chemo tablet Take 1 tablet by mouth once a week RX directions are 4 tablets weekly. Patient takes one tablet Monday/Wednesday/Friday. Patient taking differently: Take 2.5 mg by mouth once a week Takes 2 tablets on friday.  3/5/19  Yes Shawn Mir MD   tamsulosin (FLOMAX) 0.4

## 2023-03-14 ENCOUNTER — NURSE ONLY (OUTPATIENT)
Dept: CARDIOLOGY CLINIC | Age: 73
End: 2023-03-14
Payer: MEDICARE

## 2023-03-14 ENCOUNTER — OFFICE VISIT (OUTPATIENT)
Dept: CARDIOLOGY CLINIC | Age: 73
End: 2023-03-14

## 2023-03-14 VITALS
HEART RATE: 68 BPM | HEIGHT: 71 IN | BODY MASS INDEX: 26.54 KG/M2 | SYSTOLIC BLOOD PRESSURE: 94 MMHG | DIASTOLIC BLOOD PRESSURE: 60 MMHG | WEIGHT: 189.6 LBS | OXYGEN SATURATION: 97 %

## 2023-03-14 DIAGNOSIS — I42.9 SECONDARY CARDIOMYOPATHY (HCC): ICD-10-CM

## 2023-03-14 DIAGNOSIS — I25.5 ISCHEMIC CARDIOMYOPATHY: ICD-10-CM

## 2023-03-14 DIAGNOSIS — Z95.810 AICD (AUTOMATIC CARDIOVERTER/DEFIBRILLATOR) PRESENT: Primary | ICD-10-CM

## 2023-03-14 DIAGNOSIS — I47.29 PAROXYSMAL VENTRICULAR TACHYCARDIA: ICD-10-CM

## 2023-03-14 DIAGNOSIS — I47.29 PAROXYSMAL VENTRICULAR TACHYCARDIA (HCC): ICD-10-CM

## 2023-03-14 DIAGNOSIS — I25.5 ISCHEMIC CARDIOMYOPATHY: Primary | ICD-10-CM

## 2023-03-14 PROCEDURE — 93284 PRGRMG EVAL IMPLANTABLE DFB: CPT | Performed by: INTERNAL MEDICINE

## 2023-03-14 NOTE — PATIENT INSTRUCTIONS
No changes today     Remote device transmissions every three months     Smoking cessation is recommended    Daily weights. Please call the office for weight gain more than 3 lbs in 24 hours or 5 lbs in one week.      Follow up in 6 months or sooner if needed

## 2023-03-14 NOTE — PROGRESS NOTES
Patient presents to the device clinic today for a programming evaluation for his defibrillator. Patient has a history of ICM and VT. Takes Plavix and Toprol XL. Last device interrogation was on 1/3. Since then, PMT events recorded- not all EGMs show true PMT. P wave: 1.9 mV  R wave: 8.1 mV    AP 3%  RVP 4% %    All sensing and pacing parameters are within normal range. No changes need to be made at this time. Patient will see ERIC Bangura in office today. Patient education was provided about device functionality, in home monitoring, and any other patient questions and/or concerns were addressed. Patient voices understanding. Patient will follow up in 3 months in office or remotely. Please see interrogation for more detail - Paceart report located under the Cardiology tab.

## 2023-03-20 ENCOUNTER — TELEPHONE (OUTPATIENT)
Dept: FAMILY MEDICINE CLINIC | Age: 73
End: 2023-03-20

## 2023-03-20 NOTE — TELEPHONE ENCOUNTER
Spoke with pt informed pt he is due for his annual CT Lung screen and pt stated that he still has the scheduling phone # and will call to get this scheduled I will follow up with pt if not scheduled in 3 weeks.  Pt agreeable and advised

## 2023-03-22 PROBLEM — M50.30 DEGENERATIVE CERVICAL DISC: Status: ACTIVE | Noted: 2023-03-22

## 2023-03-22 PROBLEM — M47.812 FACET ARTHROPATHY, CERVICAL: Status: ACTIVE | Noted: 2023-03-22

## 2023-03-23 PROBLEM — F33.1 MAJOR DEPRESSIVE DISORDER, RECURRENT, MODERATE (HCC): Status: RESOLVED | Noted: 2021-08-10 | Resolved: 2023-03-23

## 2023-03-23 PROBLEM — F33.0 MAJOR DEPRESSIVE DISORDER, RECURRENT, MILD (HCC): Status: RESOLVED | Noted: 2021-08-10 | Resolved: 2023-03-23

## 2023-03-23 PROBLEM — L40.50 PSORIATIC ARTHRITIS (HCC): Status: ACTIVE | Noted: 2023-03-23

## 2023-03-23 ASSESSMENT — ENCOUNTER SYMPTOMS
COUGH: 0
ABDOMINAL DISTENTION: 0
WHEEZING: 0
DIARRHEA: 0
NAUSEA: 0
VOMITING: 0
SHORTNESS OF BREATH: 0
CONSTIPATION: 0
ABDOMINAL PAIN: 0
CHEST TIGHTNESS: 0

## 2023-03-31 ENCOUNTER — HOSPITAL ENCOUNTER (OUTPATIENT)
Dept: CT IMAGING | Age: 73
Discharge: HOME | End: 2023-03-31
Payer: MEDICARE

## 2023-03-31 DIAGNOSIS — Z87.891 PERSONAL HISTORY OF TOBACCO USE: ICD-10-CM

## 2023-03-31 PROCEDURE — 71271 CT THORAX LUNG CANCER SCR C-: CPT

## 2023-05-12 NOTE — TELEPHONE ENCOUNTER
Spoke with patient, added to [facility] schedule 2/24. Patient reports he feels poorly, he is dizzy and tired since switched to Toprol. Scheduled in 30 minute slot as device changes will likely need to be made. Video Triage Note:    History: fall    Assessment and Plan: pt presents to the ed for a mechanical fall that occurred yesterday.  Pt states she hit her head denies LOC, but did have a nose bleed and now has a headache.  Pt also reports left elbow, right wrist, right knee, left shoulder and lower back pain, pt screened in triage, wkp ordered discussed with pt agrees with plan of care.      This patient encounter was via video triage. The patient will be evaluated by an ED physician/APC.       Rita Royal, CNP  05/12/23 1117

## 2023-06-02 DIAGNOSIS — R60.9 EDEMA, UNSPECIFIED TYPE: ICD-10-CM

## 2023-06-02 RX ORDER — ROSUVASTATIN CALCIUM 40 MG/1
TABLET, COATED ORAL
Qty: 90 TABLET | Refills: 5 | Status: SHIPPED | OUTPATIENT
Start: 2023-06-02

## 2023-06-02 RX ORDER — CLOPIDOGREL BISULFATE 75 MG/1
TABLET ORAL
Qty: 90 TABLET | Refills: 5 | Status: SHIPPED | OUTPATIENT
Start: 2023-06-02

## 2023-06-09 ENCOUNTER — OFFICE VISIT (OUTPATIENT)
Dept: FAMILY MEDICINE CLINIC | Age: 73
End: 2023-06-09
Payer: MEDICARE

## 2023-06-09 VITALS
OXYGEN SATURATION: 98 % | SYSTOLIC BLOOD PRESSURE: 132 MMHG | BODY MASS INDEX: 26.31 KG/M2 | DIASTOLIC BLOOD PRESSURE: 80 MMHG | WEIGHT: 186 LBS | HEART RATE: 68 BPM

## 2023-06-09 DIAGNOSIS — F33.1 MODERATE EPISODE OF RECURRENT MAJOR DEPRESSIVE DISORDER (HCC): ICD-10-CM

## 2023-06-09 DIAGNOSIS — E11.22 TYPE 2 DIABETES MELLITUS WITH STAGE 3A CHRONIC KIDNEY DISEASE, WITHOUT LONG-TERM CURRENT USE OF INSULIN (HCC): Primary | ICD-10-CM

## 2023-06-09 DIAGNOSIS — I50.22 CHRONIC SYSTOLIC CONGESTIVE HEART FAILURE (HCC): ICD-10-CM

## 2023-06-09 DIAGNOSIS — N18.31 TYPE 2 DIABETES MELLITUS WITH STAGE 3A CHRONIC KIDNEY DISEASE, WITHOUT LONG-TERM CURRENT USE OF INSULIN (HCC): Primary | ICD-10-CM

## 2023-06-09 DIAGNOSIS — E78.2 MIXED HYPERLIPIDEMIA: ICD-10-CM

## 2023-06-09 DIAGNOSIS — Z95.810 AICD (AUTOMATIC CARDIOVERTER/DEFIBRILLATOR) PRESENT: ICD-10-CM

## 2023-06-09 DIAGNOSIS — N18.30 CHRONIC RENAL FAILURE SYNDROME, STAGE 3 (MODERATE) (HCC): ICD-10-CM

## 2023-06-09 DIAGNOSIS — Z72.0 TOBACCO ABUSE: ICD-10-CM

## 2023-06-09 LAB — HBA1C MFR BLD: 6 %

## 2023-06-09 PROCEDURE — 1123F ACP DISCUSS/DSCN MKR DOCD: CPT | Performed by: NURSE PRACTITIONER

## 2023-06-09 PROCEDURE — 99213 OFFICE O/P EST LOW 20 MIN: CPT | Performed by: NURSE PRACTITIONER

## 2023-06-09 PROCEDURE — 83036 HEMOGLOBIN GLYCOSYLATED A1C: CPT | Performed by: NURSE PRACTITIONER

## 2023-06-09 PROCEDURE — 3044F HG A1C LEVEL LT 7.0%: CPT | Performed by: NURSE PRACTITIONER

## 2023-06-09 RX ORDER — OMEPRAZOLE 20 MG/1
20 CAPSULE, DELAYED RELEASE ORAL
Qty: 90 CAPSULE | Refills: 1 | Status: SHIPPED | OUTPATIENT
Start: 2023-06-09

## 2023-06-09 ASSESSMENT — PATIENT HEALTH QUESTIONNAIRE - PHQ9
SUM OF ALL RESPONSES TO PHQ9 QUESTIONS 1 & 2: 0
6. FEELING BAD ABOUT YOURSELF - OR THAT YOU ARE A FAILURE OR HAVE LET YOURSELF OR YOUR FAMILY DOWN: 0
SUM OF ALL RESPONSES TO PHQ QUESTIONS 1-9: 1
SUM OF ALL RESPONSES TO PHQ QUESTIONS 1-9: 1
4. FEELING TIRED OR HAVING LITTLE ENERGY: 1
SUM OF ALL RESPONSES TO PHQ QUESTIONS 1-9: 1
7. TROUBLE CONCENTRATING ON THINGS, SUCH AS READING THE NEWSPAPER OR WATCHING TELEVISION: 0
SUM OF ALL RESPONSES TO PHQ QUESTIONS 1-9: 0
2. FEELING DOWN, DEPRESSED OR HOPELESS: 0
SUM OF ALL RESPONSES TO PHQ9 QUESTIONS 1 & 2: 0
8. MOVING OR SPEAKING SO SLOWLY THAT OTHER PEOPLE COULD HAVE NOTICED. OR THE OPPOSITE, BEING SO FIGETY OR RESTLESS THAT YOU HAVE BEEN MOVING AROUND A LOT MORE THAN USUAL: 0
10. IF YOU CHECKED OFF ANY PROBLEMS, HOW DIFFICULT HAVE THESE PROBLEMS MADE IT FOR YOU TO DO YOUR WORK, TAKE CARE OF THINGS AT HOME, OR GET ALONG WITH OTHER PEOPLE: 0
6. FEELING BAD ABOUT YOURSELF - OR THAT YOU ARE A FAILURE OR HAVE LET YOURSELF OR YOUR FAMILY DOWN: 0
SUM OF ALL RESPONSES TO PHQ QUESTIONS 1-9: 0
10. IF YOU CHECKED OFF ANY PROBLEMS, HOW DIFFICULT HAVE THESE PROBLEMS MADE IT FOR YOU TO DO YOUR WORK, TAKE CARE OF THINGS AT HOME, OR GET ALONG WITH OTHER PEOPLE: 0
9. THOUGHTS THAT YOU WOULD BE BETTER OFF DEAD, OR OF HURTING YOURSELF: 0
3. TROUBLE FALLING OR STAYING ASLEEP: 0
SUM OF ALL RESPONSES TO PHQ QUESTIONS 1-9: 0
2. FEELING DOWN, DEPRESSED OR HOPELESS: 0
3. TROUBLE FALLING OR STAYING ASLEEP: 0
SUM OF ALL RESPONSES TO PHQ QUESTIONS 1-9: 1
SUM OF ALL RESPONSES TO PHQ QUESTIONS 1-9: 1
3. TROUBLE FALLING OR STAYING ASLEEP: 0
8. MOVING OR SPEAKING SO SLOWLY THAT OTHER PEOPLE COULD HAVE NOTICED. OR THE OPPOSITE, BEING SO FIGETY OR RESTLESS THAT YOU HAVE BEEN MOVING AROUND A LOT MORE THAN USUAL: 0
2. FEELING DOWN, DEPRESSED OR HOPELESS: 0
4. FEELING TIRED OR HAVING LITTLE ENERGY: 1
1. LITTLE INTEREST OR PLEASURE IN DOING THINGS: 0
5. POOR APPETITE OR OVEREATING: 0
SUM OF ALL RESPONSES TO PHQ QUESTIONS 1-9: 1
SUM OF ALL RESPONSES TO PHQ QUESTIONS 1-9: 1
1. LITTLE INTEREST OR PLEASURE IN DOING THINGS: 0
5. POOR APPETITE OR OVEREATING: 0
7. TROUBLE CONCENTRATING ON THINGS, SUCH AS READING THE NEWSPAPER OR WATCHING TELEVISION: 0
5. POOR APPETITE OR OVEREATING: 0
7. TROUBLE CONCENTRATING ON THINGS, SUCH AS READING THE NEWSPAPER OR WATCHING TELEVISION: 0
9. THOUGHTS THAT YOU WOULD BE BETTER OFF DEAD, OR OF HURTING YOURSELF: 0
8. MOVING OR SPEAKING SO SLOWLY THAT OTHER PEOPLE COULD HAVE NOTICED. OR THE OPPOSITE, BEING SO FIGETY OR RESTLESS THAT YOU HAVE BEEN MOVING AROUND A LOT MORE THAN USUAL: 0
SUM OF ALL RESPONSES TO PHQ QUESTIONS 1-9: 0
6. FEELING BAD ABOUT YOURSELF - OR THAT YOU ARE A FAILURE OR HAVE LET YOURSELF OR YOUR FAMILY DOWN: 0
10. IF YOU CHECKED OFF ANY PROBLEMS, HOW DIFFICULT HAVE THESE PROBLEMS MADE IT FOR YOU TO DO YOUR WORK, TAKE CARE OF THINGS AT HOME, OR GET ALONG WITH OTHER PEOPLE: 0
1. LITTLE INTEREST OR PLEASURE IN DOING THINGS: 0
9. THOUGHTS THAT YOU WOULD BE BETTER OFF DEAD, OR OF HURTING YOURSELF: 0
SUM OF ALL RESPONSES TO PHQ9 QUESTIONS 1 & 2: 0
4. FEELING TIRED OR HAVING LITTLE ENERGY: 0
SUM OF ALL RESPONSES TO PHQ QUESTIONS 1-9: 1

## 2023-06-09 ASSESSMENT — ENCOUNTER SYMPTOMS
SHORTNESS OF BREATH: 0
VOMITING: 0
WHEEZING: 0
COUGH: 0
DIARRHEA: 0
NAUSEA: 0
ABDOMINAL DISTENTION: 0
CHEST TIGHTNESS: 0
CONSTIPATION: 0
ABDOMINAL PAIN: 0

## 2023-06-09 ASSESSMENT — ANXIETY QUESTIONNAIRES
1. FEELING NERVOUS, ANXIOUS, OR ON EDGE: 0
6. BECOMING EASILY ANNOYED OR IRRITABLE: 0
IF YOU CHECKED OFF ANY PROBLEMS ON THIS QUESTIONNAIRE, HOW DIFFICULT HAVE THESE PROBLEMS MADE IT FOR YOU TO DO YOUR WORK, TAKE CARE OF THINGS AT HOME, OR GET ALONG WITH OTHER PEOPLE: NOT DIFFICULT AT ALL
2. NOT BEING ABLE TO STOP OR CONTROL WORRYING: 0
3. WORRYING TOO MUCH ABOUT DIFFERENT THINGS: 0
GAD7 TOTAL SCORE: 1
7. FEELING AFRAID AS IF SOMETHING AWFUL MIGHT HAPPEN: 0
5. BEING SO RESTLESS THAT IT IS HARD TO SIT STILL: 0
4. TROUBLE RELAXING: 1

## 2023-06-09 NOTE — PROGRESS NOTES
6/21/2023] oxyCODONE-acetaminophen (PERCOCET) 5-325 MG per tablet Take 1 tablet by mouth 3 times daily for 11 days. Please fill 6/21/23 Max Daily Amount: 3 tablets 33 tablet 0    folic acid (FOLVITE) 1 MG tablet Take 1 tab po daily. 90 tablet 1    metoprolol succinate (TOPROL XL) 50 MG extended release tablet Take 0.5 tablets by mouth daily 45 tablet 3    spironolactone (ALDACTONE) 25 MG tablet TAKE 1/2 TABLET EVERY DAY 45 tablet 3    Lift Chair MISC by Does not apply route 1 each 0    lisinopril (PRINIVIL;ZESTRIL) 2.5 MG tablet Take 1 tablet by mouth daily 90 tablet 3    omeprazole (PRILOSEC) 20 MG delayed release capsule Take 1 capsule by mouth every morning (before breakfast) 30 capsule 1    tiZANidine (ZANAFLEX) 2 MG tablet Take 1 tablet by mouth 3 times daily as needed (muscle tension) 30 tablet 1    gemfibrozil (LOPID) 600 MG tablet Take 1 tablet by mouth 2 times daily (before meals) 180 tablet 5    Alcohol Swabstick 70 % PADS Use prior to checking glucose daily. E11.9 100 each 3    Respiratory Therapy Supplies (NEBULIZER/TUBING/MOUTHPIECE) KIT 1 kit by Does not apply route 3 times daily 1 kit 0    ondansetron (ZOFRAN-ODT) 4 MG disintegrating tablet DISSOLVE 1 TABLET ON THE TONGUE EVERY 8 HOURS AS NEEDED FOR NAUSEA  OR  VOMITING 30 tablet 1    nitroGLYCERIN (NITROSTAT) 0.4 MG SL tablet Place 1 tablet under the tongue every 5 minutes as needed for Chest pain 25 tablet 1    Handicap Placard MISC by Does not apply route Exp: 1/1/2024 2 each 0    ipratropium-albuterol (DUONEB) 0.5-2.5 (3) MG/3ML SOLN nebulizer solution Inhale 3 mLs into the lungs every 6 hours as needed for Shortness of Breath 120 vial 5    tamsulosin (FLOMAX) 0.4 MG capsule Take 1 capsule by mouth daily      Blood Glucose Monitoring Suppl (ONE TOUCH ULTRA 2) w/Device KIT 1 kit by Does not apply route daily 1 kit 0    blood glucose monitor strips Test one times a day & as needed for symptoms of irregular blood glucose.  Dispense sufficient amount

## 2023-06-09 NOTE — PATIENT INSTRUCTIONS
[address] Skye Mast MD, MPH  [address] [place] [address], [address] [place] [address], [address] [place] [address]  Phone: [phone]    Dr. Patsy Wong  Address: [address] [place] [address]  Phone: [phone]

## 2023-06-22 DIAGNOSIS — E78.2 MIXED HYPERLIPIDEMIA: ICD-10-CM

## 2023-06-22 DIAGNOSIS — N18.31 TYPE 2 DIABETES MELLITUS WITH STAGE 3A CHRONIC KIDNEY DISEASE, WITHOUT LONG-TERM CURRENT USE OF INSULIN (HCC): ICD-10-CM

## 2023-06-22 DIAGNOSIS — I25.10 CORONARY ARTERY DISEASE INVOLVING NATIVE CORONARY ARTERY OF NATIVE HEART WITHOUT ANGINA PECTORIS: ICD-10-CM

## 2023-06-22 DIAGNOSIS — E11.22 TYPE 2 DIABETES MELLITUS WITH STAGE 3A CHRONIC KIDNEY DISEASE, WITHOUT LONG-TERM CURRENT USE OF INSULIN (HCC): ICD-10-CM

## 2023-06-22 LAB
DEPRECATED RDW RBC AUTO: 14 % (ref 12.4–15.4)
HCT VFR BLD AUTO: 34.8 % (ref 40.5–52.5)
HGB BLD-MCNC: 11.7 G/DL (ref 13.5–17.5)
MCH RBC QN AUTO: 34.5 PG (ref 26–34)
MCHC RBC AUTO-ENTMCNC: 33.7 G/DL (ref 31–36)
MCV RBC AUTO: 102.4 FL (ref 80–100)
PLATELET # BLD AUTO: 245 K/UL (ref 135–450)
PMV BLD AUTO: 8.6 FL (ref 5–10.5)
RBC # BLD AUTO: 3.4 M/UL (ref 4.2–5.9)
WBC # BLD AUTO: 9.1 K/UL (ref 4–11)

## 2023-06-23 ENCOUNTER — TELEPHONE (OUTPATIENT)
Dept: CARDIOLOGY CLINIC | Age: 73
End: 2023-06-23

## 2023-06-23 LAB
ALBUMIN SERPL-MCNC: 4.8 G/DL (ref 3.4–5)
ALBUMIN/GLOB SERPL: 1.8 {RATIO} (ref 1.1–2.2)
ALP SERPL-CCNC: 110 U/L (ref 40–129)
ALT SERPL-CCNC: 10 U/L (ref 10–40)
ANION GAP SERPL CALCULATED.3IONS-SCNC: 15 MMOL/L (ref 3–16)
AST SERPL-CCNC: 21 U/L (ref 15–37)
BILIRUB SERPL-MCNC: 0.3 MG/DL (ref 0–1)
BUN SERPL-MCNC: 34 MG/DL (ref 7–20)
CALCIUM SERPL-MCNC: 9.8 MG/DL (ref 8.3–10.6)
CHLORIDE SERPL-SCNC: 100 MMOL/L (ref 99–110)
CHOLEST SERPL-MCNC: 142 MG/DL (ref 0–199)
CO2 SERPL-SCNC: 19 MMOL/L (ref 21–32)
CREAT SERPL-MCNC: 1.9 MG/DL (ref 0.8–1.3)
GFR SERPLBLD CREATININE-BSD FMLA CKD-EPI: 36 ML/MIN/{1.73_M2}
GLUCOSE SERPL-MCNC: 105 MG/DL (ref 70–99)
HDLC SERPL-MCNC: 40 MG/DL (ref 40–60)
LDL CHOLESTEROL CALCULATED: 77 MG/DL
POTASSIUM SERPL-SCNC: 4.8 MMOL/L (ref 3.5–5.1)
PROT SERPL-MCNC: 7.5 G/DL (ref 6.4–8.2)
SODIUM SERPL-SCNC: 134 MMOL/L (ref 136–145)
TRIGL SERPL-MCNC: 125 MG/DL (ref 0–150)
VLDLC SERPL CALC-MCNC: 25 MG/DL

## 2023-06-23 NOTE — TELEPHONE ENCOUNTER
----- Message from Blanca Mcginnis MD sent at 6/23/2023  7:31 AM EDT -----  Good labs overall but LDL 77 and goal < 70. He is on highest dose crestor. I would recommend Repatha if able to afford and willing to do it. He may say I'm close to goal and decline. See what he says. Thanks.

## 2023-06-26 DIAGNOSIS — N28.9 RENAL INSUFFICIENCY: Primary | ICD-10-CM

## 2023-07-10 ENCOUNTER — NURSE ONLY (OUTPATIENT)
Dept: CARDIOLOGY CLINIC | Age: 73
End: 2023-07-10
Payer: MEDICARE

## 2023-07-10 DIAGNOSIS — I47.29 PAROXYSMAL VENTRICULAR TACHYCARDIA (HCC): ICD-10-CM

## 2023-07-10 DIAGNOSIS — I50.22 CHRONIC SYSTOLIC CONGESTIVE HEART FAILURE (HCC): ICD-10-CM

## 2023-07-10 DIAGNOSIS — I42.9 SECONDARY CARDIOMYOPATHY (HCC): ICD-10-CM

## 2023-07-10 DIAGNOSIS — I25.5 ISCHEMIC CARDIOMYOPATHY: ICD-10-CM

## 2023-07-10 DIAGNOSIS — Z95.810 PRESENCE OF CARDIAC RESYNCHRONIZATION THERAPY DEFIBRILLATOR (CRT-D): ICD-10-CM

## 2023-07-10 DIAGNOSIS — Z95.810 AICD (AUTOMATIC CARDIOVERTER/DEFIBRILLATOR) PRESENT: Primary | ICD-10-CM

## 2023-07-10 PROCEDURE — 93297 REM INTERROG DEV EVAL ICPMS: CPT | Performed by: INTERNAL MEDICINE

## 2023-07-10 PROCEDURE — 93296 REM INTERROG EVL PM/IDS: CPT | Performed by: INTERNAL MEDICINE

## 2023-07-10 PROCEDURE — 93295 DEV INTERROG REMOTE 1/2/MLT: CPT | Performed by: INTERNAL MEDICINE

## 2023-07-10 RX ORDER — GEMFIBROZIL 600 MG/1
600 TABLET, FILM COATED ORAL
Qty: 180 TABLET | Refills: 3 | Status: SHIPPED | OUTPATIENT
Start: 2023-07-10 | End: 2023-07-14 | Stop reason: SDUPTHER

## 2023-07-10 NOTE — TELEPHONE ENCOUNTER
Medication Refill    Medication needing refilled:gemfibrozil (LOPID) 600 MG tablet      Dosage of the medication:600mg    How are you taking this medication (QD, BID, TID, QID, PRN):  Sig: Take 1 tablet by mouth 2 times daily (before meals)             30 or 90 day supply called in:90    When will you run out of your medication:Friday 7/14/23    Which Pharmacy are we sending the medication to?:    [facility], [address] [phone] - F [phone]   [address] [facility] [address] [address]   Phone:  [phone]  Fax:  [phone]

## 2023-07-14 DIAGNOSIS — N28.9 RENAL INSUFFICIENCY: ICD-10-CM

## 2023-07-14 RX ORDER — GEMFIBROZIL 600 MG/1
600 TABLET, FILM COATED ORAL
Qty: 180 TABLET | Refills: 3 | Status: SHIPPED | OUTPATIENT
Start: 2023-07-14

## 2023-07-14 NOTE — TELEPHONE ENCOUNTER
Medication Refill    Medication needing refilled:gemfibrozil (LOPID) 600 MG tablet      Dosage of the medication:600mg    How are you taking this medication (QD, BID, TID, QID, PRN):Sig: Take 1 tablet by mouth 2 times daily (before meals)    30 or 90 day supply called in:30    When will you run out of your medication:now    Which Pharmacy are we sending the medication to?:    [facility]     [address] [place] [address], [facility] [address] [facility]       Phone: [phone]

## 2023-07-15 LAB
ALBUMIN SERPL-MCNC: 4.7 G/DL (ref 3.4–5)
ANION GAP SERPL CALCULATED.3IONS-SCNC: 13 MMOL/L (ref 3–16)
BUN SERPL-MCNC: 31 MG/DL (ref 7–20)
CALCIUM SERPL-MCNC: 9.6 MG/DL (ref 8.3–10.6)
CHLORIDE SERPL-SCNC: 101 MMOL/L (ref 99–110)
CO2 SERPL-SCNC: 21 MMOL/L (ref 21–32)
CREAT SERPL-MCNC: 1.6 MG/DL (ref 0.8–1.3)
GFR SERPLBLD CREATININE-BSD FMLA CKD-EPI: 45 ML/MIN/{1.73_M2}
GLUCOSE SERPL-MCNC: 94 MG/DL (ref 70–99)
PHOSPHATE SERPL-MCNC: 3.4 MG/DL (ref 2.5–4.9)
POTASSIUM SERPL-SCNC: 4.5 MMOL/L (ref 3.5–5.1)
SODIUM SERPL-SCNC: 135 MMOL/L (ref 136–145)

## 2023-07-25 ENCOUNTER — TELEPHONE (OUTPATIENT)
Dept: PHARMACY | Facility: CLINIC | Age: 73
End: 2023-07-25

## 2023-07-25 NOTE — TELEPHONE ENCOUNTER
[facility] CLINICAL PHARMACY: ADHERENCE REVIEW  Identified care gap per [place]: fills at [facility]: ACE/ARB adherence    Patient also appears to be prescribed: Statin (due 23) RF avail     ASSESSMENT    ACE/ARB ADHERENCE    Insurance Records claims through 23 (Prior Year [address] = not reported; YTD [address] = FIRST FILL; Potential Fail Date: 23): Lisinopril 2.5mg last filled on 3/8/23 for 90 day supply. Next refill due: 23    Prescribed si tablet/capsule daily    Per Insurer Portal: same. 2RF    First fill-mail order pharmacy not contacted at this time. BP Readings from Last 3 Encounters:   23 99/65   23 132/80   23 94/60     Estimated Creatinine Clearance: 43 mL/min (A) (based on SCr of 1.6 mg/dL (H)). Lab Results   Component Value Date    CREATININE 1.6 (H) 2023     Lab Results   Component Value Date    K 4.5 2023       The following are interventions that have been identified:   Patient overdue refilling lisinopril 2.5mg and active on home medication list.     Reached patient to review. He's camping up in [place] and verified he takes lisinopril 2.5mg daily. Think's he even has another bottle at home. Stockpile. Last Visit: 23  Next Visit: 23      Sofia Patel CP.    [facility] free: [phone]     For Pharmacy Admin Tracking Only    Program: Sandee in place:  No  Recommendation Provided To: Patient/Caregiver: 1 via Telephone  Intervention Detail: Adherence Monitorin  Intervention Accepted By: Patient/Caregiver: 1  Gap Closed?: Yes   Time Spent (min): 15

## 2023-08-03 DIAGNOSIS — G44.229 CHRONIC TENSION-TYPE HEADACHE, NOT INTRACTABLE: ICD-10-CM

## 2023-08-03 RX ORDER — AMITRIPTYLINE HYDROCHLORIDE 25 MG/1
TABLET, FILM COATED ORAL
Qty: 90 TABLET | Refills: 1 | Status: SHIPPED | OUTPATIENT
Start: 2023-08-03

## 2023-08-03 NOTE — TELEPHONE ENCOUNTER
Refill Request     CONFIRM preferred pharmacy with the patient. If Mail Order Rx - Pend for 90 day refill. Last Seen: Last Seen Department: 6/9/2023    Last Seen by PCP: 7/18/2022    Last Written: 2/10/23 90 tablet 1 refill    If no future appointment scheduled:  Review the last OV with PCP and review information for follow-up visit,  Route STAFF MESSAGE with patient name to the [facility] for scheduling with the following information:            -  Timing of next visit           -  Visit type ie Physical, OV, etc           -  Diagnoses/Reason ie. COPD, HTN - Do not use MEDICATION, Follow-up or CHECK UP - Give reason for visit      Next Appointment: 12/11/23  Future Appointments   Date Time Provider [address]   8/4/2023  1:30 PM Whitney Slade MD [facility]   9/25/2023  1:00 PM SCHEDULE, [facility]   9/25/2023  1:00 PM Jaqueline Corea APRN - CNP [facility]   12/11/2023 11:00 AM Paulina Arambula APRN - CNP [facility] [place] - D[facility]   12/14/2023  1:15 PM Theodore Jett MD [facility] CLER CAR [facility]   1/8/2024  7:25 AM SCHEDULE, [address] [facility]       Message sent to [facility] to schedule appt with patient?   NO      Requested Prescriptions     Pending Prescriptions Disp Refills    amitriptyline (ELAVIL) 25 MG tablet [Pharmacy Med Name: AMITRIPTYLIN TAB 25MG] 90 tablet 1     Sig: TAKE 1 TABLET EVERY NIGHT

## 2023-08-07 RX ORDER — GEMFIBROZIL 600 MG/1
600 TABLET, FILM COATED ORAL
Qty: 180 TABLET | Refills: 5 | Status: SHIPPED | OUTPATIENT
Start: 2023-08-07

## 2023-08-16 ENCOUNTER — TELEMEDICINE (OUTPATIENT)
Dept: FAMILY MEDICINE CLINIC | Age: 73
End: 2023-08-16
Payer: MEDICARE

## 2023-08-16 DIAGNOSIS — Z00.00 MEDICARE ANNUAL WELLNESS VISIT, SUBSEQUENT: Primary | ICD-10-CM

## 2023-08-16 PROCEDURE — 1123F ACP DISCUSS/DSCN MKR DOCD: CPT | Performed by: STUDENT IN AN ORGANIZED HEALTH CARE EDUCATION/TRAINING PROGRAM

## 2023-08-16 PROCEDURE — G0439 PPPS, SUBSEQ VISIT: HCPCS | Performed by: STUDENT IN AN ORGANIZED HEALTH CARE EDUCATION/TRAINING PROGRAM

## 2023-08-16 ASSESSMENT — PATIENT HEALTH QUESTIONNAIRE - PHQ9
SUM OF ALL RESPONSES TO PHQ QUESTIONS 1-9: 0
SUM OF ALL RESPONSES TO PHQ QUESTIONS 1-9: 0
1. LITTLE INTEREST OR PLEASURE IN DOING THINGS: 0
SUM OF ALL RESPONSES TO PHQ9 QUESTIONS 1 & 2: 0
SUM OF ALL RESPONSES TO PHQ QUESTIONS 1-9: 0
SUM OF ALL RESPONSES TO PHQ QUESTIONS 1-9: 0
2. FEELING DOWN, DEPRESSED OR HOPELESS: 0

## 2023-08-16 ASSESSMENT — LIFESTYLE VARIABLES
HOW OFTEN DO YOU HAVE A DRINK CONTAINING ALCOHOL: MONTHLY OR LESS
HOW MANY STANDARD DRINKS CONTAINING ALCOHOL DO YOU HAVE ON A TYPICAL DAY: 1 OR 2

## 2023-08-16 NOTE — PROGRESS NOTES
Medicare Annual Wellness Visit    Sally Ramsey is here for Medicare AWV    Assessment & Plan   Medicare annual wellness visit, subsequent  Recommendations for Preventive Services Due: see orders and patient instructions/AVS.  Recommended screening schedule for the next 5-10 years is provided to the patient in written form: see Patient Instructions/AVS.     No follow-ups on file. Subjective       Patient's complete Health Risk Assessment and screening values have been reviewed and are found in Flowsheets. The following problems were reviewed today and where indicated follow up appointments were made and/or referrals ordered. Positive Risk Factor Screenings with Interventions:                Opioid Risk: (Low risk score <55) Opioid risk score: 40    Patient is low risk for opioid use disorder or overdose. Last PDMP Tabitha Flory as Reviewed:  Review User Review Instant Review Result   Diogo Almonte 8/4/2023  1:32 PM     Reviewed PDMP [1]     Last Controlled Substance Monitoring Documentation      [facility] Office Visit from 8/4/2023 in [facility] [address] [facility]   Periodic Controlled Substance Monitoring Possible medication side effects, risk of tolerance/dependence & alternative treatments discussed., No signs of potential drug abuse or diversion identified. , Assessed functional status., Obtaining appropriate analgesic effect of treatment. filed at 08/04/2023 1332            Opioid Risk: (Low risk score ? 55)  Opioid risk score: 40    Any opioid medication usage will be addressed by their licensed provider at a future visit.          Weight and Activity:  Physical Activity: Inactive    Days of Exercise per Week: 0 days    Minutes of Exercise per Session: 0 min     On average, how many days per week do you engage in moderate to strenuous exercise (like a brisk walk)?: 0 days  Have you lost any weight without trying in the past 3 months?: No  There is no height or

## 2023-08-16 NOTE — PATIENT INSTRUCTIONS
Personalized Preventive Plan for Torin Desouza - 8/16/2023  Medicare offers a range of preventive health benefits. Some of the tests and screenings are paid in full while other may be subject to a deductible, co-insurance, and/or copay. Some of these benefits include a comprehensive review of your medical history including lifestyle, illnesses that may run in your family, and various assessments and screenings as appropriate. After reviewing your medical record and screening and assessments performed today your provider may have ordered immunizations, labs, imaging, and/or referrals for you. A list of these orders (if applicable) as well as your Preventive Care list are included within your After Visit Summary for your review. Other Preventive Recommendations:    A preventive eye exam performed by an eye specialist is recommended every 1-2 years to screen for glaucoma; cataracts, macular degeneration, and other eye disorders. A preventive dental visit is recommended every 6 months. Try to get at least 150 minutes of exercise per week or 10,000 steps per day on a pedometer . Order or download the FREE \"Exercise & Physical Activity: Your Everyday Guide\" from [employer]. Call 0-398.884.6866 or search [employer] online. You need 5959-9710 mg of calcium and 2856-8923 IU of vitamin D per day. It is possible to meet your calcium requirement with diet alone, but a vitamin D supplement is usually necessary to meet this goal.  When exposed to the sun, use a sunscreen that protects against both UVA and UVB radiation with an SPF of 30 or greater. Reapply every 2 to 3 hours or after sweating, drying off with a towel, or swimming. Always wear a seat belt when traveling in a car. Always wear a helmet when riding a bicycle or motorcycle.

## 2023-08-20 RX ORDER — SPIRONOLACTONE 25 MG/1
TABLET ORAL
Qty: 90 TABLET | Refills: 0 | OUTPATIENT
Start: 2023-08-20

## 2023-09-25 ENCOUNTER — OFFICE VISIT (OUTPATIENT)
Dept: CARDIOLOGY CLINIC | Age: 73
End: 2023-09-25
Payer: MEDICARE

## 2023-09-25 ENCOUNTER — NURSE ONLY (OUTPATIENT)
Dept: CARDIOLOGY CLINIC | Age: 73
End: 2023-09-25

## 2023-09-25 VITALS
WEIGHT: 181 LBS | HEIGHT: 71 IN | BODY MASS INDEX: 25.34 KG/M2 | OXYGEN SATURATION: 100 % | HEART RATE: 62 BPM | SYSTOLIC BLOOD PRESSURE: 118 MMHG | DIASTOLIC BLOOD PRESSURE: 68 MMHG

## 2023-09-25 DIAGNOSIS — I25.5 ISCHEMIC CARDIOMYOPATHY: ICD-10-CM

## 2023-09-25 DIAGNOSIS — I44.7 LBBB (LEFT BUNDLE BRANCH BLOCK): ICD-10-CM

## 2023-09-25 DIAGNOSIS — Z95.810 AICD (AUTOMATIC CARDIOVERTER/DEFIBRILLATOR) PRESENT: Primary | ICD-10-CM

## 2023-09-25 DIAGNOSIS — I47.29 PAROXYSMAL VENTRICULAR TACHYCARDIA (HCC): Primary | ICD-10-CM

## 2023-09-25 PROCEDURE — 99214 OFFICE O/P EST MOD 30 MIN: CPT | Performed by: NURSE PRACTITIONER

## 2023-09-25 PROCEDURE — 93000 ELECTROCARDIOGRAM COMPLETE: CPT | Performed by: NURSE PRACTITIONER

## 2023-09-25 PROCEDURE — 1123F ACP DISCUSS/DSCN MKR DOCD: CPT | Performed by: NURSE PRACTITIONER

## 2023-09-25 NOTE — PROGRESS NOTES
[facility]   Electrophysiology Outpatient Note              Date:  September 25, 2023  Patient name: Chencho Pabon  YOB: 1950    Primary Care physician: Georgia Munoz MD    HISTORY OF PRESENT ILLNESS: The patient is a 75 y.o.  male with a history of VT/VF, PAT, LBBB, orthostatic hypotension, ischemic cardiomyopathy, CAD, chronic combined CHF, MR, HLD, CKD, PE, anemia and hearing loss. In 2011, patient had a dual-chamber ICD implanted. In 2014, [facility] showed patent stents. In 6/2020, he was admitted with an ICD shock for ventricular tachycardia that then degenerated into ventricular fibrillation. Stress testing at the time showed fixed defects. Echo showed an EF of 15 to 20%. In 12/2020, he underwent upgrade to [place] ICD. Today he is being seen for VT/VF, PAT and ICM. Patient has no complaints. He is feeling well. Denies chest pain, palpitations, shortness of breath, and dizziness. He stays busy working around his house and in his garage. He is still smoking. Device check today shows:   Brand: [employer]   Mode: DDD  Normal function   4% AP  29%  100% effective    Arrhythmias: PMT   Battery life 9.5 years  RA impedance 385 ohms   RV impedance 387 ohms   LV impedance 1329 ohms  RA threshold 0.8 V @ 0.40 ms  RV threshold 0.9 V @ 0.40 ms  LV threshold 0.7 V @ 0.6 ms  RA sensitivity 0.25 mV  RV sensitivity 0.6 mV        Past Medical History:   has a past medical history of AICD (automatic cardioverter/defibrillator) present, Arthritis, CAD (coronary artery disease), CHF (congestive heart failure) ([address]), Chronic systolic CHF (congestive heart failure), NYHA class 3 ([address]), GERD (gastroesophageal reflux disease), Hearing loss, Hyperlipidemia, MI (myocardial infarction) ([address]), Rash, and Type 2 diabetes mellitus with chronic kidney disease.     Past Surgical History:   has a past surgical history that includes Coronary angioplasty with stent; Cardiac defibrillator

## 2023-09-25 NOTE — PATIENT INSTRUCTIONS
NO changes today     Remote device transmissions every three months     Continue to stay physically active     Smoking cessation is recommended    Follow up in one year or sooner if needed

## 2023-10-05 PROCEDURE — G2066 INTER DEVC REMOTE 30D: HCPCS | Performed by: NURSE PRACTITIONER

## 2023-10-05 PROCEDURE — 93297 REM INTERROG DEV EVAL ICPMS: CPT | Performed by: NURSE PRACTITIONER

## 2023-10-19 PROCEDURE — 93296 REM INTERROG EVL PM/IDS: CPT | Performed by: INTERNAL MEDICINE

## 2023-10-19 PROCEDURE — 93295 DEV INTERROG REMOTE 1/2/MLT: CPT | Performed by: INTERNAL MEDICINE

## 2023-11-05 PROCEDURE — 93297 REM INTERROG DEV EVAL ICPMS: CPT | Performed by: NURSE PRACTITIONER

## 2023-11-05 PROCEDURE — G2066 INTER DEVC REMOTE 30D: HCPCS | Performed by: NURSE PRACTITIONER

## 2023-11-27 NOTE — TELEPHONE ENCOUNTER
Refill Request     CONFIRM preferred pharmacy with the patient. If Mail Order Rx - Pend for 90 day refill. Last Seen: Last Seen Department: 8/16/2023  Last Seen by PCP: 7/18/2022    Last Written: 6/9/23 90 tabs 1 refill     If no future appointment scheduled:  Review the last OV with PCP and review information for follow-up visit,  Route STAFF MESSAGE with patient name to the [facility] for scheduling with the following information:            -  Timing of next visit           -  Visit type ie Physical, OV, etc           -  Diagnoses/Reason ie. COPD, HTN - Do not use MEDICATION, Follow-up or CHECK UP - Give reason for visit      Next Appointment:   Future Appointments   Date Time Provider [facility] [address]   12/14/2023  1:15 PM Taye Vergara MD P CLER CAR [facility]   12/18/2023 11:30 AM Ken Arambula APRN - CNP [facility] [place] - [place]   1/9/2024 11:30 AM Deyanira Randhawa APRN - CNP [facility] AND [facility]       Message sent to  to schedule appt with patient?   NO      Requested Prescriptions     Pending Prescriptions Disp Refills    omeprazole (PRILOSEC) 20 MG delayed release capsule 90 capsule 1     Sig: Take 1 capsule by mouth every morning (before breakfast)

## 2023-11-30 RX ORDER — OMEPRAZOLE 20 MG/1
20 CAPSULE, DELAYED RELEASE ORAL
Qty: 90 CAPSULE | Refills: 1 | Status: SHIPPED | OUTPATIENT
Start: 2023-11-30

## 2023-12-06 PROCEDURE — G2066 INTER DEVC REMOTE 30D: HCPCS | Performed by: NURSE PRACTITIONER

## 2023-12-06 PROCEDURE — 93297 REM INTERROG DEV EVAL ICPMS: CPT | Performed by: NURSE PRACTITIONER

## 2023-12-06 NOTE — PROGRESS NOTES
surgical history that includes Coronary angioplasty with stent; Cardiac defibrillator placement (Left, 12/17/2020); knee surgery; Colonoscopy; Cataract removal with implant (Right, 02/01/2017); Cataract removal with implant (Left, 03/01/2017); eye surgery; and Cystoscopy (N/A, 1/5/2022). Social History:   reports that he has been smoking cigarettes. He started smoking about 61 years ago. He has a 40.50 pack-year smoking history. He has never used smokeless tobacco. He reports that he does not drink alcohol and does not use drugs. Family History:  family history includes Cancer in his father; Diabetes in his mother. Home Medications:  Prior to Admission medications    Medication Sig Start Date End Date Taking? Authorizing Provider   omeprazole (PRILOSEC) 20 MG delayed release capsule Take 1 capsule by mouth every morning (before breakfast) 11/30/23  Yes Lance Guerrero DO   oxyCODONE-acetaminophen (PERCOCET) 5-325 MG per tablet Take 1 tablet by mouth 3 times daily for 30 days. Max Daily Amount: 3 tablets 11/29/23 12/29/23 Yes Helen Boyd MD   gemfibrozil (LOPID) 600 MG tablet Take 1 tablet by mouth 2 times daily (before meals) 8/7/23  Yes Mariah Toledo MD   amitriptyline (ELAVIL) 25 MG tablet TAKE 1 TABLET EVERY NIGHT 8/3/23  Yes Lance Guerrero DO   clopidogrel (PLAVIX) 75 MG tablet TAKE 1 TABLET EVERY DAY 6/2/23  Yes Mariah Toledo MD   rosuvastatin (CRESTOR) 40 MG tablet TAKE 1 TABLET EVERY EVENING 6/2/23  Yes Mariah Toledo MD   Blood Glucose Monitoring Suppl (ONE TOUCH ULTRA 2) w/Device KIT 1 kit by Does not apply route daily 3/2/23  Yes Reynold Arambula APRN - CNP   blood glucose monitor strips Test one times a day & as needed for symptoms of irregular blood glucose. Dispense sufficient amount for indicated testing frequency plus additional to accommodate PRN testing needs.  3/2/23  Yes Reynold Arambula APRN - CNP   blood glucose test strips (TRUE METRIX BLOOD GLUCOSE

## 2023-12-14 ENCOUNTER — OFFICE VISIT (OUTPATIENT)
Dept: CARDIOLOGY CLINIC | Age: 73
End: 2023-12-14
Payer: MEDICARE

## 2023-12-14 VITALS
BODY MASS INDEX: 26.74 KG/M2 | DIASTOLIC BLOOD PRESSURE: 60 MMHG | HEIGHT: 71 IN | SYSTOLIC BLOOD PRESSURE: 108 MMHG | HEART RATE: 66 BPM | WEIGHT: 191 LBS | OXYGEN SATURATION: 98 %

## 2023-12-14 DIAGNOSIS — I95.9 HYPOTENSION, UNSPECIFIED HYPOTENSION TYPE: ICD-10-CM

## 2023-12-14 DIAGNOSIS — Z72.0 TOBACCO ABUSE: ICD-10-CM

## 2023-12-14 DIAGNOSIS — I50.22 CHRONIC SYSTOLIC CONGESTIVE HEART FAILURE (HCC): Primary | ICD-10-CM

## 2023-12-14 DIAGNOSIS — I25.5 ISCHEMIC CARDIOMYOPATHY: ICD-10-CM

## 2023-12-14 DIAGNOSIS — I25.10 CORONARY ARTERY DISEASE INVOLVING NATIVE CORONARY ARTERY OF NATIVE HEART WITHOUT ANGINA PECTORIS: ICD-10-CM

## 2023-12-14 PROCEDURE — 99214 OFFICE O/P EST MOD 30 MIN: CPT | Performed by: INTERNAL MEDICINE

## 2023-12-14 PROCEDURE — 1123F ACP DISCUSS/DSCN MKR DOCD: CPT | Performed by: INTERNAL MEDICINE

## 2023-12-14 NOTE — PATIENT INSTRUCTIONS
Plan:  1. Medications reviewed. Patient notes that he does not need refills. 2. Continue current course  3. Recommend that you call my office when you get labs with pcp. We recommend TSH, CMP, FLP and CBC. These are ordered. Plan to follow up 6 months.

## 2023-12-18 ENCOUNTER — OFFICE VISIT (OUTPATIENT)
Dept: FAMILY MEDICINE CLINIC | Age: 73
End: 2023-12-18
Payer: MEDICARE

## 2023-12-18 VITALS
WEIGHT: 187 LBS | SYSTOLIC BLOOD PRESSURE: 120 MMHG | OXYGEN SATURATION: 98 % | BODY MASS INDEX: 26.44 KG/M2 | DIASTOLIC BLOOD PRESSURE: 80 MMHG | HEART RATE: 63 BPM

## 2023-12-18 DIAGNOSIS — N18.30 CHRONIC RENAL FAILURE SYNDROME, STAGE 3 (MODERATE) (HCC): ICD-10-CM

## 2023-12-18 DIAGNOSIS — I25.10 CORONARY ARTERY DISEASE INVOLVING NATIVE CORONARY ARTERY OF NATIVE HEART WITHOUT ANGINA PECTORIS: ICD-10-CM

## 2023-12-18 DIAGNOSIS — M35.3 POLYMYALGIA RHEUMATICA (HCC): ICD-10-CM

## 2023-12-18 DIAGNOSIS — I50.22 CHRONIC SYSTOLIC CONGESTIVE HEART FAILURE (HCC): ICD-10-CM

## 2023-12-18 DIAGNOSIS — E78.2 MIXED HYPERLIPIDEMIA: ICD-10-CM

## 2023-12-18 DIAGNOSIS — N18.31 TYPE 2 DIABETES MELLITUS WITH STAGE 3A CHRONIC KIDNEY DISEASE, WITHOUT LONG-TERM CURRENT USE OF INSULIN (HCC): Primary | ICD-10-CM

## 2023-12-18 DIAGNOSIS — N18.31 STAGE 3A CHRONIC KIDNEY DISEASE (HCC): ICD-10-CM

## 2023-12-18 DIAGNOSIS — I95.9 HYPOTENSION, UNSPECIFIED HYPOTENSION TYPE: ICD-10-CM

## 2023-12-18 DIAGNOSIS — Z95.810 AICD (AUTOMATIC CARDIOVERTER/DEFIBRILLATOR) PRESENT: ICD-10-CM

## 2023-12-18 DIAGNOSIS — E11.22 TYPE 2 DIABETES MELLITUS WITH STAGE 3A CHRONIC KIDNEY DISEASE, WITHOUT LONG-TERM CURRENT USE OF INSULIN (HCC): Primary | ICD-10-CM

## 2023-12-18 DIAGNOSIS — I25.5 ISCHEMIC CARDIOMYOPATHY: ICD-10-CM

## 2023-12-18 LAB
ALBUMIN SERPL-MCNC: 5.1 G/DL (ref 3.4–5)
ALBUMIN/GLOB SERPL: 2.2 {RATIO} (ref 1.1–2.2)
ALP SERPL-CCNC: 125 U/L (ref 40–129)
ALT SERPL-CCNC: 7 U/L (ref 10–40)
ANION GAP SERPL CALCULATED.3IONS-SCNC: 11 MMOL/L (ref 3–16)
AST SERPL-CCNC: 15 U/L (ref 15–37)
BASOPHILS # BLD: 0.1 K/UL (ref 0–0.2)
BASOPHILS NFR BLD: 0.5 %
BILIRUB SERPL-MCNC: 0.3 MG/DL (ref 0–1)
BUN SERPL-MCNC: 29 MG/DL (ref 7–20)
CALCIUM SERPL-MCNC: 9.5 MG/DL (ref 8.3–10.6)
CHLORIDE SERPL-SCNC: 103 MMOL/L (ref 99–110)
CHOLEST SERPL-MCNC: 139 MG/DL (ref 0–199)
CO2 SERPL-SCNC: 21 MMOL/L (ref 21–32)
CREAT SERPL-MCNC: 1.3 MG/DL (ref 0.8–1.3)
DEPRECATED RDW RBC AUTO: 13.7 % (ref 12.4–15.4)
EOSINOPHIL # BLD: 0.3 K/UL (ref 0–0.6)
EOSINOPHIL NFR BLD: 2.5 %
GFR SERPLBLD CREATININE-BSD FMLA CKD-EPI: 57 ML/MIN/{1.73_M2}
GLUCOSE SERPL-MCNC: 112 MG/DL (ref 70–99)
HBA1C MFR BLD: 6.3 %
HCT VFR BLD AUTO: 38.5 % (ref 40.5–52.5)
HDLC SERPL-MCNC: 46 MG/DL (ref 40–60)
HGB BLD-MCNC: 12.7 G/DL (ref 13.5–17.5)
LDLC SERPL CALC-MCNC: 77 MG/DL
LYMPHOCYTES # BLD: 1.8 K/UL (ref 1–5.1)
LYMPHOCYTES NFR BLD: 13.9 %
MCH RBC QN AUTO: 31.6 PG (ref 26–34)
MCHC RBC AUTO-ENTMCNC: 32.9 G/DL (ref 31–36)
MCV RBC AUTO: 96.2 FL (ref 80–100)
MONOCYTES # BLD: 1.1 K/UL (ref 0–1.3)
MONOCYTES NFR BLD: 7.9 %
NEUTROPHILS # BLD: 10 K/UL (ref 1.7–7.7)
NEUTROPHILS NFR BLD: 75.2 %
PLATELET # BLD AUTO: 213 K/UL (ref 135–450)
PMV BLD AUTO: 8.8 FL (ref 5–10.5)
POTASSIUM SERPL-SCNC: 4.6 MMOL/L (ref 3.5–5.1)
PROT SERPL-MCNC: 7.4 G/DL (ref 6.4–8.2)
RBC # BLD AUTO: 4 M/UL (ref 4.2–5.9)
SODIUM SERPL-SCNC: 135 MMOL/L (ref 136–145)
TRIGL SERPL-MCNC: 81 MG/DL (ref 0–150)
TSH SERPL DL<=0.005 MIU/L-ACNC: 2.53 UIU/ML (ref 0.27–4.2)
VLDLC SERPL CALC-MCNC: 16 MG/DL
WBC # BLD AUTO: 13.3 K/UL (ref 4–11)

## 2023-12-18 PROCEDURE — 1123F ACP DISCUSS/DSCN MKR DOCD: CPT | Performed by: NURSE PRACTITIONER

## 2023-12-18 PROCEDURE — G0008 ADMIN INFLUENZA VIRUS VAC: HCPCS | Performed by: NURSE PRACTITIONER

## 2023-12-18 PROCEDURE — 99213 OFFICE O/P EST LOW 20 MIN: CPT | Performed by: NURSE PRACTITIONER

## 2023-12-18 PROCEDURE — 90694 VACC AIIV4 NO PRSRV 0.5ML IM: CPT | Performed by: NURSE PRACTITIONER

## 2023-12-18 PROCEDURE — 83036 HEMOGLOBIN GLYCOSYLATED A1C: CPT | Performed by: NURSE PRACTITIONER

## 2023-12-18 PROCEDURE — 3044F HG A1C LEVEL LT 7.0%: CPT | Performed by: NURSE PRACTITIONER

## 2023-12-18 NOTE — PROGRESS NOTES
anti-TNF can be considered per Rheum. Following with Dr. Garrison Carter. Feeling better. CHF/CAD-Following Cardio and EP-[facility]-has ICD checked on regular basis, decreased metoprolol Bp well controlled. Denies cp, sob, palpitations. DM-A1c 6.3 today. diet controlled. has been below 6.5 for over a year. Previously took metformin. Smoking about 2/3 ppd or less. Not interested in options for cessation today. Hld-crestor-due 10/2023    Urology-follows every 6 months. hx of urolift. Brother had hx of prostate cancer, no personal hx. Discussed shingles vaccine. CT lung screen-discussed. Review of Systems   Constitutional:  Negative for activity change, appetite change, chills, fatigue, fever and unexpected weight change. HENT: Negative. Respiratory:  Negative for cough, chest tightness, shortness of breath and wheezing. Cardiovascular:  Negative for chest pain, palpitations and leg swelling. Gastrointestinal:  Negative for abdominal distention, abdominal pain, constipation, diarrhea, nausea and vomiting. Genitourinary: Negative. Musculoskeletal: Negative. Skin: Negative. Neurological:  Negative for dizziness, weakness, light-headedness, numbness and headaches. Hematological: Negative. Psychiatric/Behavioral: Negative. Allergies, past medical history, family history, and social history reviewed and unchanged from previous encounter. Current Outpatient Medications   Medication Sig Dispense Refill    omeprazole (PRILOSEC) 20 MG delayed release capsule Take 1 capsule by mouth every morning (before breakfast) 90 capsule 1    oxyCODONE-acetaminophen (PERCOCET) 5-325 MG per tablet Take 1 tablet by mouth 3 times daily for 30 days.  Max Daily Amount: 3 tablets 90 tablet 0    gemfibrozil (LOPID) 600 MG tablet Take 1 tablet by mouth 2 times daily (before meals) 180 tablet 5    amitriptyline (ELAVIL) 25 MG tablet TAKE 1 TABLET EVERY NIGHT 90 tablet 1

## 2024-01-06 PROCEDURE — 93297 REM INTERROG DEV EVAL ICPMS: CPT | Performed by: NURSE PRACTITIONER

## 2024-01-18 PROCEDURE — 93295 DEV INTERROG REMOTE 1/2/MLT: CPT | Performed by: INTERNAL MEDICINE

## 2024-01-18 PROCEDURE — 93296 REM INTERROG EVL PM/IDS: CPT | Performed by: INTERNAL MEDICINE

## 2024-01-24 ENCOUNTER — OFFICE VISIT (OUTPATIENT)
Dept: FAMILY MEDICINE CLINIC | Age: 74
End: 2024-01-24
Payer: MEDICARE

## 2024-01-24 VITALS
TEMPERATURE: 97.8 F | DIASTOLIC BLOOD PRESSURE: 78 MMHG | OXYGEN SATURATION: 98 % | HEIGHT: 71 IN | HEART RATE: 82 BPM | WEIGHT: 191.6 LBS | SYSTOLIC BLOOD PRESSURE: 128 MMHG | BODY MASS INDEX: 26.82 KG/M2

## 2024-01-24 DIAGNOSIS — L65.9 HAIR LOSS: ICD-10-CM

## 2024-01-24 DIAGNOSIS — L65.9 HAIR LOSS: Primary | ICD-10-CM

## 2024-01-24 PROCEDURE — 99213 OFFICE O/P EST LOW 20 MIN: CPT | Performed by: NURSE PRACTITIONER

## 2024-01-24 PROCEDURE — 1123F ACP DISCUSS/DSCN MKR DOCD: CPT | Performed by: NURSE PRACTITIONER

## 2024-01-24 SDOH — ECONOMIC STABILITY: FOOD INSECURITY: WITHIN THE PAST 12 MONTHS, THE FOOD YOU BOUGHT JUST DIDN'T LAST AND YOU DIDN'T HAVE MONEY TO GET MORE.: NEVER TRUE

## 2024-01-24 SDOH — ECONOMIC STABILITY: FOOD INSECURITY: WITHIN THE PAST 12 MONTHS, YOU WORRIED THAT YOUR FOOD WOULD RUN OUT BEFORE YOU GOT MONEY TO BUY MORE.: NEVER TRUE

## 2024-01-24 SDOH — ECONOMIC STABILITY: INCOME INSECURITY: HOW HARD IS IT FOR YOU TO PAY FOR THE VERY BASICS LIKE FOOD, HOUSING, MEDICAL CARE, AND HEATING?: NOT HARD AT ALL

## 2024-01-24 ASSESSMENT — ENCOUNTER SYMPTOMS
SHORTNESS OF BREATH: 0
DIARRHEA: 0
ABDOMINAL PAIN: 0
WHEEZING: 0
CHEST TIGHTNESS: 0
VOMITING: 0
CONSTIPATION: 0
NAUSEA: 0
ABDOMINAL DISTENTION: 0
COUGH: 0

## 2024-01-24 ASSESSMENT — PATIENT HEALTH QUESTIONNAIRE - PHQ9
8. MOVING OR SPEAKING SO SLOWLY THAT OTHER PEOPLE COULD HAVE NOTICED. OR THE OPPOSITE, BEING SO FIGETY OR RESTLESS THAT YOU HAVE BEEN MOVING AROUND A LOT MORE THAN USUAL: 0
SUM OF ALL RESPONSES TO PHQ9 QUESTIONS 1 & 2: 0
3. TROUBLE FALLING OR STAYING ASLEEP: 0
9. THOUGHTS THAT YOU WOULD BE BETTER OFF DEAD, OR OF HURTING YOURSELF: 0
7. TROUBLE CONCENTRATING ON THINGS, SUCH AS READING THE NEWSPAPER OR WATCHING TELEVISION: 0
SUM OF ALL RESPONSES TO PHQ QUESTIONS 1-9: 1
4. FEELING TIRED OR HAVING LITTLE ENERGY: 1
SUM OF ALL RESPONSES TO PHQ QUESTIONS 1-9: 1
SUM OF ALL RESPONSES TO PHQ QUESTIONS 1-9: 1
10. IF YOU CHECKED OFF ANY PROBLEMS, HOW DIFFICULT HAVE THESE PROBLEMS MADE IT FOR YOU TO DO YOUR WORK, TAKE CARE OF THINGS AT HOME, OR GET ALONG WITH OTHER PEOPLE: 0
2. FEELING DOWN, DEPRESSED OR HOPELESS: 0
1. LITTLE INTEREST OR PLEASURE IN DOING THINGS: 0
SUM OF ALL RESPONSES TO PHQ QUESTIONS 1-9: 1
6. FEELING BAD ABOUT YOURSELF - OR THAT YOU ARE A FAILURE OR HAVE LET YOURSELF OR YOUR FAMILY DOWN: 0

## 2024-01-24 ASSESSMENT — ANXIETY QUESTIONNAIRES
4. TROUBLE RELAXING: 0
GAD7 TOTAL SCORE: 1
6. BECOMING EASILY ANNOYED OR IRRITABLE: 0
7. FEELING AFRAID AS IF SOMETHING AWFUL MIGHT HAPPEN: 0
5. BEING SO RESTLESS THAT IT IS HARD TO SIT STILL: 0
2. NOT BEING ABLE TO STOP OR CONTROL WORRYING: 0
1. FEELING NERVOUS, ANXIOUS, OR ON EDGE: 1
3. WORRYING TOO MUCH ABOUT DIFFERENT THINGS: 0
IF YOU CHECKED OFF ANY PROBLEMS ON THIS QUESTIONNAIRE, HOW DIFFICULT HAVE THESE PROBLEMS MADE IT FOR YOU TO DO YOUR WORK, TAKE CARE OF THINGS AT HOME, OR GET ALONG WITH OTHER PEOPLE: NOT DIFFICULT AT ALL

## 2024-01-24 NOTE — PROGRESS NOTES
2024  Mau Jacinto (: 1950)  74 y.o.    ASSESSMENT and PLAN:  Mau was seen today for other.    Diagnoses and all orders for this visit:    Hair loss  -     Vitamin D 25 Hydroxy; Future  -     Vitamin B12; Future  -     VITAMIN B6; Future    Update labs to rule out vitamin deficiency   Decrease weekly shampoos to 2-3x per week, avoid anything on head like hats that rub or pull on hair.   Discussed conservative methods. Edu added to AVS.   If no improvement in a 2-3 wks would consider topical rogaine or dermatology referral.     Return if symptoms worsen or fail to improve.    HPI  Presenting for hair loss.   Onset a few weeks ago.   Has noticed thinning all over and breakage.   Uses shampoo 4-5x a week.  No new shampoos or medications. No new detergents.   No rash, or bald spots.   No new stress.   Wears hats, pemberton hair daily.   No other changes.     Review of Systems   Constitutional:  Negative for activity change, appetite change, chills, fatigue, fever and unexpected weight change.   HENT: Negative.     Respiratory:  Negative for cough, chest tightness, shortness of breath and wheezing.    Cardiovascular:  Negative for chest pain, palpitations and leg swelling.   Gastrointestinal:  Negative for abdominal distention, abdominal pain, constipation, diarrhea, nausea and vomiting.   Genitourinary: Negative.    Musculoskeletal: Negative.    Skin: Negative.    Neurological:  Negative for dizziness, weakness, light-headedness, numbness and headaches.   Hematological: Negative.    Psychiatric/Behavioral: Negative.         Allergies, past medical history, family history, and social history reviewed and unchanged from previous encounter.     Current Outpatient Medications   Medication Sig Dispense Refill    oxyCODONE-acetaminophen (PERCOCET) 5-325 MG per tablet Take 1 tablet by mouth 3 times daily for 30 days. Max Daily Amount: 3 tablets 90 tablet 0    [START ON 2024] oxyCODONE-acetaminophen (PERCOCET)  Right hp reduction

## 2024-01-25 LAB
25(OH)D3 SERPL-MCNC: 20.1 NG/ML
VIT B12 SERPL-MCNC: 489 PG/ML (ref 211–911)

## 2024-01-27 LAB — VIT B6 SERPL-MCNC: 12.2 NMOL/L (ref 20–125)

## 2024-01-29 RX ORDER — LISINOPRIL 2.5 MG/1
2.5 TABLET ORAL DAILY
Qty: 90 TABLET | Refills: 3 | Status: SHIPPED | OUTPATIENT
Start: 2024-01-29

## 2024-01-29 NOTE — TELEPHONE ENCOUNTER
Medication Refill    Medication needing refilled:Lisinopril    Dosage of the medication:2.5MG    How are you taking this medication (QD, BID, TID, QID, PRN):QD    30 or 90 day supply called in:90    When will you run out of your medication:9 days    Which Pharmacy are we sending the medication to?:    [facility] - [place], [place] - [address] - P [phone] - F [phone]  [address] [facility] [address], [facility] [address]  Phone: [phone]  Fax: [phone]         Last OV: 12/14/2023 FABIÁN

## 2024-02-06 ENCOUNTER — TELEPHONE (OUTPATIENT)
Dept: FAMILY MEDICINE CLINIC | Age: 74
End: 2024-02-06

## 2024-02-06 PROCEDURE — 93297 REM INTERROG DEV EVAL ICPMS: CPT | Performed by: NURSE PRACTITIONER

## 2024-02-07 DIAGNOSIS — E55.9 VITAMIN D DEFICIENCY: Primary | ICD-10-CM

## 2024-02-07 RX ORDER — ERGOCALCIFEROL 1.25 MG/1
50000 CAPSULE ORAL WEEKLY
Qty: 12 CAPSULE | Refills: 0 | Status: SHIPPED | OUTPATIENT
Start: 2024-02-07

## 2024-02-14 DIAGNOSIS — G44.229 CHRONIC TENSION-TYPE HEADACHE, NOT INTRACTABLE: ICD-10-CM

## 2024-02-14 NOTE — TELEPHONE ENCOUNTER
Refill Request     CONFIRM preferred pharmacy with the patient.    If Mail Order Rx - Pend for 90 day refill.      Last Seen: Last Seen Department: 1/24/2024  Last Seen by PCP: 7/18/2022    Last Written: 08/03/2023 90 tab 1 refills     If no future appointment scheduled:  Review the last OV with PCP and review information for follow-up visit,  Route STAFF MESSAGE with patient name to the  Pool for scheduling with the following information:            -  Timing of next visit           -  Visit type ie Physical, OV, etc           -  Diagnoses/Reason ie. COPD, HTN - Do not use MEDICATION, Follow-up or CHECK UP - Give reason for visit      Next Appointment:   Future Appointments   Date Time Provider Department Center   3/7/2024 11:40 AM Gaaythri Randhawa APRN - CNP [facility]   6/19/2024 11:00 AM [facility]  [place] [facility]   6/25/2024  1:00 PM Diony Mo MD P CLER CAR [facility]       Message sent to  to schedule appt with patient?  NO      Requested Prescriptions     Pending Prescriptions Disp Refills    amitriptyline (ELAVIL) 25 MG tablet 90 tablet 1     Sig: Take 1 tablet by mouth nightly

## 2024-02-15 RX ORDER — AMITRIPTYLINE HYDROCHLORIDE 25 MG/1
25 TABLET, FILM COATED ORAL NIGHTLY
Qty: 90 TABLET | Refills: 1 | Status: SHIPPED | OUTPATIENT
Start: 2024-02-15

## 2024-03-12 ENCOUNTER — TELEPHONE (OUTPATIENT)
Dept: FAMILY MEDICINE CLINIC | Age: 74
End: 2024-03-12

## 2024-03-12 NOTE — TELEPHONE ENCOUNTER
----- Message from Merna Monae sent at 3/12/2024 11:52 AM EDT -----  Subject: Message to Provider    QUESTIONS  Information for Provider? Patient returned call to office. I am not able   to reach the . Please call again.  ---------------------------------------------------------------------------  --------------  CALL BACK INFO  [phone]; OK to leave message on voicemail  ---------------------------------------------------------------------------  --------------  SCRIPT ANSWERS  undefined

## 2024-03-13 ENCOUNTER — TELEPHONE (OUTPATIENT)
Dept: TELEMETRY | Age: 74
End: 2024-03-13

## 2024-03-13 DIAGNOSIS — Z72.0 TOBACCO ABUSE: Primary | ICD-10-CM

## 2024-03-13 NOTE — TELEPHONE ENCOUNTER
Patient due for annual CT Lung Screening. Reminder letter mailed.    CT Lung Screening order has been pended to chart should you choose to sign it.  Routed to PCP    Amanda Avina RN

## 2024-04-02 ENCOUNTER — HOSPITAL ENCOUNTER (OUTPATIENT)
Dept: CT IMAGING | Age: 74
Discharge: HOME | End: 2024-04-02
Payer: MEDICARE

## 2024-04-02 DIAGNOSIS — Z72.0 TOBACCO ABUSE: ICD-10-CM

## 2024-04-02 PROCEDURE — 71271 CT THORAX LUNG CANCER SCR C-: CPT

## 2024-04-27 NOTE — TELEPHONE ENCOUNTER
Refill Request     CONFIRM preferred pharmacy with the patient.    If Mail Order Rx - Pend for 90 day refill.      Last Seen: Last Seen Department: 1/24/2024  Last Seen by PCP: 1/24/2024    Last Written: 3/2/2023    If no future appointment scheduled:  Review the last OV with PCP and review information for follow-up visit,  Route STAFF MESSAGE with patient name to the  Pool for scheduling with the following information:            -  Timing of next visit           -  Visit type ie Physical, OV, etc           -  Diagnoses/Reason ie. COPD, HTN - Do not use MEDICATION, Follow-up or CHECK UP - Give reason for visit      Next Appointment:   Future Appointments   Date Time Provider Department Center   5/16/2024 12:30 PM Gayathri Randhawa APRN - CNP [facility]   6/21/2024  2:00 PM Donita Arambula APRN - CNP [facility] [place] [facility]   6/25/2024  1:00 PM Diony Mo MD P [facility]       Message sent to  to schedule appt with patient?  NO      Requested Prescriptions     Pending Prescriptions Disp Refills    ONETOUCH ULTRA strip [Pharmacy Med Name: ONETOUCH ULTRA  STRP] 100 strip 3     Sig: TEST ONCE DAILY AND AS     NEEDED FOR SYMPTOMS OF     IRREGULAR BLOOD GLUCOSE

## 2024-04-29 RX ORDER — BLOOD SUGAR DIAGNOSTIC
STRIP MISCELLANEOUS
Qty: 100 STRIP | Refills: 3 | Status: SHIPPED | OUTPATIENT
Start: 2024-04-29

## 2024-05-06 RX ORDER — OMEPRAZOLE 20 MG/1
20 CAPSULE, DELAYED RELEASE ORAL
Qty: 90 CAPSULE | Refills: 1 | Status: SHIPPED | OUTPATIENT
Start: 2024-05-06

## 2024-05-06 NOTE — TELEPHONE ENCOUNTER
Refill Request     CONFIRM preferred pharmacy with the patient.    If Mail Order Rx - Pend for 90 day refill.      Last Seen: Last Seen Department: 1/24/2024  Last Seen by PCP: 1/24/2024    Last Written: Prilosec 11/30/23 90 caps 1 refill     If no future appointment scheduled:  Review the last OV with PCP and review information for follow-up visit,  Route STAFF MESSAGE with patient name to the  Pool for scheduling with the following information:            -  Timing of next visit           -  Visit type ie Physical, OV, etc           -  Diagnoses/Reason ie. COPD, HTN - Do not use MEDICATION, Follow-up or CHECK UP - Give reason for visit      Next Appointment:   Future Appointments   Date Time Provider Department Center   5/16/2024 12:30 PM Gayathri Randhawa APRN - CNP [facility]   6/21/2024  2:00 PM Donita Arambula APRN - CNP [facility] [place] - [facility]   6/25/2024  1:00 PM Diony Mo MD [facility]       Message sent to  to schedule appt with patient?  NO      Requested Prescriptions     Pending Prescriptions Disp Refills    omeprazole (PRILOSEC) 20 MG delayed release capsule 90 capsule 1     Sig: Take 1 capsule by mouth every morning (before breakfast)

## 2024-05-14 DIAGNOSIS — R60.9 EDEMA, UNSPECIFIED TYPE: ICD-10-CM

## 2024-05-14 RX ORDER — SPIRONOLACTONE 25 MG/1
TABLET ORAL
Qty: 45 TABLET | Refills: 0 | Status: SHIPPED | OUTPATIENT
Start: 2024-05-14

## 2024-05-14 NOTE — TELEPHONE ENCOUNTER
Last ov 12.14.23-SMM  Assessment:      1. Ischemic cardiomyopathy: ECHO 6/29/20 EF=15-20%. Clinically compensated NYHA Class II and will continue current CHF medical regimen. Continue GDMT with Toprol XL 25mg qd, lisinopril 2.5mg qd, and aldactone 12.5mg qd. He could not afford SGLT2i.     2. Hyperlipidemia:  Most recent lipids 6/22/23 see results I personally reviewed (see above). Well controlled overall except LDL 77 (close to goal). He does not want new meds and will continue same regimen.      3. CAD:   s/p RCA stents prior.  Cardiac cath 6/14 --> stable known ischemic CAD. Gisell nuc 6/28/20 large scar anteroseptal/apical and inferior walls; LVEF 24%, no ischemia. There are no concerning symptoms for angina currently.       4. Orthostatic hypotension: Improved. Continue lower dose Toprol XL 25mg daily.      Plan:  1. Medications reviewed.  Patient notes that he does not need refills.  2. Continue current course  3. Recommend that you call my office when you get labs with pcp.  We recommend TSH, CMP, FLP and CBC. These are ordered.      Plan to follow up 6 months.      This note was scribed in the presence of Diony Mo MD by Low Loving RN.      I, Dr. Diony Mo, personally performed the services described in this documentation, as scribed by the above signed scribe in my presence. It is both accurate and complete to my knowledge. I agree with the details independently gathered by the clinical support staff, while the remaining scribed note accurately describes my personal service to the patient.     Next ov 06.25.24-SMM

## 2024-05-20 DIAGNOSIS — G44.229 CHRONIC TENSION-TYPE HEADACHE, NOT INTRACTABLE: ICD-10-CM

## 2024-05-20 RX ORDER — AMITRIPTYLINE HYDROCHLORIDE 25 MG/1
25 TABLET, FILM COATED ORAL NIGHTLY
Qty: 90 TABLET | Refills: 1 | Status: SHIPPED | OUTPATIENT
Start: 2024-05-20

## 2024-05-20 NOTE — TELEPHONE ENCOUNTER
Refill Request     CONFIRM preferred pharmacy with the patient.    If Mail Order Rx - Pend for 90 day refill.      Last Seen: Last Seen Department: 1/24/2024  Last Seen by PCP: 1/24/2024    Last Written: 2/15/2024 25mg #90 1 refill     If no future appointment scheduled:  Review the last OV with PCP and review information for follow-up visit,  Route STAFF MESSAGE with patient name to the  Pool for scheduling with the following information:            -  Timing of next visit           -  Visit type ie Physical, OV, etc           -  Diagnoses/Reason ie. COPD, HTN - Do not use MEDICATION, Follow-up or CHECK UP - Give reason for visit      Next Appointment:   Future Appointments   Date Time Provider Department Center   6/21/2024  2:00 PM Donita Arambula APRN - CNP [facility] [place] [facility]   6/25/2024  1:00 PM Diony Mo MD [facility]   7/11/2024 12:30 PM Gayathri Randhawa APRN - CNP [facility]       Message sent to  to schedule appt with patient?  NO      Requested Prescriptions     Pending Prescriptions Disp Refills    amitriptyline (ELAVIL) 25 MG tablet 90 tablet 1     Sig: Take 1 tablet by mouth nightly

## 2024-06-06 NOTE — PROGRESS NOTES
memory, mentation, or behavior are noted    LABS:   Lab Results   Component Value Date     06/21/2024    K 4.4 06/21/2024     06/21/2024    CO2 22 06/21/2024    BUN 39 (H) 06/21/2024    CREATININE 1.5 (H) 06/21/2024    GLUCOSE 93 06/21/2024    CALCIUM 10.4 06/21/2024    BILITOT 0.3 12/18/2023    ALKPHOS 125 12/18/2023    AST 15 12/18/2023    ALT 7 (L) 12/18/2023    LABGLOM 48 (A) 06/21/2024    GFRAA 60 (A) 10/14/2022    AGRATIO 2.2 12/18/2023    GLOB 2.3 05/07/2021     Lab Results   Component Value Date    WBC 10.3 06/21/2024    HGB 12.3 (L) 06/21/2024    HCT 36.4 (L) 06/21/2024    MCV 94.5 06/21/2024     06/21/2024     Lab Results   Component Value Date    CHOL 139 12/18/2023    TRIG 81 12/18/2023    HDL 46 12/18/2023    LDL 77 12/18/2023    VLDL 16 12/18/2023     Hemoglobin A1C   Date Value Ref Range Status   12/18/2023 6.3 % Corrected     Lab Results   Component Value Date    TSH 1.81 10/14/2022    O5VZMZJ 5.6 05/07/2021     I have personally reviewed all labs including lipids     Assessment:   1. Ischemic cardiomyopathy: ECHO 6/29/20 EF=15-20%. Clinically compensated NYHA Class II and will continue current GDMT with Toprol XL 25mg qd, lisinopril 2.5mg qd, and aldactone 12.5mg qd. He could not afford SGLT2i.    2. Hyperlipidemia:  Most recent lipids 12/28/23 see results I personally reviewed (see above). Well controlled overall except LDL 77. He did not want new meds at time and continued crestor 40mg qd. Repeat testing and will see if willing to try PCSK9i or zetia if willing and can afford.     3. CAD:   s/p RCA stents prior.  Cardiac cath 6/14 --> stable known ischemic CAD. Gisell nuc 6/28/20 large scar anteroseptal/apical and inferior walls; LVEF 24%, no ischemia. There are no concerning symptoms for angina currently.       4. Orthostatic hypotension: Improved. Continue lower dose Toprol XL 25mg daily.     Plan:  Labs reviewed in epic and discussed with patient.  Medications reviewed in

## 2024-06-21 ENCOUNTER — OFFICE VISIT (OUTPATIENT)
Dept: FAMILY MEDICINE CLINIC | Age: 74
End: 2024-06-21
Payer: MEDICARE

## 2024-06-21 VITALS
BODY MASS INDEX: 25.88 KG/M2 | HEART RATE: 70 BPM | DIASTOLIC BLOOD PRESSURE: 70 MMHG | SYSTOLIC BLOOD PRESSURE: 140 MMHG | OXYGEN SATURATION: 96 % | WEIGHT: 183 LBS

## 2024-06-21 DIAGNOSIS — F33.1 MODERATE EPISODE OF RECURRENT MAJOR DEPRESSIVE DISORDER (HCC): ICD-10-CM

## 2024-06-21 DIAGNOSIS — I50.22 CHRONIC SYSTOLIC CONGESTIVE HEART FAILURE (HCC): ICD-10-CM

## 2024-06-21 DIAGNOSIS — M35.3 POLYMYALGIA RHEUMATICA (HCC): ICD-10-CM

## 2024-06-21 DIAGNOSIS — L40.50 PSORIATIC ARTHRITIS (HCC): ICD-10-CM

## 2024-06-21 DIAGNOSIS — F11.20 OPIOID DEPENDENCE WITH CURRENT USE (HCC): ICD-10-CM

## 2024-06-21 DIAGNOSIS — E55.9 VITAMIN D DEFICIENCY: ICD-10-CM

## 2024-06-21 DIAGNOSIS — N18.31 TYPE 2 DIABETES MELLITUS WITH STAGE 3A CHRONIC KIDNEY DISEASE, UNSPECIFIED WHETHER LONG TERM INSULIN USE (HCC): Primary | ICD-10-CM

## 2024-06-21 DIAGNOSIS — N18.31 TYPE 2 DIABETES MELLITUS WITH STAGE 3A CHRONIC KIDNEY DISEASE, UNSPECIFIED WHETHER LONG TERM INSULIN USE (HCC): ICD-10-CM

## 2024-06-21 DIAGNOSIS — Z00.00 MEDICARE ANNUAL WELLNESS VISIT, SUBSEQUENT: ICD-10-CM

## 2024-06-21 DIAGNOSIS — M70.21 OLECRANON BURSITIS OF RIGHT ELBOW: ICD-10-CM

## 2024-06-21 DIAGNOSIS — E11.22 TYPE 2 DIABETES MELLITUS WITH STAGE 3A CHRONIC KIDNEY DISEASE, UNSPECIFIED WHETHER LONG TERM INSULIN USE (HCC): ICD-10-CM

## 2024-06-21 DIAGNOSIS — E11.22 TYPE 2 DIABETES MELLITUS WITH STAGE 3A CHRONIC KIDNEY DISEASE, UNSPECIFIED WHETHER LONG TERM INSULIN USE (HCC): Primary | ICD-10-CM

## 2024-06-21 PROCEDURE — 83036 HEMOGLOBIN GLYCOSYLATED A1C: CPT | Performed by: NURSE PRACTITIONER

## 2024-06-21 PROCEDURE — 1123F ACP DISCUSS/DSCN MKR DOCD: CPT | Performed by: NURSE PRACTITIONER

## 2024-06-21 PROCEDURE — 99213 OFFICE O/P EST LOW 20 MIN: CPT | Performed by: NURSE PRACTITIONER

## 2024-06-21 PROCEDURE — G0439 PPPS, SUBSEQ VISIT: HCPCS | Performed by: NURSE PRACTITIONER

## 2024-06-21 ASSESSMENT — PATIENT HEALTH QUESTIONNAIRE - PHQ9
SUM OF ALL RESPONSES TO PHQ QUESTIONS 1-9: 0
7. TROUBLE CONCENTRATING ON THINGS, SUCH AS READING THE NEWSPAPER OR WATCHING TELEVISION: NOT AT ALL
1. LITTLE INTEREST OR PLEASURE IN DOING THINGS: NOT AT ALL
10. IF YOU CHECKED OFF ANY PROBLEMS, HOW DIFFICULT HAVE THESE PROBLEMS MADE IT FOR YOU TO DO YOUR WORK, TAKE CARE OF THINGS AT HOME, OR GET ALONG WITH OTHER PEOPLE: NOT DIFFICULT AT ALL
SUM OF ALL RESPONSES TO PHQ QUESTIONS 1-9: 0
8. MOVING OR SPEAKING SO SLOWLY THAT OTHER PEOPLE COULD HAVE NOTICED. OR THE OPPOSITE, BEING SO FIGETY OR RESTLESS THAT YOU HAVE BEEN MOVING AROUND A LOT MORE THAN USUAL: NOT AT ALL
9. THOUGHTS THAT YOU WOULD BE BETTER OFF DEAD, OR OF HURTING YOURSELF: NOT AT ALL
6. FEELING BAD ABOUT YOURSELF - OR THAT YOU ARE A FAILURE OR HAVE LET YOURSELF OR YOUR FAMILY DOWN: NOT AT ALL
SUM OF ALL RESPONSES TO PHQ9 QUESTIONS 1 & 2: 0
SUM OF ALL RESPONSES TO PHQ QUESTIONS 1-9: 0
3. TROUBLE FALLING OR STAYING ASLEEP: NOT AT ALL
SUM OF ALL RESPONSES TO PHQ QUESTIONS 1-9: 0
5. POOR APPETITE OR OVEREATING: NOT AT ALL
4. FEELING TIRED OR HAVING LITTLE ENERGY: NOT AT ALL
2. FEELING DOWN, DEPRESSED OR HOPELESS: NOT AT ALL

## 2024-06-21 ASSESSMENT — LIFESTYLE VARIABLES
HOW OFTEN DURING THE LAST YEAR HAVE YOU NEEDED AN ALCOHOLIC DRINK FIRST THING IN THE MORNING TO GET YOURSELF GOING AFTER A NIGHT OF HEAVY DRINKING: NEVER
HOW OFTEN DURING THE LAST YEAR HAVE YOU HAD A FEELING OF GUILT OR REMORSE AFTER DRINKING: NEVER
HOW OFTEN DURING THE LAST YEAR HAVE YOU BEEN UNABLE TO REMEMBER WHAT HAPPENED THE NIGHT BEFORE BECAUSE YOU HAD BEEN DRINKING: NEVER
HAVE YOU OR SOMEONE ELSE BEEN INJURED AS A RESULT OF YOUR DRINKING: NO
HOW OFTEN DURING THE LAST YEAR HAVE YOU FOUND THAT YOU WERE NOT ABLE TO STOP DRINKING ONCE YOU HAD STARTED: NEVER
HOW MANY STANDARD DRINKS CONTAINING ALCOHOL DO YOU HAVE ON A TYPICAL DAY: 1 OR 2
HOW OFTEN DURING THE LAST YEAR HAVE YOU FAILED TO DO WHAT WAS NORMALLY EXPECTED FROM YOU BECAUSE OF DRINKING: NEVER
HAS A RELATIVE, FRIEND, DOCTOR, OR ANOTHER HEALTH PROFESSIONAL EXPRESSED CONCERN ABOUT YOUR DRINKING OR SUGGESTED YOU CUT DOWN: NO
HOW OFTEN DO YOU HAVE A DRINK CONTAINING ALCOHOL: 2-4 TIMES A MONTH

## 2024-06-21 NOTE — PROGRESS NOTES
Provider, MD Mely       CareTeam (Including outside providers/suppliers regularly involved in providing care):   Patient Care Team:  Donita Arambula APRN - CNP as PCP - General (Family Nurse Practitioner)  Donita Arambula APRN - CNP as PCP - Empaneled Provider  Diony Mo MD as Consulting Physician (Cardiology)     Reviewed and updated this visit:  Tobacco  Allergies  Meds  Med Hx  Surg Hx  Soc Hx  Fam Hx

## 2024-06-22 LAB
25(OH)D3 SERPL-MCNC: 53.7 NG/ML
ANION GAP SERPL CALCULATED.3IONS-SCNC: 13 MMOL/L (ref 3–16)
BUN SERPL-MCNC: 39 MG/DL (ref 7–20)
CALCIUM SERPL-MCNC: 10.4 MG/DL (ref 8.3–10.6)
CHLORIDE SERPL-SCNC: 105 MMOL/L (ref 99–110)
CO2 SERPL-SCNC: 22 MMOL/L (ref 21–32)
CREAT SERPL-MCNC: 1.5 MG/DL (ref 0.8–1.3)
DEPRECATED RDW RBC AUTO: 13.7 % (ref 12.4–15.4)
GFR SERPLBLD CREATININE-BSD FMLA CKD-EPI: 48 ML/MIN/{1.73_M2}
GLUCOSE SERPL-MCNC: 93 MG/DL (ref 70–99)
HCT VFR BLD AUTO: 36.4 % (ref 40.5–52.5)
HGB BLD-MCNC: 12.3 G/DL (ref 13.5–17.5)
MCH RBC QN AUTO: 32 PG (ref 26–34)
MCHC RBC AUTO-ENTMCNC: 33.9 G/DL (ref 31–36)
MCV RBC AUTO: 94.5 FL (ref 80–100)
PLATELET # BLD AUTO: 220 K/UL (ref 135–450)
PMV BLD AUTO: 8.8 FL (ref 5–10.5)
POTASSIUM SERPL-SCNC: 4.4 MMOL/L (ref 3.5–5.1)
RBC # BLD AUTO: 3.85 M/UL (ref 4.2–5.9)
SODIUM SERPL-SCNC: 140 MMOL/L (ref 136–145)
WBC # BLD AUTO: 10.3 K/UL (ref 4–11)

## 2024-06-25 ENCOUNTER — HOSPITAL ENCOUNTER (OUTPATIENT)
Age: 74
Discharge: HOME | End: 2024-06-25

## 2024-06-25 ENCOUNTER — OFFICE VISIT (OUTPATIENT)
Dept: CARDIOLOGY CLINIC | Age: 74
End: 2024-06-25
Payer: MEDICARE

## 2024-06-25 VITALS
SYSTOLIC BLOOD PRESSURE: 90 MMHG | HEIGHT: 71 IN | OXYGEN SATURATION: 95 % | BODY MASS INDEX: 25.73 KG/M2 | DIASTOLIC BLOOD PRESSURE: 54 MMHG | HEART RATE: 60 BPM | WEIGHT: 183.8 LBS

## 2024-06-25 DIAGNOSIS — I50.22 CHRONIC SYSTOLIC CONGESTIVE HEART FAILURE (HCC): Primary | ICD-10-CM

## 2024-06-25 DIAGNOSIS — R60.9 EDEMA, UNSPECIFIED TYPE: ICD-10-CM

## 2024-06-25 DIAGNOSIS — I25.5 ISCHEMIC CARDIOMYOPATHY: ICD-10-CM

## 2024-06-25 DIAGNOSIS — I25.10 CORONARY ARTERY DISEASE INVOLVING NATIVE CORONARY ARTERY OF NATIVE HEART WITHOUT ANGINA PECTORIS: ICD-10-CM

## 2024-06-25 DIAGNOSIS — Z79.899 MEDICATION MANAGEMENT: ICD-10-CM

## 2024-06-25 DIAGNOSIS — Z72.0 TOBACCO ABUSE: ICD-10-CM

## 2024-06-25 DIAGNOSIS — E78.2 MIXED HYPERLIPIDEMIA: ICD-10-CM

## 2024-06-25 PROCEDURE — 99214 OFFICE O/P EST MOD 30 MIN: CPT | Performed by: INTERNAL MEDICINE

## 2024-06-25 PROCEDURE — 93000 ELECTROCARDIOGRAM COMPLETE: CPT | Performed by: INTERNAL MEDICINE

## 2024-06-25 PROCEDURE — 1123F ACP DISCUSS/DSCN MKR DOCD: CPT | Performed by: INTERNAL MEDICINE

## 2024-06-25 RX ORDER — GEMFIBROZIL 600 MG/1
600 TABLET, FILM COATED ORAL
Qty: 180 TABLET | Refills: 3 | Status: SHIPPED | OUTPATIENT
Start: 2024-06-25

## 2024-06-25 RX ORDER — ROSUVASTATIN CALCIUM 40 MG/1
TABLET, COATED ORAL
Qty: 90 TABLET | Refills: 3 | Status: SHIPPED | OUTPATIENT
Start: 2024-06-25

## 2024-06-25 RX ORDER — SPIRONOLACTONE 25 MG/1
TABLET ORAL
Qty: 45 TABLET | Refills: 3 | Status: SHIPPED | OUTPATIENT
Start: 2024-06-25

## 2024-06-25 RX ORDER — CLOPIDOGREL BISULFATE 75 MG/1
TABLET ORAL
Qty: 90 TABLET | Refills: 3 | Status: SHIPPED | OUTPATIENT
Start: 2024-06-25

## 2024-06-25 RX ORDER — NITROGLYCERIN 0.4 MG/1
0.4 TABLET SUBLINGUAL EVERY 5 MIN PRN
Qty: 25 TABLET | Refills: 3 | Status: SHIPPED | OUTPATIENT
Start: 2024-06-25

## 2024-06-25 NOTE — PATIENT INSTRUCTIONS
Labs reviewed in epic and discussed with patient.  Medications reviewed in office. Medications refilled as warranted  Ordered lipid and liver blood work.  EKG preformed in office

## 2024-06-26 ENCOUNTER — HOSPITAL ENCOUNTER (OUTPATIENT)
Age: 74
Discharge: HOME | End: 2024-06-26
Payer: MEDICARE

## 2024-06-26 DIAGNOSIS — Z79.899 MEDICATION MANAGEMENT: ICD-10-CM

## 2024-06-26 LAB
ALBUMIN SERPL-MCNC: 4.6 G/DL (ref 3.4–5)
ALP SERPL-CCNC: 105 U/L (ref 40–129)
ALT SERPL-CCNC: 13 U/L (ref 10–40)
AST SERPL-CCNC: 23 U/L (ref 15–37)
BILIRUB DIRECT SERPL-MCNC: <0.2 MG/DL (ref 0–0.3)
BILIRUB INDIRECT SERPL-MCNC: NORMAL MG/DL (ref 0–1)
BILIRUB SERPL-MCNC: 0.3 MG/DL (ref 0–1)
CHOLEST SERPL-MCNC: 139 MG/DL (ref 0–199)
HDLC SERPL-MCNC: 43 MG/DL (ref 40–60)
LDL CHOLESTEROL: 71 MG/DL
PROT SERPL-MCNC: 7.8 G/DL (ref 6.4–8.2)
TRIGL SERPL-MCNC: 126 MG/DL (ref 0–150)
VLDLC SERPL CALC-MCNC: 25 MG/DL

## 2024-06-26 PROCEDURE — 80061 LIPID PANEL: CPT

## 2024-06-26 PROCEDURE — 80076 HEPATIC FUNCTION PANEL: CPT

## 2024-06-26 PROCEDURE — 36415 COLL VENOUS BLD VENIPUNCTURE: CPT

## 2024-06-27 ENCOUNTER — TELEPHONE (OUTPATIENT)
Dept: CARDIOLOGY CLINIC | Age: 74
End: 2024-06-27

## 2024-06-27 NOTE — TELEPHONE ENCOUNTER
LMOVM with results (per HIPAA)        ----- Message from Diony Mo MD sent at 6/27/2024  7:28 AM EDT -----  Good kidney and lipid labs

## 2024-07-17 NOTE — PATIENT INSTRUCTIONS
chances of quitting for good. Quitting is one of the most important things you can do to protect your heart. It is never too late to quit. Try to avoid secondhand smoke too.     Stay at a weight that's healthy for you. Talk to your doctor if you need help losing weight.     Try to get 7 to 9 hours of sleep each night.     Limit alcohol to 2 drinks a day for men and 1 drink a day for women. Too much alcohol can cause health problems.     Manage other health problems such as diabetes, high blood pressure, and high cholesterol. If you think you may have a problem with alcohol or drug use, talk to your doctor.   Medicines    Take your medicines exactly as prescribed. Call your doctor if you think you are having a problem with your medicine.     If your doctor recommends aspirin, take the amount directed each day. Make sure you take aspirin and not another kind of pain reliever, such as acetaminophen (Tylenol).   When should you call for help?   Call 911 if you have symptoms of a heart attack. These may include:    Chest pain or pressure, or a strange feeling in the chest.     Sweating.     Shortness of breath.     Pain, pressure, or a strange feeling in the back, neck, jaw, or upper belly or in one or both shoulders or arms.     Lightheadedness or sudden weakness.     A fast or irregular heartbeat.   After you call 911, the  may tell you to chew 1 adult-strength or 2 to 4 low-dose aspirin. Wait for an ambulance. Do not try to drive yourself.  Watch closely for changes in your health, and be sure to contact your doctor if you have any problems.  Where can you learn more?  Go to https://www.[employer].net/patientEd and enter F075 to learn more about \"A Healthy Heart: Care Instructions.\"  Current as of: June 24, 2023  Content Version: 14.1  © 7615-9306 [employer].   Care instructions adapted under license by [employer]. If you have questions about a medical condition or this instruction, always ask

## 2024-08-02 RX ORDER — OMEPRAZOLE 20 MG/1
20 CAPSULE, DELAYED RELEASE ORAL
Qty: 90 CAPSULE | Refills: 1 | Status: SHIPPED | OUTPATIENT
Start: 2024-08-02

## 2024-08-02 NOTE — TELEPHONE ENCOUNTER
Refill Request     CONFIRM preferred pharmacy with the patient.    If Mail Order Rx - Pend for 90 day refill.      Last Seen: Last Seen Department: 6/21/2024  Last Seen by PCP: 6/21/2024    Last Written: 5/6/24 #90 - 1 refill    If no future appointment scheduled:  Review the last OV with PCP and review information for follow-up visit,  Route STAFF MESSAGE with patient name to the  Pool for scheduling with the following information:            -  Timing of next visit           -  Visit type ie Physical, OV, etc           -  Diagnoses/Reason ie. COPD, HTN - Do not use MEDICATION, Follow-up or CHECK UP - Give reason for visit      Next Appointment:   Future Appointments   Date Time Provider Department Center   9/12/2024 12:10 PM Gayathri Randhawa APRN - CNP [facility]   9/20/2024  1:15 PM SCHEDULE, SORAYA DEVICE CHECK [facility]   9/20/2024  1:15 PM Jaqueline Kelly APRN - CNP [place] [facility]   12/23/2024 11:30 AM Donita Arambula APRN - CNP [facility]       Message sent to  to schedule appt with patient?  NO      Requested Prescriptions     Pending Prescriptions Disp Refills    omeprazole (PRILOSEC) 20 MG delayed release capsule 90 capsule 1     Sig: Take 1 capsule by mouth every morning (before breakfast)

## 2024-08-19 ENCOUNTER — TELEPHONE (OUTPATIENT)
Dept: CARDIOLOGY CLINIC | Age: 74
End: 2024-08-19

## 2024-08-19 RX ORDER — METOPROLOL SUCCINATE 50 MG/1
25 TABLET, EXTENDED RELEASE ORAL DAILY
Qty: 45 TABLET | Refills: 1 | Status: SHIPPED | OUTPATIENT
Start: 2024-08-19

## 2024-08-19 NOTE — TELEPHONE ENCOUNTER
Pt called in stating he is completely out of metoprolol succinate 50mg takes half a tablet daily. The pt would like a 90 script be sent to his mail order [facility] pharmacy.   [facility] - [place], [place] - [address] [place] [address] - P [phone] - F [phone]  [address] [place] [address]  Phone: [phone]  Fax: [phone]     I asked pt if he needed a small supply to go to his local pharmacy and he denied that.

## 2024-09-20 ENCOUNTER — OFFICE VISIT (OUTPATIENT)
Dept: CARDIOLOGY CLINIC | Age: 74
End: 2024-09-20

## 2024-09-20 ENCOUNTER — NURSE ONLY (OUTPATIENT)
Dept: CARDIOLOGY CLINIC | Age: 74
End: 2024-09-20
Payer: MEDICARE

## 2024-09-20 VITALS
SYSTOLIC BLOOD PRESSURE: 80 MMHG | WEIGHT: 178.2 LBS | DIASTOLIC BLOOD PRESSURE: 50 MMHG | HEIGHT: 71 IN | BODY MASS INDEX: 24.95 KG/M2 | OXYGEN SATURATION: 97 % | HEART RATE: 60 BPM

## 2024-09-20 DIAGNOSIS — I25.5 ISCHEMIC CARDIOMYOPATHY: ICD-10-CM

## 2024-09-20 DIAGNOSIS — Z95.810 PRESENCE OF CARDIAC RESYNCHRONIZATION THERAPY DEFIBRILLATOR (CRT-D): Primary | ICD-10-CM

## 2024-09-20 DIAGNOSIS — I44.7 LBBB (LEFT BUNDLE BRANCH BLOCK): ICD-10-CM

## 2024-09-20 DIAGNOSIS — I47.29 PAROXYSMAL VENTRICULAR TACHYCARDIA (HCC): Primary | ICD-10-CM

## 2024-09-20 PROCEDURE — 93284 PRGRMG EVAL IMPLANTABLE DFB: CPT | Performed by: INTERNAL MEDICINE

## 2024-10-01 ENCOUNTER — TELEPHONE (OUTPATIENT)
Dept: CARDIOLOGY CLINIC | Age: 74
End: 2024-10-01

## 2024-10-01 NOTE — TELEPHONE ENCOUNTER
LVM for patient to return call to the office.    Transmission received. No arrhythmias, normal device function observed. I do not know why patient's bedside monitor was alarming, monitor is working appropriately. If patient has any monitor specific questions going forward, please reach out to [employer] directly at [phone].

## 2024-10-01 NOTE — TELEPHONE ENCOUNTER
Pt states his bedside monitor has \" gone off beeping\" twice over the past two weeks. This happened at different times of the day. Pt would like to know what this means. Pt given number for [employer] and will send manual transmission. Please advise.

## 2024-10-29 NOTE — TELEPHONE ENCOUNTER
Patient called asking for this medication to be sent in, he stated that the pharmacy is telling him they dont have this or have nay refills. Please advise

## 2024-11-05 RX ORDER — GEMFIBROZIL 600 MG/1
600 TABLET, FILM COATED ORAL
Qty: 180 TABLET | Refills: 5 | Status: SHIPPED | OUTPATIENT
Start: 2024-11-05

## 2024-11-05 NOTE — TELEPHONE ENCOUNTER
Pt is needing refill on Gemfiberzil 600 mg 1 bid. 90 day supply    He called [facility] they do not have any refills on file.  Stated they did receive the refills sent by   [facility] on 6.25.24     Last ov was 6.25.24-[facility]

## 2024-11-19 RX ORDER — LISINOPRIL 2.5 MG/1
2.5 TABLET ORAL DAILY
Qty: 90 TABLET | Refills: 3 | Status: SHIPPED | OUTPATIENT
Start: 2024-11-19

## 2024-11-19 NOTE — TELEPHONE ENCOUNTER
Patient last seen on 6/25/24. Advised to follow up 6 months. Next appointment none.       Patient needs an appt.

## 2024-12-23 ENCOUNTER — OFFICE VISIT (OUTPATIENT)
Dept: FAMILY MEDICINE CLINIC | Age: 74
End: 2024-12-23
Payer: MEDICARE

## 2024-12-23 VITALS
TEMPERATURE: 97.3 F | DIASTOLIC BLOOD PRESSURE: 64 MMHG | OXYGEN SATURATION: 99 % | SYSTOLIC BLOOD PRESSURE: 128 MMHG | WEIGHT: 189.8 LBS | HEART RATE: 61 BPM | BODY MASS INDEX: 26.57 KG/M2 | HEIGHT: 71 IN

## 2024-12-23 DIAGNOSIS — N18.30 CHRONIC RENAL FAILURE SYNDROME, STAGE 3 (MODERATE) (HCC): ICD-10-CM

## 2024-12-23 DIAGNOSIS — I25.10 CORONARY ARTERY DISEASE INVOLVING NATIVE CORONARY ARTERY OF NATIVE HEART WITHOUT ANGINA PECTORIS: ICD-10-CM

## 2024-12-23 DIAGNOSIS — I20.89 OTHER FORMS OF ANGINA PECTORIS (HCC): ICD-10-CM

## 2024-12-23 DIAGNOSIS — I50.22 CHRONIC SYSTOLIC CONGESTIVE HEART FAILURE (HCC): ICD-10-CM

## 2024-12-23 DIAGNOSIS — J41.0 SIMPLE CHRONIC BRONCHITIS (HCC): ICD-10-CM

## 2024-12-23 DIAGNOSIS — E78.2 MIXED HYPERLIPIDEMIA: ICD-10-CM

## 2024-12-23 DIAGNOSIS — N18.31 TYPE 2 DIABETES MELLITUS WITH STAGE 3A CHRONIC KIDNEY DISEASE, UNSPECIFIED WHETHER LONG TERM INSULIN USE (HCC): Primary | ICD-10-CM

## 2024-12-23 DIAGNOSIS — F33.1 MODERATE EPISODE OF RECURRENT MAJOR DEPRESSIVE DISORDER (HCC): ICD-10-CM

## 2024-12-23 DIAGNOSIS — N18.31 TYPE 2 DIABETES MELLITUS WITH STAGE 3A CHRONIC KIDNEY DISEASE, UNSPECIFIED WHETHER LONG TERM INSULIN USE (HCC): ICD-10-CM

## 2024-12-23 DIAGNOSIS — E11.22 TYPE 2 DIABETES MELLITUS WITH STAGE 3A CHRONIC KIDNEY DISEASE, UNSPECIFIED WHETHER LONG TERM INSULIN USE (HCC): ICD-10-CM

## 2024-12-23 DIAGNOSIS — E11.22 TYPE 2 DIABETES MELLITUS WITH STAGE 3A CHRONIC KIDNEY DISEASE, UNSPECIFIED WHETHER LONG TERM INSULIN USE (HCC): Primary | ICD-10-CM

## 2024-12-23 LAB
ANION GAP SERPL CALCULATED.3IONS-SCNC: 13 MMOL/L (ref 3–16)
BUN SERPL-MCNC: 23 MG/DL (ref 7–20)
CALCIUM SERPL-MCNC: 9.8 MG/DL (ref 8.3–10.6)
CHLORIDE SERPL-SCNC: 105 MMOL/L (ref 99–110)
CO2 SERPL-SCNC: 22 MMOL/L (ref 21–32)
CREAT SERPL-MCNC: 1.3 MG/DL (ref 0.8–1.3)
DEPRECATED RDW RBC AUTO: 13.2 % (ref 12.4–15.4)
GFR SERPLBLD CREATININE-BSD FMLA CKD-EPI: 57 ML/MIN/{1.73_M2}
GLUCOSE SERPL-MCNC: 107 MG/DL (ref 70–99)
HBA1C MFR BLD: 6 %
HCT VFR BLD AUTO: 39.1 % (ref 40.5–52.5)
HGB BLD-MCNC: 13.3 G/DL (ref 13.5–17.5)
MCH RBC QN AUTO: 32.7 PG (ref 26–34)
MCHC RBC AUTO-ENTMCNC: 34 G/DL (ref 31–36)
MCV RBC AUTO: 96.4 FL (ref 80–100)
PLATELET # BLD AUTO: 197 K/UL (ref 135–450)
PMV BLD AUTO: 8.7 FL (ref 5–10.5)
POTASSIUM SERPL-SCNC: 4.4 MMOL/L (ref 3.5–5.1)
RBC # BLD AUTO: 4.06 M/UL (ref 4.2–5.9)
SODIUM SERPL-SCNC: 140 MMOL/L (ref 136–145)
WBC # BLD AUTO: 7.3 K/UL (ref 4–11)

## 2024-12-23 PROCEDURE — 3044F HG A1C LEVEL LT 7.0%: CPT | Performed by: NURSE PRACTITIONER

## 2024-12-23 PROCEDURE — G0008 ADMIN INFLUENZA VIRUS VAC: HCPCS | Performed by: NURSE PRACTITIONER

## 2024-12-23 PROCEDURE — 90653 IIV ADJUVANT VACCINE IM: CPT | Performed by: NURSE PRACTITIONER

## 2024-12-23 PROCEDURE — 83036 HEMOGLOBIN GLYCOSYLATED A1C: CPT | Performed by: NURSE PRACTITIONER

## 2024-12-23 PROCEDURE — 1159F MED LIST DOCD IN RCRD: CPT | Performed by: NURSE PRACTITIONER

## 2024-12-23 PROCEDURE — 99213 OFFICE O/P EST LOW 20 MIN: CPT | Performed by: NURSE PRACTITIONER

## 2024-12-23 PROCEDURE — 1160F RVW MEDS BY RX/DR IN RCRD: CPT | Performed by: NURSE PRACTITIONER

## 2024-12-23 PROCEDURE — 1123F ACP DISCUSS/DSCN MKR DOCD: CPT | Performed by: NURSE PRACTITIONER

## 2024-12-23 SDOH — ECONOMIC STABILITY: FOOD INSECURITY: WITHIN THE PAST 12 MONTHS, THE FOOD YOU BOUGHT JUST DIDN'T LAST AND YOU DIDN'T HAVE MONEY TO GET MORE.: NEVER TRUE

## 2024-12-23 SDOH — ECONOMIC STABILITY: FOOD INSECURITY: WITHIN THE PAST 12 MONTHS, YOU WORRIED THAT YOUR FOOD WOULD RUN OUT BEFORE YOU GOT MONEY TO BUY MORE.: NEVER TRUE

## 2024-12-23 SDOH — ECONOMIC STABILITY: INCOME INSECURITY: HOW HARD IS IT FOR YOU TO PAY FOR THE VERY BASICS LIKE FOOD, HOUSING, MEDICAL CARE, AND HEATING?: NOT HARD AT ALL

## 2024-12-23 ASSESSMENT — ENCOUNTER SYMPTOMS
VOMITING: 0
DIARRHEA: 0
WHEEZING: 0
NAUSEA: 0
ABDOMINAL PAIN: 0
CONSTIPATION: 0
COUGH: 0
SHORTNESS OF BREATH: 0
ABDOMINAL DISTENTION: 0
CHEST TIGHTNESS: 0

## 2024-12-23 ASSESSMENT — PATIENT HEALTH QUESTIONNAIRE - PHQ9
4. FEELING TIRED OR HAVING LITTLE ENERGY: NOT AT ALL
5. POOR APPETITE OR OVEREATING: NOT AT ALL
SUM OF ALL RESPONSES TO PHQ QUESTIONS 1-9: 0
6. FEELING BAD ABOUT YOURSELF - OR THAT YOU ARE A FAILURE OR HAVE LET YOURSELF OR YOUR FAMILY DOWN: NOT AT ALL
SUM OF ALL RESPONSES TO PHQ QUESTIONS 1-9: 0
SUM OF ALL RESPONSES TO PHQ QUESTIONS 1-9: 0
3. TROUBLE FALLING OR STAYING ASLEEP: NOT AT ALL
1. LITTLE INTEREST OR PLEASURE IN DOING THINGS: NOT AT ALL
8. MOVING OR SPEAKING SO SLOWLY THAT OTHER PEOPLE COULD HAVE NOTICED. OR THE OPPOSITE, BEING SO FIGETY OR RESTLESS THAT YOU HAVE BEEN MOVING AROUND A LOT MORE THAN USUAL: NOT AT ALL
2. FEELING DOWN, DEPRESSED OR HOPELESS: NOT AT ALL
SUM OF ALL RESPONSES TO PHQ9 QUESTIONS 1 & 2: 0
SUM OF ALL RESPONSES TO PHQ QUESTIONS 1-9: 0
9. THOUGHTS THAT YOU WOULD BE BETTER OFF DEAD, OR OF HURTING YOURSELF: NOT AT ALL
7. TROUBLE CONCENTRATING ON THINGS, SUCH AS READING THE NEWSPAPER OR WATCHING TELEVISION: NOT AT ALL

## 2024-12-23 NOTE — PROGRESS NOTES
2024  Mau Jacinto (: 1950)  74 y.o.    ASSESSMENT and PLAN:  Mau was seen today for diabetes.    Diagnoses and all orders for this visit:    Type 2 diabetes mellitus with stage 3a chronic kidney disease, unspecified whether long term insulin use (HCC)  -     Albumin/Creatinine Ratio, Urine; Future  -     Diabetic Foot Exam  -     POCT glycosylated hemoglobin (Hb A1C)  -     Albumin/Creatinine Ratio, Urine  -     Basic Metabolic Panel; Future  -     CBC; Future  -A1c controlled.   -The current medical regimen is effective;  continue present plan and medications.  -update labs    Chronic systolic congestive heart failure (HCC)  -The current medical regimen is effective;  continue present plan and medications.    Simple chronic bronchitis (HCC)  -The current medical regimen is effective;  continue present plan and medications.    Other forms of angina pectoris (HCC)    Coronary artery disease involving native coronary artery of native heart without angina pectoris  -The current medical regimen is effective;  continue present plan and medications.    Chronic renal failure syndrome, stage 3 (moderate) (HCC)  -The current medical regimen is effective;  continue present plan and medications.    Mixed hyperlipidemia  -The current medical regimen is effective;  continue present plan and medications.    Moderate episode of recurrent major depressive disorder (HCC)  The current medical regimen is effective;  continue present plan and medications.    Other orders  -     Influenza, FLUAD T[employer], (age 65 y+), IM, Preservative Free, 0.5mL  Given     Return in about 6 months (around 2025), or if symptoms worsen or fail to improve, for dm.    HPI  Presenting for follow up on chronic condition     Pain management - taking percocet bid.     Established with Rheum, hasn't seen since  Feeling much better. Repeat echocardiogram, if EF is more than 35%, anti-TNF can be considered per Rheum.     Following with

## 2024-12-24 LAB
CREAT UR-MCNC: 109 MG/DL (ref 39–259)
MICROALBUMIN UR DL<=1MG/L-MCNC: 28.3 MG/DL
MICROALBUMIN/CREAT UR: 259.6 MG/G (ref 0–30)

## 2024-12-29 DIAGNOSIS — G44.229 CHRONIC TENSION-TYPE HEADACHE, NOT INTRACTABLE: ICD-10-CM

## 2024-12-29 NOTE — TELEPHONE ENCOUNTER
Refill Request     CONFIRM preferred pharmacy with the patient.    If Mail Order Rx - Pend for 90 day refill.      Last Seen: Last Seen Department: 12/23/2024  Last Seen by PCP: 12/23/2024    Last Written: 5/20/2024 90 tab 1 refills    If no future appointment scheduled:  Review the last OV with PCP and review information for follow-up visit,  Route STAFF MESSAGE with patient name to the  Pool for scheduling with the following information:            -  Timing of next visit           -  Visit type ie Physical, OV, etc           -  Diagnoses/Reason ie. COPD, HTN - Do not use MEDICATION, Follow-up or CHECK UP - Give reason for visit      Next Appointment:   Future Appointments   Date Time Provider Department Center   12/31/2024  1:30 PM Diony Mo MD P [facility]   1/9/2025 12:10 PM Gayathri Randhawa APRN - CNP [facility]   6/23/2025 11:00 AM Donita Arambula APRN - CNP [facility]       Message sent to  to schedule appt with patient?  NO      Requested Prescriptions     Pending Prescriptions Disp Refills    amitriptyline (ELAVIL) 25 MG tablet [Pharmacy Med Name: AMITRIPTYLINE HCL 25MG TABS] 90 tablet 1     Sig: TAKE ONE TABLET BY MOUTH EVERY EVENING

## 2024-12-30 NOTE — PROGRESS NOTES
heart failure) ([facility]), Chronic systolic CHF (congestive heart failure), NYHA class 3 ([facility]), GERD (gastroesophageal reflux disease), Hearing loss, Hyperlipidemia, MI (myocardial infarction) ([facility]), Rash, and Type 2 diabetes mellitus with chronic kidney disease.    Surgical History:   has a past surgical history that includes Coronary angioplasty with stent; Cardiac defibrillator placement (Left, 12/17/2020); knee surgery; Colonoscopy; Cataract removal with implant (Right, 02/01/2017); Cataract removal with implant (Left, 03/01/2017); eye surgery; and Cystoscopy (N/A, 1/5/2022).     Social History:   reports that he has been smoking cigarettes. He started smoking about 63 years ago. He has a 47.2 pack-year smoking history. He has never used smokeless tobacco. He reports that he does not drink alcohol and does not use drugs.     Family History:  family history includes Cancer in his father; Diabetes in his mother.     Home Medications:  Prior to Admission medications    Medication Sig Start Date End Date Taking? Authorizing Provider   amitriptyline (ELAVIL) 25 MG tablet TAKE ONE TABLET BY MOUTH EVERY EVENING 12/30/24  Yes Donita Arambula APRN - CNP   lisinopril (PRINIVIL;ZESTRIL) 2.5 MG tablet TAKE ONE TABLET BY MOUTH EVERY DAY 11/19/24  Yes Diony Mo MD   oxyCODONE-acetaminophen (PERCOCET) 5-325 MG per tablet Take 1 tablet by mouth 3 times daily for 30 days. Max Daily Amount: 3 tablets 12/6/24 1/5/25 Yes Gayathri Randhawa APRN - CNP   gemfibrozil (LOPID) 600 MG tablet Take 1 tablet by mouth 2 times daily (before meals) 11/5/24  Yes Diony Mo MD   omeprazole (PRILOSEC) 20 MG delayed release capsule Take 1 capsule by mouth every morning (before breakfast) 10/30/24  Yes Tash Avina PA   metoprolol succinate (TOPROL XL) 50 MG extended release tablet Take 0.5 tablets by mouth daily 8/19/24  Yes Diony Mo MD   clopidogrel (PLAVIX) 75 MG tablet TAKE 1 TABLET EVERY DAY 6/25/24  Yes Chepe

## 2024-12-31 ENCOUNTER — OFFICE VISIT (OUTPATIENT)
Dept: CARDIOLOGY CLINIC | Age: 74
End: 2024-12-31
Payer: MEDICARE

## 2024-12-31 VITALS
HEART RATE: 60 BPM | BODY MASS INDEX: 27.27 KG/M2 | SYSTOLIC BLOOD PRESSURE: 122 MMHG | OXYGEN SATURATION: 98 % | WEIGHT: 194.8 LBS | HEIGHT: 71 IN | DIASTOLIC BLOOD PRESSURE: 78 MMHG

## 2024-12-31 DIAGNOSIS — I50.22 CHRONIC SYSTOLIC CHF (CONGESTIVE HEART FAILURE) (HCC): ICD-10-CM

## 2024-12-31 DIAGNOSIS — I25.5 ISCHEMIC CARDIOMYOPATHY: ICD-10-CM

## 2024-12-31 DIAGNOSIS — I25.10 CORONARY ARTERY DISEASE INVOLVING NATIVE CORONARY ARTERY OF NATIVE HEART WITHOUT ANGINA PECTORIS: Primary | ICD-10-CM

## 2024-12-31 DIAGNOSIS — I44.7 LBBB (LEFT BUNDLE BRANCH BLOCK): ICD-10-CM

## 2024-12-31 DIAGNOSIS — E78.2 MIXED HYPERLIPIDEMIA: ICD-10-CM

## 2024-12-31 DIAGNOSIS — Z72.0 TOBACCO ABUSE: ICD-10-CM

## 2024-12-31 PROCEDURE — 1159F MED LIST DOCD IN RCRD: CPT | Performed by: INTERNAL MEDICINE

## 2024-12-31 PROCEDURE — 99214 OFFICE O/P EST MOD 30 MIN: CPT | Performed by: INTERNAL MEDICINE

## 2024-12-31 PROCEDURE — 1123F ACP DISCUSS/DSCN MKR DOCD: CPT | Performed by: INTERNAL MEDICINE

## 2024-12-31 RX ORDER — ROSUVASTATIN CALCIUM 40 MG/1
TABLET, COATED ORAL
Qty: 90 TABLET | Refills: 3 | Status: SHIPPED | OUTPATIENT
Start: 2024-12-31

## 2024-12-31 RX ORDER — CLOPIDOGREL BISULFATE 75 MG/1
TABLET ORAL
Qty: 90 TABLET | Refills: 3 | Status: SHIPPED | OUTPATIENT
Start: 2024-12-31

## 2024-12-31 RX ORDER — SPIRONOLACTONE 25 MG/1
TABLET ORAL
Qty: 45 TABLET | Refills: 3 | Status: SHIPPED | OUTPATIENT
Start: 2024-12-31

## 2024-12-31 NOTE — PATIENT INSTRUCTIONS
Plan:  Patients is a current smoker. Educational material provided to patient.   Recommend working on weight management since your weight is up 20 pounds from last year. Weight gain can increase your chances of developing diabetes   Follow up in 6 months   Medications refilled

## 2025-03-12 ENCOUNTER — TELEPHONE (OUTPATIENT)
Dept: TELEMETRY | Age: 75
End: 2025-03-12

## 2025-03-12 DIAGNOSIS — Z72.0 TOBACCO ABUSE: Primary | ICD-10-CM

## 2025-03-12 DIAGNOSIS — R91.1 PULMONARY NODULE: ICD-10-CM

## 2025-03-12 NOTE — TELEPHONE ENCOUNTER
Pt due for Annual CT Lung Screening, reminder letter mailed, Central Scheduling phone number provided.    CT Lung Screening order has been pended to chart should you choose to sign it.  Routed to PCP    Amanda Avina RN

## 2025-03-14 RX ORDER — METOPROLOL SUCCINATE 50 MG/1
25 TABLET, EXTENDED RELEASE ORAL DAILY
Qty: 45 TABLET | Refills: 1 | Status: SHIPPED | OUTPATIENT
Start: 2025-03-14

## 2025-03-14 NOTE — TELEPHONE ENCOUNTER
Medication Refill    Medication needing refilled: Metoprolol Succ    Dosage of the medication: 50 mg     How are you taking this medication (QD, BID, TID, QID, PRN): 1/2 tab qd    30 or 90 day supply called in: 90    When will you run out of your medication: 1 week    Which Pharmacy are we sending the medication to?: [facility]     Last OV: 12.31.24-SMM     Next OV: 6.12.25-SMM

## 2025-03-14 NOTE — TELEPHONE ENCOUNTER
Last Office Visit: 12/31/2024 Provider: FABIÁN  **Is provider OOT? No    Next Office Visit: 6/12/2025 Provider: FABIÁN  **If no OV, when does pt need to be seen? N/a  **Has patient already had 30 day supply? No    Lab orders needed? no   Encounter provider correct? Yes If not, change provider  Script changes since last refill? no    LAST LABS:   BMP:   Lab Results   Component Value Date/Time     12/23/2024 12:06 PM    K 4.4 12/23/2024 12:06 PM    K 4.9 03/14/2022 01:06 PM     12/23/2024 12:06 PM    CO2 22 12/23/2024 12:06 PM    BUN 23 12/23/2024 12:06 PM    CREATININE 1.3 12/23/2024 12:06 PM    GLUCOSE 107 12/23/2024 12:06 PM    CALCIUM 9.8 12/23/2024 12:06 PM    LABGLOM 57 12/23/2024 12:06 PM    LABGLOM 57 12/18/2023 12:50 PM

## 2025-04-04 ENCOUNTER — HOSPITAL ENCOUNTER (OUTPATIENT)
Dept: CT IMAGING | Age: 75
Discharge: HOME | End: 2025-04-04
Payer: MEDICARE

## 2025-04-04 DIAGNOSIS — Z72.0 TOBACCO ABUSE: ICD-10-CM

## 2025-04-04 DIAGNOSIS — R91.1 PULMONARY NODULE: ICD-10-CM

## 2025-04-04 PROCEDURE — 71271 CT THORAX LUNG CANCER SCR C-: CPT

## 2025-04-16 ENCOUNTER — RESULTS FOLLOW-UP (OUTPATIENT)
Dept: FAMILY MEDICINE CLINIC | Age: 75
End: 2025-04-16

## 2025-06-02 RX ORDER — OMEPRAZOLE 20 MG/1
20 CAPSULE, DELAYED RELEASE ORAL
Qty: 90 CAPSULE | Refills: 1 | Status: SHIPPED | OUTPATIENT
Start: 2025-06-02

## 2025-06-02 NOTE — TELEPHONE ENCOUNTER
Refill Request     CONFIRM preferred pharmacy with the patient.    If Mail Order Rx - Pend for 90 day refill.      Last Seen: Last Seen Department: 12/23/2024  Last Seen by PCP: 12/23/2024    Last Written: 10/30/24 90 with 1 refill     If no future appointment scheduled:  Review the last OV with PCP and review information for follow-up visit,  Route STAFF MESSAGE with patient name to the  Pool for scheduling with the following information:            -  Timing of next visit           -  Visit type ie Physical, OV, etc           -  Diagnoses/Reason ie. COPD, HTN - Do not use MEDICATION, Follow-up or CHECK UP - Give reason for visit      Next Appointment:   Future Appointments   Date Time Provider Department Center   6/11/2025  8:20 AM Gayathri Randhawa APRN - CNP [facility]   6/12/2025  2:00 PM Diony Mo MD P [facility]   6/23/2025 11:00 AM Garcia, Donita A, APRN - CNP [facility]       Message sent to  to schedule appt with patient?  NO      Requested Prescriptions     Pending Prescriptions Disp Refills    omeprazole (PRILOSEC) 20 MG delayed release capsule [Pharmacy Med Name: OMEPRAZOLE 20MG CPDR] 90 capsule 1     Sig: TAKE ONE CAPSULE BY MOUTH EVERY MORNING BEFORE BREAKFAST

## 2025-06-05 NOTE — PROGRESS NOTES
[facility]   Cardiac Follow UP    Referring Provider:  Donita Garcia APRN - CNP     Chief Complaint   Patient presents with    6 Month Follow-Up    Coronary Artery Disease    Hyperlipidemia    Congestive Heart Failure    Cardiomyopathy    Hypertension     Subjective: Mr Jacinto is being seen today for cardiology follow up;  no complaints today    History of Present Illness:    Mau Jacinto is a 75 y.o. male who has PMH severe ICM EF=15-20% (3/4 GDMT meds--cannot afford SGLT1i), hx VT s/p ICD with upgrade BiV-. CAD s/p RCA stents prior. [facility] 6/14 --> stable known ischemic heart disease. He also has hx MI, HLD, and chronic systolic CHF. ECHO 6/29/20 EF=15-20% (EF=20-25% in 12/18, 15-20% in 7/16, and 25% in 2014).   On 6/28/20 presented ER for ICD shock. Gisell nuc 6/28/20 large scar anteroseptal/apical and inferior walls; LVEF 24%, no ischemia (no change 7/16, 11/15, 11/13). Upgraded to BiV-ICD on 12/17/20 with Dr. Dubon. He decreased metoprolol to 25 mg daily due to orthostasis. Taken off lisinopril 11/21 by PCP due to K+/renal issues but restarted and now tolerates. He saw EP team for f/u 9/20/24 and no changes made. EKG atrial paced  HR  60bpm; IVCD; NST change (NSR in 6/24); Device check 5/7/25 PACING 13% RV PACING 39% LV PACING 100% AT/AF BURDEN 0%  BILLY 8.50yrs.    Today, he is doing well from a cardiac standpoint and has no difficulties doing his daily activities. Patient denies current edema, chest pain, shortness of breath, palpitations, dizziness or syncope. Patient is taking all cardiac medications as prescribed and tolerates them well.     He still smokes and no desire to quit.     Weight today is 182# (Down 12# from 12/2024)    Past Medical History:   has a past medical history of AICD (automatic cardioverter/defibrillator) present, Arthritis, CAD (coronary artery disease), CHF (congestive heart failure) ([facility]), Chronic systolic CHF (congestive heart failure), NYHA class 3 (HCC), GERD

## 2025-06-11 DIAGNOSIS — G44.229 CHRONIC TENSION-TYPE HEADACHE, NOT INTRACTABLE: ICD-10-CM

## 2025-06-11 NOTE — TELEPHONE ENCOUNTER
Refill Request     CONFIRM preferred pharmacy with the patient.    If Mail Order Rx - Pend for 90 day refill.      Last Seen: Last Seen Department: 12/23/2024  Last Seen by PCP: 12/23/2024    Last Written: 12/30/24 90 with 1 refill     If no future appointment scheduled:  Review the last OV with PCP and review information for follow-up visit,  Route STAFF MESSAGE with patient name to the  Pool for scheduling with the following information:            -  Timing of next visit           -  Visit type ie Physical, OV, etc           -  Diagnoses/Reason ie. COPD, HTN - Do not use MEDICATION, Follow-up or CHECK UP - Give reason for visit      Next Appointment:   Future Appointments   Date Time Provider Department [place]   6/12/2025  2:00 PM Diony Mo MD P [facility]   6/23/2025 11:00 AM Garcia, Donita A, APRN - CNP [facility]   7/10/2025 10:10 AM Gayathri Randhawa APRN - CNP [facility] [place]       Message sent to  to schedule appt with patient?  NO      Requested Prescriptions     Pending Prescriptions Disp Refills    amitriptyline (ELAVIL) 25 MG tablet [Pharmacy Med Name: AMITRIPTYLINE HCL 25MG TABS] 90 tablet 1     Sig: TAKE ONE TABLET BY MOUTH EVERY EVENING

## 2025-06-12 ENCOUNTER — OFFICE VISIT (OUTPATIENT)
Dept: CARDIOLOGY CLINIC | Age: 75
End: 2025-06-12
Payer: MEDICARE

## 2025-06-12 VITALS
WEIGHT: 182 LBS | OXYGEN SATURATION: 94 % | SYSTOLIC BLOOD PRESSURE: 90 MMHG | HEIGHT: 70 IN | DIASTOLIC BLOOD PRESSURE: 58 MMHG | BODY MASS INDEX: 26.05 KG/M2 | HEART RATE: 77 BPM

## 2025-06-12 DIAGNOSIS — I25.10 CORONARY ARTERY DISEASE INVOLVING NATIVE CORONARY ARTERY OF NATIVE HEART WITHOUT ANGINA PECTORIS: ICD-10-CM

## 2025-06-12 DIAGNOSIS — Z72.0 TOBACCO ABUSE: ICD-10-CM

## 2025-06-12 DIAGNOSIS — I95.1 ORTHOSTATIC HYPOTENSION: ICD-10-CM

## 2025-06-12 DIAGNOSIS — Z95.5 PRESENCE OF STENT IN CORONARY ARTERY IN PATIENT WITH CORONARY ARTERY DISEASE: ICD-10-CM

## 2025-06-12 DIAGNOSIS — I25.5 ISCHEMIC CARDIOMYOPATHY: Primary | ICD-10-CM

## 2025-06-12 DIAGNOSIS — Z79.899 MEDICATION MANAGEMENT: ICD-10-CM

## 2025-06-12 DIAGNOSIS — E78.2 MIXED HYPERLIPIDEMIA: ICD-10-CM

## 2025-06-12 DIAGNOSIS — I25.10 PRESENCE OF STENT IN CORONARY ARTERY IN PATIENT WITH CORONARY ARTERY DISEASE: ICD-10-CM

## 2025-06-12 PROCEDURE — 1159F MED LIST DOCD IN RCRD: CPT | Performed by: INTERNAL MEDICINE

## 2025-06-12 PROCEDURE — 1123F ACP DISCUSS/DSCN MKR DOCD: CPT | Performed by: INTERNAL MEDICINE

## 2025-06-12 PROCEDURE — 99214 OFFICE O/P EST MOD 30 MIN: CPT | Performed by: INTERNAL MEDICINE

## 2025-06-12 NOTE — PATIENT INSTRUCTIONS
Medications reviewed in office. Medications refilled as warranted  Labs reviewed in epic and discussed with patient.  Please obtain the following labs; -LIPID FASTING, CBC, CMP, TSH reflex to FT4, FT3, hemoglobin A1c      Please call in November to make your next appointment. Our 2026 schedule is not out yet. [phone].

## 2025-06-13 DIAGNOSIS — Z79.899 MEDICATION MANAGEMENT: ICD-10-CM

## 2025-06-13 LAB
ALBUMIN SERPL-MCNC: 4.7 G/DL (ref 3.4–5)
ALBUMIN/GLOB SERPL: 1.9 {RATIO} (ref 1.1–2.2)
ALP SERPL-CCNC: 102 U/L (ref 40–129)
ALT SERPL-CCNC: 14 U/L (ref 10–40)
ANION GAP SERPL CALCULATED.3IONS-SCNC: 13 MMOL/L (ref 3–16)
AST SERPL-CCNC: 24 U/L (ref 15–37)
BASOPHILS # BLD: 0.1 K/UL (ref 0–0.2)
BASOPHILS NFR BLD: 0.7 %
BILIRUB SERPL-MCNC: 0.3 MG/DL (ref 0–1)
BUN SERPL-MCNC: 33 MG/DL (ref 7–20)
CALCIUM SERPL-MCNC: 9.6 MG/DL (ref 8.3–10.6)
CHLORIDE SERPL-SCNC: 104 MMOL/L (ref 99–110)
CHOLEST SERPL-MCNC: 154 MG/DL (ref 0–199)
CO2 SERPL-SCNC: 19 MMOL/L (ref 21–32)
CREAT SERPL-MCNC: 1.7 MG/DL (ref 0.8–1.3)
DEPRECATED RDW RBC AUTO: 13.7 % (ref 12.4–15.4)
EOSINOPHIL # BLD: 0.4 K/UL (ref 0–0.6)
EOSINOPHIL NFR BLD: 5.2 %
EST. AVERAGE GLUCOSE BLD GHB EST-MCNC: 137 MG/DL
GFR SERPLBLD CREATININE-BSD FMLA CKD-EPI: 41 ML/MIN/{1.73_M2}
GLUCOSE SERPL-MCNC: 108 MG/DL (ref 70–99)
HBA1C MFR BLD: 6.4 %
HCT VFR BLD AUTO: 35 % (ref 40.5–52.5)
HDLC SERPL-MCNC: 43 MG/DL (ref 40–60)
HGB BLD-MCNC: 12.1 G/DL (ref 13.5–17.5)
LDLC SERPL CALC-MCNC: 86 MG/DL
LYMPHOCYTES # BLD: 2.2 K/UL (ref 1–5.1)
LYMPHOCYTES NFR BLD: 25.8 %
MCH RBC QN AUTO: 32.2 PG (ref 26–34)
MCHC RBC AUTO-ENTMCNC: 34.4 G/DL (ref 31–36)
MCV RBC AUTO: 93.6 FL (ref 80–100)
MONOCYTES # BLD: 0.7 K/UL (ref 0–1.3)
MONOCYTES NFR BLD: 8.1 %
NEUTROPHILS # BLD: 5 K/UL (ref 1.7–7.7)
NEUTROPHILS NFR BLD: 60.2 %
PLATELET # BLD AUTO: 210 K/UL (ref 135–450)
PMV BLD AUTO: 8.4 FL (ref 5–10.5)
POTASSIUM SERPL-SCNC: 4.6 MMOL/L (ref 3.5–5.1)
PROT SERPL-MCNC: 7.2 G/DL (ref 6.4–8.2)
RBC # BLD AUTO: 3.74 M/UL (ref 4.2–5.9)
SODIUM SERPL-SCNC: 136 MMOL/L (ref 136–145)
TRIGL SERPL-MCNC: 125 MG/DL (ref 0–150)
TSH SERPL DL<=0.005 MIU/L-ACNC: 1.88 UIU/ML (ref 0.27–4.2)
VLDLC SERPL CALC-MCNC: 25 MG/DL
WBC # BLD AUTO: 8.3 K/UL (ref 4–11)

## 2025-06-16 ENCOUNTER — RESULTS FOLLOW-UP (OUTPATIENT)
Dept: CARDIOLOGY | Age: 75
End: 2025-06-16

## 2025-06-16 DIAGNOSIS — I50.22 CHRONIC SYSTOLIC CHF (CONGESTIVE HEART FAILURE) (HCC): ICD-10-CM

## 2025-06-16 DIAGNOSIS — Z79.899 MEDICATION MANAGEMENT: Primary | ICD-10-CM

## 2025-06-23 ENCOUNTER — OFFICE VISIT (OUTPATIENT)
Dept: FAMILY MEDICINE CLINIC | Age: 75
End: 2025-06-23
Payer: MEDICARE

## 2025-06-23 ENCOUNTER — TELEPHONE (OUTPATIENT)
Dept: FAMILY MEDICINE CLINIC | Age: 75
End: 2025-06-23

## 2025-06-23 VITALS — DIASTOLIC BLOOD PRESSURE: 74 MMHG | SYSTOLIC BLOOD PRESSURE: 124 MMHG | HEART RATE: 71 BPM | OXYGEN SATURATION: 98 %

## 2025-06-23 DIAGNOSIS — N18.30 CHRONIC RENAL FAILURE SYNDROME, STAGE 3 (MODERATE) (HCC): ICD-10-CM

## 2025-06-23 DIAGNOSIS — E11.22 TYPE 2 DIABETES MELLITUS WITH STAGE 3A CHRONIC KIDNEY DISEASE, UNSPECIFIED WHETHER LONG TERM INSULIN USE (HCC): Primary | ICD-10-CM

## 2025-06-23 DIAGNOSIS — N18.31 TYPE 2 DIABETES MELLITUS WITH STAGE 3A CHRONIC KIDNEY DISEASE, UNSPECIFIED WHETHER LONG TERM INSULIN USE (HCC): Primary | ICD-10-CM

## 2025-06-23 DIAGNOSIS — N18.31 TYPE 2 DIABETES MELLITUS WITH STAGE 3A CHRONIC KIDNEY DISEASE, WITHOUT LONG-TERM CURRENT USE OF INSULIN (HCC): Primary | ICD-10-CM

## 2025-06-23 DIAGNOSIS — F11.20 OPIOID DEPENDENCE WITH CURRENT USE (HCC): ICD-10-CM

## 2025-06-23 DIAGNOSIS — I47.20 PAROXYSMAL VENTRICULAR TACHYCARDIA (HCC): ICD-10-CM

## 2025-06-23 DIAGNOSIS — E11.22 TYPE 2 DIABETES MELLITUS WITH STAGE 3A CHRONIC KIDNEY DISEASE, WITHOUT LONG-TERM CURRENT USE OF INSULIN (HCC): Primary | ICD-10-CM

## 2025-06-23 DIAGNOSIS — L40.50 PSORIATIC ARTHRITIS (HCC): ICD-10-CM

## 2025-06-23 DIAGNOSIS — F33.1 MODERATE EPISODE OF RECURRENT MAJOR DEPRESSIVE DISORDER (HCC): ICD-10-CM

## 2025-06-23 DIAGNOSIS — J41.0 SIMPLE CHRONIC BRONCHITIS (HCC): ICD-10-CM

## 2025-06-23 DIAGNOSIS — Z00.00 MEDICARE ANNUAL WELLNESS VISIT, SUBSEQUENT: ICD-10-CM

## 2025-06-23 DIAGNOSIS — I50.22 CHRONIC SYSTOLIC CHF (CONGESTIVE HEART FAILURE) (HCC): ICD-10-CM

## 2025-06-23 LAB — HBA1C MFR BLD: 6.5 %

## 2025-06-23 PROCEDURE — 99214 OFFICE O/P EST MOD 30 MIN: CPT | Performed by: NURSE PRACTITIONER

## 2025-06-23 PROCEDURE — G0439 PPPS, SUBSEQ VISIT: HCPCS | Performed by: NURSE PRACTITIONER

## 2025-06-23 PROCEDURE — 83036 HEMOGLOBIN GLYCOSYLATED A1C: CPT | Performed by: NURSE PRACTITIONER

## 2025-06-23 PROCEDURE — 1159F MED LIST DOCD IN RCRD: CPT | Performed by: NURSE PRACTITIONER

## 2025-06-23 PROCEDURE — 3044F HG A1C LEVEL LT 7.0%: CPT | Performed by: NURSE PRACTITIONER

## 2025-06-23 PROCEDURE — 1160F RVW MEDS BY RX/DR IN RCRD: CPT | Performed by: NURSE PRACTITIONER

## 2025-06-23 PROCEDURE — 1123F ACP DISCUSS/DSCN MKR DOCD: CPT | Performed by: NURSE PRACTITIONER

## 2025-06-23 SDOH — ECONOMIC STABILITY: FOOD INSECURITY: WITHIN THE PAST 12 MONTHS, THE FOOD YOU BOUGHT JUST DIDN'T LAST AND YOU DIDN'T HAVE MONEY TO GET MORE.: NEVER TRUE

## 2025-06-23 SDOH — ECONOMIC STABILITY: FOOD INSECURITY: WITHIN THE PAST 12 MONTHS, YOU WORRIED THAT YOUR FOOD WOULD RUN OUT BEFORE YOU GOT MONEY TO BUY MORE.: NEVER TRUE

## 2025-06-23 ASSESSMENT — PATIENT HEALTH QUESTIONNAIRE - PHQ9
5. POOR APPETITE OR OVEREATING: NOT AT ALL
6. FEELING BAD ABOUT YOURSELF - OR THAT YOU ARE A FAILURE OR HAVE LET YOURSELF OR YOUR FAMILY DOWN: NOT AT ALL
2. FEELING DOWN, DEPRESSED OR HOPELESS: NOT AT ALL
SUM OF ALL RESPONSES TO PHQ QUESTIONS 1-9: 0
8. MOVING OR SPEAKING SO SLOWLY THAT OTHER PEOPLE COULD HAVE NOTICED. OR THE OPPOSITE, BEING SO FIGETY OR RESTLESS THAT YOU HAVE BEEN MOVING AROUND A LOT MORE THAN USUAL: NOT AT ALL
3. TROUBLE FALLING OR STAYING ASLEEP: NOT AT ALL
SUM OF ALL RESPONSES TO PHQ QUESTIONS 1-9: 0
SUM OF ALL RESPONSES TO PHQ QUESTIONS 1-9: 0
4. FEELING TIRED OR HAVING LITTLE ENERGY: NOT AT ALL
9. THOUGHTS THAT YOU WOULD BE BETTER OFF DEAD, OR OF HURTING YOURSELF: NOT AT ALL
1. LITTLE INTEREST OR PLEASURE IN DOING THINGS: NOT AT ALL
SUM OF ALL RESPONSES TO PHQ QUESTIONS 1-9: 0
10. IF YOU CHECKED OFF ANY PROBLEMS, HOW DIFFICULT HAVE THESE PROBLEMS MADE IT FOR YOU TO DO YOUR WORK, TAKE CARE OF THINGS AT HOME, OR GET ALONG WITH OTHER PEOPLE: NOT DIFFICULT AT ALL
7. TROUBLE CONCENTRATING ON THINGS, SUCH AS READING THE NEWSPAPER OR WATCHING TELEVISION: NOT AT ALL

## 2025-06-23 ASSESSMENT — LIFESTYLE VARIABLES
HOW MANY STANDARD DRINKS CONTAINING ALCOHOL DO YOU HAVE ON A TYPICAL DAY: 1 OR 2
HOW OFTEN DO YOU HAVE A DRINK CONTAINING ALCOHOL: 2-4 TIMES A MONTH

## 2025-06-23 NOTE — PATIENT INSTRUCTIONS

## 2025-06-23 NOTE — PROGRESS NOTES
as needed for symptoms of irregular blood glucose. Dispense sufficient amount for indicated testing frequency plus additional to accommodate PRN testing needs. Yes Donita Garcia APRN - CNP   blood glucose test strips (TRUE METRIX BLOOD GLUCOSE TEST) strip Check glucose daily.  DM 2 E 11.9 Yes Donita Garcia APRN - CNP   Blood Glucose Monitoring Suppl (TRUE METRIX METER) w/Device KIT  Yes ProviderMely MD   Alcohol Swabstick 70 % PADS Use prior to checking glucose daily. E11.9 Yes Anjali Sanz MD   Blood Glucose Monitoring Suppl (TRUE METRIX METER) YELENA Check glucose daily. E11.9 Yes Anjali Sanz MD   tamsulosin (FLOMAX) 0.4 MG capsule Take 1 capsule by mouth daily Yes Basilio Carmona MD   aspirin 81 MG tablet Take 1 tablet by mouth daily Yes ProviderMely MD       CareTeam (Including outside providers/suppliers regularly involved in providing care):   Patient Care Team:  Donita Garcia APRN - CNP as PCP - General (Family Nurse Practitioner)  Donita Garcia APRN - CNP as PCP - Empaneled Provider  Diony Mo MD as Consulting Physician (Cardiology)     Recommendations for Preventive Services Due: see orders and patient instructions/AVS.  Recommended screening schedule for the next 5-10 years is provided to the patient in written form: see Patient Instructions/AVS.     Reviewed and updated this visit:  Tobacco  Allergies  Meds

## 2025-06-23 NOTE — TELEPHONE ENCOUNTER
Patient called his insurance company, he was told that Jardiance would be a $0 copay.   [facility]

## 2025-07-08 ENCOUNTER — OFFICE VISIT (OUTPATIENT)
Dept: FAMILY MEDICINE CLINIC | Age: 75
End: 2025-07-08
Payer: MEDICARE

## 2025-07-08 VITALS
TEMPERATURE: 98.1 F | SYSTOLIC BLOOD PRESSURE: 102 MMHG | OXYGEN SATURATION: 97 % | DIASTOLIC BLOOD PRESSURE: 60 MMHG | HEIGHT: 70 IN | HEART RATE: 64 BPM | BODY MASS INDEX: 25.8 KG/M2 | WEIGHT: 180.2 LBS

## 2025-07-08 DIAGNOSIS — R21 RASH AND NONSPECIFIC SKIN ERUPTION: ICD-10-CM

## 2025-07-08 DIAGNOSIS — R21 RASH AND NONSPECIFIC SKIN ERUPTION: Primary | ICD-10-CM

## 2025-07-08 LAB
BASOPHILS # BLD: 0 K/UL (ref 0–0.2)
BASOPHILS NFR BLD: 0.6 %
DEPRECATED RDW RBC AUTO: 13.9 % (ref 12.4–15.4)
EOSINOPHIL # BLD: 0.4 K/UL (ref 0–0.6)
EOSINOPHIL NFR BLD: 5.2 %
HAV IGM SERPL QL IA: NORMAL
HBV CORE IGM SERPL QL IA: NORMAL
HBV SURFACE AG SERPL QL IA: NORMAL
HCT VFR BLD AUTO: 37.6 % (ref 40.5–52.5)
HCV AB SERPL QL IA: NORMAL
HGB BLD-MCNC: 12.7 G/DL (ref 13.5–17.5)
LYMPHOCYTES # BLD: 1.7 K/UL (ref 1–5.1)
LYMPHOCYTES NFR BLD: 21 %
MCH RBC QN AUTO: 32.3 PG (ref 26–34)
MCHC RBC AUTO-ENTMCNC: 33.7 G/DL (ref 31–36)
MCV RBC AUTO: 96 FL (ref 80–100)
MONOCYTES # BLD: 0.9 K/UL (ref 0–1.3)
MONOCYTES NFR BLD: 10.8 %
NEUTROPHILS # BLD: 5 K/UL (ref 1.7–7.7)
NEUTROPHILS NFR BLD: 62.4 %
PLATELET # BLD AUTO: 246 K/UL (ref 135–450)
PMV BLD AUTO: 8.8 FL (ref 5–10.5)
RBC # BLD AUTO: 3.92 M/UL (ref 4.2–5.9)
WBC # BLD AUTO: 8.1 K/UL (ref 4–11)

## 2025-07-08 PROCEDURE — 1159F MED LIST DOCD IN RCRD: CPT

## 2025-07-08 PROCEDURE — 99212 OFFICE O/P EST SF 10 MIN: CPT

## 2025-07-08 PROCEDURE — 1123F ACP DISCUSS/DSCN MKR DOCD: CPT

## 2025-07-08 PROCEDURE — 1160F RVW MEDS BY RX/DR IN RCRD: CPT

## 2025-07-08 RX ORDER — METHYLPREDNISOLONE 4 MG/1
TABLET ORAL
Qty: 1 KIT | Refills: 0 | Status: SHIPPED | OUTPATIENT
Start: 2025-07-08

## 2025-07-08 SDOH — ECONOMIC STABILITY: FOOD INSECURITY: WITHIN THE PAST 12 MONTHS, YOU WORRIED THAT YOUR FOOD WOULD RUN OUT BEFORE YOU GOT MONEY TO BUY MORE.: NEVER TRUE

## 2025-07-08 SDOH — ECONOMIC STABILITY: FOOD INSECURITY: WITHIN THE PAST 12 MONTHS, THE FOOD YOU BOUGHT JUST DIDN'T LAST AND YOU DIDN'T HAVE MONEY TO GET MORE.: NEVER TRUE

## 2025-07-08 ASSESSMENT — PATIENT HEALTH QUESTIONNAIRE - PHQ9
SUM OF ALL RESPONSES TO PHQ QUESTIONS 1-9: 0
1. LITTLE INTEREST OR PLEASURE IN DOING THINGS: NOT AT ALL
SUM OF ALL RESPONSES TO PHQ QUESTIONS 1-9: 0
2. FEELING DOWN, DEPRESSED OR HOPELESS: NOT AT ALL

## 2025-07-08 ASSESSMENT — ENCOUNTER SYMPTOMS
SINUS PRESSURE: 0
CONSTIPATION: 0
ABDOMINAL PAIN: 0
DIARRHEA: 0
CHEST TIGHTNESS: 0
TROUBLE SWALLOWING: 0
SHORTNESS OF BREATH: 0
NAUSEA: 0
COUGH: 0

## 2025-07-08 NOTE — PROGRESS NOTES
Mau Jacinto 75 y.o. male    Chief Complaint   Patient presents with    Other     Rash on legs, back and butt       HPI    Rash:  - started suddenly 2 weeks ago  - no known medication, detergent, soap changes.   - no recent colds, no fever, body aches or chills  -spends a lot of time outside working but no known bug or tick bites  - not itchy or tender  -started on lower legs- most prominent on left lower leg and progressively spreading to bilateral buttocks  - has tried a couple benadryl with no improvement in the rash    Current Outpatient Medications:     empagliflozin (JARDIANCE) 10 MG tablet, Take 1 tablet by mouth daily, Disp: 90 tablet, Rfl: 1    amitriptyline (ELAVIL) 25 MG tablet, TAKE ONE TABLET BY MOUTH EVERY EVENING, Disp: 90 tablet, Rfl: 1    oxyCODONE-acetaminophen (PERCOCET) 5-325 MG per tablet, Take 1 tablet by mouth 3 times daily for 30 days. Max Daily Amount: 3 tablets, Disp: 90 tablet, Rfl: 0    omeprazole (PRILOSEC) 20 MG delayed release capsule, TAKE ONE CAPSULE BY MOUTH EVERY MORNING BEFORE BREAKFAST, Disp: 90 capsule, Rfl: 1    metoprolol succinate (TOPROL XL) 50 MG extended release tablet, Take 0.5 tablets by mouth daily, Disp: 45 tablet, Rfl: 1    clopidogrel (PLAVIX) 75 MG tablet, TAKE 1 TABLET EVERY DAY, Disp: 90 tablet, Rfl: 3    rosuvastatin (CRESTOR) 40 MG tablet, TAKE 1 TABLET EVERY EVENING, Disp: 90 tablet, Rfl: 3    spironolactone (ALDACTONE) 25 MG tablet, TAKE 1/2 TABLET EVERY DAY, Disp: 45 tablet, Rfl: 3    lisinopril (PRINIVIL;ZESTRIL) 2.5 MG tablet, TAKE ONE TABLET BY MOUTH EVERY DAY, Disp: 90 tablet, Rfl: 3    gemfibrozil (LOPID) 600 MG tablet, Take 1 tablet by mouth 2 times daily (before meals), Disp: 180 tablet, Rfl: 5    nitroGLYCERIN (NITROSTAT) 0.4 MG SL tablet, Place 1 tablet under the tongue every 5 minutes as needed for Chest pain, Disp: 25 tablet, Rfl: 3    ONETOUCH ULTRA strip, TEST ONCE DAILY AND AS     NEEDED FOR SYMPTOMS OF     IRREGULAR BLOOD GLUCOSE, Disp: 100

## 2025-07-09 LAB
HIV 1+2 AB+HIV1 P24 AG SERPL QL IA: NORMAL
HIV 2 AB SERPL QL IA: NORMAL
HIV1 AB SERPL QL IA: NORMAL
HIV1 P24 AG SERPL QL IA: NORMAL
REAGIN+T PALLIDUM IGG+IGM SERPL-IMP: NORMAL

## 2025-07-10 LAB — EBV VCA IGM SER IA-ACNC: <10 U/ML (ref 0–43.9)

## 2025-07-10 NOTE — PROGRESS NOTES
DRISS Lowe   [facility] [address] [place] [address]  [phone]      PATIENT NAME: Kory Douglas  : 1955  DATE: 7/10/25  MRN: [id number]      Billing Provider: DRISS Lwoe  Level of Service:   Patient PCP Information       Provider PCP Type    DRISS Lowe General            Reason for Visit / Chief Complaint: Follow-up (.There are no preventive care reminders to display for this patient./)       Update PCP  Update Chief Complaint         History of Present Illness / Problem Focused Workflow     Kory Douglas presents to the clinic with Follow-up (.There are no preventive care reminders to display for this patient./)     Pt presents for routine follow up with lab and med refills. Overall doing well.      BP appears poorly controlled today. Home meds reviewed     continue present plan and medications. Recommended patient to check home readings to monitor and see me for followup as scheduled or sooner as needed.   Discussed sodium restriction, maintaining ideal body weight and regular exercise program as physiologic means to continue to achieve blood pressure control in addition to medication compliance.  Patient was educated that both decongestant and anti-inflammatory medication may raise blood pressure.    Advised to monitor BP at home. Advised on optimal BP readings - SBP < 130 & DBP < 80. Advised to call office for any persistent BP elevation and may have to prescribe or adjust BP med(s).  Recommended DASH diet, stay well hydrated with water daily, eliminate or decrease caffeinated and high calorie drinks, increase physical activity, and lose weight if BMI > 25.0. Written patient education information provided to patient with goals and recommendations to assist with BP management.     Discussed need for low fat/low cholesterol diet, regular exercise, and weight control.  Patient presents to the device clinic today for a programming evaluation for his defibrillator. Patient has a history of pVT and ICm. Takes Coreg and Plavix. Last device interrogation was on 1/15. Since then, 1 PMT event recorded. PVCs since last device interrogation in office on 12/23 - 1.7K,  PACs 233. Trigeminal ectopy is noted during V threshold testing. RV and LV lead alerts noted on device- likely d/t ectopy during auto testing, trends are stable. AP <1% RVP 2%% LVP 99%    All sensing and pacing parameters are within normal range. No changes need to be made at this time. Patient education was provided about device functionality, in home monitoring, and any other patient questions and/or concerns were addressed. Patient voices understanding. Please see interrogation for more detail. Patient will see Dr. Rikki Lawrence today in office. Patient will follow up in 3 months in office or remotely. See Paceart report under the Cardiology tab.   Cardiovascular risk and specific lipid/LDL goals reviewed.        Review of Systems     Review of Systems   Constitutional:  Negative for fatigue and fever.   HENT:  Negative for nasal congestion and sore throat.    Eyes:  Negative for visual disturbance.   Respiratory:  Negative for chest tightness and shortness of breath.    Cardiovascular:  Negative for chest pain and leg swelling.   Gastrointestinal:  Negative for abdominal pain and change in bowel habit.   Endocrine: Negative for polydipsia, polyphagia and polyuria.   Genitourinary:  Negative for dysuria and hematuria.   Musculoskeletal:  Negative for back pain and leg pain.   Integumentary:  Negative for rash.   Neurological:  Negative for dizziness, syncope, weakness and light-headedness.        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 The patient's tumor invades the bladder neck in the resection margins are positive areas. did not get XRT.    Erectile dysfunction after radical prostatectomy 12/16/2021    No meds tried to date Reviewed all methods of ED treatment    Hyperlipidemia     Hypertension     Personal history of colonic polyps 06/19/2019       Past Surgical History:   Procedure Laterality Date    ADENOIDECTOMY      COLONOSCOPY W/ BIOPSIES  06/19/2019    PROSTATE SURGERY         Social History  Mr. Douglas  reports that he has never smoked. He has been exposed to tobacco smoke. He has never used smokeless tobacco. He reports that he does not drink alcohol and does not use drugs.    Family History  Mr. Douglas's family history includes No Known Problems in his father and mother.    Medications and Allergies     Medications  No outpatient medications have been marked as taking for the 7/10/25 encounter (Office Visit) with Evelina Gongora ACNP.       Allergies  Review of patient's allergies  indicates:  No Known Allergies    Physical Examination     Vitals:    07/10/25 0819   BP: (!) 169/89   Pulse: 80   Resp: 16   Temp: 98.6 °F (37 °C)     Physical Exam  Eyes:      Pupils: Pupils are equal, round, and reactive to light.   Cardiovascular:      Rate and Rhythm: Normal rate and regular rhythm.      Heart sounds: Normal heart sounds. No murmur heard.  Pulmonary:      Breath sounds: Normal breath sounds. No wheezing, rhonchi or rales.   Abdominal:      General: Bowel sounds are normal.      Palpations: Abdomen is soft.   Musculoskeletal:         General: No swelling.      Cervical back: Normal range of motion and neck supple.   Skin:     General: Skin is warm and dry.   Neurological:      Mental Status: He is alert and oriented to person, place, and time.          Lab Results   Component Value Date    WBC 7.97 07/10/2023    HGB 13.1 (L) 07/10/2023    HCT 43.1 07/10/2023    MCV 90.9 07/10/2023     07/10/2023        Sodium   Date Value Ref Range Status   01/09/2025 141 136 - 145 mmol/L Final     Potassium   Date Value Ref Range Status   01/09/2025 4.1 3.5 - 5.1 mmol/L Final     Chloride   Date Value Ref Range Status   01/09/2025 105 98 - 107 mmol/L Final     CO2   Date Value Ref Range Status   01/09/2025 28 23 - 31 mmol/L Final     Glucose   Date Value Ref Range Status   01/09/2025 82 82 - 115 mg/dL Final     BUN   Date Value Ref Range Status   01/09/2025 14 8 - 26 mg/dL Final     Creatinine   Date Value Ref Range Status   01/09/2025 0.90 0.72 - 1.25 mg/dL Final     Calcium   Date Value Ref Range Status   01/09/2025 8.8 8.8 - 10.0 mg/dL Final     Total Protein   Date Value Ref Range Status   01/09/2025 7.4 5.8 - 7.6 g/dL Final     Albumin   Date Value Ref Range Status   01/09/2025 4.1 3.4 - 4.8 g/dL Final     Bilirubin, Total   Date Value Ref Range Status   01/09/2025 0.9 <=1.5 mg/dL Final     Alk Phos   Date Value Ref Range Status   01/09/2025 66 40 - 150 U/L Final     AST   Date Value Ref Range  "Status   01/09/2025 24 5 - 34 U/L Final     ALT   Date Value Ref Range Status   01/09/2025 15 <=55 U/L Final     Anion Gap   Date Value Ref Range Status   01/09/2025 12 7 - 16 mmol/L Final     eGFR   Date Value Ref Range Status   01/09/2025 92 >=60 mL/min/1.73m2 Final      Lab Results   Component Value Date    HGBA1C 5.2 01/09/2025      Lab Results   Component Value Date    CHOL 175 01/09/2025    CHOL 181 07/08/2024    CHOL 151 01/08/2024     Lab Results   Component Value Date    HDL 39 01/09/2025    HDL 40 07/08/2024    HDL 39 (L) 01/08/2024     Lab Results   Component Value Date    LDLCALC 116 01/09/2025    LDLCALC 114 07/08/2024    LDLCALC 92 01/08/2024     No results found for: "DLDL"  Lab Results   Component Value Date    TRIG 101 01/09/2025    TRIG 134 07/08/2024    TRIG 99 01/08/2024     Lab Results   Component Value Date    CHOLHDL 4.5 01/09/2025    CHOLHDL 4.5 07/08/2024    CHOLHDL 3.9 01/08/2024      Lab Results   Component Value Date    TSH 0.793 02/07/2022        Assessment and Plan (including Health Maintenance)      Problem List  Smart Sets  Document Outside HM   :    Plan:     1. Essential hypertension  Assessment & Plan:  BP Readings from Last 3 Encounters:   07/10/25 (!) 169/89   01/14/25 132/71   01/09/25 (!) 156/79    Goal less 130/80  Follow up 2 weeks bp check     Orders:  -     CBC Auto Differential; Future; Expected date: 07/10/2025  -     Comprehensive Metabolic Panel; Future; Expected date: 07/10/2025  -     hydroCHLOROthiazide (HYDRODIURIL) 25 MG tablet; Take 1 tablet (25 mg total) by mouth once daily.  Dispense: 90 tablet; Refill: 1    2. Hyperlipidemia, unspecified hyperlipidemia type  Assessment & Plan:  Lab Results   Component Value Date    CHOL 175 01/09/2025    CHOL 181 07/08/2024    CHOL 151 01/08/2024      Lab Results   Component Value Date    HDL 39 01/09/2025    HDL 40 07/08/2024    HDL 39 (L) 01/08/2024     Lab Results   Component Value Date    LDLCALC 116 01/09/2025    LDLCALC " "114 07/08/2024    LDLCALC 92 01/08/2024      Lab Results   Component Value Date    TRIG 101 01/09/2025    TRIG 134 07/08/2024    TRIG 99 01/08/2024     No results found for: "TOTALCHOLEST"  Lab Results   Component Value Date    NONHDLCHOL 136 01/09/2025    NONHDLCHOL 141 07/08/2024    NONHDLCHOL 112 01/08/2024     Lab Results   Component Value Date    CHOLHDL 4.5 01/09/2025    CHOLHDL 4.5 07/08/2024    CHOLHDL 3.9 01/08/2024    The current medical regimen is effective;  continue present plan and medications.  Cont crestor     Orders:  -     Lipid Panel; Future; Expected date: 07/10/2025    Other orders  -     amLODIPine (NORVASC) 10 MG tablet; Take 1 tablet (10 mg total) by mouth once daily.  Dispense: 90 tablet; Refill: 1  -     rosuvastatin (CRESTOR) 10 MG tablet; Take 1 tablet (10 mg total) by mouth every evening.  Dispense: 90 tablet; Refill: 1         There are no Patient Instructions on file for this visit.     There are no preventive care reminders to display for this patient.      Health Maintenance Topics with due status: Not Due       Topic Last Completion Date    TETANUS VACCINE 03/25/2023    Colorectal Cancer Screening 11/12/2024    PROSTATE-SPECIFIC ANTIGEN 01/09/2025    Influenza Vaccine 01/09/2025    Diabetes Urine Screening 01/09/2025    Lipid Panel 01/09/2025    Hemoglobin A1c 01/09/2025       Future Appointments   Date Time Provider Department Center   7/21/2025  8:00 AM Tayo Gardner MD [facility]   1/12/2026  8:00 AM Evelina Gongora ACNP [facility] [place]   6/15/2026  1:00 PM AWV NURSE, [facility] [place]            Signature:  DRISS Lowe  [facility] [address] [place] [address]  [phone]    Date of encounter: 7/10/25    "

## 2025-07-14 ENCOUNTER — TELEPHONE (OUTPATIENT)
Dept: FAMILY MEDICINE CLINIC | Age: 75
End: 2025-07-14

## 2025-07-14 NOTE — TELEPHONE ENCOUNTER
Patient returning call to [place] after visit.   Went to Dermatologist [facility], they ordered cream for him for his legs. They recommended  he Start using dove soap, and keep legs elevated.     GALE

## 2025-07-21 PROCEDURE — 93297 REM INTERROG DEV EVAL ICPMS: CPT | Performed by: INTERNAL MEDICINE

## 2025-07-25 DIAGNOSIS — N18.31 TYPE 2 DIABETES MELLITUS WITH STAGE 3A CHRONIC KIDNEY DISEASE, WITHOUT LONG-TERM CURRENT USE OF INSULIN (HCC): ICD-10-CM

## 2025-07-25 DIAGNOSIS — E11.22 TYPE 2 DIABETES MELLITUS WITH STAGE 3A CHRONIC KIDNEY DISEASE, WITHOUT LONG-TERM CURRENT USE OF INSULIN (HCC): ICD-10-CM

## 2025-07-26 LAB
ANION GAP SERPL CALCULATED.3IONS-SCNC: 15 MMOL/L (ref 3–16)
BUN SERPL-MCNC: 43 MG/DL (ref 7–20)
CALCIUM SERPL-MCNC: 9.7 MG/DL (ref 8.3–10.6)
CHLORIDE SERPL-SCNC: 103 MMOL/L (ref 99–110)
CO2 SERPL-SCNC: 17 MMOL/L (ref 21–32)
CREAT SERPL-MCNC: 2 MG/DL (ref 0.8–1.3)
GFR SERPLBLD CREATININE-BSD FMLA CKD-EPI: 34 ML/MIN/{1.73_M2}
GLUCOSE SERPL-MCNC: 114 MG/DL (ref 70–99)
POTASSIUM SERPL-SCNC: 4.7 MMOL/L (ref 3.5–5.1)
SODIUM SERPL-SCNC: 135 MMOL/L (ref 136–145)

## 2025-07-28 RX ORDER — METOPROLOL SUCCINATE 50 MG/1
TABLET, EXTENDED RELEASE ORAL
Qty: 45 TABLET | Refills: 2 | Status: SHIPPED | OUTPATIENT
Start: 2025-07-28

## 2025-07-29 ENCOUNTER — TELEPHONE (OUTPATIENT)
Dept: FAMILY MEDICINE CLINIC | Age: 75
End: 2025-07-29

## (undated) DEVICE — BOWL MED L 32OZ PLAS W/ MOLD GRAD EZ OPN PEEL PCH

## (undated) DEVICE — DBD-PACK,CYSTOSCOPY,PK VI,AURORA: Brand: MEDLINE

## (undated) DEVICE — MEDI-VAC NON-CONDUCTIVE SUCTION TUBING: Brand: CARDINAL HEALTH

## (undated) DEVICE — COVER BOOT THK2MIL UNIV POLYETH IMPERMEABLE FLD RESIST DISP

## (undated) DEVICE — GAUZE,SPONGE,4"X4",8PLY,STRL,LF,10/TRAY: Brand: MEDLINE

## (undated) DEVICE — Y-TYPE TUR/BLADDER IRRIGATION SET, REGULATING CLAMP

## (undated) DEVICE — GLOVE ORANGE PI 7 1/2   MSG9075

## (undated) DEVICE — SOLUTION IV IRRIG WATER 1000ML POUR BRL 2F7114

## (undated) DEVICE — SOLUTION IRRIG 3000ML 1.5% GL USP UROMATIC CONT

## (undated) DEVICE — BAG URIN STRL FOR URO CTCH SYS

## (undated) DEVICE — ELECTRODE PT RET AD L9FT HI MOIST COND ADH HYDRGEL CORDED

## (undated) DEVICE — GOWN SIRUS NONREIN XL W/TWL: Brand: MEDLINE INDUSTRIES, INC.